# Patient Record
Sex: MALE | Race: WHITE | HISPANIC OR LATINO | URBAN - METROPOLITAN AREA
[De-identification: names, ages, dates, MRNs, and addresses within clinical notes are randomized per-mention and may not be internally consistent; named-entity substitution may affect disease eponyms.]

---

## 2017-01-30 ENCOUNTER — INPATIENT (INPATIENT)
Facility: HOSPITAL | Age: 82
LOS: 13 days | Discharge: NURSING, REHAB OR LONG-TERM CARE | End: 2017-02-13
Attending: FAMILY MEDICINE | Admitting: FAMILY MEDICINE
Payer: MEDICARE

## 2017-01-30 VITALS
HEART RATE: 98 BPM | HEIGHT: 64 IN | SYSTOLIC BLOOD PRESSURE: 194 MMHG | RESPIRATION RATE: 18 BRPM | OXYGEN SATURATION: 97 % | DIASTOLIC BLOOD PRESSURE: 88 MMHG | WEIGHT: 169.98 LBS

## 2017-01-30 LAB
ALBUMIN SERPL ELPH-MCNC: 3.9 G/DL — SIGNIFICANT CHANGE UP (ref 3.3–5)
ALP SERPL-CCNC: 74 U/L — SIGNIFICANT CHANGE UP (ref 40–120)
ALT FLD-CCNC: 21 U/L — SIGNIFICANT CHANGE UP (ref 12–78)
ANION GAP SERPL CALC-SCNC: 8 MMOL/L — SIGNIFICANT CHANGE UP (ref 5–17)
ANISOCYTOSIS BLD QL: SLIGHT — SIGNIFICANT CHANGE UP
APTT BLD: 29.2 SEC — SIGNIFICANT CHANGE UP (ref 27.5–37.4)
AST SERPL-CCNC: 18 U/L — SIGNIFICANT CHANGE UP (ref 15–37)
BASE EXCESS BLDV CALC-SCNC: 6.4 MMOL/L — HIGH (ref -2–2)
BILIRUB SERPL-MCNC: 0.4 MG/DL — SIGNIFICANT CHANGE UP (ref 0.2–1.2)
BUN SERPL-MCNC: 31 MG/DL — HIGH (ref 7–23)
CALCIUM SERPL-MCNC: 8.7 MG/DL — SIGNIFICANT CHANGE UP (ref 8.5–10.1)
CHLORIDE SERPL-SCNC: 97 MMOL/L — SIGNIFICANT CHANGE UP (ref 96–108)
CO2 SERPL-SCNC: 31 MMOL/L — SIGNIFICANT CHANGE UP (ref 22–31)
CREAT SERPL-MCNC: 3.77 MG/DL — HIGH (ref 0.5–1.3)
EOSINOPHIL NFR BLD AUTO: 2 % — SIGNIFICANT CHANGE UP (ref 0–6)
GLUCOSE SERPL-MCNC: 143 MG/DL — HIGH (ref 70–99)
HCO3 BLDV-SCNC: 32 MMOL/L — HIGH (ref 21–29)
HCT VFR BLD CALC: 39 % — SIGNIFICANT CHANGE UP (ref 39–50)
HGB BLD-MCNC: 12.8 G/DL — LOW (ref 13–17)
INR BLD: 0.96 RATIO — SIGNIFICANT CHANGE UP (ref 0.88–1.16)
LACTATE SERPL-SCNC: 1.2 MMOL/L — SIGNIFICANT CHANGE UP (ref 0.7–2)
LG PLATELETS BLD QL AUTO: SLIGHT — SIGNIFICANT CHANGE UP
LYMPHOCYTES # BLD AUTO: 2 % — LOW (ref 13–44)
MCHC RBC-ENTMCNC: 29.7 PG — SIGNIFICANT CHANGE UP (ref 27–34)
MCHC RBC-ENTMCNC: 32.9 GM/DL — SIGNIFICANT CHANGE UP (ref 32–36)
MCV RBC AUTO: 90.3 FL — SIGNIFICANT CHANGE UP (ref 80–100)
MONOCYTES NFR BLD AUTO: 12 % — SIGNIFICANT CHANGE UP (ref 2–14)
NEUTROPHILS NFR BLD AUTO: 82 % — HIGH (ref 43–77)
NEUTS BAND # BLD: 2 % — SIGNIFICANT CHANGE UP (ref 0–8)
PCO2 BLDV: 52 MMHG — HIGH (ref 35–50)
PH BLDV: 7.41 — SIGNIFICANT CHANGE UP (ref 7.35–7.45)
PLAT MORPH BLD: NORMAL — SIGNIFICANT CHANGE UP
PLATELET # BLD AUTO: 219 K/UL — SIGNIFICANT CHANGE UP (ref 150–400)
PO2 BLDV: 27 MMHG — SIGNIFICANT CHANGE UP (ref 25–45)
POIKILOCYTOSIS BLD QL AUTO: SLIGHT — SIGNIFICANT CHANGE UP
POTASSIUM SERPL-MCNC: 4 MMOL/L — SIGNIFICANT CHANGE UP (ref 3.5–5.3)
POTASSIUM SERPL-SCNC: 4 MMOL/L — SIGNIFICANT CHANGE UP (ref 3.5–5.3)
PROT SERPL-MCNC: 8 GM/DL — SIGNIFICANT CHANGE UP (ref 6–8.3)
PROTHROM AB SERPL-ACNC: 10.6 SEC — SIGNIFICANT CHANGE UP (ref 10–13.1)
RAPID RVP RESULT: SIGNIFICANT CHANGE UP
RBC # BLD: 4.32 M/UL — SIGNIFICANT CHANGE UP (ref 4.2–5.8)
RBC # FLD: 13.4 % — SIGNIFICANT CHANGE UP (ref 10.3–14.5)
RBC BLD AUTO: NORMAL — SIGNIFICANT CHANGE UP
SAO2 % BLDV: 43 % — LOW (ref 67–88)
SODIUM SERPL-SCNC: 136 MMOL/L — SIGNIFICANT CHANGE UP (ref 135–145)
TROPONIN I SERPL-MCNC: 0.11 NG/ML — HIGH (ref 0.01–0.04)
WBC # BLD: 11.2 K/UL — HIGH (ref 3.8–10.5)
WBC # FLD AUTO: 11.2 K/UL — HIGH (ref 3.8–10.5)

## 2017-01-30 PROCEDURE — 70450 CT HEAD/BRAIN W/O DYE: CPT | Mod: 26

## 2017-01-30 PROCEDURE — 99285 EMERGENCY DEPT VISIT HI MDM: CPT

## 2017-01-30 PROCEDURE — 71260 CT THORAX DX C+: CPT | Mod: 26

## 2017-01-30 PROCEDURE — 74177 CT ABD & PELVIS W/CONTRAST: CPT | Mod: 26

## 2017-01-30 PROCEDURE — 93010 ELECTROCARDIOGRAM REPORT: CPT

## 2017-01-30 PROCEDURE — 71010: CPT | Mod: 26

## 2017-01-30 RX ORDER — ACETAMINOPHEN 500 MG
650 TABLET ORAL ONCE
Qty: 0 | Refills: 0 | Status: COMPLETED | OUTPATIENT
Start: 2017-01-30 | End: 2017-01-30

## 2017-01-30 RX ORDER — PIPERACILLIN AND TAZOBACTAM 4; .5 G/20ML; G/20ML
3.38 INJECTION, POWDER, LYOPHILIZED, FOR SOLUTION INTRAVENOUS ONCE
Qty: 0 | Refills: 0 | Status: COMPLETED | OUTPATIENT
Start: 2017-01-30 | End: 2017-01-30

## 2017-01-30 RX ORDER — SODIUM CHLORIDE 9 MG/ML
500 INJECTION INTRAMUSCULAR; INTRAVENOUS; SUBCUTANEOUS ONCE
Qty: 0 | Refills: 0 | Status: COMPLETED | OUTPATIENT
Start: 2017-01-30 | End: 2017-01-30

## 2017-01-30 RX ORDER — SODIUM CHLORIDE 9 MG/ML
3 INJECTION INTRAMUSCULAR; INTRAVENOUS; SUBCUTANEOUS ONCE
Qty: 0 | Refills: 0 | Status: COMPLETED | OUTPATIENT
Start: 2017-01-30 | End: 2017-01-30

## 2017-01-30 RX ORDER — VANCOMYCIN HCL 1 G
1000 VIAL (EA) INTRAVENOUS ONCE
Qty: 0 | Refills: 0 | Status: COMPLETED | OUTPATIENT
Start: 2017-01-30 | End: 2017-01-30

## 2017-01-30 RX ADMIN — Medication 650 MILLIGRAM(S): at 13:00

## 2017-01-30 RX ADMIN — SODIUM CHLORIDE 500 MILLILITER(S): 9 INJECTION INTRAMUSCULAR; INTRAVENOUS; SUBCUTANEOUS at 11:55

## 2017-01-30 RX ADMIN — Medication 650 MILLIGRAM(S): at 21:00

## 2017-01-30 RX ADMIN — Medication 250 MILLIGRAM(S): at 16:10

## 2017-01-30 RX ADMIN — SODIUM CHLORIDE 3 MILLILITER(S): 9 INJECTION INTRAMUSCULAR; INTRAVENOUS; SUBCUTANEOUS at 18:00

## 2017-01-30 RX ADMIN — PIPERACILLIN AND TAZOBACTAM 200 GRAM(S): 4; .5 INJECTION, POWDER, LYOPHILIZED, FOR SOLUTION INTRAVENOUS at 15:13

## 2017-01-30 NOTE — ED PROVIDER NOTE - PROGRESS NOTE DETAILS
Pt seen and evaluated by ED attending (Dr. Burgos) agree with plan and assessment as set forth by Resident (Dr. Monge). Pt seen with resident.  Pt is now oriented to place and name.  States was confused earlier after dialysis and thought he was in another clinic.  Explained that we wish to do lp to r/o cns infection.   Risks and benefitis explained in Macedonian to pt.  He is in agreement to have test performed. Dr. Anaya Lp attempted by Dr. Monge under aseptic conditions without success.  Supervision by me. Pt tolerated attempts well. Dr. Anaya: Lp attempted by Dr. Monge under aseptic conditions without success.  Supervision by me. Pt tolerated attempts well. Dr. Anaya: Lp attempted by Dr. Monge under aseptic conditions without success.  Pt tolerated attempts well. Dr. Anaya: Lp attempted by Dr. Monge under aseptic conditions without success.  Pt tolerated attempts well. I was available to Dr. Monge during procedure for consultation.

## 2017-01-30 NOTE — ED PROVIDER NOTE - MEDICAL DECISION MAKING DETAILS
87M hx multiple medical comorbidities presents with AMS, cough, and RLQ this AM after routine dialysis.  Hypertensive, with mild tachycardia and rectal temp >103F.  Concern for sepsis.  will obtain labs including lactate, cultures, urine and RVP.  CXR.  given RLQ ttp will obtain CT to eval for source of infection.  500cc IVF bolus to start given ESRD.  reassess, TBA.

## 2017-01-30 NOTE — ED ADULT NURSE REASSESSMENT NOTE - NS ED NURSE REASSESS COMMENT FT1
Pt more alert at this time. Pt able to verbalize understanding of current plan of care and need for urine sample. Will continue to monitor.

## 2017-01-30 NOTE — ED PROVIDER NOTE - DETAILS:
Hx of dialysis with change in ms and fever. Pt greatly improved.  Presented by resident Dr. Monge.  Agree with plan of care.

## 2017-01-30 NOTE — ED ADULT NURSE NOTE - OBJECTIVE STATEMENT
Pt unable to verbalize what is happening. Pt presents with AMS, pt care taker states he had AMS after dialysis this AM. Spoke to pts daughter, who states this often happens and he has a h/o pneumonia.

## 2017-01-30 NOTE — ED PROCEDURE NOTE - PROCEDURE ADDITIONAL DETAILS
Unable to sample CSF fluid in both lateral recumbent and sitting position likely 2/2 positioning v habitus.

## 2017-01-30 NOTE — ED PROVIDER NOTE - PSH
AICD (automatic cardioverter/defibrillator) present    History of Hernia Repair    History of penile implant

## 2017-01-30 NOTE — ED PROVIDER NOTE - CARE PLAN
Principal Discharge DX:	Sepsis, due to unspecified organism Principal Discharge DX:	Sepsis, due to unspecified organism  Secondary Diagnosis:	Alteration in cognition

## 2017-01-30 NOTE — ED ADULT NURSE REASSESSMENT NOTE - NS ED NURSE REASSESS COMMENT FT1
MD Monge attempted LP. Pt and family updated as to current plan of care. Daughter now at pt bedside. Will continue to monitor.

## 2017-01-30 NOTE — ED ADULT NURSE REASSESSMENT NOTE - NS ED NURSE REASSESS COMMENT FT1
Pt and family aware of current plan of care. Pt and family verbalize understanding of reason for admission and have no questions at this time. Will continue to waqar.

## 2017-01-30 NOTE — ED ADULT NURSE REASSESSMENT NOTE - NS ED NURSE REASSESS COMMENT FT1
Spoke to pts daughter as to pt care and status. Pts daughter stated pt has a h/o pneumonia. MD Monge made aware. MD Monge made aware of pts BP. Unable to obtain urine sample due to being dialysised this morning, unable to straight cath pt due to penial implant, MD Monge aware. Will continue to monitor.

## 2017-01-30 NOTE — ED ADULT NURSE REASSESSMENT NOTE - NS ED NURSE REASSESS COMMENT FT1
Spoke with pt family about admission status and bed placement. Pt temp increased, MD Monge made aware, pt medicated per MD order. Will continue to monitor,

## 2017-01-30 NOTE — ED ADULT NURSE NOTE - PMH
CHF (Congestive Heart Failure)    Diabetes    Dialysis Patient    Heart Attack    High Cholesterol    HTN (Hypertension)

## 2017-01-30 NOTE — ED PROVIDER NOTE - PMH
AICD (automatic cardioverter/defibrillator) present    CHF (Congestive Heart Failure)    Diabetes    Dialysis Patient    Heart Attack    High Cholesterol    HTN (Hypertension)    Pancreatitis    Pulmonary edema

## 2017-01-30 NOTE — ED PROVIDER NOTE - NS ED ATTENDING STATEMENT MOD
I have personally seen and examined this patient. I have fully participated in the care of this patient. I have reviewed all pertinent clinical information, including history physical exam, plan and the Resident's note and agree except as noted I personally performed the services described in the documentation, reviewed the documentation recorded by the scribe in my presence and it accurately and completely records my words and action.

## 2017-01-30 NOTE — ED PROVIDER NOTE - OBJECTIVE STATEMENT
87m hx multiple medical comorbidities including DM on insulin, ESRD on HD (MWF) presents with AMS worsening after HD this morning.  + cough.  As per EMS pt was HTN with -200s and mildly tachycardic.  POC blood glucose 158 in field.  Pt Vietnamese speaking and arrived with new HHA who is also Occitan speaking and know little about the patient's history.      SHANELL - Danielle Perales [phone] 87m hx multiple medical comorbidities including DM on insulin, ESRD on HD (MWF), PPM? presents with AMS worsening after HD this morning.  + cough.  As per EMS pt was HTN with -200s and mildly tachycardic.  POC blood glucose 158 in field.  No known trauma.  Pt Uzbek speaking and arrived with new HHA who is also Bulgarian speaking and know little about the patient's history.      SHANELL - Danielle Perales [phone]

## 2017-01-30 NOTE — ED PROCEDURE NOTE - NS ED PROCEDURE ASSISTED BY
The procedure was performed independently Supervision was available/The procedure was performed independently

## 2017-01-30 NOTE — ED PROVIDER NOTE - ATTENDING CONTRIBUTION TO CARE
87M hx ESRD/HD BIBA for confusion after HD today -- febrile in ED with tender lower abd.  No other complaints.  Limited historian.  Not hypoglycemic.    Plan: labs, Abx, CT, admit Hx of dialysis with change in ms and fever. Pt greatly improved.  Presented by resident Dr. Monge.  Agree with plan of care.

## 2017-01-31 DIAGNOSIS — E11.22 TYPE 2 DIABETES MELLITUS WITH DIABETIC CHRONIC KIDNEY DISEASE: ICD-10-CM

## 2017-01-31 DIAGNOSIS — I10 ESSENTIAL (PRIMARY) HYPERTENSION: ICD-10-CM

## 2017-01-31 DIAGNOSIS — I25.10 ATHEROSCLEROTIC HEART DISEASE OF NATIVE CORONARY ARTERY WITHOUT ANGINA PECTORIS: ICD-10-CM

## 2017-01-31 DIAGNOSIS — Z99.2 DEPENDENCE ON RENAL DIALYSIS: ICD-10-CM

## 2017-01-31 DIAGNOSIS — R55 SYNCOPE AND COLLAPSE: ICD-10-CM

## 2017-01-31 DIAGNOSIS — G93.40 ENCEPHALOPATHY, UNSPECIFIED: ICD-10-CM

## 2017-01-31 DIAGNOSIS — I50.9 HEART FAILURE, UNSPECIFIED: ICD-10-CM

## 2017-01-31 DIAGNOSIS — A41.9 SEPSIS, UNSPECIFIED ORGANISM: ICD-10-CM

## 2017-01-31 DIAGNOSIS — R79.89 OTHER SPECIFIED ABNORMAL FINDINGS OF BLOOD CHEMISTRY: ICD-10-CM

## 2017-01-31 DIAGNOSIS — N18.6 END STAGE RENAL DISEASE: ICD-10-CM

## 2017-01-31 DIAGNOSIS — R41.89 OTHER SYMPTOMS AND SIGNS INVOLVING COGNITIVE FUNCTIONS AND AWARENESS: ICD-10-CM

## 2017-01-31 DIAGNOSIS — E78.5 HYPERLIPIDEMIA, UNSPECIFIED: ICD-10-CM

## 2017-01-31 DIAGNOSIS — E11.9 TYPE 2 DIABETES MELLITUS WITHOUT COMPLICATIONS: ICD-10-CM

## 2017-01-31 DIAGNOSIS — I16.0 HYPERTENSIVE URGENCY: ICD-10-CM

## 2017-01-31 DIAGNOSIS — E03.9 HYPOTHYROIDISM, UNSPECIFIED: ICD-10-CM

## 2017-01-31 DIAGNOSIS — I50.22 CHRONIC SYSTOLIC (CONGESTIVE) HEART FAILURE: ICD-10-CM

## 2017-01-31 LAB
ANION GAP SERPL CALC-SCNC: 12 MMOL/L — SIGNIFICANT CHANGE UP (ref 5–17)
BASOPHILS # BLD AUTO: 0.2 K/UL — SIGNIFICANT CHANGE UP (ref 0–0.2)
BASOPHILS NFR BLD AUTO: 1.9 % — SIGNIFICANT CHANGE UP (ref 0–2)
BUN SERPL-MCNC: 49 MG/DL — HIGH (ref 7–23)
CALCIUM SERPL-MCNC: 8.3 MG/DL — LOW (ref 8.5–10.1)
CHLORIDE SERPL-SCNC: 98 MMOL/L — SIGNIFICANT CHANGE UP (ref 96–108)
CHOLEST SERPL-MCNC: 113 MG/DL — SIGNIFICANT CHANGE UP (ref 10–199)
CK SERPL-CCNC: 403 U/L — HIGH (ref 26–308)
CK SERPL-CCNC: 454 U/L — HIGH (ref 26–308)
CK SERPL-CCNC: 501 U/L — HIGH (ref 26–308)
CO2 SERPL-SCNC: 27 MMOL/L — SIGNIFICANT CHANGE UP (ref 22–31)
CREAT SERPL-MCNC: 5.53 MG/DL — HIGH (ref 0.5–1.3)
EOSINOPHIL # BLD AUTO: 0 K/UL — SIGNIFICANT CHANGE UP (ref 0–0.5)
EOSINOPHIL NFR BLD AUTO: 0.3 % — SIGNIFICANT CHANGE UP (ref 0–6)
GLUCOSE SERPL-MCNC: 155 MG/DL — HIGH (ref 70–99)
HBA1C BLD-MCNC: 6.8 % — HIGH (ref 4–5.6)
HCT VFR BLD CALC: 37.3 % — LOW (ref 39–50)
HDLC SERPL-MCNC: 52 MG/DL — SIGNIFICANT CHANGE UP (ref 40–125)
HGB BLD-MCNC: 12.2 G/DL — LOW (ref 13–17)
LACTATE SERPL-SCNC: 0.9 MMOL/L — SIGNIFICANT CHANGE UP (ref 0.7–2)
LIPID PNL WITH DIRECT LDL SERPL: 40 MG/DL — SIGNIFICANT CHANGE UP
LYMPHOCYTES # BLD AUTO: 0.5 K/UL — LOW (ref 1–3.3)
LYMPHOCYTES # BLD AUTO: 6 % — LOW (ref 13–44)
MAGNESIUM SERPL-MCNC: 2.4 MG/DL — SIGNIFICANT CHANGE UP (ref 1.8–2.4)
MCHC RBC-ENTMCNC: 29.6 PG — SIGNIFICANT CHANGE UP (ref 27–34)
MCHC RBC-ENTMCNC: 32.7 GM/DL — SIGNIFICANT CHANGE UP (ref 32–36)
MCV RBC AUTO: 90.3 FL — SIGNIFICANT CHANGE UP (ref 80–100)
MONOCYTES # BLD AUTO: 1.3 K/UL — HIGH (ref 0–0.9)
MONOCYTES NFR BLD AUTO: 16.3 % — HIGH (ref 2–14)
NEUTROPHILS # BLD AUTO: 6.1 K/UL — SIGNIFICANT CHANGE UP (ref 1.8–7.4)
NEUTROPHILS NFR BLD AUTO: 75.6 % — SIGNIFICANT CHANGE UP (ref 43–77)
PHOSPHATE SERPL-MCNC: 4.6 MG/DL — HIGH (ref 2.5–4.5)
PLATELET # BLD AUTO: 194 K/UL — SIGNIFICANT CHANGE UP (ref 150–400)
POTASSIUM SERPL-MCNC: 4.4 MMOL/L — SIGNIFICANT CHANGE UP (ref 3.5–5.3)
POTASSIUM SERPL-SCNC: 4.4 MMOL/L — SIGNIFICANT CHANGE UP (ref 3.5–5.3)
RBC # BLD: 4.13 M/UL — LOW (ref 4.2–5.8)
RBC # FLD: 14.1 % — SIGNIFICANT CHANGE UP (ref 10.3–14.5)
SODIUM SERPL-SCNC: 137 MMOL/L — SIGNIFICANT CHANGE UP (ref 135–145)
T4 FREE SERPL-MCNC: 1.13 NG/DL — SIGNIFICANT CHANGE UP (ref 0.76–1.46)
TOTAL CHOLESTEROL/HDL RATIO MEASUREMENT: 2.2 RATIO — LOW (ref 3.4–9.6)
TRIGL SERPL-MCNC: 104 MG/DL — SIGNIFICANT CHANGE UP (ref 10–149)
TROPONIN I SERPL-MCNC: 0.1 NG/ML — HIGH (ref 0.01–0.04)
TROPONIN I SERPL-MCNC: 0.11 NG/ML — HIGH (ref 0.01–0.04)
TROPONIN I SERPL-MCNC: 0.11 NG/ML — HIGH (ref 0.01–0.04)
TROPONIN I SERPL-MCNC: 0.14 NG/ML — HIGH (ref 0.01–0.04)
TSH SERPL-MCNC: 2.16 UU/ML — SIGNIFICANT CHANGE UP (ref 0.36–3.74)
VANCOMYCIN TROUGH SERPL-MCNC: 10.8 UG/ML — SIGNIFICANT CHANGE UP (ref 10–20)
WBC # BLD: 8 K/UL — SIGNIFICANT CHANGE UP (ref 3.8–10.5)
WBC # FLD AUTO: 8 K/UL — SIGNIFICANT CHANGE UP (ref 3.8–10.5)

## 2017-01-31 PROCEDURE — 93306 TTE W/DOPPLER COMPLETE: CPT | Mod: 26

## 2017-01-31 RX ORDER — FUROSEMIDE 40 MG
40 TABLET ORAL
Qty: 0 | Refills: 0 | Status: DISCONTINUED | OUTPATIENT
Start: 2017-01-31 | End: 2017-02-02

## 2017-01-31 RX ORDER — ISOSORBIDE MONONITRATE 60 MG/1
60 TABLET, EXTENDED RELEASE ORAL DAILY
Qty: 0 | Refills: 0 | Status: DISCONTINUED | OUTPATIENT
Start: 2017-01-31 | End: 2017-02-13

## 2017-01-31 RX ORDER — DOCUSATE SODIUM 100 MG
100 CAPSULE ORAL THREE TIMES A DAY
Qty: 0 | Refills: 0 | Status: DISCONTINUED | OUTPATIENT
Start: 2017-01-31 | End: 2017-02-13

## 2017-01-31 RX ORDER — SODIUM CHLORIDE 9 MG/ML
1000 INJECTION, SOLUTION INTRAVENOUS
Qty: 0 | Refills: 0 | Status: DISCONTINUED | OUTPATIENT
Start: 2017-01-31 | End: 2017-02-13

## 2017-01-31 RX ORDER — ONDANSETRON 8 MG/1
4 TABLET, FILM COATED ORAL ONCE
Qty: 0 | Refills: 0 | Status: COMPLETED | OUTPATIENT
Start: 2017-01-31 | End: 2017-01-31

## 2017-01-31 RX ORDER — DEXTROSE 50 % IN WATER 50 %
25 SYRINGE (ML) INTRAVENOUS ONCE
Qty: 0 | Refills: 0 | Status: DISCONTINUED | OUTPATIENT
Start: 2017-01-31 | End: 2017-02-13

## 2017-01-31 RX ORDER — HYDRALAZINE HCL 50 MG
25 TABLET ORAL THREE TIMES A DAY
Qty: 0 | Refills: 0 | Status: DISCONTINUED | OUTPATIENT
Start: 2017-01-31 | End: 2017-01-31

## 2017-01-31 RX ORDER — ACETAMINOPHEN 500 MG
650 TABLET ORAL EVERY 6 HOURS
Qty: 0 | Refills: 0 | Status: DISCONTINUED | OUTPATIENT
Start: 2017-01-31 | End: 2017-02-13

## 2017-01-31 RX ORDER — METOPROLOL TARTRATE 50 MG
2.5 TABLET ORAL ONCE
Qty: 0 | Refills: 0 | Status: COMPLETED | OUTPATIENT
Start: 2017-01-31 | End: 2017-01-31

## 2017-01-31 RX ORDER — DEXTROSE 50 % IN WATER 50 %
12.5 SYRINGE (ML) INTRAVENOUS ONCE
Qty: 0 | Refills: 0 | Status: DISCONTINUED | OUTPATIENT
Start: 2017-01-31 | End: 2017-02-13

## 2017-01-31 RX ORDER — CEFEPIME 1 G/1
2000 INJECTION, POWDER, FOR SOLUTION INTRAMUSCULAR; INTRAVENOUS
Qty: 0 | Refills: 0 | Status: DISCONTINUED | OUTPATIENT
Start: 2017-01-31 | End: 2017-01-31

## 2017-01-31 RX ORDER — ONDANSETRON 8 MG/1
4 TABLET, FILM COATED ORAL EVERY 6 HOURS
Qty: 0 | Refills: 0 | Status: DISCONTINUED | OUTPATIENT
Start: 2017-01-31 | End: 2017-02-13

## 2017-01-31 RX ORDER — HYDRALAZINE HCL 50 MG
50 TABLET ORAL THREE TIMES A DAY
Qty: 0 | Refills: 0 | Status: DISCONTINUED | OUTPATIENT
Start: 2017-01-31 | End: 2017-02-02

## 2017-01-31 RX ORDER — ASPIRIN/CALCIUM CARB/MAGNESIUM 324 MG
81 TABLET ORAL DAILY
Qty: 0 | Refills: 0 | Status: DISCONTINUED | OUTPATIENT
Start: 2017-01-31 | End: 2017-02-13

## 2017-01-31 RX ORDER — LACTULOSE 10 G/15ML
20 SOLUTION ORAL ONCE
Qty: 0 | Refills: 0 | Status: COMPLETED | OUTPATIENT
Start: 2017-01-31 | End: 2017-01-31

## 2017-01-31 RX ORDER — INSULIN GLARGINE 100 [IU]/ML
25 INJECTION, SOLUTION SUBCUTANEOUS AT BEDTIME
Qty: 0 | Refills: 0 | Status: DISCONTINUED | OUTPATIENT
Start: 2017-01-31 | End: 2017-02-13

## 2017-01-31 RX ORDER — FUROSEMIDE 40 MG
40 TABLET ORAL DAILY
Qty: 0 | Refills: 0 | Status: COMPLETED | OUTPATIENT
Start: 2017-01-31 | End: 2017-02-01

## 2017-01-31 RX ORDER — SENNA PLUS 8.6 MG/1
2 TABLET ORAL AT BEDTIME
Qty: 0 | Refills: 0 | Status: DISCONTINUED | OUTPATIENT
Start: 2017-01-31 | End: 2017-02-13

## 2017-01-31 RX ORDER — AMPICILLIN TRIHYDRATE 250 MG
2 CAPSULE ORAL ONCE
Qty: 0 | Refills: 0 | Status: DISCONTINUED | OUTPATIENT
Start: 2017-01-31 | End: 2017-01-31

## 2017-01-31 RX ORDER — GLUCAGON INJECTION, SOLUTION 0.5 MG/.1ML
1 INJECTION, SOLUTION SUBCUTANEOUS ONCE
Qty: 0 | Refills: 0 | Status: DISCONTINUED | OUTPATIENT
Start: 2017-01-31 | End: 2017-02-13

## 2017-01-31 RX ORDER — INSULIN GLARGINE 100 [IU]/ML
15 INJECTION, SOLUTION SUBCUTANEOUS AT BEDTIME
Qty: 0 | Refills: 0 | Status: DISCONTINUED | OUTPATIENT
Start: 2017-01-31 | End: 2017-01-31

## 2017-01-31 RX ORDER — ATORVASTATIN CALCIUM 80 MG/1
40 TABLET, FILM COATED ORAL DAILY
Qty: 0 | Refills: 0 | Status: DISCONTINUED | OUTPATIENT
Start: 2017-01-31 | End: 2017-02-06

## 2017-01-31 RX ORDER — HEPARIN SODIUM 5000 [USP'U]/ML
5000 INJECTION INTRAVENOUS; SUBCUTANEOUS EVERY 8 HOURS
Qty: 0 | Refills: 0 | Status: DISCONTINUED | OUTPATIENT
Start: 2017-01-31 | End: 2017-02-13

## 2017-01-31 RX ORDER — ACYCLOVIR SODIUM 500 MG
385 VIAL (EA) INTRAVENOUS ONCE
Qty: 0 | Refills: 0 | Status: DISCONTINUED | OUTPATIENT
Start: 2017-01-31 | End: 2017-01-31

## 2017-01-31 RX ORDER — INSULIN LISPRO 100/ML
VIAL (ML) SUBCUTANEOUS
Qty: 0 | Refills: 0 | Status: DISCONTINUED | OUTPATIENT
Start: 2017-01-31 | End: 2017-02-13

## 2017-01-31 RX ORDER — CARVEDILOL PHOSPHATE 80 MG/1
25 CAPSULE, EXTENDED RELEASE ORAL EVERY 12 HOURS
Qty: 0 | Refills: 0 | Status: DISCONTINUED | OUTPATIENT
Start: 2017-01-31 | End: 2017-02-02

## 2017-01-31 RX ORDER — DEXTROSE 50 % IN WATER 50 %
1 SYRINGE (ML) INTRAVENOUS ONCE
Qty: 0 | Refills: 0 | Status: DISCONTINUED | OUTPATIENT
Start: 2017-01-31 | End: 2017-02-13

## 2017-01-31 RX ORDER — INSULIN GLARGINE 100 [IU]/ML
15 INJECTION, SOLUTION SUBCUTANEOUS AT BEDTIME
Qty: 0 | Refills: 0 | Status: DISCONTINUED | OUTPATIENT
Start: 2017-01-31 | End: 2017-02-06

## 2017-01-31 RX ADMIN — INSULIN GLARGINE 25 UNIT(S): 100 INJECTION, SOLUTION SUBCUTANEOUS at 22:10

## 2017-01-31 RX ADMIN — Medication 100 MILLIGRAM(S): at 05:49

## 2017-01-31 RX ADMIN — Medication 50 MILLIGRAM(S): at 22:09

## 2017-01-31 RX ADMIN — Medication 40 MILLIGRAM(S): at 18:26

## 2017-01-31 RX ADMIN — Medication 25 MILLIGRAM(S): at 04:16

## 2017-01-31 RX ADMIN — Medication 81 MILLIGRAM(S): at 18:34

## 2017-01-31 RX ADMIN — ISOSORBIDE MONONITRATE 60 MILLIGRAM(S): 60 TABLET, EXTENDED RELEASE ORAL at 18:41

## 2017-01-31 RX ADMIN — Medication 25 MILLIGRAM(S): at 18:33

## 2017-01-31 RX ADMIN — Medication 40 MILLIGRAM(S): at 05:47

## 2017-01-31 RX ADMIN — HEPARIN SODIUM 5000 UNIT(S): 5000 INJECTION INTRAVENOUS; SUBCUTANEOUS at 18:31

## 2017-01-31 RX ADMIN — ONDANSETRON 4 MILLIGRAM(S): 8 TABLET, FILM COATED ORAL at 18:38

## 2017-01-31 RX ADMIN — HEPARIN SODIUM 5000 UNIT(S): 5000 INJECTION INTRAVENOUS; SUBCUTANEOUS at 05:47

## 2017-01-31 RX ADMIN — CARVEDILOL PHOSPHATE 25 MILLIGRAM(S): 80 CAPSULE, EXTENDED RELEASE ORAL at 18:26

## 2017-01-31 RX ADMIN — HEPARIN SODIUM 5000 UNIT(S): 5000 INJECTION INTRAVENOUS; SUBCUTANEOUS at 22:09

## 2017-01-31 RX ADMIN — Medication 650 MILLIGRAM(S): at 23:16

## 2017-01-31 RX ADMIN — Medication 2.5 MILLIGRAM(S): at 18:37

## 2017-01-31 NOTE — CONSULT NOTE ADULT - SUBJECTIVE AND OBJECTIVE BOX
HPI:  87 year old male with past medical history of iddm. htn. cad. chf. ppm, pe, esrd on hd (mwf w/ Dr Lerma) comes with AMS and dizziness with headaches after HD yest am, pt reports he usually has dizzy spells and feels "out of it" after HD, his BP in the ER was noted to be high with systolic BP of up to 190-200s with mild degree of tachycardia, no fever, no wbc ct, reported slight headache but attributed to his BP, no neck pain or stiffness, no recent illness, no cough, no dysuria, no rashes, no diarrhea, there was concern for meningitis so he was given vanco/cefepime acyclovir.     his BP is better today  no fevers  reports slight frontal headache but better than yest  feels better   no confusion    PMH: as above  PSH: as above    Meds: per reconcilation sheet, noted below  MEDICATIONS  (STANDING):  atorvastatin Oral Tab/Cap - Peds 40milliGRAM(s) Oral daily  aspirin  chewable 81milliGRAM(s) Oral daily  carvedilol 25milliGRAM(s) Oral every 12 hours  furosemide    Tablet 40milliGRAM(s) Oral two times a day  hydrALAZINE 25milliGRAM(s) Oral three times a day  insulin glargine Injectable (LANTUS) 25Unit(s) SubCutaneous at bedtime  insulin glargine Injectable (LANTUS) 15Unit(s) SubCutaneous at bedtime  heparin  Injectable 5000Unit(s) SubCutaneous every 8 hours  docusate sodium 100milliGRAM(s) Oral three times a day  insulin lispro (HumaLOG) corrective regimen sliding scale  SubCutaneous three times a day before meals  dextrose 5%. 1000milliLiter(s) IV Continuous <Continuous>  dextrose 50% Injectable 12.5Gram(s) IV Push once  dextrose 50% Injectable 25Gram(s) IV Push once  dextrose 50% Injectable 25Gram(s) IV Push once  isosorbide   mononitrate ER Tablet (IMDUR) 60milliGRAM(s) Oral daily    MEDICATIONS  (PRN):  senna 2Tablet(s) Oral at bedtime PRN Constipation  dextrose Gel 1Dose(s) Oral once PRN Blood Glucose LESS THAN 70 milliGRAM(s)/deciliter  glucagon  Injectable 1milliGRAM(s) IntraMuscular once PRN Glucose LESS THAN 70 milligrams/deciliter    Allergies    No Known Allergies    Intolerances      Social: no smoking, no alcohol, no illegal drugs; no recent travel, no exposure to TB  FAMILY HISTORY:  No pertinent family history in first degree relatives    ROS: the patient denies fever, no chills, no HA, no dizziness, no sore throat, no blurry vision, no CP, no palpitations, no SOB, no cough, no abdominal pain, no diarrhea, no N/V, no dysuria, no leg pain, no claudication, no rash, no joint aches, no rectal pain or bleeding, no night sweats  Vital Signs Last 24 Hrs  T(C): 37.4, Max: 39.5 (01-30 @ 12:00)  T(F): 99.3, Max: 103.1 (01-30 @ 12:00)  HR: 96 (74 - 104)  BP: 204/79 (136/60 - 204/79)  BP(mean): --  RR: 15 (15 - 23)  SpO2: 96% (95% - 100%)  Daily Height in cm: 162.56 (30 Jan 2017 11:34)    Daily   Constitutional: well -appearing, NAD  HEENT: NC/AT, EOMI, PERRLA  Neck: supple  Back: no tenderness  Respiratory: clear  Cardiovascular: S1S2 regular, no murmurs  Abdomen: soft, not tender, not distended, positive BS  Genitourinary: deferred  Rectal: deferred  Musculoskeletal: no muscle tenderness, no joint swelling or tenderness  Extremities: no pedal edema  Neurological: AxOx3, moving all extremities, no focal deficits  Skin: no rashes, R arm fistula                          12.2   8.0   )-----------( 194      ( 31 Jan 2017 08:43 )             37.3     31 Jan 2017 08:43    137    |  98     |  49     ----------------------------<  155    4.4     |  27     |  5.53     Ca    8.3        31 Jan 2017 08:43  Phos  4.6       31 Jan 2017 08:43  Mg     2.4       31 Jan 2017 08:43    TPro  8.0    /  Alb  3.9    /  TBili  0.4    /  DBili  x      /  AST  18     /  ALT  21     /  AlkPhos  74     30 Jan 2017 12:08     LIVER FUNCTIONS - ( 30 Jan 2017 12:08 )  Alb: 3.9 g/dL / Pro: 8.0 gm/dL / ALK PHOS: 74 U/L / ALT: 21 U/L / AST: 18 U/L / GGT: x             PROCEDURE:   CT of the Chest, Abdomen and Pelvis was performed with intravenous   contrast.   Intravenous contrast: 90 ml Omnipaque 350. 10 ml discarded.  Oral contrast: positive contrast was administered.  Sagittal and coronal reformats were performed.    FINDINGS:    CHEST:     LOWER NECK: Within normal limits.  AXILLA, MEDIASTINUM AND GERRI: No lymphadenopathy.  VESSELS: Coronary arterial calcifications. Normal caliber aorta and main   pulmonary artery.  HEART: Cardiomegaly.    PLEURA: No pleural effusion.  LUNGS AND LARGE AIRWAYS: Patent central airways. No pulmonary nodules.   Groundglass opacities in the dependent lower lobes bilaterally compatible   with atelectasis.  CHEST WALL:  Left-sided biventricular AICD in place. Bilateral   gynecomastia.    ABDOMEN AND PELVIS:    LIVER: Within normal limits.  SPLEEN: Within normal limits.  PANCREAS: Within normal limits.  BILE DUCTS: Normal caliber.  GALLBLADDER: Cholelithiasis.  ADRENALS: Within normal limits.  KIDNEYS/URETERS: Mild bilateral renal atrophy and poor enhancement of the   kidneys compatible with the patient's history of end-stage renal disease.   Subcentimeter low-density lesion in the left kidney which is too small to   characterize. 3 mm nonobstructing right renal calculus.    RETROPERITONEUM: No lymphadenopathy.    VESSELS:  Within normal limits.    BOWEL: No bowel obstruction. Appendix normal.  PERITONEUM: No ascites or pneumoperitoneum.    REPRODUCTIVE ORGANS: Penile prosthesis noted with fluid-filled reservoir   in the lower anterior pelvis. Prostate and seminal vesicles are   unremarkable.  BLADDER: Within normal limits.    ABDOMINAL WALL: Within normal limits.  BONES: Multilevel degenerative arthritic changes and L3 inferior endplate   deformity, unchanged.    IMPRESSION: No source of fever identified.    Radiology:  FINDINGS:    CHEST:     LOWER NECK: Within normal limits.  AXILLA, MEDIASTINUM AND GERRI: No lymphadenopathy.  VESSELS: Coronary arterial calcifications. Normal caliber aorta and main   pulmonary artery.  HEART: Cardiomegaly.    PLEURA: No pleural effusion.  LUNGS AND LARGE AIRWAYS: Patent central airways. No pulmonary nodules.   Groundglass opacities in the dependent lower lobes bilaterally compatible   with atelectasis.  CHEST WALL:  Left-sided biventricular AICD in place. Bilateral   gynecomastia.    ABDOMEN AND PELVIS:    LIVER: Within normal limits.  SPLEEN: Within normal limits.  PANCREAS: Within normal limits.  BILE DUCTS: Normal caliber.  GALLBLADDER: Cholelithiasis.  ADRENALS: Within normal limits.  KIDNEYS/URETERS: Mild bilateral renal atrophy and poor enhancement of the   kidneys compatible with the patient's history of end-stage renal disease.   Subcentimeter low-density lesion in the left kidney which is too small to   characterize. 3 mm nonobstructing right renal calculus.    RETROPERITONEUM: No lymphadenopathy.    VESSELS:  Within normal limits.    BOWEL: No bowel obstruction. Appendix normal.  PERITONEUM: No ascites or pneumoperitoneum.    REPRODUCTIVE ORGANS: Penile prosthesis noted with fluid-filled reservoir   in the lower anterior pelvis. Prostate and seminal vesicles are   unremarkable.  BLADDER: Within normal limits.    ABDOMINAL WALL: Within normal limits.  BONES: Multilevel degenerative arthritic changes and L3 inferior endplate   deformity, unchanged.    IMPRESSION: No source of fever identified.      INTERPRETATION:  CT head without IV contrast     CPT 98482    CLINICAL INFORMATION: Fever and altered mental status  TECHNIQUE: Contiguous axial 5 mm sections were obtained through the head.   Coronal and sagittal reconstructions were reformatted.    FINDINGS: Prior CT of the head dated 10/23/2016 is available for review.    The brain demonstrates mild periventricular white matter microvascular   ischemia.. No acute cerebral territorial infarction is seen.  No   intracranial hemorrhage is found.  No mass effect is found in the brain.      The ventricles, sulci and basal cisterns appear unremarkable.  The orbits are unremarkable.   The visualized portions of the paranasal sinuses are clear.  The osseous   calvarium is intact.    IMPRESSION: Mild periventricular white matter microvascular ischemia. No   evidence of acute intracranial pathology.

## 2017-01-31 NOTE — H&P ADULT - PROBLEM SELECTOR PLAN 1
-follow up lactate in am   - start vanco and ceftriaxone  - id consult - r/o meningitis vs unknown source  - neurology consult   - id consult  -follow up lactate in am   - start cefepime ampicillin acyclovir  - s/p vanco and zosyn in ed   - droplet isolation precuations - r/o meningitis vs unknown source  - neurology consult   - id consult  -follow up lactate in am   - start cefepime ampicillin acyclovir as per Dr Pulliam  - s/p vanco and zosyn in ed   - droplet isolation precuations

## 2017-01-31 NOTE — PROGRESS NOTE ADULT - SUBJECTIVE AND OBJECTIVE BOX
PCP    Patient is a 87y old  Male who presents with a chief complaint of altered mental status and dizzyness (2017 03:51)        HPI:       87 year old male with PMH of  DM2 on insulin, HTN, CAD, CHF, PPM, PE, ESRD on HD  (mwf w/ Dr Lerma) comes with AMS and dizziness after HD this morning. Patient states that he usually gets dizziness and slightly out of it after dialysis.     As per ED note states with EMS pt was HTN with -200s and mildly tachycardic.  POC blood glucose 158 in field.  No known trauma.         # [id number] pt reports feeling dizzy, occasional cough, denies CP, palpitations, has lose BM today, but was constipated prior that, denies HA, neck stiffness, visual changes, evaluated by neurology and ID, plan of care discussed    Review of system- Rest of the review of system are normal except mentioned in HPI    SUBJECTIVE & OBJECTIVE:  No new complaint    T(C): 37.5, Max: 37.7 ( @ 20:45)  HR: 90 (82 - 96)  BP: 216/70 (154/63 - 216/70)  RR: 21 (13 - 30)  SpO2: 99% (92% - 100%)  Wt(kg): --      LABS:                        12.2   8.0   )-----------( 194      ( 2017 08:43 )             37.3     2017 08:43    137    |  98     |  49     ----------------------------<  155    4.4     |  27     |  5.53     Ca    8.3        2017 08:43  Phos  4.6       2017 08:43  Mg     2.4       2017 08:43    TPro  8.0    /  Alb  3.9    /  TBili  0.4    /  DBili  x      /  AST  18     /  ALT  21     /  AlkPhos  74     2017 12:08    PT/INR - ( 2017 12:08 )   PT: 10.6 sec;   INR: 0.96 ratio         PTT - ( 2017 12:08 )  PTT:29.2 sec      CAPILLARY BLOOD GLUCOSE      CAPILLARY BLOOD GLUCOSE    CAPILLARY BLOOD GLUCOSE      CARDIAC MARKERS ( 2017 14:01 )  0.097 ng/mL / x     / 454 U/L / x     / x      CARDIAC MARKERS ( 2017 08:43 )  0.114 ng/mL / x     / 501 U/L / x     / x      CARDIAC MARKERS ( 2017 23:39 )  0.112 ng/mL / x     / x     / x     / x      CARDIAC MARKERS ( 2017 20:42 )  0.108 ng/mL / x     / x     / x     / x            RECENT CULTURES:      RADIOLOGY & ADDITIONAL TESTS:      PHYSICAL EXAM:    GENERAL: NAD, well-groomed, well-developed  HEAD:  Atraumatic, Normocephalic  EYES: EOMI, PERRLA, conjunctiva and sclera clear  HEENT: Moist mucous membranes  NECK: Supple, No JVD  NERVOUS SYSTEM:  Alert & Oriented X3, Motor Strength 5/5 B/L upper and lower extremities; DTRs 2+ intact and symmetric  CHEST/LUNG: Clear to auscultation bilaterally; No rales, rhonchi, wheezing, or rubs  HEART: Regular rate and rhythm; No murmurs, rubs, or gallops  ABDOMEN: Soft, Nontender, distended; Bowel sounds present  GENITOURINARY- Voiding, no palpable bladder  EXTREMITIES:  2+ Peripheral Pulses, No clubbing, cyanosis, or edema  MUSCULOSKELETAL No muscle tenderness, Muscle tone normal, No joint tenderness, no Joint swelling, Joint range of motion-normal  SKIN-no rash, no lesion  CNS- alert, oriented X3, non focal       Daily     Daily Weight in k.5 (2017 05:45)      atorvastatin Oral Tab/Cap - Peds 40milliGRAM(s) Oral daily  aspirin  chewable 81milliGRAM(s) Oral daily  carvedilol 25milliGRAM(s) Oral every 12 hours  furosemide    Tablet 40milliGRAM(s) Oral two times a day  hydrALAZINE 25milliGRAM(s) Oral three times a day  insulin glargine Injectable (LANTUS) 25Unit(s) SubCutaneous at bedtime  insulin glargine Injectable (LANTUS) 15Unit(s) SubCutaneous at bedtime  heparin  Injectable 5000Unit(s) SubCutaneous every 8 hours  senna 2Tablet(s) Oral at bedtime PRN  docusate sodium 100milliGRAM(s) Oral three times a day  insulin lispro (HumaLOG) corrective regimen sliding scale  SubCutaneous three times a day before meals  dextrose 5%. 1000milliLiter(s) IV Continuous <Continuous>  dextrose Gel 1Dose(s) Oral once PRN  dextrose 50% Injectable 12.5Gram(s) IV Push once  dextrose 50% Injectable 25Gram(s) IV Push once  dextrose 50% Injectable 25Gram(s) IV Push once  glucagon  Injectable 1milliGRAM(s) IntraMuscular once PRN  isosorbide   mononitrate ER Tablet (IMDUR) 60milliGRAM(s) Oral daily

## 2017-01-31 NOTE — PROVIDER CONTACT NOTE (OTHER) - SITUATION
Called Dr Hutchins regarding consult and spoke to service
Called Dr Kay regarding consult and spoke to service.
Called Dr Lerma regarding consult and spoke to her service
called Dr Downey regarding consult.  spoke to service

## 2017-01-31 NOTE — CONSULT NOTE ADULT - SUBJECTIVE AND OBJECTIVE BOX
87 year old man  past medical history of iddm. htn. cad. chf. ppm, pe, esrd on hd. Admitted after passing out, feeling dizzy after hemodialysis. He reports other similar episodes in the past. Denies recent illness, fever or chills. Presently feeling better, c/o mild frontal headache. no nausea, vertigo, unilateral weakness or numbness of the extremities.  No  chest pains, palpitations, SOB, no stomach pain, diarrhea. History of numbness of both feet. Denies recurrent headaches.  ROS: as above  Social hx: no alcohol, no drugs.    PAST MEDICAL & SURGICAL HISTORY:  CHF (Congestive Heart Failure)  HTN (Hypertension)  High Cholesterol  Heart Attack  Dialysis Patient    Diabetes  History of Hernia Repair      MEDICATIONS  (STANDING):  atorvastatin Oral Tab/Cap - Peds 40milliGRAM(s) Oral daily  aspirin  chewable 81milliGRAM(s) Oral daily  carvedilol 25milliGRAM(s) Oral every 12 hours  furosemide    Tablet 40milliGRAM(s) Oral two times a day  hydrALAZINE 25milliGRAM(s) Oral three times a day  insulin glargine Injectable (LANTUS) 25Unit(s) SubCutaneous at bedtime  insulin glargine Injectable (LANTUS) 15Unit(s) SubCutaneous at bedtime  heparin  Injectable 5000Unit(s) SubCutaneous every 8 hours  docusate sodium 100milliGRAM(s) Oral three times a day  insulin lispro (HumaLOG) corrective regimen sliding scale  SubCutaneous three times a day before meals  dextrose 5%. 1000milliLiter(s) IV Continuous <Continuous>  dextrose 50% Injectable 12.5Gram(s) IV Push once  dextrose 50% Injectable 25Gram(s) IV Push once  dextrose 50% Injectable 25Gram(s) IV Push once  isosorbide   mononitrate ER Tablet (IMDUR) 60milliGRAM(s) Oral daily    MEDICATIONS  (PRN):  senna 2Tablet(s) Oral at bedtime PRN Constipation  dextrose Gel 1Dose(s) Oral once PRN Blood Glucose LESS THAN 70 milliGRAM(s)/deciliter  glucagon  Injectable 1milliGRAM(s) IntraMuscular once PRN Glucose LESS THAN 70 milligrams/deciliter      Allergies    No Known Allergies    Intolerances        SOCIAL HISTORY:    FAMILY HISTORY:  No pertinent family history in first degree relatives      REVIEW OF SYSTEMS      General:	    Skin/Breast:  	  Ophthalmologic:  	  ENMT:	    Respiratory and Thorax:  	  Cardiovascular:	    Gastrointestinal:	    Genitourinary:	    Musculoskeletal:	    Neurological:	    Psychiatric:	    Hematology/Lymphatics:	    Endocrine:	    Allergic/Immunologic:	    Vital Signs Last 24 Hrs  T(C): 37.4, Max: 37.7 (01-30 @ 20:45)  T(F): 99.3, Max: 99.9 (01-30 @ 20:45)  HR: 96 (74 - 104)  BP: 204/79 (136/60 - 204/79)  BP(mean): --  RR: 15 (15 - 23)  SpO2: 96% (95% - 100%)    Neurological Exam:    HF: Patient is alert and oriented x 3. There is no aphasia or dysarthria. Follows complex commands.     CN: Vision is intact to confrontation. Pupils are equal and reactive. Extra ocular muscles are intact. There is no facial droop or asymmetry. Tongue is midline. Sensation is intact in the face. Other CN II-XII are intact.     Motor: motor examination all muscles are 5/5 and there is no pronator drift.     Sensory: reduced distally, symmetrical to pinprick and touch. VS intact    DTR: 1/4 all 4 extremities. Babinski is negative bilateral.    Co-ord:  Finger to finger to nose is intact. Heel to shin is intact bilaterally.     Gait/balance: Not tested.     PHYSICAL EXAM:      Constitutional:    Eyes:PERRL, EOMI:    Neck: supple, no lymphadenopathy       LABS:                        12.2   8.0   )-----------( 194      ( 31 Jan 2017 08:43 )             37.3     31 Jan 2017 08:43    137    |  98     |  49     ----------------------------<  155    4.4     |  27     |  5.53     Ca    8.3        31 Jan 2017 08:43  Phos  4.6       31 Jan 2017 08:43  Mg     2.4       31 Jan 2017 08:43    TPro  8.0    /  Alb  3.9    /  TBili  0.4    /  DBili  x      /  AST  18     /  ALT  21     /  AlkPhos  74     30 Jan 2017 12:08    PT/INR - ( 30 Jan 2017 12:08 )   PT: 10.6 sec;   INR: 0.96 ratio         PTT - ( 30 Jan 2017 12:08 )  PTT:29.2 sec      RADIOLOGY & ADDITIONAL STUDIES:  IMPRESSION: Mild periventricular white matter microvascular ischemia. No   evidence of acute intracranial pathology.

## 2017-01-31 NOTE — CONSULT NOTE ADULT - PROBLEM SELECTOR RECOMMENDATION 9
Will continue TIW HD.  Due for tx in am.
continue present care, f/u blood pressure, management as per medicine.

## 2017-01-31 NOTE — PROGRESS NOTE ADULT - ASSESSMENT
87 year old male with PMH of  DM2 on insulin, HTN, CAD, CHF, PPM, PE, ESRD on HD  (mwf w/ Dr Lerma) comes with AMS and dizziness after HD this morning. Patient states that he usually gets dizziness and slightly out of it after dialysis.     As per ED note states with EMS pt was HTN with -200s and mildly tachycardic.  POC blood glucose 158 in field.  No known trauma.

## 2017-01-31 NOTE — H&P ADULT - PROBLEM SELECTOR PLAN 8
- most likely secondary to demand ischemia given pt possible sepsis - most likely secondary to demand ischemia given pt possible sepsis  - cardiology consult

## 2017-01-31 NOTE — PHARMACY COMMUNICATION NOTE - COMMENTS
order for atorvastatin has "caps/peds" attached to it and will not link to proper product. please dc and enter atorvastatin 40mg product

## 2017-01-31 NOTE — H&P ADULT - NSHPLABSRESULTS_GEN_ALL_CORE
ct head- Mild periventricular white matter microvascular ischemia. No   evidence of acute intracranial pathology.     ct chest/ abdo/pelvis- no pathology noticed     cxr- No active pulmonary disease.                          12.8   11.2  )-----------( 219      ( 30 Jan 2017 12:08 )             39.0   30 Jan 2017 12:08    136    |  97     |  31     ----------------------------<  143    4.0     |  31     |  3.77     Ca    8.7        30 Jan 2017 12:08    TPro  8.0    /  Alb  3.9    /  TBili  0.4    /  DBili  x      /  AST  18     /  ALT  21     /  AlkPhos  74     30 Jan 2017 12:08

## 2017-01-31 NOTE — CONSULT NOTE ADULT - SUBJECTIVE AND OBJECTIVE BOX
Chief complaints.  Pt presented post syncope when he returned home post HD tx yesterday.  Pt unable to provide any detailed hx during tx.    HPI: Hx  Obtained via  phone  #[phone]  86 yo man with ESRD on maintenance HD TIW for several years.  Apparently had no difficulty with dialysis yesterday.  Per outpt HD unit RN, pt maintained stable BP throughout TX.  D/ольга home with /70 with no complaints.  Had 2 kg UF.  Pt reports feeling dizzy and falling.  Apparently had LOC.  In ER, noted with increased BP and mild tachycardia.  Pt was given abtx to treat for possible infectious etiology.        PMHX and PSHX.:  1.HTN  2.DM  3.CAD  4.CHF  5.PPM    FAMILY HISTORY:  No pertinent family history in first degree relatives      SOCIAL HISTORY :Lives at home with family, no hx of smoking /ETOH.  ALLERGIES:  No Known Allergies    REVIEW OF SYSTEMS :  Overall feeling OK.  No current HA.  Denies SOB.  Denies chest discomfort.  No adbdominal discomfort.  No pain in ext.    MEDICATIONS  (STANDING):  atorvastatin Oral Tab/Cap - Peds 40milliGRAM(s) Oral daily  aspirin  chewable 81milliGRAM(s) Oral daily  carvedilol 25milliGRAM(s) Oral every 12 hours  furosemide    Tablet 40milliGRAM(s) Oral two times a day  hydrALAZINE 25milliGRAM(s) Oral three times a day  insulin glargine Injectable (LANTUS) 25Unit(s) SubCutaneous at bedtime  insulin glargine Injectable (LANTUS) 15Unit(s) SubCutaneous at bedtime  heparin  Injectable 5000Unit(s) SubCutaneous every 8 hours  docusate sodium 100milliGRAM(s) Oral three times a day  insulin lispro (HumaLOG) corrective regimen sliding scale  SubCutaneous three times a day before meals  dextrose 5%. 1000milliLiter(s) IV Continuous <Continuous>  dextrose 50% Injectable 12.5Gram(s) IV Push once  dextrose 50% Injectable 25Gram(s) IV Push once  dextrose 50% Injectable 25Gram(s) IV Push once  isosorbide   mononitrate ER Tablet (IMDUR) 60milliGRAM(s) Oral daily  cefepime  IVPB 2000milliGRAM(s) IV Intermittent every 48 hours  ampicillin  IVPB 2Gram(s) IV Intermittent once  acyclovir IVPB 385milliGRAM(s) IV Intermittent once    MEDICATIONS  (PRN):  senna 2Tablet(s) Oral at bedtime PRN Constipation  dextrose Gel 1Dose(s) Oral once PRN Blood Glucose LESS THAN 70 milliGRAM(s)/deciliter  glucagon  Injectable 1milliGRAM(s) IntraMuscular once PRN Glucose LESS THAN 70 milligrams/deciliter         Vital Signs Last 24 Hrs  T(C): 37.4, Max: 39.5 (01-30 @ 12:00)  T(F): 99.3, Max: 103.1 (01-30 @ 12:00)  HR: 96 (74 - 104)  BP: 204/79 (136/60 - 204/79)  BP(mean): --  RR: 15 (15 - 23)  SpO2: 96% (95% - 100%)  Daily Height in cm: 162.56 (30 Jan 2017 11:34)    Daily   I&O's Summary      PHYSICAL EXAM:  Via  phone, A + O x 3  GEN: Resting comfortably            No apparent distress  HEENT: WNL  NECK :supple  CV: s1s2 RRR  LUNGS: Clear to aus  ABD: Soft  EXT: No edema    LABS:                        12.2   8.0   )-----------( 194      ( 31 Jan 2017 08:43 )             37.3     31 Jan 2017 08:43    137    |  98     |  49     ----------------------------<  155    4.4     |  27     |  5.53     Ca    8.3        31 Jan 2017 08:43  Phos  4.6       31 Jan 2017 08:43  Mg     2.4       31 Jan 2017 08:43    TPro  8.0    /  Alb  3.9    /  TBili  0.4    /  DBili  x      /  AST  18     /  ALT  21     /  AlkPhos  74     30 Jan 2017 12:08    PT/INR - ( 30 Jan 2017 12:08 )   PT: 10.6 sec;   INR: 0.96 ratio         PTT - ( 30 Jan 2017 12:08 )  PTT:29.2 sec    Phosphorus Level, Serum: 4.6 mg/dL (01-31 @ 08:43)  Magnesium, Serum: 2.4 mg/dL (01-31 @ 08:43)

## 2017-01-31 NOTE — H&P ADULT - NSHPPHYSICALEXAM_GEN_ALL_CORE
PHYSICAL EXAM:    Constitutional: aaox2 (alert to name and place)    HEENT: EOMI, NCAT    Respiratory: CTA b/l no wheezing no rhonchi    Cardiovascular: +s1/s2    Gastrointestinal: soft nontender bs+    Extremities: no edema b/l le

## 2017-01-31 NOTE — CONSULT NOTE ADULT - PROBLEM SELECTOR RECOMMENDATION 2
Adjust meds.  Pt apparently has been having labile BPs.  Monitor for compliance.
improving. f/u renal, hemodialysis.

## 2017-01-31 NOTE — H&P ADULT - HISTORY OF PRESENT ILLNESS
87 year old male with past medical history of iddm. htn. cad. chf. ppm, pe, esrd on hd (mwf w/ Dr Lerma) comes in after 87 year old male with past medical history of iddm. htn. cad. chf. ppm, pe, esrd on hd (mwf w/ Dr Lerma) comes with AMS and dizzyness after HD this morning. Patient states that he usually gets dizzyness and slightly out of it after dialysis. As per ED note states with EMS pt was HTN with -200s and mildly tachycardic.  POC blood glucose 158 in field.  No known trauma.

## 2017-02-01 DIAGNOSIS — E03.9 HYPOTHYROIDISM, UNSPECIFIED: ICD-10-CM

## 2017-02-01 DIAGNOSIS — Z96.89 PRESENCE OF OTHER SPECIFIED FUNCTIONAL IMPLANTS: Chronic | ICD-10-CM

## 2017-02-01 DIAGNOSIS — Z95.810 PRESENCE OF AUTOMATIC (IMPLANTABLE) CARDIAC DEFIBRILLATOR: Chronic | ICD-10-CM

## 2017-02-01 LAB
ALBUMIN SERPL ELPH-MCNC: 3.2 G/DL — LOW (ref 3.3–5)
ANION GAP SERPL CALC-SCNC: 14 MMOL/L — SIGNIFICANT CHANGE UP (ref 5–17)
ANISOCYTOSIS BLD QL: SLIGHT — SIGNIFICANT CHANGE UP
BASOPHILS # BLD AUTO: 0.1 K/UL — SIGNIFICANT CHANGE UP (ref 0–0.2)
BASOPHILS NFR BLD AUTO: 0.8 % — SIGNIFICANT CHANGE UP (ref 0–2)
BUN SERPL-MCNC: 81 MG/DL — HIGH (ref 7–23)
BUN SERPL-MCNC: 81 MG/DL — HIGH (ref 7–23)
CALCIUM SERPL-MCNC: 7.9 MG/DL — LOW (ref 8.5–10.1)
CHLORIDE SERPL-SCNC: 98 MMOL/L — SIGNIFICANT CHANGE UP (ref 96–108)
CO2 SERPL-SCNC: 25 MMOL/L — SIGNIFICANT CHANGE UP (ref 22–31)
CREAT SERPL-MCNC: 8.03 MG/DL — HIGH (ref 0.5–1.3)
ELLIPTOCYTES BLD QL SMEAR: SLIGHT — SIGNIFICANT CHANGE UP
EOSINOPHIL # BLD AUTO: 0 K/UL — SIGNIFICANT CHANGE UP (ref 0–0.5)
EOSINOPHIL NFR BLD AUTO: 0 % — SIGNIFICANT CHANGE UP (ref 0–6)
GIANT PLATELETS BLD QL SMEAR: PRESENT — SIGNIFICANT CHANGE UP
GLUCOSE SERPL-MCNC: 171 MG/DL — HIGH (ref 70–99)
HAV IGM SER-ACNC: SIGNIFICANT CHANGE UP
HBV CORE IGM SER-ACNC: SIGNIFICANT CHANGE UP
HBV SURFACE AG SER-ACNC: SIGNIFICANT CHANGE UP
HCT VFR BLD CALC: 35.5 % — LOW (ref 39–50)
HCV AB S/CO SERPL IA: 0.11 S/CO — SIGNIFICANT CHANGE UP
HCV AB SERPL-IMP: SIGNIFICANT CHANGE UP
HGB BLD-MCNC: 11.7 G/DL — LOW (ref 13–17)
LACTATE SERPL-SCNC: 0.6 MMOL/L — LOW (ref 0.7–2)
LYMPHOCYTES # BLD AUTO: 0.8 K/UL — LOW (ref 1–3.3)
LYMPHOCYTES # BLD AUTO: 7.6 % — LOW (ref 13–44)
MANUAL DIF COMMENT BLD-IMP: SIGNIFICANT CHANGE UP
MCHC RBC-ENTMCNC: 29.5 PG — SIGNIFICANT CHANGE UP (ref 27–34)
MCHC RBC-ENTMCNC: 33 GM/DL — SIGNIFICANT CHANGE UP (ref 32–36)
MCV RBC AUTO: 89.4 FL — SIGNIFICANT CHANGE UP (ref 80–100)
MONOCYTES # BLD AUTO: 1.5 K/UL — HIGH (ref 0–0.9)
MONOCYTES NFR BLD AUTO: 15 % — HIGH (ref 2–14)
NEUTROPHILS # BLD AUTO: 7.9 K/UL — HIGH (ref 1.8–7.4)
NEUTROPHILS NFR BLD AUTO: 76.7 % — SIGNIFICANT CHANGE UP (ref 43–77)
PHOSPHATE SERPL-MCNC: 8.4 MG/DL — HIGH (ref 2.5–4.5)
PLAT MORPH BLD: NORMAL — SIGNIFICANT CHANGE UP
PLATELET # BLD AUTO: 187 K/UL — SIGNIFICANT CHANGE UP (ref 150–400)
PLATELET COUNT - ESTIMATE: NORMAL — SIGNIFICANT CHANGE UP
POIKILOCYTOSIS BLD QL AUTO: SLIGHT — SIGNIFICANT CHANGE UP
POTASSIUM SERPL-MCNC: 5 MMOL/L — SIGNIFICANT CHANGE UP (ref 3.5–5.3)
POTASSIUM SERPL-SCNC: 5 MMOL/L — SIGNIFICANT CHANGE UP (ref 3.5–5.3)
RBC # BLD: 3.97 M/UL — LOW (ref 4.2–5.8)
RBC # FLD: 13.4 % — SIGNIFICANT CHANGE UP (ref 10.3–14.5)
RBC BLD AUTO: (no result)
SODIUM SERPL-SCNC: 137 MMOL/L — SIGNIFICANT CHANGE UP (ref 135–145)
WBC # BLD: 10.3 K/UL — SIGNIFICANT CHANGE UP (ref 3.8–10.5)
WBC # FLD AUTO: 10.3 K/UL — SIGNIFICANT CHANGE UP (ref 3.8–10.5)

## 2017-02-01 PROCEDURE — 71010: CPT | Mod: 26

## 2017-02-01 RX ORDER — LEVOTHYROXINE SODIUM 125 MCG
75 TABLET ORAL DAILY
Qty: 0 | Refills: 0 | Status: DISCONTINUED | OUTPATIENT
Start: 2017-02-01 | End: 2017-02-13

## 2017-02-01 RX ORDER — ERYTHROPOIETIN 10000 [IU]/ML
2000 INJECTION, SOLUTION INTRAVENOUS; SUBCUTANEOUS ONCE
Qty: 0 | Refills: 0 | Status: DISCONTINUED | OUTPATIENT
Start: 2017-02-01 | End: 2017-02-01

## 2017-02-01 RX ORDER — HEPARIN SODIUM 5000 [USP'U]/ML
1500 INJECTION INTRAVENOUS; SUBCUTANEOUS ONCE
Qty: 0 | Refills: 0 | Status: COMPLETED | OUTPATIENT
Start: 2017-02-01 | End: 2017-02-03

## 2017-02-01 RX ADMIN — Medication 100 MILLIGRAM(S): at 23:24

## 2017-02-01 RX ADMIN — HEPARIN SODIUM 5000 UNIT(S): 5000 INJECTION INTRAVENOUS; SUBCUTANEOUS at 06:22

## 2017-02-01 RX ADMIN — Medication 40 MILLIGRAM(S): at 18:33

## 2017-02-01 RX ADMIN — Medication 50 MILLIGRAM(S): at 16:40

## 2017-02-01 RX ADMIN — HEPARIN SODIUM 5000 UNIT(S): 5000 INJECTION INTRAVENOUS; SUBCUTANEOUS at 23:25

## 2017-02-01 RX ADMIN — ISOSORBIDE MONONITRATE 60 MILLIGRAM(S): 60 TABLET, EXTENDED RELEASE ORAL at 18:32

## 2017-02-01 RX ADMIN — Medication 81 MILLIGRAM(S): at 16:36

## 2017-02-01 RX ADMIN — HEPARIN SODIUM 5000 UNIT(S): 5000 INJECTION INTRAVENOUS; SUBCUTANEOUS at 16:36

## 2017-02-01 RX ADMIN — Medication 100 MILLIGRAM(S): at 16:35

## 2017-02-01 RX ADMIN — CARVEDILOL PHOSPHATE 25 MILLIGRAM(S): 80 CAPSULE, EXTENDED RELEASE ORAL at 18:31

## 2017-02-01 NOTE — PROGRESS NOTE ADULT - SUBJECTIVE AND OBJECTIVE BOX
PCP    Patient is a 87y old  Male who presents with a chief complaint of altered mental status and dizzyness (2017 03:51)        HPI:       87 year old male with PMH of  DM2 on insulin, HTN, CAD, CHF, s/p CRT-D, PE, ESRD on HD  (mwf w/ Dr Lerma)  admitted on 17 with AMS and dizziness after HD this morning. Patient states that he usually gets dizziness and slightly out of it after dialysis.     As per ED note states with EMS pt was HTN with -200s and mildly tachycardic.  POC blood glucose 158 in field.  No known trauma.         # [id number] pt reports feeling dizzy, occasional cough, denies CP, palpitations, has lose BM today, but was constipated prior that, denies HA, neck stiffness, visual changes, evaluated by neurology and ID, plan of care discussed     BP better controlled, + RUQ pain, reports nausea on and off, fever 101 overnight, encephalopathy persist, daughter at bedside , plan of care discussed    Review of system- Rest of the review of system are normal except mentioned in HPI    SUBJECTIVE & OBJECTIVE:  No new complaint    T(C): 37.5, Max: 37.7 (- @ 20:45)  HR: 90 (82 - 96)  BP: 216/70 (154/63 - 216/70)  RR: 21 (13 - 30)  SpO2: 99% (92% - 100%)  Wt(kg): --      LABS:                        12.2   8.0   )-----------( 194      ( 2017 08:43 )             37.3     2017 08:43    137    |  98     |  49     ----------------------------<  155    4.4     |  27     |  5.53     Ca    8.3        2017 08:43  Phos  4.6       2017 08:43  Mg     2.4       2017 08:43    TPro  8.0    /  Alb  3.9    /  TBili  0.4    /  DBili  x      /  AST  18     /  ALT  21     /  AlkPhos  74     2017 12:08    PT/INR - ( 2017 12:08 )   PT: 10.6 sec;   INR: 0.96 ratio         PTT - ( 2017 12:08 )  PTT:29.2 sec      CAPILLARY BLOOD GLUCOSE      CAPILLARY BLOOD GLUCOSE    CAPILLARY BLOOD GLUCOSE      CARDIAC MARKERS ( 2017 14:01 )  0.097 ng/mL / x     / 454 U/L / x     / x      CARDIAC MARKERS ( 2017 08:43 )  0.114 ng/mL / x     / 501 U/L / x     / x      CARDIAC MARKERS ( 2017 23:39 )  0.112 ng/mL / x     / x     / x     / x      CARDIAC MARKERS ( 2017 20:42 )  0.108 ng/mL / x     / x     / x     / x            RECENT CULTURES:      RADIOLOGY & ADDITIONAL TESTS:      PHYSICAL EXAM:    GENERAL: NAD, well-groomed, well-developed  HEAD:  Atraumatic, Normocephalic  EYES: EOMI, PERRLA, conjunctiva and sclera clear  HEENT: Moist mucous membranes  NECK: Supple, No JVD  NERVOUS SYSTEM:  Alert & Oriented X3, Motor Strength 5/5 B/L upper and lower extremities; DTRs 2+ intact and symmetric  CHEST/LUNG: Clear to auscultation bilaterally; No rales, rhonchi, wheezing, or rubs  HEART: Regular rate and rhythm; No murmurs, rubs, or gallops  ABDOMEN: Soft, Nontender, distended; Bowel sounds present  GENITOURINARY- Voiding, no palpable bladder  EXTREMITIES:  2+ Peripheral Pulses, No clubbing, cyanosis, or edema  MUSCULOSKELETAL No muscle tenderness, Muscle tone normal, No joint tenderness, no Joint swelling, Joint range of motion-normal  SKIN-no rash, no lesion  CNS- alert, oriented X3, non focal       Daily     Daily Weight in k.5 (2017 05:45)      atorvastatin Oral Tab/Cap - Peds 40milliGRAM(s) Oral daily  aspirin  chewable 81milliGRAM(s) Oral daily  carvedilol 25milliGRAM(s) Oral every 12 hours  furosemide    Tablet 40milliGRAM(s) Oral two times a day  hydrALAZINE 25milliGRAM(s) Oral three times a day  insulin glargine Injectable (LANTUS) 25Unit(s) SubCutaneous at bedtime  insulin glargine Injectable (LANTUS) 15Unit(s) SubCutaneous at bedtime  heparin  Injectable 5000Unit(s) SubCutaneous every 8 hours  senna 2Tablet(s) Oral at bedtime PRN  docusate sodium 100milliGRAM(s) Oral three times a day  insulin lispro (HumaLOG) corrective regimen sliding scale  SubCutaneous three times a day before meals  dextrose 5%. 1000milliLiter(s) IV Continuous <Continuous>  dextrose Gel 1Dose(s) Oral once PRN  dextrose 50% Injectable 12.5Gram(s) IV Push once  dextrose 50% Injectable 25Gram(s) IV Push once  dextrose 50% Injectable 25Gram(s) IV Push once  glucagon  Injectable 1milliGRAM(s) IntraMuscular once PRN  isosorbide   mononitrate ER Tablet (IMDUR) 60milliGRAM(s) Oral daily

## 2017-02-01 NOTE — PROGRESS NOTE ADULT - SUBJECTIVE AND OBJECTIVE BOX
NEPHROLOGY INTERVAL HPI/OVERNIGHT EVENTS:  history with nurse   states had three episodes of weakness after HD in past 3 months ( once nov, dec and now).  No LOC with no prodromal symptoms notes.  seen now on HD with alert to person time and recognized me.      Noted with BP that is less variable on his home dose of medications.  States that he gets dizzy alot and that he takes his medications as directed.      MEDICATIONS  (STANDING):  atorvastatin Oral Tab/Cap - Peds 40milliGRAM(s) Oral daily  aspirin  chewable 81milliGRAM(s) Oral daily  carvedilol 25milliGRAM(s) Oral every 12 hours  furosemide    Tablet 40milliGRAM(s) Oral two times a day  insulin glargine Injectable (LANTUS) 25Unit(s) SubCutaneous at bedtime  insulin glargine Injectable (LANTUS) 15Unit(s) SubCutaneous at bedtime  heparin  Injectable 5000Unit(s) SubCutaneous every 8 hours  docusate sodium 100milliGRAM(s) Oral three times a day  insulin lispro (HumaLOG) corrective regimen sliding scale  SubCutaneous three times a day before meals  dextrose 5%. 1000milliLiter(s) IV Continuous <Continuous>  dextrose 50% Injectable 12.5Gram(s) IV Push once  dextrose 50% Injectable 25Gram(s) IV Push once  dextrose 50% Injectable 25Gram(s) IV Push once  isosorbide   mononitrate ER Tablet (IMDUR) 60milliGRAM(s) Oral daily  hydrALAZINE 50milliGRAM(s) Oral three times a day  heparin  Injectable 1500Unit(s) IV Push once    MEDICATIONS  (PRN):  senna 2Tablet(s) Oral at bedtime PRN Constipation  dextrose Gel 1Dose(s) Oral once PRN Blood Glucose LESS THAN 70 milliGRAM(s)/deciliter  glucagon  Injectable 1milliGRAM(s) IntraMuscular once PRN Glucose LESS THAN 70 milligrams/deciliter  ondansetron Injectable 4milliGRAM(s) IV Push every 6 hours PRN Nausea and/or Vomiting  acetaminophen  Suppository 650milliGRAM(s) Rectal every 6 hours PRN For Temp greater than 38 C (100.4 F)      Allergies    No Known Allergies    Intolerances        I&O's Detail  I & Os for 24h ending 2017 07:00  =============================================  IN:    Oral Fluid: 600 ml    Total IN: 600 ml  ---------------------------------------------  OUT:    Total OUT: 0 ml  ---------------------------------------------  Total NET: 600 ml    I & Os for current day (as of 2017 12:58)  =============================================  IN:    Oral Fluid: 100 ml    Total IN: 100 ml  ---------------------------------------------  OUT:    Total OUT: 0 ml  ---------------------------------------------  Total NET: 100 ml      .    Patient was seen and evaluated on dialysis.   Patient is tolerating the procedure well.   Continue dialysis:   Dialyzer:          QB:        QD:   Goal UF ___ over ___ Hours       Vital Signs Last 24 Hrs  T(C): 36.4, Max: 39.6 ( @ 22:00)  T(F): 97.6, Max: 103.2 ( @ 22:00)  HR: 74 (65 - 96)  BP: 134/59 (90/38 - 216/70)  BP(mean): 71 (50 - 109)  RR: 18 (13 - 34)  SpO2: 98% (93% - 100%)  Daily     Daily Weight in k.5 (2017 05:00)    PHYSICAL EXAM:  General: alert. awake Ox3  HEENT: MMM  CV: s1s2 rrr  LUNGS: B/L CTA  EXT: no edema    LABS:                        11.7   10.3  )-----------( 187      ( 2017 10:20 )             35.5     2017 10:20    137    |  98     |  81     ----------------------------<  171    5.0     |  25     |  8.03     Ca    7.9        2017 10:20  Phos  8.4       2017 10:20  Mg     2.4       2017 08:43    TPro  x      /  Alb  3.2    /  TBili  x      /  DBili  x      /  AST  x      /  ALT  x      /  AlkPhos  x      2017 10:20        Phosphorus Level, Serum: 8.4 mg/dL ( @ 10:20)

## 2017-02-01 NOTE — PROGRESS NOTE ADULT - ASSESSMENT
87 year old male with past medical history of iddm. htn. cad. chf. ppm, pe, esrd on hd (mwf w/ Dr Lerma) comes with AMS and dizziness with headaches after HD yest am, pt reports he usually has dizzy spells and feels "out of it" after HD, his BP in the ER was noted to be high with systolic BP of up to 190-200s with mild degree of tachycardia, no fever, no wbc ct, reported slight headache but attributed to his BP, no neck pain or stiffness, no recent illness, no cough, no dysuria, no rashes, no diarrhea, there was concern for meningitis so he was given vanco/cefepime acyclovir.     1. fevers/nausea/vomiting/diarrhea - ? etiology ? viral gastroenteritis ? vs unclear etiology    - slightly confused post HD, lungs clear, no rashes, no abd pain, fevers o/n to 103, BP improved from prior    - previous CT abd/pelvis/chest/head (-) unremarkable for infectious etiology and cultures no growth    - no nuchal rigidity or neck stiffness, fevers and low suspicion for meningeal infection     - recommend repeat blood cultures, urinalysis, urine cx, xray to evaluate for source    - would observe off antibiotics at this time     - if still w/loose stools, check stool studies, C diff PCR    - if still febrile, no improvement, start empiric vancomycin/zosyn and repeat CT chest/abd/pelvis    - case d/w Dr. Shaver    - supportive care    - monitor temps    - f/u CBC    2. other issues - iddm. htn. cad. chf. ppm, pe, esrd on hd - care per medicine

## 2017-02-01 NOTE — PROGRESS NOTE ADULT - SUBJECTIVE AND OBJECTIVE BOX
87 year old male with past medical history of iddm. htn. cad. chf. ppm, pe, esrd on hd (mwf w/ Dr Lerma) comes with AMS and dizziness with headaches after HD yest am, pt reports he usually has dizzy spells and feels "out of it" after HD, his BP in the ER was noted to be high with systolic BP of up to 190-200s with mild degree of tachycardia, no fever, no wbc ct, reported slight headache but attributed to his BP, no neck pain or stiffness, no recent illness, no cough, no dysuria, no rashes, no diarrhea, there was concern for meningitis so he was given vanco/cefepime acyclovir.     had persistent fevers o/n to 101-102 103 tmax with episodes of nausea/vomiting/loose stool  completing HD now  feels like he is "seeing things", slightly confused  no cough, no abd pain, no rashes, no other complaints    MEDICATIONS  (STANDING):  atorvastatin Oral Tab/Cap - Peds 40milliGRAM(s) Oral daily  aspirin  chewable 81milliGRAM(s) Oral daily  carvedilol 25milliGRAM(s) Oral every 12 hours  furosemide    Tablet 40milliGRAM(s) Oral two times a day  insulin glargine Injectable (LANTUS) 25Unit(s) SubCutaneous at bedtime  insulin glargine Injectable (LANTUS) 15Unit(s) SubCutaneous at bedtime  heparin  Injectable 5000Unit(s) SubCutaneous every 8 hours  docusate sodium 100milliGRAM(s) Oral three times a day  insulin lispro (HumaLOG) corrective regimen sliding scale  SubCutaneous three times a day before meals  dextrose 5%. 1000milliLiter(s) IV Continuous <Continuous>  dextrose 50% Injectable 12.5Gram(s) IV Push once  dextrose 50% Injectable 25Gram(s) IV Push once  dextrose 50% Injectable 25Gram(s) IV Push once  isosorbide   mononitrate ER Tablet (IMDUR) 60milliGRAM(s) Oral daily  hydrALAZINE 50milliGRAM(s) Oral three times a day  heparin  Injectable 1500Unit(s) IV Push once    MEDICATIONS  (PRN):  senna 2Tablet(s) Oral at bedtime PRN Constipation  dextrose Gel 1Dose(s) Oral once PRN Blood Glucose LESS THAN 70 milliGRAM(s)/deciliter  glucagon  Injectable 1milliGRAM(s) IntraMuscular once PRN Glucose LESS THAN 70 milligrams/deciliter  ondansetron Injectable 4milliGRAM(s) IV Push every 6 hours PRN Nausea and/or Vomiting  acetaminophen  Suppository 650milliGRAM(s) Rectal every 6 hours PRN For Temp greater than 38 C (100.4 F)      Vital Signs Last 24 Hrs  T(C): 36, Max: 39.6 (01-31 @ 22:00)  T(F): 96.8, Max: 103.2 (01-31 @ 22:00)  HR: 78 (65 - 96)  BP: 114/76 (90/38 - 216/70)  BP(mean): 71 (50 - 109)  RR: 18 (13 - 34)  SpO2: 98% (93% - 100%)    Physical Exam:    Constitutional: well -appearing, NAD  HEENT: NC/AT, EOMI, PERRLA  Neck: supple  Back: no tenderness  Respiratory: clear  Cardiovascular: S1S2 regular, no murmurs  Abdomen: soft, not tender, not distended, positive BS  Genitourinary: deferred  Rectal: deferred  Musculoskeletal: no muscle tenderness, no joint swelling or tenderness  Extremities: no pedal edema  Neurological:  moving all extremities, no focal deficits  Skin: no rashes, R arm fistula               Labs:                        11.7   10.3  )-----------( 187      ( 01 Feb 2017 10:20 )             35.5     01 Feb 2017 10:20    137    |  98     |  81     ----------------------------<  171    5.0     |  25     |  8.03     Ca    7.9        01 Feb 2017 10:20  Phos  8.4       01 Feb 2017 10:20  Mg     2.4       31 Jan 2017 08:43    TPro  x      /  Alb  3.2    /  TBili  x      /  DBili  x      /  AST  x      /  ALT  x      /  AlkPhos  x      01 Feb 2017 10:20       Vancomycin Level, Trough: 10.8 ug/mL (01-31 @ 08:43)      Cultures:                PROCEDURE:   CT of the Chest, Abdomen and Pelvis was performed with intravenous   contrast.   Intravenous contrast: 90 ml Omnipaque 350. 10 ml discarded.  Oral contrast: positive contrast was administered.  Sagittal and coronal reformats were performed.    FINDINGS:    CHEST:     LOWER NECK: Within normal limits.  AXILLA, MEDIASTINUM AND GERRI: No lymphadenopathy.  VESSELS: Coronary arterial calcifications. Normal caliber aorta and main   pulmonary artery.  HEART: Cardiomegaly.    PLEURA: No pleural effusion.  LUNGS AND LARGE AIRWAYS: Patent central airways. No pulmonary nodules.   Groundglass opacities in the dependent lower lobes bilaterally compatible   with atelectasis.  CHEST WALL:  Left-sided biventricular AICD in place. Bilateral   gynecomastia.    ABDOMEN AND PELVIS:    LIVER: Within normal limits.  SPLEEN: Within normal limits.  PANCREAS: Within normal limits.  BILE DUCTS: Normal caliber.  GALLBLADDER: Cholelithiasis.  ADRENALS: Within normal limits.  KIDNEYS/URETERS: Mild bilateral renal atrophy and poor enhancement of the   kidneys compatible with the patient's history of end-stage renal disease.   Subcentimeter low-density lesion in the left kidney which is too small to   characterize. 3 mm nonobstructing right renal calculus.    RETROPERITONEUM: No lymphadenopathy.    VESSELS:  Within normal limits.    BOWEL: No bowel obstruction. Appendix normal.  PERITONEUM: No ascites or pneumoperitoneum.    REPRODUCTIVE ORGANS: Penile prosthesis noted with fluid-filled reservoir   in the lower anterior pelvis. Prostate and seminal vesicles are   unremarkable.  BLADDER: Within normal limits.    ABDOMINAL WALL: Within normal limits.  BONES: Multilevel degenerative arthritic changes and L3 inferior endplate   deformity, unchanged.    IMPRESSION: No source of fever identified.    Radiology:  FINDINGS:    CHEST:     LOWER NECK: Within normal limits.  AXILLA, MEDIASTINUM AND GERRI: No lymphadenopathy.  VESSELS: Coronary arterial calcifications. Normal caliber aorta and main   pulmonary artery.  HEART: Cardiomegaly.    PLEURA: No pleural effusion.  LUNGS AND LARGE AIRWAYS: Patent central airways. No pulmonary nodules.   Groundglass opacities in the dependent lower lobes bilaterally compatible   with atelectasis.  CHEST WALL:  Left-sided biventricular AICD in place. Bilateral   gynecomastia.    ABDOMEN AND PELVIS:    LIVER: Within normal limits.  SPLEEN: Within normal limits.  PANCREAS: Within normal limits.  BILE DUCTS: Normal caliber.  GALLBLADDER: Cholelithiasis.  ADRENALS: Within normal limits.  KIDNEYS/URETERS: Mild bilateral renal atrophy and poor enhancement of the   kidneys compatible with the patient's history of end-stage renal disease.   Subcentimeter low-density lesion in the left kidney which is too small to   characterize. 3 mm nonobstructing right renal calculus.    RETROPERITONEUM: No lymphadenopathy.    VESSELS:  Within normal limits.    BOWEL: No bowel obstruction. Appendix normal.  PERITONEUM: No ascites or pneumoperitoneum.    REPRODUCTIVE ORGANS: Penile prosthesis noted with fluid-filled reservoir   in the lower anterior pelvis. Prostate and seminal vesicles are   unremarkable.  BLADDER: Within normal limits.    ABDOMINAL WALL: Within normal limits.  BONES: Multilevel degenerative arthritic changes and L3 inferior endplate   deformity, unchanged.    IMPRESSION: No source of fever identified.      INTERPRETATION:  CT head without IV contrast     CPT 05358    CLINICAL INFORMATION: Fever and altered mental status  TECHNIQUE: Contiguous axial 5 mm sections were obtained through the head.   Coronal and sagittal reconstructions were reformatted.    FINDINGS: Prior CT of the head dated 10/23/2016 is available for review.    The brain demonstrates mild periventricular white matter microvascular   ischemia.. No acute cerebral territorial infarction is seen.  No   intracranial hemorrhage is found.  No mass effect is found in the brain.      The ventricles, sulci and basal cisterns appear unremarkable.  The orbits are unremarkable.   The visualized portions of the paranasal sinuses are clear.  The osseous   calvarium is intact.    IMPRESSION: Mild periventricular white matter microvascular ischemia. No   evidence of acute intracranial pathology.

## 2017-02-01 NOTE — CONSULT NOTE ADULT - SUBJECTIVE AND OBJECTIVE BOX
PCP:    REQUESTING PHYSICIAN: Dr Shaver    REASON FOR CONSULT: Positive Troponins    CHIEF COMPLAINT: Positive Troponins    HPI:  87 year old male with past medical history of iddm. htn. cad. chf. ppm, pe, esrd on hd (mwf w/ Dr Lerma) comes with AMS and dizzyness after HD this morning. Patient states that he usually gets dizzyness and slightly out of it after dialysis. As per ED note states with EMS pt was HTN with -200s and mildly tachycardic.  POC blood glucose 158 in field.  No known trauma. (2017 03:51)    - asked to consult on weakness and positive troponins. Patient appears confused and not able to offer any information.   Temperature yesterday.   Elevated blood pressure. didn't have dialysis yesterday.   Yesterday was vomiting. blood pressure improved once they were able to give him his blood pressure medications.         PAST MEDICAL & SURGICAL HISTORY:  Pulmonary edema  Pancreatitis  AICD (automatic cardioverter/defibrillator) present  CHF (Congestive Heart Failure)  HTN (Hypertension)  High Cholesterol  Heart Attack  Dialysis Patient  Dialysis Patient  Dialysis Patient  Dialysis Patient  Diabetes  AICD (automatic cardioverter/defibrillator) present  History of penile implant  History of Hernia Repair    MEDICATIONS  (STANDING):  atorvastatin Oral Tab/Cap - Peds 40milliGRAM(s) Oral daily  aspirin  chewable 81milliGRAM(s) Oral daily  carvedilol 25milliGRAM(s) Oral every 12 hours  furosemide    Tablet 40milliGRAM(s) Oral two times a day  insulin glargine Injectable (LANTUS) 25Unit(s) SubCutaneous at bedtime  insulin glargine Injectable (LANTUS) 15Unit(s) SubCutaneous at bedtime  heparin  Injectable 5000Unit(s) SubCutaneous every 8 hours  docusate sodium 100milliGRAM(s) Oral three times a day  insulin lispro (HumaLOG) corrective regimen sliding scale  SubCutaneous three times a day before meals  dextrose 5%. 1000milliLiter(s) IV Continuous <Continuous>  dextrose 50% Injectable 12.5Gram(s) IV Push once  dextrose 50% Injectable 25Gram(s) IV Push once  dextrose 50% Injectable 25Gram(s) IV Push once  isosorbide   mononitrate ER Tablet (IMDUR) 60milliGRAM(s) Oral daily  hydrALAZINE 50milliGRAM(s) Oral three times a day  epoetin indu Injectable 2000Unit(s) IV Push once  heparin  Injectable 1500Unit(s) IV Push once    MEDICATIONS  (PRN):  senna 2Tablet(s) Oral at bedtime PRN Constipation  dextrose Gel 1Dose(s) Oral once PRN Blood Glucose LESS THAN 70 milliGRAM(s)/deciliter  glucagon  Injectable 1milliGRAM(s) IntraMuscular once PRN Glucose LESS THAN 70 milligrams/deciliter  ondansetron Injectable 4milliGRAM(s) IV Push every 6 hours PRN Nausea and/or Vomiting  acetaminophen  Suppository 650milliGRAM(s) Rectal every 6 hours PRN For Temp greater than 38 C (100.4 F)    Allergies    No Known Allergies    Intolerances      FAMILY HISTORY:  No pertinent family history in first degree relatives        REVIEW OF SYSTEMS:    CONSTITUTIONAL: No weakness, fevers or chills  EYES/ENT: No visual changes;  No vertigo or throat pain   NECK: No pain or stiffness  RESPIRATORY: No cough, wheezing, hemoptysis; No shortness of breath  CARDIOVASCULAR: No chest pain or palpitations  GASTROINTESTINAL: No abdominal or epigastric pain. No nausea, vomiting, or hematemesis; No diarrhea or constipation. No melena or hematochezia.  GENITOURINARY: No dysuria, frequency or hematuria  NEUROLOGICAL: No numbness or weakness  SKIN: No itching, burning, rashes, or lesions   All other review of systems is negative unless indicated above      PHYSICAL EXAM:  Daily     Daily Weight in k.5 (2017 05:00)  Vital Signs Last 24 Hrs  T(C): 36.4, Max: 39.6 ( @ 22:00)  T(F): 97.6, Max: 103.2 ( @ 22:00)  HR: 67 (67 - 96)  BP: 127/52 (90/38 - 216/70)  BP(mean): 71 (50 - 109)  RR: 20 (13 - 34)  SpO2: 98% (92% - 100%)    Constitutional: NAD, awake and alert, well-developed  HEENT: PERR, EOMI, Normal Hearing, MMM  Neck: Soft and supple, No LAD, No JVD  Respiratory: Breath sounds are clear bilaterally, No wheezing, rales or rhonchi  Cardiovascular: S1 and S2, regular rate and rhythm, no Murmurs, gallops or rubs  Gastrointestinal: Bowel Sounds present, soft, nontender, nondistended, no guarding, no rebound  Extremities: No peripheral edema  Vascular: 2+ peripheral pulses  Neurological: A/O x 3, no focal deficits  Musculoskeletal: 5/5 strength b/l upper and lower extremities  Skin: No rashes    LABS: All Labs Reviewed:                        12.2   8.0   )-----------( 194      ( 2017 08:43 )             37.3     2017 08:43    137    |  98     |  49     ----------------------------<  155    4.4     |  27     |  5.53     Ca    8.3        2017 08:43  Phos  4.6       2017 08:43  Mg     2.4       2017 08:43    TPro  8.0    /  Alb  3.9    /  TBili  0.4    /  DBili  x      /  AST  18     /  ALT  21     /  AlkPhos  74     2017 12:08    PT/INR - ( 2017 12:08 )   PT: 10.6 sec;   INR: 0.96 ratio         PTT - ( 2017 12:08 )  PTT:29.2 sec  CARDIAC MARKERS ( 2017 22:19 )  0.144 ng/mL / x     / 403 U/L / x     / x      CARDIAC MARKERS ( 2017 14:01 )  0.097 ng/mL / x     / 454 U/L / x     / x      CARDIAC MARKERS ( 2017 08:43 )  0.114 ng/mL / x     / 501 U/L / x     / x      CARDIAC MARKERS ( 2017 23:39 )  0.112 ng/mL / x     / x     / x     / x      CARDIAC MARKERS ( 2017 20:42 )  0.108 ng/mL / x     / x     / x     / x          Blood Culture: Organism --  Gram Stain Blood -- Gram Stain --  Specimen Source .Blood None  Culture-Blood --    Organism --  Gram Stain Blood -- Gram Stain --  Specimen Source .Blood None  Culture-Blood --        RADIOLOGY/EKG: Paced Rhythm.    CARDIOLOGY TESTING:  Echo:    There is mild thickening of both mitral valve leaflets. The leaflet   opening appears normal.   Moderate (2+) mitral regurgitation is present.   Fibrocalcific changes noted to the aortic valve leaflets with mild   restriction in leaflet excursion. Trace aortic regurgitation is present.   Well seated prosthetic valve in the aortic position. Peak trans-trans   valve gradient is 23.43mmHg, consistent with   Mild aortic stenosis is present.   The tricuspid valve is not well visualized, but is probably normal.   Trace tricuspid valve regurgitation is present.   Pulmonic valve not well seen, probably normal pulmonic valve function.   The left atrium is normal.   Borderline septal left ventricular hypertrophy is present. Left ventricle   function is mildly impaired; estimated left ventricle function is 45-50%   The right atrium is normal.   The right ventricle is normal in size, wall thickness, wall motion, and   contractility.   A device wire is seen in the RV Enmanuel.   Trace pericardial effusion cannot be ruled out

## 2017-02-01 NOTE — PROGRESS NOTE ADULT - ASSESSMENT
88 y/o HM with ESRD on HD presents with weakness and LOC after HD with evidence of HTN urgency.  Febrile illness with RVP and Blood culures negative.  Has had hx of intermitant weakness after HD with generalized dizziness.      - tolerating HD with goal uf of 0.5 kg on 2k, epogen on hold  - BFR of 400 on 2k  - cardiology input noted, await ECHO and will fu the tele report  - off abx   - neuro input noted

## 2017-02-02 LAB
ALBUMIN SERPL ELPH-MCNC: 3.1 G/DL — LOW (ref 3.3–5)
ALP SERPL-CCNC: 67 U/L — SIGNIFICANT CHANGE UP (ref 40–120)
ALT FLD-CCNC: 28 U/L — SIGNIFICANT CHANGE UP (ref 12–78)
AMMONIA BLD-MCNC: 15 UMOL/L — SIGNIFICANT CHANGE UP (ref 11–32)
ANION GAP SERPL CALC-SCNC: 10 MMOL/L — SIGNIFICANT CHANGE UP (ref 5–17)
AST SERPL-CCNC: 35 U/L — SIGNIFICANT CHANGE UP (ref 15–37)
BILIRUB DIRECT SERPL-MCNC: <0.1 MG/DL — SIGNIFICANT CHANGE UP (ref 0–0.2)
BILIRUB INDIRECT FLD-MCNC: >0.2 MG/DL — SIGNIFICANT CHANGE UP (ref 0.2–1)
BILIRUB SERPL-MCNC: 0.3 MG/DL — SIGNIFICANT CHANGE UP (ref 0.2–1.2)
BUN SERPL-MCNC: 52 MG/DL — HIGH (ref 7–23)
CALCIUM SERPL-MCNC: 7.9 MG/DL — LOW (ref 8.5–10.1)
CHLORIDE SERPL-SCNC: 96 MMOL/L — SIGNIFICANT CHANGE UP (ref 96–108)
CO2 SERPL-SCNC: 30 MMOL/L — SIGNIFICANT CHANGE UP (ref 22–31)
CREAT SERPL-MCNC: 6.02 MG/DL — HIGH (ref 0.5–1.3)
GLUCOSE SERPL-MCNC: 122 MG/DL — HIGH (ref 70–99)
LIDOCAIN IGE QN: 584 U/L — HIGH (ref 73–393)
POTASSIUM SERPL-MCNC: 4.4 MMOL/L — SIGNIFICANT CHANGE UP (ref 3.5–5.3)
POTASSIUM SERPL-SCNC: 4.4 MMOL/L — SIGNIFICANT CHANGE UP (ref 3.5–5.3)
PROT SERPL-MCNC: 6.8 GM/DL — SIGNIFICANT CHANGE UP (ref 6–8.3)
SODIUM SERPL-SCNC: 136 MMOL/L — SIGNIFICANT CHANGE UP (ref 135–145)
VIT B12 SERPL-MCNC: 1004 PG/ML — HIGH (ref 243–894)

## 2017-02-02 PROCEDURE — 76700 US EXAM ABDOM COMPLETE: CPT | Mod: 26

## 2017-02-02 RX ORDER — PIPERACILLIN AND TAZOBACTAM 4; .5 G/20ML; G/20ML
3.38 INJECTION, POWDER, LYOPHILIZED, FOR SOLUTION INTRAVENOUS EVERY 12 HOURS
Qty: 0 | Refills: 0 | Status: DISCONTINUED | OUTPATIENT
Start: 2017-02-02 | End: 2017-02-03

## 2017-02-02 RX ORDER — CARVEDILOL PHOSPHATE 80 MG/1
12.5 CAPSULE, EXTENDED RELEASE ORAL EVERY 12 HOURS
Qty: 0 | Refills: 0 | Status: DISCONTINUED | OUTPATIENT
Start: 2017-02-02 | End: 2017-02-05

## 2017-02-02 RX ORDER — HYDRALAZINE HCL 50 MG
25 TABLET ORAL THREE TIMES A DAY
Qty: 0 | Refills: 0 | Status: DISCONTINUED | OUTPATIENT
Start: 2017-02-02 | End: 2017-02-02

## 2017-02-02 RX ORDER — HYDRALAZINE HCL 50 MG
25 TABLET ORAL EVERY 8 HOURS
Qty: 0 | Refills: 0 | Status: DISCONTINUED | OUTPATIENT
Start: 2017-02-02 | End: 2017-02-13

## 2017-02-02 RX ORDER — FUROSEMIDE 40 MG
20 TABLET ORAL
Qty: 0 | Refills: 0 | Status: DISCONTINUED | OUTPATIENT
Start: 2017-02-02 | End: 2017-02-13

## 2017-02-02 RX ADMIN — CARVEDILOL PHOSPHATE 12.5 MILLIGRAM(S): 80 CAPSULE, EXTENDED RELEASE ORAL at 17:26

## 2017-02-02 RX ADMIN — CARVEDILOL PHOSPHATE 25 MILLIGRAM(S): 80 CAPSULE, EXTENDED RELEASE ORAL at 07:06

## 2017-02-02 RX ADMIN — Medication 1: at 12:13

## 2017-02-02 RX ADMIN — Medication 20 MILLIGRAM(S): at 17:26

## 2017-02-02 RX ADMIN — Medication 40 MILLIGRAM(S): at 07:09

## 2017-02-02 RX ADMIN — HEPARIN SODIUM 5000 UNIT(S): 5000 INJECTION INTRAVENOUS; SUBCUTANEOUS at 23:24

## 2017-02-02 RX ADMIN — ISOSORBIDE MONONITRATE 60 MILLIGRAM(S): 60 TABLET, EXTENDED RELEASE ORAL at 12:13

## 2017-02-02 RX ADMIN — Medication 100 MILLIGRAM(S): at 15:50

## 2017-02-02 RX ADMIN — Medication 50 MILLIGRAM(S): at 07:08

## 2017-02-02 RX ADMIN — HEPARIN SODIUM 5000 UNIT(S): 5000 INJECTION INTRAVENOUS; SUBCUTANEOUS at 07:01

## 2017-02-02 RX ADMIN — Medication 81 MILLIGRAM(S): at 12:12

## 2017-02-02 RX ADMIN — Medication 2: at 10:31

## 2017-02-02 RX ADMIN — Medication 75 MICROGRAM(S): at 07:31

## 2017-02-02 RX ADMIN — Medication 25 MILLIGRAM(S): at 23:24

## 2017-02-02 RX ADMIN — INSULIN GLARGINE 25 UNIT(S): 100 INJECTION, SOLUTION SUBCUTANEOUS at 01:47

## 2017-02-02 RX ADMIN — Medication 100 MILLIGRAM(S): at 23:22

## 2017-02-02 RX ADMIN — HEPARIN SODIUM 5000 UNIT(S): 5000 INJECTION INTRAVENOUS; SUBCUTANEOUS at 15:50

## 2017-02-02 RX ADMIN — Medication 100 MILLIGRAM(S): at 07:05

## 2017-02-02 RX ADMIN — PIPERACILLIN AND TAZOBACTAM 25 GRAM(S): 4; .5 INJECTION, POWDER, LYOPHILIZED, FOR SOLUTION INTRAVENOUS at 18:55

## 2017-02-02 NOTE — PROGRESS NOTE ADULT - ASSESSMENT
86 y/o HM with ESRD on HD presents with weakness and LOC after HD with evidence of HTN urgency.  Febrile illness with RVP and Blood culures negative.  Has had hx of intermitant weakness after HD with generalized dizziness.      - tolerating HD with goal uf of 0.5 kg on 2k, epogen on hold  - BFR of 400 on 2k  - cardiology input noted, await ECHO and will fu the tele report  - off abx   - neuro input noted

## 2017-02-02 NOTE — PROGRESS NOTE ADULT - ASSESSMENT
PCP Dr. Diaz Santos  nephrology Dr. Martínez    87 year old male with PMH of  DM2 on insulin, HTN, CAD, CHF, s/p CRT-D, PE, ESRD on HD  (mw w/ Dr Lerma)  admitted on 1/30/17 with AMS and dizziness after HD this morning. Patient states that he usually gets dizziness and slightly out of it after dialysis.     As per ED note states with EMS pt was HTN with -200s and mildly tachycardic.  POC blood glucose 158 in field.  No known trauma.   Hospital course:  1/31  # [id number] pt reports feeling dizzy, occasional cough, denies CP, palpitations, has lose BM today, but was constipated prior that, denies HA, neck stiffness, visual changes, evaluated by neurology and ID, plan of care discussed  2/1 BP better controlled, + RUQ pain, reports nausea on and off, fever 101 overnight, encephalopathy persist, daughter at bedside , plan of care discussed  2/2 RUQ pain persist, feels better, + nausea, tolerating PO intake, denies CP, palpitations, dizziness, noted low SBP in 100,plan of care discussed

## 2017-02-02 NOTE — PROGRESS NOTE ADULT - ASSESSMENT
87 year old male with past medical history of iddm. htn. cad. chf. ppm, pe, esrd on hd (mwf w/ Dr Lerma) comes with AMS and dizziness with headaches after HD yest am, pt reports he usually has dizzy spells and feels "out of it" after HD, his BP in the ER was noted to be high with systolic BP of up to 190-200s with mild degree of tachycardia, no fever, no wbc ct, reported slight headache but attributed to his BP, no neck pain or stiffness, no recent illness, no cough, no dysuria, no rashes, no diarrhea, there was concern for meningitis so he was given vanco/cefepime acyclovir.     1. fevers/nausea/vomiting/diarrhea - ? etiology ? viral gastroenteritis ? vs unclear etiology    - afebrile, improved today    - repeat blood cx pending    - xray (-)    - US abd with minimal cholithiasis    - previous CT abd/pelvis/chest/head (-) unremarkable for infectious etiology and cultures no growth    - would continue to observe off antibiotics at this time     - if develops any worsening abd pain, consider hida scan  - supportive care    - monitor temps    - f/u CBC    2. other issues - iddm. htn. cad. chf. ppm, pe, esrd on hd - care per medicine 87 year old male with past medical history of iddm. htn. cad. chf. ppm, pe, esrd on hd (mwf w/ Dr Lerma) comes with AMS and dizziness with headaches after HD yest am, pt reports he usually has dizzy spells and feels "out of it" after HD, his BP in the ER was noted to be high with systolic BP of up to 190-200s with mild degree of tachycardia, no fever, no wbc ct, reported slight headache but attributed to his BP, no neck pain or stiffness, no recent illness, no cough, no dysuria, no rashes, no diarrhea, there was concern for meningitis so he was given vanco/cefepime acyclovir.     1. fevers/nausea/vomiting/diarrhea - ? etiology ? viral gastroenteritis ? vs unclear etiology    - afebrile, improved today    - repeat blood cx pending    - xray (-)    - US abd with minimal cholithiasis    - previous CT abd/pelvis/chest/head (-) unremarkable for infectious etiology and cultures no growth    - would continue to observe off antibiotics at this time     - if develops any worsening abd pain, consider CT abd/pelvis if unable to get HIDA    - supportive care    - monitor temps    - f/u CBC    2. other issues - iddm. htn. cad. chf. ppm, pe, esrd on hd - care per medicine 87 year old male with past medical history of iddm. htn. cad. chf. ppm, pe, esrd on hd (mwf w/ Dr Lerma) comes with AMS and dizziness with headaches after HD yest am, pt reports he usually has dizzy spells and feels "out of it" after HD, his BP in the ER was noted to be high with systolic BP of up to 190-200s with mild degree of tachycardia, no fever, no wbc ct, reported slight headache but attributed to his BP, no neck pain or stiffness, no recent illness, no cough, no dysuria, no rashes, no diarrhea, there was concern for meningitis so he was given vanco/cefepime acyclovir - low suspicion for meningitis - antibiotics were discontinued, course c/b fevers up to 103 o/n on 1/31 with n/v/abd pain.    1. fevers/nausea/vomiting/diarrhea - ? etiology ? cholecystitis ? viral gastroenteritis ?    - afebrile, improved today, mild RUQ pain    - repeat blood cx pending    - xray (-)    - US abd with minimal cholithiasis    - previous CT abd/pelvis/chest/head (-) unremarkable for infectious etiology and cultures no growth    - HIDA scan pending, while awaiting further imaging for cholecystitis, can empirically cover with renally-dose adjusted zosyn [id number] for GI tract coverage    - supportive care    - monitor temps    - f/u CBC    2. other issues - iddm. htn. cad. chf. ppm, pe, esrd on hd - care per medicine

## 2017-02-02 NOTE — PROGRESS NOTE ADULT - SUBJECTIVE AND OBJECTIVE BOX
87 year old male with past medical history of iddm. htn. cad. chf. ppm, pe, esrd on hd (mwf w/ Dr Lerma) comes with AMS and dizziness with headaches after HD yest am, pt reports he usually has dizzy spells and feels "out of it" after HD, his BP in the ER was noted to be high with systolic BP of up to 190-200s with mild degree of tachycardia, no fever, no wbc ct, reported slight headache but attributed to his BP, no neck pain or stiffness, no recent illness, no cough, no dysuria, no rashes, no diarrhea, there was concern for meningitis so he was given vanco/cefepime acyclovir.     fevers are down  feels better today  has no complaints    MEDICATIONS  (STANDING):  atorvastatin Oral Tab/Cap - Peds 40milliGRAM(s) Oral daily  aspirin  chewable 81milliGRAM(s) Oral daily  carvedilol 25milliGRAM(s) Oral every 12 hours  furosemide    Tablet 40milliGRAM(s) Oral two times a day  insulin glargine Injectable (LANTUS) 25Unit(s) SubCutaneous at bedtime  insulin glargine Injectable (LANTUS) 15Unit(s) SubCutaneous at bedtime  heparin  Injectable 5000Unit(s) SubCutaneous every 8 hours  docusate sodium 100milliGRAM(s) Oral three times a day  insulin lispro (HumaLOG) corrective regimen sliding scale  SubCutaneous three times a day before meals  dextrose 5%. 1000milliLiter(s) IV Continuous <Continuous>  dextrose 50% Injectable 12.5Gram(s) IV Push once  dextrose 50% Injectable 25Gram(s) IV Push once  dextrose 50% Injectable 25Gram(s) IV Push once  isosorbide   mononitrate ER Tablet (IMDUR) 60milliGRAM(s) Oral daily  hydrALAZINE 50milliGRAM(s) Oral three times a day  heparin  Injectable 1500Unit(s) IV Push once  levothyroxine 75MICROGram(s) Oral daily    MEDICATIONS  (PRN):  senna 2Tablet(s) Oral at bedtime PRN Constipation  dextrose Gel 1Dose(s) Oral once PRN Blood Glucose LESS THAN 70 milliGRAM(s)/deciliter  glucagon  Injectable 1milliGRAM(s) IntraMuscular once PRN Glucose LESS THAN 70 milligrams/deciliter  ondansetron Injectable 4milliGRAM(s) IV Push every 6 hours PRN Nausea and/or Vomiting  acetaminophen  Suppository 650milliGRAM(s) Rectal every 6 hours PRN For Temp greater than 38 C (100.4 F)      Vital Signs Last 24 Hrs  T(C): 36.8, Max: 36.8 (02-02 @ 07:40)  T(F): 98.2, Max: 98.2 (02-02 @ 07:40)  HR: 68 (68 - 85)  BP: 112/46 (107/50 - 144/65)  BP(mean): --  RR: 18 (18 - 18)  SpO2: 99% (94% - 100%)    Physical Exam:  Constitutional: well -appearing, NAD  HEENT: NC/AT, EOMI, PERRLA  Neck: supple  Back: no tenderness  Respiratory: clear  Cardiovascular: S1S2 regular, no murmurs  Abdomen: soft, not tender, not distended, positive BS  Genitourinary: deferred  Rectal: deferred  Musculoskeletal: no muscle tenderness, no joint swelling or tenderness  Extremities: no pedal edema  Neurological:  moving all extremities, no focal deficits  Skin: no rashes, R arm fistula               Labs:                        11.7   10.3  )-----------( 187      ( 01 Feb 2017 10:20 )             35.5     02 Feb 2017 07:31    136    |  96     |  52     ----------------------------<  122    4.4     |  30     |  6.02     Ca    7.9        02 Feb 2017 07:31  Phos  8.4       01 Feb 2017 10:20    TPro  6.8    /  Alb  3.1    /  TBili  0.3    /  DBili  <0.1   /  AST  35     /  ALT  28     /  AlkPhos  67     02 Feb 2017 07:31           Cultures: 1/30 blood cx no growth           US abd  PROCEDURE DATE:  02/02/2017        INTERPRETATION:  Exam Date:        Ultrasound of the abdomen         CLINICAL INFORMATION:       Pain.    TECHNIQUE:   Transcutaneous ultrasonography of the abdomen was performed.    FINDINGS:    No previous examinations are available for review.    The liver demonstrates homogeneous echotexture without focal lesion.    Hepatic size and contours are maintained.  The main hepatic and portal   veins appear patent.   No intrahepatic or ductal dilatation is found.     The common duct is not dilated, measuring 0.31 cm.  The gallbladder is   significant for minimal cholelithiasis with minimal wall thickening   measuring 4 mm but no pericholecystic fluid or Balderrama's sign. The   pancreas is obscured by bowel gas.  The spleen size is normal.    The right kidney measures 8.3 cm in length. The left kidney measures 8.7   cm in length.  Bilateral cortical thinning is noted with increased   echogenicity consistent with end-stage renal disease. 6 mm cyst is seen   in the LEFT midpole. It demonstrates no mass, calculus or hydronephrosis.    The abdominal aorta and IVC are obscured by bowel gas..      IMPRESSION: Minimal cholelithiasis with minimal wall thickening measuring   4 mm. There is no Balderrama's sign. These findings are mild however may   represent early or mild cholecystitis and clinically indicated, HIDA scan   can be utilized for further evaluation. End-stage renal disease.    PROCEDURE:   CT of the Chest, Abdomen and Pelvis was performed with intravenous   contrast.   Intravenous contrast: 90 ml Omnipaque 350. 10 ml discarded.  Oral contrast: positive contrast was administered.  Sagittal and coronal reformats were performed.    FINDINGS:    CHEST:     LOWER NECK: Within normal limits.  AXILLA, MEDIASTINUM AND GERRI: No lymphadenopathy.  VESSELS: Coronary arterial calcifications. Normal caliber aorta and main   pulmonary artery.  HEART: Cardiomegaly.    PLEURA: No pleural effusion.  LUNGS AND LARGE AIRWAYS: Patent central airways. No pulmonary nodules.   Groundglass opacities in the dependent lower lobes bilaterally compatible   with atelectasis.  CHEST WALL:  Left-sided biventricular AICD in place. Bilateral   gynecomastia.    ABDOMEN AND PELVIS:    LIVER: Within normal limits.  SPLEEN: Within normal limits.  PANCREAS: Within normal limits.  BILE DUCTS: Normal caliber.  GALLBLADDER: Cholelithiasis.  ADRENALS: Within normal limits.  KIDNEYS/URETERS: Mild bilateral renal atrophy and poor enhancement of the   kidneys compatible with the patient's history of end-stage renal disease.   Subcentimeter low-density lesion in the left kidney which is too small to   characterize. 3 mm nonobstructing right renal calculus.    RETROPERITONEUM: No lymphadenopathy.    VESSELS:  Within normal limits.    BOWEL: No bowel obstruction. Appendix normal.  PERITONEUM: No ascites or pneumoperitoneum.    REPRODUCTIVE ORGANS: Penile prosthesis noted with fluid-filled reservoir   in the lower anterior pelvis. Prostate and seminal vesicles are   unremarkable.  BLADDER: Within normal limits.    ABDOMINAL WALL: Within normal limits.  BONES: Multilevel degenerative arthritic changes and L3 inferior endplate   deformity, unchanged.    IMPRESSION: No source of fever identified.    Radiology:  FINDINGS:    CHEST:     LOWER NECK: Within normal limits.  AXILLA, MEDIASTINUM AND GERRI: No lymphadenopathy.  VESSELS: Coronary arterial calcifications. Normal caliber aorta and main   pulmonary artery.  HEART: Cardiomegaly.    PLEURA: No pleural effusion.  LUNGS AND LARGE AIRWAYS: Patent central airways. No pulmonary nodules.   Groundglass opacities in the dependent lower lobes bilaterally compatible   with atelectasis.  CHEST WALL:  Left-sided biventricular AICD in place. Bilateral   gynecomastia.    ABDOMEN AND PELVIS:    LIVER: Within normal limits.  SPLEEN: Within normal limits.  PANCREAS: Within normal limits.  BILE DUCTS: Normal caliber.  GALLBLADDER: Cholelithiasis.  ADRENALS: Within normal limits.  KIDNEYS/URETERS: Mild bilateral renal atrophy and poor enhancement of the   kidneys compatible with the patient's history of end-stage renal disease.   Subcentimeter low-density lesion in the left kidney which is too small to   characterize. 3 mm nonobstructing right renal calculus.    RETROPERITONEUM: No lymphadenopathy.    VESSELS:  Within normal limits.    BOWEL: No bowel obstruction. Appendix normal.  PERITONEUM: No ascites or pneumoperitoneum.    REPRODUCTIVE ORGANS: Penile prosthesis noted with fluid-filled reservoir   in the lower anterior pelvis. Prostate and seminal vesicles are   unremarkable.  BLADDER: Within normal limits.    ABDOMINAL WALL: Within normal limits.  BONES: Multilevel degenerative arthritic changes and L3 inferior endplate   deformity, unchanged.    IMPRESSION: No source of fever identified.      INTERPRETATION:  CT head without IV contrast     CPT 48153    CLINICAL INFORMATION: Fever and altered mental status  TECHNIQUE: Contiguous axial 5 mm sections were obtained through the head.   Coronal and sagittal reconstructions were reformatted.    FINDINGS: Prior CT of the head dated 10/23/2016 is available for review.    The brain demonstrates mild periventricular white matter microvascular   ischemia.. No acute cerebral territorial infarction is seen.  No   intracranial hemorrhage is found.  No mass effect is found in the brain.      The ventricles, sulci and basal cisterns appear unremarkable.  The orbits are unremarkable.   The visualized portions of the paranasal sinuses are clear.  The osseous   calvarium is intact.    IMPRESSION: Mild periventricular white matter microvascular ischemia. No   evidence of acute intracranial pathology. 87 year old male with past medical history of iddm. htn. cad. chf. ppm, pe, esrd on hd (mwf w/ Dr Lerma) comes with AMS and dizziness with headaches after HD yest am, pt reports he usually has dizzy spells and feels "out of it" after HD, his BP in the ER was noted to be high with systolic BP of up to 190-200s with mild degree of tachycardia, no fever, no wbc ct, reported slight headache but attributed to his BP, no neck pain or stiffness, no recent illness, no cough, no dysuria, no rashes, no diarrhea, there was concern for meningitis so he was given vanco/cefepime acyclovir - low suspicion for meningitis - antibiotics were discontinued, course c/b fevers up to 103 o/n on 1/31 with n/v/abd pain.    fevers are down  endorses some RUQ pain yesterday and today  feels better       MEDICATIONS  (STANDING):  atorvastatin Oral Tab/Cap - Peds 40milliGRAM(s) Oral daily  aspirin  chewable 81milliGRAM(s) Oral daily  carvedilol 25milliGRAM(s) Oral every 12 hours  furosemide    Tablet 40milliGRAM(s) Oral two times a day  insulin glargine Injectable (LANTUS) 25Unit(s) SubCutaneous at bedtime  insulin glargine Injectable (LANTUS) 15Unit(s) SubCutaneous at bedtime  heparin  Injectable 5000Unit(s) SubCutaneous every 8 hours  docusate sodium 100milliGRAM(s) Oral three times a day  insulin lispro (HumaLOG) corrective regimen sliding scale  SubCutaneous three times a day before meals  dextrose 5%. 1000milliLiter(s) IV Continuous <Continuous>  dextrose 50% Injectable 12.5Gram(s) IV Push once  dextrose 50% Injectable 25Gram(s) IV Push once  dextrose 50% Injectable 25Gram(s) IV Push once  isosorbide   mononitrate ER Tablet (IMDUR) 60milliGRAM(s) Oral daily  hydrALAZINE 50milliGRAM(s) Oral three times a day  heparin  Injectable 1500Unit(s) IV Push once  levothyroxine 75MICROGram(s) Oral daily    MEDICATIONS  (PRN):  senna 2Tablet(s) Oral at bedtime PRN Constipation  dextrose Gel 1Dose(s) Oral once PRN Blood Glucose LESS THAN 70 milliGRAM(s)/deciliter  glucagon  Injectable 1milliGRAM(s) IntraMuscular once PRN Glucose LESS THAN 70 milligrams/deciliter  ondansetron Injectable 4milliGRAM(s) IV Push every 6 hours PRN Nausea and/or Vomiting  acetaminophen  Suppository 650milliGRAM(s) Rectal every 6 hours PRN For Temp greater than 38 C (100.4 F)      Vital Signs Last 24 Hrs  T(C): 36.8, Max: 36.8 (02-02 @ 07:40)  T(F): 98.2, Max: 98.2 (02-02 @ 07:40)  HR: 68 (68 - 85)  BP: 112/46 (107/50 - 144/65)  BP(mean): --  RR: 18 (18 - 18)  SpO2: 99% (94% - 100%)    Physical Exam:  Constitutional: well -appearing, NAD  HEENT: NC/AT, EOMI, PERRLA  Neck: supple  Back: no tenderness  Respiratory: clear  Cardiovascular: S1S2 regular, no murmurs  Abdomen: soft, + RUQ tenderness not distended, positive BS  Genitourinary: deferred  Rectal: deferred  Musculoskeletal: no muscle tenderness, no joint swelling or tenderness  Extremities: no pedal edema  Neurological:  moving all extremities, no focal deficits  Skin: no rashes, R arm fistula               Labs:                        11.7   10.3  )-----------( 187      ( 01 Feb 2017 10:20 )             35.5     02 Feb 2017 07:31    136    |  96     |  52     ----------------------------<  122    4.4     |  30     |  6.02     Ca    7.9        02 Feb 2017 07:31  Phos  8.4       01 Feb 2017 10:20    TPro  6.8    /  Alb  3.1    /  TBili  0.3    /  DBili  <0.1   /  AST  35     /  ALT  28     /  AlkPhos  67     02 Feb 2017 07:31           Cultures: 1/30 blood cx no growth           US abd  PROCEDURE DATE:  02/02/2017        INTERPRETATION:  Exam Date:        Ultrasound of the abdomen         CLINICAL INFORMATION:       Pain.    TECHNIQUE:   Transcutaneous ultrasonography of the abdomen was performed.    FINDINGS:    No previous examinations are available for review.    The liver demonstrates homogeneous echotexture without focal lesion.    Hepatic size and contours are maintained.  The main hepatic and portal   veins appear patent.   No intrahepatic or ductal dilatation is found.     The common duct is not dilated, measuring 0.31 cm.  The gallbladder is   significant for minimal cholelithiasis with minimal wall thickening   measuring 4 mm but no pericholecystic fluid or Balderrama's sign. The   pancreas is obscured by bowel gas.  The spleen size is normal.    The right kidney measures 8.3 cm in length. The left kidney measures 8.7   cm in length.  Bilateral cortical thinning is noted with increased   echogenicity consistent with end-stage renal disease. 6 mm cyst is seen   in the LEFT midpole. It demonstrates no mass, calculus or hydronephrosis.    The abdominal aorta and IVC are obscured by bowel gas..      IMPRESSION: Minimal cholelithiasis with minimal wall thickening measuring   4 mm. There is no Balderrama's sign. These findings are mild however may   represent early or mild cholecystitis and clinically indicated, HIDA scan   can be utilized for further evaluation. End-stage renal disease.    PROCEDURE:   CT of the Chest, Abdomen and Pelvis was performed with intravenous   contrast.   Intravenous contrast: 90 ml Omnipaque 350. 10 ml discarded.  Oral contrast: positive contrast was administered.  Sagittal and coronal reformats were performed.    FINDINGS:    CHEST:     LOWER NECK: Within normal limits.  AXILLA, MEDIASTINUM AND GERRI: No lymphadenopathy.  VESSELS: Coronary arterial calcifications. Normal caliber aorta and main   pulmonary artery.  HEART: Cardiomegaly.    PLEURA: No pleural effusion.  LUNGS AND LARGE AIRWAYS: Patent central airways. No pulmonary nodules.   Groundglass opacities in the dependent lower lobes bilaterally compatible   with atelectasis.  CHEST WALL:  Left-sided biventricular AICD in place. Bilateral   gynecomastia.    ABDOMEN AND PELVIS:    LIVER: Within normal limits.  SPLEEN: Within normal limits.  PANCREAS: Within normal limits.  BILE DUCTS: Normal caliber.  GALLBLADDER: Cholelithiasis.  ADRENALS: Within normal limits.  KIDNEYS/URETERS: Mild bilateral renal atrophy and poor enhancement of the   kidneys compatible with the patient's history of end-stage renal disease.   Subcentimeter low-density lesion in the left kidney which is too small to   characterize. 3 mm nonobstructing right renal calculus.    RETROPERITONEUM: No lymphadenopathy.    VESSELS:  Within normal limits.    BOWEL: No bowel obstruction. Appendix normal.  PERITONEUM: No ascites or pneumoperitoneum.    REPRODUCTIVE ORGANS: Penile prosthesis noted with fluid-filled reservoir   in the lower anterior pelvis. Prostate and seminal vesicles are   unremarkable.  BLADDER: Within normal limits.    ABDOMINAL WALL: Within normal limits.  BONES: Multilevel degenerative arthritic changes and L3 inferior endplate   deformity, unchanged.    IMPRESSION: No source of fever identified.    Radiology:  FINDINGS:    CHEST:     LOWER NECK: Within normal limits.  AXILLA, MEDIASTINUM AND GERRI: No lymphadenopathy.  VESSELS: Coronary arterial calcifications. Normal caliber aorta and main   pulmonary artery.  HEART: Cardiomegaly.    PLEURA: No pleural effusion.  LUNGS AND LARGE AIRWAYS: Patent central airways. No pulmonary nodules.   Groundglass opacities in the dependent lower lobes bilaterally compatible   with atelectasis.  CHEST WALL:  Left-sided biventricular AICD in place. Bilateral   gynecomastia.    ABDOMEN AND PELVIS:    LIVER: Within normal limits.  SPLEEN: Within normal limits.  PANCREAS: Within normal limits.  BILE DUCTS: Normal caliber.  GALLBLADDER: Cholelithiasis.  ADRENALS: Within normal limits.  KIDNEYS/URETERS: Mild bilateral renal atrophy and poor enhancement of the   kidneys compatible with the patient's history of end-stage renal disease.   Subcentimeter low-density lesion in the left kidney which is too small to   characterize. 3 mm nonobstructing right renal calculus.    RETROPERITONEUM: No lymphadenopathy.    VESSELS:  Within normal limits.    BOWEL: No bowel obstruction. Appendix normal.  PERITONEUM: No ascites or pneumoperitoneum.    REPRODUCTIVE ORGANS: Penile prosthesis noted with fluid-filled reservoir   in the lower anterior pelvis. Prostate and seminal vesicles are   unremarkable.  BLADDER: Within normal limits.    ABDOMINAL WALL: Within normal limits.  BONES: Multilevel degenerative arthritic changes and L3 inferior endplate   deformity, unchanged.    IMPRESSION: No source of fever identified.      INTERPRETATION:  CT head without IV contrast     CPT 75691    CLINICAL INFORMATION: Fever and altered mental status  TECHNIQUE: Contiguous axial 5 mm sections were obtained through the head.   Coronal and sagittal reconstructions were reformatted.    FINDINGS: Prior CT of the head dated 10/23/2016 is available for review.    The brain demonstrates mild periventricular white matter microvascular   ischemia.. No acute cerebral territorial infarction is seen.  No   intracranial hemorrhage is found.  No mass effect is found in the brain.      The ventricles, sulci and basal cisterns appear unremarkable.  The orbits are unremarkable.   The visualized portions of the paranasal sinuses are clear.  The osseous   calvarium is intact.    IMPRESSION: Mild periventricular white matter microvascular ischemia. No   evidence of acute intracranial pathology.

## 2017-02-02 NOTE — CONSULT NOTE ADULT - ASSESSMENT
86 yo man with ESRD admitted post syncope post HD.  Pt reports this is the third similar episode post HD.  Reports no symptoms during tx.
87 year old male admitted with weakness after dialysis. He has positive troponins which may be related to his altered kidney status rather than demand ischemia.   He had a temperature last night and appears confused today.   Will need to get some older records so we can compare echocardiogram.   Would continue his cardiac medications at this time.
87 year old male with past medical history of iddm. htn. cad. chf. ppm, pe, esrd on hd (mwf w/ Dr Lerma) comes with AMS and dizziness with headaches after HD yest am, pt reports he usually has dizzy spells and feels "out of it" after HD, his BP in the ER was noted to be high with systolic BP of up to 190-200s with mild degree of tachycardia, no fever, no wbc ct, reported slight headache but attributed to his BP, no neck pain or stiffness, no recent illness, no cough, no dysuria, no rashes, no diarrhea, there was concern for meningitis so he was given vanco/cefepime acyclovir.     1. AMS/dizziness/headache     - likely d/t hypertensive urgency     - no nuchal rigidity or neck stiffness, fevers and low suspicion for meningeal infection     - no evidence of sepsis on CT head/chest/abd/pelvis    - recommend discontinue antibiotics and observe    - f/u cultures    - supportive care    - monitor temps    - f/u CBC    2. other issues - iddm. htn. cad. chf. ppm, pe, esrd on hd - care per medicine
A/P:  R/O cholecystitis  Cholelithiasis  Multiple medical comorbidities of DMII, HTN, CAD, chronic systolic CHF, PE, ESRD on HD, hypothyroidism, h/o MI  NPO, IV hydration  HD per schedule  GI/DVT prophylaxis  Pain control  Incentive spirometry  Serial abd exams  F/U labs, radiologic studies  Medical management per primary service  Pt will be monitored for signs of evolution/resolution of pathology and surgical intervention as required and warranted  Pt aware of and agrees with all of the abov
87 year old man admitted after passing out. C/o headache. Non focal exam, Ct of head, no acute changes. No meningismus. Elevated blood pressure.  Doubt ischemia, meningitis. ? Hypertensive urgency. ? underlying infection.

## 2017-02-02 NOTE — CONSULT NOTE ADULT - SUBJECTIVE AND OBJECTIVE BOX
CC: abd pain, 2/2/17    HPI: 87 year old male with PMH of  DM2 on insulin, HTN, CAD, CHF, s/p CRT-D, PE, ESRD on HD ([facility] w/ Dr Lerma)  admitted on 1/30/17 with AMS and dizziness after HD. Asked to evaluate pt for c/o abd pain and nausea x 1 day. Pt seen and examined at bedside with chaperone,  present. Pt is AAOx3, pt in no acute distress. Pt denied c/o fever, chills, chest pain, SOB, N/V/D, extremity pain or dysfunction, hemoptysis, hematemesis, hematuria, hematochexia, headache, diplopia, vertigo, dizzyness.     PMH: DMII, HTN, CAD, chronic systolic CHF, PE, ESRD on HD, hypothyroidism, h/o MI  PSH: AICD, penile prosthesis, aortic valve repair, hernia repair  SH: no etoh, tobacco, illicit drug use  FH: non contributory at present    Vitals:  Vital Signs Last 24 Hrs  T(C): 36.7, Max: 36.9 (02-02 @ 08:35)  T(F): 98, Max: 98.4 (02-02 @ 08:35)  HR: 67 (64 - 69)  BP: 113/45 (108/44 - 134/77)  BP(mean): --  RR: 18 (18 - 18)  SpO2: 96% (96% - 100%)    Labs:      CARDIAC MARKERS ( 31 Jan 2017 22:19 )  0.144 ng/mL / x     / 403 U/L / x     / x                                11.7   10.3  )-----------( 187      ( 01 Feb 2017 10:20 )             35.5     CBC Full  -  ( 01 Feb 2017 10:20 )  WBC Count : 10.3 K/uL  Hemoglobin : 11.7 g/dL  Hematocrit : 35.5 %  Platelet Count - Automated : 187 K/uL  Mean Cell Volume : 89.4 fl  Mean Cell Hemoglobin : 29.5 pg  Mean Cell Hemoglobin Concentration : 33.0 gm/dL  Auto Neutrophil # : 7.9 K/uL  Auto Lymphocyte # : 0.8 K/uL  Auto Monocyte # : 1.5 K/uL  Auto Eosinophil # : 0.0 K/uL  Auto Basophil # : 0.1 K/uL  Auto Neutrophil % : 76.7 %  Auto Lymphocyte % : 7.6 %  Auto Monocyte % : 15.0 %  Auto Eosinophil % : 0.0 %  Auto Basophil % : 0.8 %    02 Feb 2017 07:31    136    |  96     |  52     ----------------------------<  122    4.4     |  30     |  6.02     Ca    7.9        02 Feb 2017 07:31  Phos  8.4       01 Feb 2017 10:20    TPro  6.8    /  Alb  3.1    /  TBili  0.3    /  DBili  <0.1   /  AST  35     /  ALT  28     /  AlkPhos  67     02 Feb 2017 07:31    LIVER FUNCTIONS - ( 02 Feb 2017 07:31 )  Alb: 3.1 g/dL / Pro: 6.8 gm/dL / ALK PHOS: 67 U/L / ALT: 28 U/L / AST: 35 U/L / GGT: x             Radiology:  EXAM:  US COMPLETE ABDOMEN SONOGRAM                            PROCEDURE DATE:  02/02/2017        INTERPRETATION:  Exam Date:        Ultrasound of the abdomen         CLINICAL INFORMATION:       Pain.    TECHNIQUE:   Transcutaneous ultrasonography of the abdomen was performed.    FINDINGS:    No previous examinations are available for review.    The liver demonstrates homogeneous echotexture without focal lesion.    Hepatic size and contours are maintained.  The main hepatic and portal   veins appear patent.   No intrahepatic or ductal dilatation is found.     The common duct is not dilated, measuring 0.31 cm.  The gallbladder is   significant for minimal cholelithiasis with minimal wall thickening   measuring 4 mm but no pericholecystic fluid or Balderrama's sign. The   pancreas is obscured by bowel gas.  The spleen size is normal.    The right kidney measures 8.3 cm in length. The left kidney measures 8.7   cm in length.  Bilateral cortical thinning is noted with increased   echogenicity consistent with end-stage renal disease. 6 mm cyst is seen   in the LEFT midpole. It demonstrates no mass, calculus or hydronephrosis.    The abdominal aorta and IVC are obscured by bowel gas..      IMPRESSION: Minimal cholelithiasis with minimal wallthickening measuring   4 mm. There is no Balderrama's sign. These findings are mild however may   represent early or mild cholecystitis and clinically indicated, HIDA scan   can be utilized for further evaluation. End-stage renal disease.              TIFFANIE MARTINEZ M.D., ATTENDING RADIOLOGIST  This document has been electronically signed. Feb 2 2017  8:32AM        EXAM:  CT BRAIN                            PROCEDURE DATE:  01/30/2017        INTERPRETATION:  CT head without IV contrast     CPT 78983    CLINICAL INFORMATION: Fever and altered mental status  TECHNIQUE: Contiguous axial 5 mm sections were obtained through the head.   Coronal and sagittal reconstructions were reformatted.    FINDINGS: Prior CT of the head dated 10/23/2016 is available for review.    The brain demonstrates mild periventricular white matter microvascular   ischemia.. No acute cerebral territorial infarction is seen.  No   intracranial hemorrhage is found.  No mass effect is found in the brain.      The ventricles, sulci and basal cisterns appear unremarkable.  The orbits are unremarkable.   The visualized portions of the paranasal sinuses are clear.  The osseous   calvarium is intact.    IMPRESSION: Mild periventricular white matter microvascular ischemia. No   evidence of acute intracranial pathology.                 COOPER GARCIA M.D., ATTENDING RADIOLOGIST  This document has been electronically signed. Jan 30 2017  6:02PM      EXAM:  CT CHEST IC                            PROCEDURE DATE:  01/30/2017        INTERPRETATION:  Clinical information: Fever with cough. Right lower   quadrant pain. Evaluate for pneumonia, appendicitis or other source of   infection.    COMPARISON: CT abdomen dated June 21, 2015. CT chest dated May 12, 2014.    PROCEDURE:   CT of the Chest, Abdomen and Pelvis was performed with intravenous   contrast.   Intravenous contrast: 90 ml Omnipaque 350. 10 ml discarded.  Oral contrast: positive contrast was administered.  Sagittal and coronal reformats were performed.    FINDINGS:    CHEST:     LOWER NECK: Within normal limits.  AXILLA, MEDIASTINUM AND GERRI: No lymphadenopathy.  VESSELS: Coronary arterial calcifications. Normal caliber aorta and main   pulmonary artery.  HEART: Cardiomegaly.    PLEURA: No pleural effusion.  LUNGS AND LARGE AIRWAYS: Patent central airways. No pulmonary nodules.   Groundglass opacities in the dependent lower lobes bilaterally compatible   with atelectasis.  CHEST WALL:  Left-sided biventricular AICD in place. Bilateral   gynecomastia.    ABDOMEN AND PELVIS:    LIVER: Within normal limits.  SPLEEN: Within normal limits.  PANCREAS: Within normal limits.  BILE DUCTS: Normal caliber.  GALLBLADDER: Cholelithiasis.  ADRENALS: Within normal limits.  KIDNEYS/URETERS: Mild bilateral renal atrophy and poor enhancement of the   kidneys compatible with the patient's history of end-stage renal disease.   Subcentimeter low-density lesion in the left kidney which is too small to   characterize. 3 mm nonobstructing right renal calculus.    RETROPERITONEUM: No lymphadenopathy.    VESSELS:  Within normal limits.    BOWEL: No bowel obstruction. Appendix normal.  PERITONEUM: No ascites or pneumoperitoneum.    REPRODUCTIVE ORGANS: Penile prosthesis noted with fluid-filled reservoir   in the lower anterior pelvis. Prostate and seminal vesicles are   unremarkable.  BLADDER: Within normal limits.    ABDOMINAL WALL: Within normal limits.  BONES: Multilevel degenerative arthritic changes and L3 inferior endplate   deformity, unchanged.    IMPRESSION: No source of fever identified.                  VIANNEY CONNORS   This document has been electronically signed. Jan 30 2017  2:56PM      Physical exam:    Pt is AAOx3  Pt in no acute distress  HEENT: Normocephalic, HELDER, EOM wnl  Neck: No JVD, no tracheal deviation  Cardiovascular: S1S2 Present  Chest: no rib pathology or tenderness to exam. No sternal pathology or tenderness to exam. No crepitus, no ecchymosis, no hematoma.   Respiratory: Respiratory Effort normal; no wheezes, rales or rhonchi to exam, CTAB  ABD: bowel sounds (+), soft, (+) right upper and lower quadrant tenderness to exam, non distended, no rebound, no guarding, no rigidity, no skin changes to exam.  Musculoskeletal: Pt has right AV fistula. Pt has good capillary refill to digits, no gross calf edema or tenderness to exam.  Skin: no jaundice or icteric sclera to exam b/l, no skin changes to exam

## 2017-02-02 NOTE — PROGRESS NOTE ADULT - SUBJECTIVE AND OBJECTIVE BOX
HPI:  2/2 RUQ pain persist, feels better, + nausea, tolerating PO intake, denies CP, palpitations, dizziness, noted low SBP in 100,plan of care discussed    Review of system- Rest of the review of system are normal except mentioned in HPI    SUBJECTIVE & OBJECTIVE:  No new complaint    Vital Signs Last 24 Hrs  T(C): 36.9, Max: 36.9 ( @ 08:35)  T(F): 98.4, Max: 98.4 ( @ 08:35)  HR: 64 (64 - 85)  BP: 108/44 (108/44 - 137/53)  BP(mean): --  RR: 18 (18 - 18)  SpO2: 99% (94% - 100%)  2017 07:31    136    |  96     |  52     ----------------------------<  122    4.4     |  30     |  6.02     Ca    7.9        2017 07:31  Phos  8.4       2017 10:20    TPro  6.8    /  Alb  3.1    /  TBili  0.3    /  DBili  <0.1   /  AST  35     /  ALT  28     /  AlkPhos  67     2017 07:31                            11.7   10.3  )-----------( 187      ( 2017 10:20 )             35.5       CARDIAC MARKERS ( 2017 22:19 )  0.144 ng/mL / x     / 403 U/L / x     / x            LIVER FUNCTIONS - ( 2017 07:31 )  Alb: 3.1 g/dL / Pro: 6.8 gm/dL / ALK PHOS: 67 U/L / ALT: 28 U/L / AST: 35 U/L / GGT: x             XAM:  US COMPLETE ABDOMEN SONOGRAM                            PROCEDURE DATE:  2017        INTERPRETATION:  Exam Date:        Ultrasound of the abdomen         CLINICAL INFORMATION:       Pain.    TECHNIQUE:   Transcutaneous ultrasonography of the abdomen was performed.    FINDINGS:    No previous examinations are available for review.    The liver demonstrates homogeneous echotexture without focal lesion.    Hepatic size and contours are maintained.  The main hepatic and portal   veins appear patent.   No intrahepatic or ductal dilatation is found.     The common duct is not dilated, measuring 0.31 cm.  The gallbladder is   significant for minimal cholelithiasis with minimal wall thickening   measuring 4 mm but no pericholecystic fluid or Balderrama's sign. The   pancreas is obscured by bowel gas.  The spleen size is normal.    The right kidney measures 8.3 cm in length. The left kidney measures 8.7   cm in length.  Bilateral cortical thinning is noted with increased   echogenicity consistent with end-stage renal disease. 6 mm cyst is seen   in the LEFT midpole. It demonstrates no mass, calculus or hydronephrosis.    The abdominal aorta and IVC are obscured by bowel gas..      IMPRESSION: Minimal cholelithiasis with minimal wallthickening measuring   4 mm. There is no Balderrama's sign. These findings are mild however may   represent early or mild cholecystitis and clinically indicated, HIDA scan   can be utilized for further evaluation. End-stage renal disease.    PHYSICAL EXAM:    GENERAL: NAD, well-groomed, well-developed  HEAD:  Atraumatic, Normocephalic  EYES: EOMI, PERRLA, conjunctiva and sclera clear  HEENT: Moist mucous membranes  NECK: Supple, No JVD  NERVOUS SYSTEM:  Alert & Oriented X3, Motor Strength 5/5 B/L upper and lower extremities; DTRs 2+ intact and symmetric  CHEST/LUNG: Clear to auscultation bilaterally; No rales, rhonchi, wheezing, or rubs  HEART: Regular rate and rhythm; No murmurs, rubs, or gallops  MEDICATIONS  (STANDING):  atorvastatin Oral Tab/Cap - Peds 40milliGRAM(s) Oral daily  aspirin  chewable 81milliGRAM(s) Oral daily  insulin glargine Injectable (LANTUS) 25Unit(s) SubCutaneous at bedtime  insulin glargine Injectable (LANTUS) 15Unit(s) SubCutaneous at bedtime  heparin  Injectable 5000Unit(s) SubCutaneous every 8 hours  docusate sodium 100milliGRAM(s) Oral three times a day  insulin lispro (HumaLOG) corrective regimen sliding scale  SubCutaneous three times a day before meals  dextrose 5%. 1000milliLiter(s) IV Continuous <Continuous>  dextrose 50% Injectable 12.5Gram(s) IV Push once  dextrose 50% Injectable 25Gram(s) IV Push once  dextrose 50% Injectable 25Gram(s) IV Push once  isosorbide   mononitrate ER Tablet (IMDUR) 60milliGRAM(s) Oral daily  heparin  Injectable 1500Unit(s) IV Push once  levothyroxine 75MICROGram(s) Oral daily  hydrALAZINE 25milliGRAM(s) Oral every 8 hours  carvedilol 12.5milliGRAM(s) Oral every 12 hours  furosemide    Tablet 20milliGRAM(s) Oral two times a day  piperacillin/tazobactam IVPB. 3.375Gram(s) IV Intermittent every 12 hours  ABDOMEN: Soft, Nontender, distended; Bowel sounds present  GENITOURINARY- Voiding, no palpable bladder  EXTREMITIES:  2+ Peripheral Pulses, No clubbing, cyanosis, or edema  MUSCULOSKELETAL No muscle tenderness, Muscle tone normal, No joint tenderness, no Joint swelling, Joint range of motion-normal  SKIN-no rash, no lesion  CNS- alert, oriented X3, non focal       Daily     Daily Weight in k.5 (2017 05:45)

## 2017-02-02 NOTE — PROGRESS NOTE ADULT - SUBJECTIVE AND OBJECTIVE BOX
NEPHROLOGY INTERVAL HPI/OVERNIGHT EVENTS:  No acute events.  Resting comfortably.    HPI:  87 year old male with past medical history of iddm. htn. cad. chf. ppm, pe, esrd on hd ([facility] w/ Dr Lerma) comes with AMS and dizzyness after HD this morning. Patient states that he usually gets dizzyness and slightly out of it after dialysis. As per ED note states with EMS pt was HTN with -200s and mildly tachycardic.  POC blood glucose 158 in field.  No known trauma. (2017 03:51)          MEDICATIONS  (STANDING):  atorvastatin Oral Tab/Cap - Peds 40milliGRAM(s) Oral daily  aspirin  chewable 81milliGRAM(s) Oral daily  carvedilol 25milliGRAM(s) Oral every 12 hours  furosemide    Tablet 40milliGRAM(s) Oral two times a day  insulin glargine Injectable (LANTUS) 25Unit(s) SubCutaneous at bedtime  insulin glargine Injectable (LANTUS) 15Unit(s) SubCutaneous at bedtime  heparin  Injectable 5000Unit(s) SubCutaneous every 8 hours  docusate sodium 100milliGRAM(s) Oral three times a day  insulin lispro (HumaLOG) corrective regimen sliding scale  SubCutaneous three times a day before meals  dextrose 5%. 1000milliLiter(s) IV Continuous <Continuous>  dextrose 50% Injectable 12.5Gram(s) IV Push once  dextrose 50% Injectable 25Gram(s) IV Push once  dextrose 50% Injectable 25Gram(s) IV Push once  isosorbide   mononitrate ER Tablet (IMDUR) 60milliGRAM(s) Oral daily  hydrALAZINE 50milliGRAM(s) Oral three times a day  heparin  Injectable 1500Unit(s) IV Push once  levothyroxine 75MICROGram(s) Oral daily    MEDICATIONS  (PRN):  senna 2Tablet(s) Oral at bedtime PRN Constipation  dextrose Gel 1Dose(s) Oral once PRN Blood Glucose LESS THAN 70 milliGRAM(s)/deciliter  glucagon  Injectable 1milliGRAM(s) IntraMuscular once PRN Glucose LESS THAN 70 milligrams/deciliter  ondansetron Injectable 4milliGRAM(s) IV Push every 6 hours PRN Nausea and/or Vomiting  acetaminophen  Suppository 650milliGRAM(s) Rectal every 6 hours PRN For Temp greater than 38 C (100.4 F)          Vital Signs Last 24 Hrs  T(C): 36.8, Max: 36.8 ( @ 07:40)  T(F): 98.2, Max: 98.2 ( @ 07:40)  HR: 68 (68 - 85)  BP: 112/46 (107/50 - 144/65)  BP(mean): --  RR: 18 (18 - 18)  SpO2: 99% (94% - 100%)  Daily     Daily Weight in k.5 (2017 16:31)    PHYSICAL EXAM: Resting comfortably  GENERAL: No apparent distress  CHEST/LUNG: clear to aus  HEART: S1S2 RRR  ABDOMEN: soft  EXTREMITIES: no edema  SKIN:     LABS:                        11.7   10.3  )-----------( 187      ( 2017 10:20 )             35.5     2017 07:31    136    |  96     |  52     ----------------------------<  122    4.4     |  30     |  6.02     Ca    7.9        2017 07:31  Phos  8.4       2017 10:20    TPro  6.8    /  Alb  3.1    /  TBili  0.3    /  DBili  <0.1   /  AST  35     /  ALT  28     /  AlkPhos  67     2017 07:31                RADIOLOGY & ADDITIONAL TESTS:

## 2017-02-02 NOTE — PROGRESS NOTE ADULT - PROBLEM SELECTOR PLAN 1
CT head - neg  unlikely meningitis observe off abx  monitor  neurology input appreciated  unable to do MRI due to ICD

## 2017-02-03 LAB
ANION GAP SERPL CALC-SCNC: 12 MMOL/L — SIGNIFICANT CHANGE UP (ref 5–17)
ANISOCYTOSIS BLD QL: SLIGHT — SIGNIFICANT CHANGE UP
BASOPHILS # BLD AUTO: 0.1 K/UL — SIGNIFICANT CHANGE UP (ref 0–0.2)
BASOPHILS NFR BLD AUTO: 2 % — SIGNIFICANT CHANGE UP (ref 0–2)
BUN SERPL-MCNC: 78 MG/DL — HIGH (ref 7–23)
CALCIUM SERPL-MCNC: 7.4 MG/DL — LOW (ref 8.5–10.1)
CHLORIDE SERPL-SCNC: 92 MMOL/L — LOW (ref 96–108)
CO2 SERPL-SCNC: 27 MMOL/L — SIGNIFICANT CHANGE UP (ref 22–31)
CREAT SERPL-MCNC: 7.53 MG/DL — HIGH (ref 0.5–1.3)
EOSINOPHIL # BLD AUTO: 0 K/UL — SIGNIFICANT CHANGE UP (ref 0–0.5)
EOSINOPHIL NFR BLD AUTO: 3 % — SIGNIFICANT CHANGE UP (ref 0–6)
GLUCOSE SERPL-MCNC: 65 MG/DL — LOW (ref 70–99)
HCT VFR BLD CALC: 34.3 % — LOW (ref 39–50)
HGB BLD-MCNC: 11.2 G/DL — LOW (ref 13–17)
LYMPHOCYTES # BLD AUTO: 1.1 K/UL — SIGNIFICANT CHANGE UP (ref 1–3.3)
LYMPHOCYTES # BLD AUTO: 18 % — SIGNIFICANT CHANGE UP (ref 13–44)
MCHC RBC-ENTMCNC: 29.5 PG — SIGNIFICANT CHANGE UP (ref 27–34)
MCHC RBC-ENTMCNC: 32.6 GM/DL — SIGNIFICANT CHANGE UP (ref 32–36)
MCV RBC AUTO: 90.6 FL — SIGNIFICANT CHANGE UP (ref 80–100)
MONOCYTES # BLD AUTO: 0.9 K/UL — SIGNIFICANT CHANGE UP (ref 0–0.9)
MONOCYTES NFR BLD AUTO: 17 % — HIGH (ref 2–14)
NEUTROPHILS # BLD AUTO: 2.7 K/UL — SIGNIFICANT CHANGE UP (ref 1.8–7.4)
NEUTROPHILS NFR BLD AUTO: 51 % — SIGNIFICANT CHANGE UP (ref 43–77)
NEUTS BAND # BLD: 6 % — SIGNIFICANT CHANGE UP (ref 0–8)
PLAT MORPH BLD: NORMAL — SIGNIFICANT CHANGE UP
PLATELET # BLD AUTO: 167 K/UL — SIGNIFICANT CHANGE UP (ref 150–400)
POTASSIUM SERPL-MCNC: 4.6 MMOL/L — SIGNIFICANT CHANGE UP (ref 3.5–5.3)
POTASSIUM SERPL-SCNC: 4.6 MMOL/L — SIGNIFICANT CHANGE UP (ref 3.5–5.3)
RBC # BLD: 3.79 M/UL — LOW (ref 4.2–5.8)
RBC # FLD: 13.8 % — SIGNIFICANT CHANGE UP (ref 10.3–14.5)
RBC BLD AUTO: SIGNIFICANT CHANGE UP
SODIUM SERPL-SCNC: 131 MMOL/L — LOW (ref 135–145)
VARIANT LYMPHS # BLD: 3 % — SIGNIFICANT CHANGE UP (ref 0–6)
WBC # BLD: 4.7 K/UL — SIGNIFICANT CHANGE UP (ref 3.8–10.5)
WBC # FLD AUTO: 4.7 K/UL — SIGNIFICANT CHANGE UP (ref 3.8–10.5)

## 2017-02-03 PROCEDURE — 74177 CT ABD & PELVIS W/CONTRAST: CPT | Mod: 26

## 2017-02-03 PROCEDURE — 78226 HEPATOBILIARY SYSTEM IMAGING: CPT | Mod: 26

## 2017-02-03 RX ADMIN — Medication 100 MILLIGRAM(S): at 22:06

## 2017-02-03 RX ADMIN — Medication 25 MILLIGRAM(S): at 22:07

## 2017-02-03 RX ADMIN — CARVEDILOL PHOSPHATE 12.5 MILLIGRAM(S): 80 CAPSULE, EXTENDED RELEASE ORAL at 06:42

## 2017-02-03 RX ADMIN — Medication 81 MILLIGRAM(S): at 12:00

## 2017-02-03 RX ADMIN — PIPERACILLIN AND TAZOBACTAM 25 GRAM(S): 4; .5 INJECTION, POWDER, LYOPHILIZED, FOR SOLUTION INTRAVENOUS at 06:42

## 2017-02-03 RX ADMIN — Medication 75 MICROGRAM(S): at 06:45

## 2017-02-03 RX ADMIN — Medication 100 MILLIGRAM(S): at 13:31

## 2017-02-03 RX ADMIN — HEPARIN SODIUM 5000 UNIT(S): 5000 INJECTION INTRAVENOUS; SUBCUTANEOUS at 22:06

## 2017-02-03 RX ADMIN — Medication 25 MILLIGRAM(S): at 06:48

## 2017-02-03 RX ADMIN — HEPARIN SODIUM 1500 UNIT(S): 5000 INJECTION INTRAVENOUS; SUBCUTANEOUS at 14:09

## 2017-02-03 RX ADMIN — Medication 20 MILLIGRAM(S): at 18:34

## 2017-02-03 RX ADMIN — Medication 200 MILLIGRAM(S): at 13:15

## 2017-02-03 RX ADMIN — INSULIN GLARGINE 25 UNIT(S): 100 INJECTION, SOLUTION SUBCUTANEOUS at 22:08

## 2017-02-03 RX ADMIN — HEPARIN SODIUM 5000 UNIT(S): 5000 INJECTION INTRAVENOUS; SUBCUTANEOUS at 13:31

## 2017-02-03 RX ADMIN — ISOSORBIDE MONONITRATE 60 MILLIGRAM(S): 60 TABLET, EXTENDED RELEASE ORAL at 12:01

## 2017-02-03 RX ADMIN — Medication 20 MILLIGRAM(S): at 06:44

## 2017-02-03 RX ADMIN — HEPARIN SODIUM 5000 UNIT(S): 5000 INJECTION INTRAVENOUS; SUBCUTANEOUS at 06:44

## 2017-02-03 RX ADMIN — CARVEDILOL PHOSPHATE 12.5 MILLIGRAM(S): 80 CAPSULE, EXTENDED RELEASE ORAL at 18:34

## 2017-02-03 RX ADMIN — Medication 100 MILLIGRAM(S): at 06:43

## 2017-02-03 NOTE — PROGRESS NOTE ADULT - SUBJECTIVE AND OBJECTIVE BOX
NEPHROLOGY INTERVAL HPI/OVERNIGHT EVENTS:  seen with daughter at bedside, mental status at baseline.  primary concern is his coughing      MEDICATIONS  (STANDING):  atorvastatin Oral Tab/Cap - Peds 40milliGRAM(s) Oral at bedtime  aspirin  chewable 81milliGRAM(s) Oral daily  insulin glargine Injectable (LANTUS) 25Unit(s) SubCutaneous at bedtime  insulin glargine Injectable (LANTUS) 15Unit(s) SubCutaneous at bedtime  heparin  Injectable 5000Unit(s) SubCutaneous every 8 hours  docusate sodium 100milliGRAM(s) Oral three times a day  insulin lispro (HumaLOG) corrective regimen sliding scale  SubCutaneous three times a day before meals  dextrose 5%. 1000milliLiter(s) IV Continuous <Continuous>  dextrose 50% Injectable 12.5Gram(s) IV Push once  dextrose 50% Injectable 25Gram(s) IV Push once  dextrose 50% Injectable 25Gram(s) IV Push once  isosorbide   mononitrate ER Tablet (IMDUR) 60milliGRAM(s) Oral daily  heparin  Injectable 1500Unit(s) IV Push once  levothyroxine 75MICROGram(s) Oral daily  hydrALAZINE 25milliGRAM(s) Oral every 8 hours  carvedilol 12.5milliGRAM(s) Oral every 12 hours  furosemide    Tablet 20milliGRAM(s) Oral two times a day  piperacillin/tazobactam IVPB. 3.375Gram(s) IV Intermittent every 12 hours    MEDICATIONS  (PRN):  senna 2Tablet(s) Oral at bedtime PRN Constipation  dextrose Gel 1Dose(s) Oral once PRN Blood Glucose LESS THAN 70 milliGRAM(s)/deciliter  glucagon  Injectable 1milliGRAM(s) IntraMuscular once PRN Glucose LESS THAN 70 milligrams/deciliter  ondansetron Injectable 4milliGRAM(s) IV Push every 6 hours PRN Nausea and/or Vomiting  acetaminophen  Suppository 650milliGRAM(s) Rectal every 6 hours PRN For Temp greater than 38 C (100.4 F)      Allergies    No Known Allergies    Intolerances        I&O's Detail  I & Os for 24h ending 03 Feb 2017 07:00  =============================================  IN:    Oral Fluid: 240 ml    Total IN: 240 ml  ---------------------------------------------  OUT:    Total OUT: 0 ml  ---------------------------------------------  Total NET: 240 ml    I & Os for current day (as of 03 Feb 2017 12:09)  =============================================  IN:    Total IN: 0 ml  ---------------------------------------------  OUT:    Voided: 200 ml    Total OUT: 200 ml  ---------------------------------------------  Total NET: -200 ml      Vital Signs Last 24 Hrs  T(C): 36.7, Max: 36.9 (02-02 @ 15:52)  T(F): 98.1, Max: 98.4 (02-02 @ 15:52)  HR: 63 (63 - 67)  BP: 140/50 (104/- - 140/50)  BP(mean): --  RR: 16 (16 - 20)  SpO2: 99% (96% - 99%)  Daily     Daily     PHYSICAL EXAM:  General: alert. awake Ox3  HEENT: MMM  CV: s1s2 rrr  LUNGS: B/L CTA  EXT: no edema    LABS:                        11.2   4.7   )-----------( 167      ( 03 Feb 2017 05:47 )             34.3     03 Feb 2017 05:47    131    |  92     |  78     ----------------------------<  65     4.6     |  27     |  7.53     Ca    7.4        03 Feb 2017 05:47    TPro  6.8    /  Alb  3.1    /  TBili  0.3    /  DBili  <0.1   /  AST  35     /  ALT  28     /  AlkPhos  67     02 Feb 2017 07:31    IMAGING  HIDA: negative  CT abd with IV contrast: no acute pathology

## 2017-02-03 NOTE — PROGRESS NOTE ADULT - SUBJECTIVE AND OBJECTIVE BOX
87 year old male with past medical history of iddm. htn. cad. chf. ppm, pe, esrd on hd (mwf w/ Dr Lerma) comes with AMS and dizziness with headaches after HD yest am, pt reports he usually has dizzy spells and feels "out of it" after HD, his BP in the ER was noted to be high with systolic BP of up to 190-200s with mild degree of tachycardia, no fever, no wbc ct, reported slight headache but attributed to his BP, no neck pain or stiffness, no recent illness, no cough, no dysuria, no rashes, no diarrhea, there was concern for meningitis so he was given vanco/cefepime acyclovir - low suspicion for meningitis - antibiotics were discontinued, course c/b fevers up to 103 o/n on 1/31 with n/v/abd pain.    fevers are down  no complaints  feels better      MEDICATIONS  (STANDING):  atorvastatin Oral Tab/Cap - Peds 40milliGRAM(s) Oral at bedtime  aspirin  chewable 81milliGRAM(s) Oral daily  insulin glargine Injectable (LANTUS) 25Unit(s) SubCutaneous at bedtime  insulin glargine Injectable (LANTUS) 15Unit(s) SubCutaneous at bedtime  heparin  Injectable 5000Unit(s) SubCutaneous every 8 hours  docusate sodium 100milliGRAM(s) Oral three times a day  insulin lispro (HumaLOG) corrective regimen sliding scale  SubCutaneous three times a day before meals  dextrose 5%. 1000milliLiter(s) IV Continuous <Continuous>  dextrose 50% Injectable 12.5Gram(s) IV Push once  dextrose 50% Injectable 25Gram(s) IV Push once  dextrose 50% Injectable 25Gram(s) IV Push once  isosorbide   mononitrate ER Tablet (IMDUR) 60milliGRAM(s) Oral daily  heparin  Injectable 1500Unit(s) IV Push once  levothyroxine 75MICROGram(s) Oral daily  hydrALAZINE 25milliGRAM(s) Oral every 8 hours  carvedilol 12.5milliGRAM(s) Oral every 12 hours  furosemide    Tablet 20milliGRAM(s) Oral two times a day  guaiFENesin    Syrup 200milliGRAM(s) Oral every 8 hours    MEDICATIONS  (PRN):  senna 2Tablet(s) Oral at bedtime PRN Constipation  dextrose Gel 1Dose(s) Oral once PRN Blood Glucose LESS THAN 70 milliGRAM(s)/deciliter  glucagon  Injectable 1milliGRAM(s) IntraMuscular once PRN Glucose LESS THAN 70 milligrams/deciliter  ondansetron Injectable 4milliGRAM(s) IV Push every 6 hours PRN Nausea and/or Vomiting  acetaminophen  Suppository 650milliGRAM(s) Rectal every 6 hours PRN For Temp greater than 38 C (100.4 F)      Vital Signs Last 24 Hrs  T(C): 36.7, Max: 36.9 (02-02 @ 15:52)  T(F): 98.1, Max: 98.4 (02-02 @ 15:52)  HR: 63 (63 - 67)  BP: 140/50 (104/- - 140/50)  BP(mean): --  RR: 16 (16 - 20)  SpO2: 99% (96% - 99%)      Physical Exam:  Constitutional: well -appearing, NAD  HEENT: NC/AT, EOMI, PERRLA  Neck: supple  Back: no tenderness  Respiratory: clear  Cardiovascular: S1S2 regular, no murmurs  Abdomen: soft, nontender, not distended, positive BS  Genitourinary: deferred  Rectal: deferred  Musculoskeletal: no muscle tenderness, no joint swelling or tenderness  Extremities: no pedal edema  Neurological:  moving all extremities, no focal deficits  Skin: no rashes, R arm fistula               Labs:                        11.2   4.7   )-----------( 167      ( 03 Feb 2017 05:47 )             34.3     03 Feb 2017 05:47    131    |  92     |  78     ----------------------------<  65     4.6     |  27     |  7.53     Ca    7.4        03 Feb 2017 05:47    TPro  6.8    /  Alb  3.1    /  TBili  0.3    /  DBili  <0.1   /  AST  35     /  ALT  28     /  AlkPhos  67     02 Feb 2017 07:31         Cultures: 1/30 blood cx no growth  Culture - Blood (02.01.17 @ 14:44)    Specimen Source: .Blood None    Culture Results:   No growth to date.    Radiology:           US abd  PROCEDURE DATE:  02/02/2017        INTERPRETATION:  Exam Date:        Ultrasound of the abdomen         CLINICAL INFORMATION:       Pain.    TECHNIQUE:   Transcutaneous ultrasonography of the abdomen was performed.    FINDINGS:    No previous examinations are available for review.    The liver demonstrates homogeneous echotexture without focal lesion.    Hepatic size and contours are maintained.  The main hepatic and portal   veins appear patent.   No intrahepatic or ductal dilatation is found.     The common duct is not dilated, measuring 0.31 cm.  The gallbladder is   significant for minimal cholelithiasis with minimal wall thickening   measuring 4 mm but no pericholecystic fluid or Balderrama's sign. The   pancreas is obscured by bowel gas.  The spleen size is normal.    The right kidney measures 8.3 cm in length. The left kidney measures 8.7   cm in length.  Bilateral cortical thinning is noted with increased   echogenicity consistent with end-stage renal disease. 6 mm cyst is seen   in the LEFT midpole. It demonstrates no mass, calculus or hydronephrosis.    The abdominal aorta and IVC are obscured by bowel gas..      IMPRESSION: Minimal cholelithiasis with minimal wall thickening measuring   4 mm. There is no Balderrama's sign. These findings are mild however may   represent early or mild cholecystitis and clinically indicated, HIDA scan   can be utilized for further evaluation. End-stage renal disease.    PROCEDURE:   CT of the Chest, Abdomen and Pelvis was performed with intravenous   contrast.   Intravenous contrast: 90 ml Omnipaque 350. 10 ml discarded.  Oral contrast: positive contrast was administered.  Sagittal and coronal reformats were performed.    FINDINGS:    CHEST:     LOWER NECK: Within normal limits.  AXILLA, MEDIASTINUM AND GERRI: No lymphadenopathy.  VESSELS: Coronary arterial calcifications. Normal caliber aorta and main   pulmonary artery.  HEART: Cardiomegaly.    PLEURA: No pleural effusion.  LUNGS AND LARGE AIRWAYS: Patent central airways. No pulmonary nodules.   Groundglass opacities in the dependent lower lobes bilaterally compatible   with atelectasis.  CHEST WALL:  Left-sided biventricular AICD in place. Bilateral   gynecomastia.    ABDOMEN AND PELVIS:    LIVER: Within normal limits.  SPLEEN: Within normal limits.  PANCREAS: Within normal limits.  BILE DUCTS: Normal caliber.  GALLBLADDER: Cholelithiasis.  ADRENALS: Within normal limits.  KIDNEYS/URETERS: Mild bilateral renal atrophy and poor enhancement of the   kidneys compatible with the patient's history of end-stage renal disease.   Subcentimeter low-density lesion in the left kidney which is too small to   characterize. 3 mm nonobstructing right renal calculus.    RETROPERITONEUM: No lymphadenopathy.    VESSELS:  Within normal limits.    BOWEL: No bowel obstruction. Appendix normal.  PERITONEUM: No ascites or pneumoperitoneum.    REPRODUCTIVE ORGANS: Penile prosthesis noted with fluid-filled reservoir   in the lower anterior pelvis. Prostate and seminal vesicles are   unremarkable.  BLADDER: Within normal limits.    ABDOMINAL WALL: Within normal limits.  BONES: Multilevel degenerative arthritic changes and L3 inferior endplate   deformity, unchanged.    IMPRESSION: No source of fever identified.    Radiology:  FINDINGS:    CHEST:     LOWER NECK: Within normal limits.  AXILLA, MEDIASTINUM AND GERRI: No lymphadenopathy.  VESSELS: Coronary arterial calcifications. Normal caliber aorta and main   pulmonary artery.  HEART: Cardiomegaly.    PLEURA: No pleural effusion.  LUNGS AND LARGE AIRWAYS: Patent central airways. No pulmonary nodules.   Groundglass opacities in the dependent lower lobes bilaterally compatible   with atelectasis.  CHEST WALL:  Left-sided biventricular AICD in place. Bilateral   gynecomastia.    ABDOMEN AND PELVIS:    LIVER: Within normal limits.  SPLEEN: Within normal limits.  PANCREAS: Within normal limits.  BILE DUCTS: Normal caliber.  GALLBLADDER: Cholelithiasis.  ADRENALS: Within normal limits.  KIDNEYS/URETERS: Mild bilateral renal atrophy and poor enhancement of the   kidneys compatible with the patient's history of end-stage renal disease.   Subcentimeter low-density lesion in the left kidney which is too small to   characterize. 3 mm nonobstructing right renal calculus.    RETROPERITONEUM: No lymphadenopathy.    VESSELS:  Within normal limits.    BOWEL: No bowel obstruction. Appendix normal.  PERITONEUM: No ascites or pneumoperitoneum.    REPRODUCTIVE ORGANS: Penile prosthesis noted with fluid-filled reservoir   in the lower anterior pelvis. Prostate and seminal vesicles are   unremarkable.  BLADDER: Within normal limits.    ABDOMINAL WALL: Within normal limits.  BONES: Multilevel degenerative arthritic changes and L3 inferior endplate   deformity, unchanged.    IMPRESSION: No source of fever identified.    INTERPRETATION:  CT head without IV contrast     CPT 22773    CLINICAL INFORMATION: Fever and altered mental status  TECHNIQUE: Contiguous axial 5 mm sections were obtained through the head.   Coronal and sagittal reconstructions were reformatted.    FINDINGS: Prior CT of the head dated 10/23/2016 is available for review.    The brain demonstrates mild periventricular white matter microvascular   ischemia.. No acute cerebral territorial infarction is seen.  No   intracranial hemorrhage is found.  No mass effect is found in the brain.      The ventricles, sulci and basal cisterns appear unremarkable.  The orbits are unremarkable.   The visualized portions of the paranasal sinuses are clear.  The osseous   calvarium is intact.    IMPRESSION: Mild periventricular white matter microvascular ischemia. No   evidence of acute intracranial pathology.   2/1 Ct abd/pelvis with contrast - no intraabd pathology  HIDA scan (-) acute alan

## 2017-02-03 NOTE — PROGRESS NOTE ADULT - ASSESSMENT
88 y/o HM presents with Altered MS with HTN urgency    # ESRD  # Syncope/ Weakness  # Encephalopathy, now resolved  # HTN urgency  # RUQ pain with neg HIDA and CT of abd   # chronic systolic CHF with ICD  # Hypothyroidism    PLAN  - Thus far, all cultures are negative  - BP mpre stable, it appears pt is non compliant with his BP meds at home  - HD today, s/p IV contrast study

## 2017-02-03 NOTE — PROGRESS NOTE ADULT - SUBJECTIVE AND OBJECTIVE BOX
Patient Demographics     Address  [address] [place] [address] [address] [id number] Phone  [phone] [facility]  [phone] [facility]      Emergency Contact(s)     Name Relation Home Work Mobile    savannah rodarte [phone]     [place] Notice HPI:  2/2 RUQ pain persist, feels better, + nausea, tolerating PO intake, denies CP, palpitations, dizziness, noted low SBP in 100,plan of care discussed  2/3 pain improved tolerated HD and PO intake well, afebrile, plan of care discussed    Review of system- Rest of the review of system are normal except mentioned in HPI    SUBJECTIVE & OBJECTIVE:  No new complaint    Vital Signs Last 24 Hrs  T(C): 36.7, Max: 36.7 ( @ 07:05)  T(F): 98, Max: 98.1 ( @ 07:05)  HR: 74 (60 - 74)  BP: 139/49 (106/50 - 140/50)  BP(mean): --  RR: 18 (16 - 18)  SpO2: 97% (97% - 99%)  2017 05:47    131    |  92     |  78     ----------------------------<  65     4.6     |  27     |  7.53     Ca    7.4        2017 05:47    TPro  6.8    /  Alb  3.1    /  TBili  0.3    /  DBili  <0.1   /  AST  35     /  ALT  28     /  AlkPhos  67     2017 07:31                         11.2   4.7   )-----------( 167      ( 2017 05:47 )             34.3         LIVER FUNCTIONS - ( 2017 07:31 )  Alb: 3.1 g/dL / Pro: 6.8 gm/dL / ALK PHOS: 67 U/L / ALT: 28 U/L / AST: 35 U/L / GGT: x             136    |  96     |  52     ----------------------------<  122    4.4     |  30     |  6.02     Ca    7.9        2017 07:31  Phos  8.4       2017 10:20    TPro  6.8    /  Alb  3.1    /  TBili  0.3    /  DBili  <0.1   /  AST  35     /  ALT  28     /  AlkPhos  67     2017 07:31                            11.7   10.3  )-----------( 187      ( 2017 10:20 )             35.5       CARDIAC MARKERS ( 2017 22:19 )  0.144 ng/mL / x     / 403 U/L / x     / x            LIVER FUNCTIONS - ( 2017 07:31 )  Alb: 3.1 g/dL / Pro: 6.8 gm/dL / ALK PHOS: 67 U/L / ALT: 28 U/L / AST: 35 U/L / GGT: x         EXAM:  NM HEPATOBILIARY IMG                            PROCEDURE DATE:  2017        INTERPRETATION:    RADIOPHARMACEUTICAL:  99mTc-Mebrofenin  DOSE: 3.6 mCi IV    CLINICAL INFORMATION: 87 year old male with sepsis, gallbladder wall   thickening on sonogram referred to evaluate for acute cholecystitis    TECHNIQUE: Dynamic images of the anterior abdomen were obtained for 20   minutes immediately following radiotracer injection. Static images of the   abdomen in the anterior, right anterioroblique and right lateral   projections were also obtained.    COMPARISON:No previous hepatobiliary scan for comparison    FINDINGS: There is prompt hepatic uptake of the injected radiotracer. The   gallbladder is first visualized at 20 minutes post tracer injection and   bowel activity by 15 minutes There is normal tracer clearance from the   liver by the end of the study.     IMPRESSION: Normal hepatobiliary scan.     No scan evidence of acute cholecystitis.     IMPRESSION: No acute abdominal pathology. CT bad      XAM:  US COMPLETE ABDOMEN SONOGRAM                            PROCEDURE DATE:  2017        INTERPRETATION:  Exam Date:        Ultrasound of the abdomen         CLINICAL INFORMATION:       Pain.    TECHNIQUE:   Transcutaneous ultrasonography of the abdomen was performed.    FINDINGS:    No previous examinations are available for review.    The liver demonstrates homogeneous echotexture without focal lesion.    Hepatic size and contours are maintained.  The main hepatic and portal   veins appear patent.   No intrahepatic or ductal dilatation is found.     The common duct is not dilated, measuring 0.31 cm.  The gallbladder is   significant for minimal cholelithiasis with minimal wall thickening   measuring 4 mm but no pericholecystic fluid or Balderrama's sign. The   pancreas is obscured by bowel gas.  The spleen size is normal.    The right kidney measures 8.3 cm in length. The left kidney measures 8.7   cm in length.  Bilateral cortical thinning is noted with increased   echogenicity consistent with end-stage renal disease. 6 mm cyst is seen   in the LEFT midpole. It demonstrates no mass, calculus or hydronephrosis.    The abdominal aorta and IVC are obscured by bowel gas..      IMPRESSION: Minimal cholelithiasis with minimal wallthickening measuring   4 mm. There is no Balderrama's sign. These findings are mild however may   represent early or mild cholecystitis and clinically indicated, HIDA scan   can be utilized for further evaluation. End-stage renal disease.    PHYSICAL EXAM:    GENERAL: NAD, well-groomed, well-developed  HEAD:  Atraumatic, Normocephalic  EYES: EOMI, PERRLA, conjunctiva and sclera clear  HEENT: Moist mucous membranes  NECK: Supple, No JVD  NERVOUS SYSTEM:  Alert & Oriented X3, Motor Strength 5/5 B/L upper and lower extremities; DTRs 2+ intact and symmetric  CHEST/LUNG: Clear to auscultation bilaterally; No rales, rhonchi, wheezing, or rubs  HEART: Regular rate and rhythm; No murmurs, rubs, or gallops  MEDICATIONS  (STANDING):  atorvastatin Oral Tab/Cap - Peds 40milliGRAM(s) Oral daily  aspirin  chewable 81milliGRAM(s) Oral daily  insulin glargine Injectable (LANTUS) 25Unit(s) SubCutaneous at bedtime  insulin glargine Injectable (LANTUS) 15Unit(s) SubCutaneous at bedtime  heparin  Injectable 5000Unit(s) SubCutaneous every 8 hours  docusate sodium 100milliGRAM(s) Oral three times a day  insulin lispro (HumaLOG) corrective regimen sliding scale  SubCutaneous three times a day before meals  dextrose 5%. 1000milliLiter(s) IV Continuous <Continuous>  dextrose 50% Injectable 12.5Gram(s) IV Push once  dextrose 50% Injectable 25Gram(s) IV Push once  dextrose 50% Injectable 25Gram(s) IV Push once  isosorbide   mononitrate ER Tablet (IMDUR) 60milliGRAM(s) Oral daily  heparin  Injectable 1500Unit(s) IV Push once  levothyroxine 75MICROGram(s) Oral daily  hydrALAZINE 25milliGRAM(s) Oral every 8 hours  carvedilol 12.5milliGRAM(s) Oral every 12 hours  furosemide    Tablet 20milliGRAM(s) Oral two times a day  piperacillin/tazobactam IVPB. 3.375Gram(s) IV Intermittent every 12 hours  ABDOMEN: Soft, Nontender, distended; Bowel sounds present  GENITOURINARY- Voiding, no palpable bladder  EXTREMITIES:  2+ Peripheral Pulses, No clubbing, cyanosis, or edema  MUSCULOSKELETAL No muscle tenderness, Muscle tone normal, No joint tenderness, no Joint swelling, Joint range of motion-normal  SKIN-no rash, no lesion  CNS- alert, oriented X3, non focal       Daily     Daily Weight in k.5 (2017 05:45) Next dose due: Tonight at bedtime      Take 100 mg by mouth 2 (two) times daily. DULCOLAX 10 MG Supp  Generic drug:  bisacodyl  Next dose due: Tomorrow in the morning      Place 10 mg rectally daily.           glipiZIDE 5 MG Tabs  Commonly known [id number] Labetalol HCl (NORMODYNE) tab 200 mg 12/12/18 1831 Given      [id number] Labetalol HCl (NORMODYNE) tab 200 mg 12/13/18 0516 Given      [id number] Labetalol HCl (TRANDATE) injection 10 mg 12/12/18 2206 Given      [id number] baclofen (LIORESAL) tab 2 SpO2  93 % Filed at 12/13/2018 1200      Patient's Most Recent Weight       Most Recent Value   Patient Weight  77.2 kg (170 lb 4.8 oz)      Lab Results Last 24 Hours    No matching results found     Microbiology Results (All)     Procedure Component Value status. Patient was discharged on Cipro to nursing home facility. Patient sent to the hospital from the nursing home due to altered mental status and hypoxemia. Nursing home reported O2 saturation 86% on 1 L.   Patient unable to cooperate with exam or an baclofen 20 MG Oral Tab Take 20 mg by mouth 3 (three) times daily. Disp:  Rfl:    Ciprofloxacin HCl 250 MG Oral Tab Take 250 mg by mouth daily.  For 5 days administer with cipro 500mg Disp:  Rfl:    AmLODIPine Besylate 10 MG Oral Tab Take 2 tablets (10 mg t Musculoskeletal: Moves all extremities. Extremities: No edema or cyanosis. [RZ.1]  Genitals-I will order a mammogram to[RZ. 2]  Psychiatric: Appropriate mood and affect.       Diagnostic Data:      Labs:  Recent Labs   Lab  12/07/18   1636   WBC  16.5*   HGB Electronically signed by Sahara Storey DO on 12/7/2018  7:30 PM   Attribution Garcia    RZ. 1 - Sahara Storey DO on 12/7/2018  7:09 PM  RELENA. 2 - Sahara Storey DO on 12/7/2018  7:25 PM                        Consults - MD Electronically signed by Mary Dumont DO on 12/10/2018  4:00 PM   Attribution Garcia    CS. 1 - Mary Dumont DO on 12/10/2018  3:55 PM                     D/C Summary     No notes of this type exist for this encounter.       Physical Therapy Not

## 2017-02-03 NOTE — PROGRESS NOTE ADULT - ASSESSMENT
87 year old male with past medical history of iddm. htn. cad. chf. ppm, pe, esrd on hd (mwf w/ Dr Lerma) comes with AMS and dizziness with headaches after HD yest am, pt reports he usually has dizzy spells and feels "out of it" after HD, his BP in the ER was noted to be high with systolic BP of up to 190-200s with mild degree of tachycardia, no fever, no wbc ct, reported slight headache but attributed to his BP, no neck pain or stiffness, no recent illness, no cough, no dysuria, no rashes, no diarrhea, there was concern for meningitis so he was given vanco/cefepime acyclovir - low suspicion for meningitis - antibiotics were discontinued, course c/b fevers up to 103 o/n on 1/31 with n/v/abd pain.    1. fevers/nausea/vomiting/diarrhea - ? etiology ? cholecystitis ? viral gastroenteritis ?    - no fevers, stable    - repeat blood cx no growth    - xray (-)    - US abd with minimal cholithiasis    - CT abd/pelvis yest w/ contrast and HIDA today with no intra abd pathology and negative for acute alan    - previous CT abd/pelvis/chest/head (-) unremarkable for infectious etiology and cultures no growth    - d/c antibiotics and observe    - supportive care    - monitor temps    - f/u CBC    2. other issues - iddm. htn. cad. chf. ppm, pe, esrd on hd - care per medicine

## 2017-02-03 NOTE — PROGRESS NOTE ADULT - ASSESSMENT
PCP Dr. Diaz Santos  nephrology Dr. Martínez    87 year old male with PMH of  DM2 on insulin, HTN, CAD, CHF, s/p CRT-D, PE, ESRD on HD  (mw w/ Dr Lerma)  admitted on 1/30/17 with AMS and dizziness after HD this morning. Patient states that he usually gets dizziness and slightly out of it after dialysis.     As per ED note states with EMS pt was HTN with -200s and mildly tachycardic.  POC blood glucose 158 in field.  No known trauma.   Hospital course:  1/31  # [id number] pt reports feeling dizzy, occasional cough, denies CP, palpitations, has lose BM today, but was constipated prior that, denies HA, neck stiffness, visual changes, evaluated by neurology and ID, plan of care discussed  2/1 BP better controlled, + RUQ pain, reports nausea on and off, fever 101 overnight, encephalopathy persist, daughter at bedside , plan of care discussed  2/2 RUQ pain persist, feels better, + nausea, tolerating PO intake, denies CP, palpitations, dizziness, noted low SBP in 100,plan of care discussed

## 2017-02-04 LAB
ANION GAP SERPL CALC-SCNC: 10 MMOL/L — SIGNIFICANT CHANGE UP (ref 5–17)
BASOPHILS # BLD AUTO: 0.1 K/UL — SIGNIFICANT CHANGE UP (ref 0–0.2)
BASOPHILS NFR BLD AUTO: 2 % — SIGNIFICANT CHANGE UP (ref 0–2)
BUN SERPL-MCNC: 43 MG/DL — HIGH (ref 7–23)
CALCIUM SERPL-MCNC: 7.8 MG/DL — LOW (ref 8.5–10.1)
CHLORIDE SERPL-SCNC: 101 MMOL/L — SIGNIFICANT CHANGE UP (ref 96–108)
CO2 SERPL-SCNC: 28 MMOL/L — SIGNIFICANT CHANGE UP (ref 22–31)
CREAT SERPL-MCNC: 5.08 MG/DL — HIGH (ref 0.5–1.3)
CULTURE RESULTS: SIGNIFICANT CHANGE UP
CULTURE RESULTS: SIGNIFICANT CHANGE UP
EOSINOPHIL # BLD AUTO: 0.1 K/UL — SIGNIFICANT CHANGE UP (ref 0–0.5)
EOSINOPHIL NFR BLD AUTO: 1.6 % — SIGNIFICANT CHANGE UP (ref 0–6)
GLUCOSE SERPL-MCNC: 86 MG/DL — SIGNIFICANT CHANGE UP (ref 70–99)
HCT VFR BLD CALC: 34.2 % — LOW (ref 39–50)
HGB BLD-MCNC: 11.1 G/DL — LOW (ref 13–17)
LYMPHOCYTES # BLD AUTO: 0.9 K/UL — LOW (ref 1–3.3)
LYMPHOCYTES # BLD AUTO: 21.3 % — SIGNIFICANT CHANGE UP (ref 13–44)
MCHC RBC-ENTMCNC: 29.3 PG — SIGNIFICANT CHANGE UP (ref 27–34)
MCHC RBC-ENTMCNC: 32.3 GM/DL — SIGNIFICANT CHANGE UP (ref 32–36)
MCV RBC AUTO: 90.7 FL — SIGNIFICANT CHANGE UP (ref 80–100)
MONOCYTES # BLD AUTO: 0.8 K/UL — SIGNIFICANT CHANGE UP (ref 0–0.9)
MONOCYTES NFR BLD AUTO: 19.8 % — HIGH (ref 2–14)
NEUTROPHILS # BLD AUTO: 2.3 K/UL — SIGNIFICANT CHANGE UP (ref 1.8–7.4)
NEUTROPHILS NFR BLD AUTO: 55.3 % — SIGNIFICANT CHANGE UP (ref 43–77)
PLATELET # BLD AUTO: 165 K/UL — SIGNIFICANT CHANGE UP (ref 150–400)
POTASSIUM SERPL-MCNC: 4 MMOL/L — SIGNIFICANT CHANGE UP (ref 3.5–5.3)
POTASSIUM SERPL-SCNC: 4 MMOL/L — SIGNIFICANT CHANGE UP (ref 3.5–5.3)
RBC # BLD: 3.78 M/UL — LOW (ref 4.2–5.8)
RBC # FLD: 13.8 % — SIGNIFICANT CHANGE UP (ref 10.3–14.5)
SODIUM SERPL-SCNC: 139 MMOL/L — SIGNIFICANT CHANGE UP (ref 135–145)
SPECIMEN SOURCE: SIGNIFICANT CHANGE UP
SPECIMEN SOURCE: SIGNIFICANT CHANGE UP
WBC # BLD: 4.1 K/UL — SIGNIFICANT CHANGE UP (ref 3.8–10.5)
WBC # FLD AUTO: 4.1 K/UL — SIGNIFICANT CHANGE UP (ref 3.8–10.5)

## 2017-02-04 RX ADMIN — Medication 200 MILLIGRAM(S): at 22:18

## 2017-02-04 RX ADMIN — Medication 200 MILLIGRAM(S): at 13:36

## 2017-02-04 RX ADMIN — HEPARIN SODIUM 5000 UNIT(S): 5000 INJECTION INTRAVENOUS; SUBCUTANEOUS at 06:18

## 2017-02-04 RX ADMIN — HEPARIN SODIUM 5000 UNIT(S): 5000 INJECTION INTRAVENOUS; SUBCUTANEOUS at 22:18

## 2017-02-04 RX ADMIN — Medication 20 MILLIGRAM(S): at 17:37

## 2017-02-04 RX ADMIN — Medication 25 MILLIGRAM(S): at 06:22

## 2017-02-04 RX ADMIN — CARVEDILOL PHOSPHATE 12.5 MILLIGRAM(S): 80 CAPSULE, EXTENDED RELEASE ORAL at 17:37

## 2017-02-04 RX ADMIN — ISOSORBIDE MONONITRATE 60 MILLIGRAM(S): 60 TABLET, EXTENDED RELEASE ORAL at 13:14

## 2017-02-04 RX ADMIN — Medication 100 MILLIGRAM(S): at 06:19

## 2017-02-04 RX ADMIN — CARVEDILOL PHOSPHATE 12.5 MILLIGRAM(S): 80 CAPSULE, EXTENDED RELEASE ORAL at 06:22

## 2017-02-04 RX ADMIN — Medication 20 MILLIGRAM(S): at 06:19

## 2017-02-04 RX ADMIN — Medication 25 MILLIGRAM(S): at 22:19

## 2017-02-04 RX ADMIN — Medication 100 MILLIGRAM(S): at 22:17

## 2017-02-04 RX ADMIN — HEPARIN SODIUM 5000 UNIT(S): 5000 INJECTION INTRAVENOUS; SUBCUTANEOUS at 13:36

## 2017-02-04 RX ADMIN — Medication 75 MICROGRAM(S): at 06:22

## 2017-02-04 RX ADMIN — Medication 100 MILLIGRAM(S): at 13:36

## 2017-02-04 RX ADMIN — Medication 81 MILLIGRAM(S): at 13:14

## 2017-02-04 RX ADMIN — Medication 25 MILLIGRAM(S): at 16:47

## 2017-02-04 NOTE — PROGRESS NOTE ADULT - ASSESSMENT
PCP Dr. Diaz Santos  nephrology Dr. Martínez    87 year old male with PMH of  DM2 on insulin, HTN, CAD, CHF, s/p CRT-D, PE, ESRD on HD  (mw w/ Dr Lerma)  admitted on 1/30/17 with AMS and dizziness after HD this morning. Patient states that he usually gets dizziness and slightly out of it after dialysis.        There is mild thickening of both mitral valve leaflets. The leaflet   opening appears normal.   Moderate (2+) mitral regurgitation is present.   Fibrocalcific changes noted to the aortic valve leaflets with mild   restriction in leaflet excursion. Trace aortic regurgitation is present.   Well seated prosthetic valve in the aortic position. Peak trans-trans   valve gradient is 23.43mmHg, consistent with   Mild aortic stenosis is present.   The tricuspid valve is not well visualized, but is probably normal.   Trace tricuspid valve regurgitation is present.   Pulmonic valve not well seen, probably normal pulmonic valve function.   The left atrium is normal.   Borderline septal left ventricular hypertrophy is present. Left ventricle   function is mildly impaired; estimated left ventricle function is 45-50%   The right atrium is normal.   The right ventricle is normal in size, wall thickness, wall motion, and   contractility.   A device wire is seen in the RV and RA.   Trace pericardial effusion cannot be ruled out    stress test when allowed.

## 2017-02-04 NOTE — PROGRESS NOTE ADULT - SUBJECTIVE AND OBJECTIVE BOX
HPI:  2/4 afebrile, cough improved, very weak when walks, patient and daughter requesting HI, denies other sx, family member at bedside    Review of system- Rest of the review of system are normal except mentioned in HPI    SUBJECTIVE & OBJECTIVE:  No new complaint    Vital Signs Last 24 Hrs  T(C): 36.6, Max: 36.7 (02-03 @ 17:38)  T(F): 97.9, Max: 98 (02-03 @ 17:38)  HR: 69 (60 - 74)  BP: 162/52 (107/51 - 162/52)  BP(mean): --  RR: 18 (16 - 18)  SpO2: 99% (97% - 99%)      PHYSICAL EXAM:    Constitutional: NAD, awake and alert, well-developed  HEENT: PERR, EOMI, Normal Hearing, MMM  Neck: Soft and supple, No LAD, No JVD  Respiratory: Breath sounds are clear bilaterally, No wheezing, rales or rhonchi  Cardiovascular: S1 and S2, regular rate and rhythm, no Murmurs, gallops or rubs  Gastrointestinal: Bowel Sounds present, soft, nontender, nondistended, no guarding, no rebound  Extremities: No peripheral edema  Vascular: 2+ peripheral pulses  Neurological: A/O x 3, no focal deficits  Musculoskeletal: 5/5 strength b/l upper and lower extremities  Skin: No rashes    04 Feb 2017 05:31    139    |  101    |  43     ----------------------------<  86     4.0     |  28     |  5.08     Ca    7.8        04 Feb 2017 05:31                              11.1   4.1   )-----------( 165      ( 04 Feb 2017 05:31 )             34.2     CARDIAC MARKERS ( 31 Jan 2017 22:19 )  0.144 ng/mL / x     / 403 U/L / x     / x            LIVER FUNCTIONS - ( 02 Feb 2017 07:31 )  Alb: 3.1 g/dL / Pro: 6.8 gm/dL / ALK PHOS: 67 U/L / ALT: 28 U/L / AST: 35 U/L / GGT: x         EXAM:  NM HEPATOBILIARY IMG                            PROCEDURE DATE:  02/03/2017        INTERPRETATION:    RADIOPHARMACEUTICAL:  99mTc-Mebrofenin  DOSE: 3.6 mCi IV    CLINICAL INFORMATION: 87 year old male with sepsis, gallbladder wall   thickening on sonogram referred to evaluate for acute cholecystitis    TECHNIQUE: Dynamic images of the anterior abdomen were obtained for 20   minutes immediately following radiotracer injection. Static images of the   abdomen in the anterior, right anterioroblique and right lateral   projections were also obtained.    COMPARISON:No previous hepatobiliary scan for comparison    FINDINGS: There is prompt hepatic uptake of the injected radiotracer. The   gallbladder is first visualized at 20 minutes post tracer injection and   bowel activity by 15 minutes There is normal tracer clearance from the   liver by the end of the study.     IMPRESSION: Normal hepatobiliary scan.     No scan evidence of acute cholecystitis.     IMPRESSION: No acute abdominal pathology. CT bad      XAM:  US COMPLETE ABDOMEN SONOGRAM                            PROCEDURE DATE:  02/02/2017        INTERPRETATION:  Exam Date:        Ultrasound of the abdomen         CLINICAL INFORMATION:       Pain.    TECHNIQUE:   Transcutaneous ultrasonography of the abdomen was performed.    FINDINGS:    No previous examinations are available for review.    The liver demonstrates homogeneous echotexture without focal lesion.    Hepatic size and contours are maintained.  The main hepatic and portal   veins appear patent.   No intrahepatic or ductal dilatation is found.     The common duct is not dilated, measuring 0.31 cm.  The gallbladder is   significant for minimal cholelithiasis with minimal wall thickening   measuring 4 mm but no pericholecystic fluid or Balderrama's sign. The   pancreas is obscured by bowel gas.  The spleen size is normal.    The right kidney measures 8.3 cm in length. The left kidney measures 8.7   cm in length.  Bilateral cortical thinning is noted with increased   echogenicity consistent with end-stage renal disease. 6 mm cyst is seen   in the LEFT midpole. It demonstrates no mass, calculus or hydronephrosis.    The abdominal aorta and IVC are obscured by bowel gas..      IMPRESSION: Minimal cholelithiasis with minimal wallthickening measuring   4 mm. There is no Balderrama's sign. These findings are mild however may   represent early or mild cholecystitis and clinically indicated, HIDA scan   can be utilized for further evaluation. End-stage renal disease.

## 2017-02-04 NOTE — PROGRESS NOTE ADULT - SUBJECTIVE AND OBJECTIVE BOX
NEPHROLOGY INTERVAL HPI/OVERNIGHT EVENTS:  No acute events.  CT of ABD and HIDA scan negative.  Now off abtx.    HPI:  87 year old male with past medical history of iddm. htn. cad. chf. ppm, pe, esrd on hd (mwf w/ Dr Lerma) comes with AMS and dizzyness after HD this morning. Patient states that he usually gets dizzyness and slightly out of it after dialysis. As per ED note states with EMS pt was HTN with -200s and mildly tachycardic.  POC blood glucose 158 in field.  No known trauma. (2017 03:51)      MEDICATIONS  (STANDING):  atorvastatin Oral Tab/Cap - Peds 40milliGRAM(s) Oral at bedtime  aspirin  chewable 81milliGRAM(s) Oral daily  insulin glargine Injectable (LANTUS) 25Unit(s) SubCutaneous at bedtime  insulin glargine Injectable (LANTUS) 15Unit(s) SubCutaneous at bedtime  heparin  Injectable 5000Unit(s) SubCutaneous every 8 hours  docusate sodium 100milliGRAM(s) Oral three times a day  insulin lispro (HumaLOG) corrective regimen sliding scale  SubCutaneous three times a day before meals  dextrose 5%. 1000milliLiter(s) IV Continuous <Continuous>  dextrose 50% Injectable 12.5Gram(s) IV Push once  dextrose 50% Injectable 25Gram(s) IV Push once  dextrose 50% Injectable 25Gram(s) IV Push once  isosorbide   mononitrate ER Tablet (IMDUR) 60milliGRAM(s) Oral daily  levothyroxine 75MICROGram(s) Oral daily  hydrALAZINE 25milliGRAM(s) Oral every 8 hours  carvedilol 12.5milliGRAM(s) Oral every 12 hours  furosemide    Tablet 20milliGRAM(s) Oral two times a day  guaiFENesin    Syrup 200milliGRAM(s) Oral every 8 hours    MEDICATIONS  (PRN):  senna 2Tablet(s) Oral at bedtime PRN Constipation  dextrose Gel 1Dose(s) Oral once PRN Blood Glucose LESS THAN 70 milliGRAM(s)/deciliter  glucagon  Injectable 1milliGRAM(s) IntraMuscular once PRN Glucose LESS THAN 70 milligrams/deciliter  ondansetron Injectable 4milliGRAM(s) IV Push every 6 hours PRN Nausea and/or Vomiting  acetaminophen  Suppository 650milliGRAM(s) Rectal every 6 hours PRN For Temp greater than 38 C (100.4 F)          Vital Signs Last 24 Hrs  T(C): 36.8, Max: 36.8 ( @ 15:29)  T(F): 98.3, Max: 98.3 ( @ 15:29)  HR: 65 (65 - 74)  BP: 154/53 (139/49 - 162/52)  BP(mean): --  RR: 18 (18 - 18)  SpO2: 97% (97% - 99%)  Daily     Daily Weight in k.3 (2017 22:02)    PHYSICAL EXAM:  Alert and appropriate  GENERAL: comfortable  CHEST/LUNG: clear to aus  HEART: S1S2 RRR  ABDOMEN: soft  EXTREMITIES: no edema  SKIN:     LABS:                        11.1   4.1   )-----------( 165      ( 2017 05:31 )             34.2     2017 05:31    139    |  101    |  43     ----------------------------<  86     4.0     |  28     |  5.08     Ca    7.8        2017 05:31                  RADIOLOGY & ADDITIONAL TESTS:

## 2017-02-04 NOTE — PROGRESS NOTE ADULT - ASSESSMENT
88 y/o HM presents with Altered MS with HTN urgency    # ESRD  # Syncope/ Weakness  # Encephalopathy, now resolved  # HTN urgency  # RUQ pain with neg HIDA and CT of abd   # chronic systolic CHF with ICD  # Hypothyroidism    PLAN  - Thus far, all cultures are negative  - BP mpre stable, it appears pt is non compliant with his BP meds at home  - HD today, s/p IV contrast study    2/4  --ESRD  stable on TIW HD  --Syncope,  work up negative.  --HTN, BP control acceptable,  Would allow some increase in BP to avoid hypotensive episodes associated with HD.  --ABD, No pathology on imaging studies.

## 2017-02-04 NOTE — PROGRESS NOTE ADULT - SUBJECTIVE AND OBJECTIVE BOX
HPI:  2/2 RUQ pain persist, feels better, + nausea, tolerating PO intake, denies CP, palpitations, dizziness, noted low SBP in 100,plan of care discussed  2/3 pain improved tolerated HD and PO intake well, afebrile, plan of care discussed    2/4 states his last episode of chest pain was thursday.    Review of system- Rest of the review of system are normal except mentioned in HPI    SUBJECTIVE & OBJECTIVE:  No new complaint    Vital Signs Last 24 Hrs  T(C): 36.7, Max: 36.7 ( @ 07:05)  T(F): 98, Max: 98.1 ( @ 07:05)  HR: 74 (60 - 74)  BP: 139/49 (106/50 - 140/50)  BP(mean): --  RR: 18 (16 - 18)  SpO2: 97% (97% - 99%)  2017 05:47    131    |  92     |  78     ----------------------------<  65     4.6     |  27     |  7.53     Ca    7.4        2017 05:47    TPro  6.8    /  Alb  3.1    /  TBili  0.3    /  DBili  <0.1   /  AST  35     /  ALT  28     /  AlkPhos  67     2017 07:31                         11.2   4.7   )-----------( 167      ( 2017 05:47 )             34.3         LIVER FUNCTIONS - ( 2017 07:31 )  Alb: 3.1 g/dL / Pro: 6.8 gm/dL / ALK PHOS: 67 U/L / ALT: 28 U/L / AST: 35 U/L / GGT: x             136    |  96     |  52     ----------------------------<  122    4.4     |  30     |  6.02     Ca    7.9        2017 07:31  Phos  8.4       2017 10:20    TPro  6.8    /  Alb  3.1    /  TBili  0.3    /  DBili  <0.1   /  AST  35     /  ALT  28     /  AlkPhos  67     2017 07:31                            11.7   10.3  )-----------( 187      ( 2017 10:20 )             35.5       CARDIAC MARKERS ( 2017 22:19 )  0.144 ng/mL / x     / 403 U/L / x     / x            LIVER FUNCTIONS - ( 2017 07:31 )  Alb: 3.1 g/dL / Pro: 6.8 gm/dL / ALK PHOS: 67 U/L / ALT: 28 U/L / AST: 35 U/L / GGT: x         EXAM:  NM HEPATOBILIARY IMG                            PROCEDURE DATE:  2017        INTERPRETATION:    RADIOPHARMACEUTICAL:  99mTc-Mebrofenin  DOSE: 3.6 mCi IV    CLINICAL INFORMATION: 87 year old male with sepsis, gallbladder wall   thickening on sonogram referred to evaluate for acute cholecystitis    TECHNIQUE: Dynamic images of the anterior abdomen were obtained for 20   minutes immediately following radiotracer injection. Static images of the   abdomen in the anterior, right anterioroblique and right lateral   projections were also obtained.    COMPARISON:No previous hepatobiliary scan for comparison    FINDINGS: There is prompt hepatic uptake of the injected radiotracer. The   gallbladder is first visualized at 20 minutes post tracer injection and   bowel activity by 15 minutes There is normal tracer clearance from the   liver by the end of the study.     IMPRESSION: Normal hepatobiliary scan.     No scan evidence of acute cholecystitis.     IMPRESSION: No acute abdominal pathology. CT bad      XAM:  US COMPLETE ABDOMEN SONOGRAM                            PROCEDURE DATE:  2017        INTERPRETATION:  Exam Date:        Ultrasound of the abdomen         CLINICAL INFORMATION:       Pain.    TECHNIQUE:   Transcutaneous ultrasonography of the abdomen was performed.    FINDINGS:    No previous examinations are available for review.    The liver demonstrates homogeneous echotexture without focal lesion.    Hepatic size and contours are maintained.  The main hepatic and portal   veins appear patent.   No intrahepatic or ductal dilatation is found.     The common duct is not dilated, measuring 0.31 cm.  The gallbladder is   significant for minimal cholelithiasis with minimal wall thickening   measuring 4 mm but no pericholecystic fluid or Balderrama's sign. The   pancreas is obscured by bowel gas.  The spleen size is normal.    The right kidney measures 8.3 cm in length. The left kidney measures 8.7   cm in length.  Bilateral cortical thinning is noted with increased   echogenicity consistent with end-stage renal disease. 6 mm cyst is seen   in the LEFT midpole. It demonstrates no mass, calculus or hydronephrosis.    The abdominal aorta and IVC are obscured by bowel gas..      IMPRESSION: Minimal cholelithiasis with minimal wallthickening measuring   4 mm. There is no Balderrama's sign. These findings are mild however may   represent early or mild cholecystitis and clinically indicated, HIDA scan   can be utilized for further evaluation. End-stage renal disease.    PHYSICAL EXAM:    GENERAL: NAD, well-groomed, well-developed  HEAD:  Atraumatic, Normocephalic  EYES: EOMI, PERRLA, conjunctiva and sclera clear  HEENT: Moist mucous membranes  NECK: Supple, No JVD  NERVOUS SYSTEM:  Alert & Oriented X3, Motor Strength 5/5 B/L upper and lower extremities; DTRs 2+ intact and symmetric  CHEST/LUNG: Clear to auscultation bilaterally; No rales, rhonchi, wheezing, or rubs  HEART: Regular rate and rhythm; No murmurs, rubs, or gallops  MEDICATIONS  (STANDING):  atorvastatin Oral Tab/Cap - Peds 40milliGRAM(s) Oral daily  aspirin  chewable 81milliGRAM(s) Oral daily  insulin glargine Injectable (LANTUS) 25Unit(s) SubCutaneous at bedtime  insulin glargine Injectable (LANTUS) 15Unit(s) SubCutaneous at bedtime  heparin  Injectable 5000Unit(s) SubCutaneous every 8 hours  docusate sodium 100milliGRAM(s) Oral three times a day  insulin lispro (HumaLOG) corrective regimen sliding scale  SubCutaneous three times a day before meals  dextrose 5%. 1000milliLiter(s) IV Continuous <Continuous>  dextrose 50% Injectable 12.5Gram(s) IV Push once  dextrose 50% Injectable 25Gram(s) IV Push once  dextrose 50% Injectable 25Gram(s) IV Push once  isosorbide   mononitrate ER Tablet (IMDUR) 60milliGRAM(s) Oral daily  heparin  Injectable 1500Unit(s) IV Push once  levothyroxine 75MICROGram(s) Oral daily  hydrALAZINE 25milliGRAM(s) Oral every 8 hours  carvedilol 12.5milliGRAM(s) Oral every 12 hours  furosemide    Tablet 20milliGRAM(s) Oral two times a day  piperacillin/tazobactam IVPB. 3.375Gram(s) IV Intermittent every 12 hours  ABDOMEN: Soft, Nontender, distended; Bowel sounds present  GENITOURINARY- Voiding, no palpable bladder  EXTREMITIES:  2+ Peripheral Pulses, No clubbing, cyanosis, or edema  MUSCULOSKELETAL No muscle tenderness, Muscle tone normal, No joint tenderness, no Joint swelling, Joint range of motion-normal  SKIN-no rash, no lesion  CNS- alert, oriented X3, non focal       Daily     Daily Weight in k.5 (2017 05:45)

## 2017-02-04 NOTE — PROGRESS NOTE ADULT - ASSESSMENT
PCP Dr. Diaz Santos  nephrology Dr. Martínez    87 year old male with PMH of  DM2 on insulin, HTN, CAD, CHF, s/p CRT-D, PE, ESRD on HD  (mw w/ Dr Lerma)  admitted on 1/30/17 with AMS and dizziness after HD this morning. Patient states that he usually gets dizziness and slightly out of it after dialysis.     As per ED note states with EMS pt was HTN with -200s and mildly tachycardic.  POC blood glucose 158 in field.  No known trauma.   Hospital course:  1/31  # [id number] pt reports feeling dizzy, occasional cough, denies CP, palpitations, has lose BM today, but was constipated prior that, denies HA, neck stiffness, visual changes, evaluated by neurology and ID, plan of care discussed  2/1 BP better controlled, + RUQ pain, reports nausea on and off, fever 101 overnight, encephalopathy persist, daughter at bedside , plan of care discussed  2/2 RUQ pain persist, feels better, + nausea, tolerating PO intake, denies CP, palpitations, dizziness, noted low SBP in 100,plan of care discussed  2/3 pain improved tolerated HD and PO intake well, afebrile, plan of care discussed  2/4 afebrile, off abx

## 2017-02-05 LAB
ANION GAP SERPL CALC-SCNC: 12 MMOL/L — SIGNIFICANT CHANGE UP (ref 5–17)
BASOPHILS # BLD AUTO: 0 K/UL — SIGNIFICANT CHANGE UP (ref 0–0.2)
BASOPHILS NFR BLD AUTO: 1 % — SIGNIFICANT CHANGE UP (ref 0–2)
BUN SERPL-MCNC: 68 MG/DL — HIGH (ref 7–23)
CALCIUM SERPL-MCNC: 7.5 MG/DL — LOW (ref 8.5–10.1)
CHLORIDE SERPL-SCNC: 100 MMOL/L — SIGNIFICANT CHANGE UP (ref 96–108)
CO2 SERPL-SCNC: 26 MMOL/L — SIGNIFICANT CHANGE UP (ref 22–31)
CREAT SERPL-MCNC: 6.67 MG/DL — HIGH (ref 0.5–1.3)
EOSINOPHIL # BLD AUTO: 0.1 K/UL — SIGNIFICANT CHANGE UP (ref 0–0.5)
EOSINOPHIL NFR BLD AUTO: 1 % — SIGNIFICANT CHANGE UP (ref 0–6)
GLUCOSE SERPL-MCNC: 66 MG/DL — LOW (ref 70–99)
HCT VFR BLD CALC: 34.3 % — LOW (ref 39–50)
HGB BLD-MCNC: 10.8 G/DL — LOW (ref 13–17)
LYMPHOCYTES # BLD AUTO: 1.5 K/UL — SIGNIFICANT CHANGE UP (ref 1–3.3)
LYMPHOCYTES # BLD AUTO: 27 % — SIGNIFICANT CHANGE UP (ref 13–44)
MANUAL DIF COMMENT BLD-IMP: SIGNIFICANT CHANGE UP
MCHC RBC-ENTMCNC: 28.6 PG — SIGNIFICANT CHANGE UP (ref 27–34)
MCHC RBC-ENTMCNC: 31.5 GM/DL — LOW (ref 32–36)
MCV RBC AUTO: 91.1 FL — SIGNIFICANT CHANGE UP (ref 80–100)
MONOCYTES # BLD AUTO: 0.7 K/UL — SIGNIFICANT CHANGE UP (ref 0–0.9)
MONOCYTES NFR BLD AUTO: 18 % — HIGH (ref 2–14)
NEUTROPHILS # BLD AUTO: 2.9 K/UL — SIGNIFICANT CHANGE UP (ref 1.8–7.4)
NEUTROPHILS NFR BLD AUTO: 53 % — SIGNIFICANT CHANGE UP (ref 43–77)
NRBC # BLD: 1 /100 — HIGH (ref 0–0)
PLAT MORPH BLD: NORMAL — SIGNIFICANT CHANGE UP
PLATELET # BLD AUTO: 165 K/UL — SIGNIFICANT CHANGE UP (ref 150–400)
POTASSIUM SERPL-MCNC: 4.4 MMOL/L — SIGNIFICANT CHANGE UP (ref 3.5–5.3)
POTASSIUM SERPL-SCNC: 4.4 MMOL/L — SIGNIFICANT CHANGE UP (ref 3.5–5.3)
RBC # BLD: 3.77 M/UL — LOW (ref 4.2–5.8)
RBC # FLD: 13.5 % — SIGNIFICANT CHANGE UP (ref 10.3–14.5)
RBC BLD AUTO: NORMAL — SIGNIFICANT CHANGE UP
SODIUM SERPL-SCNC: 138 MMOL/L — SIGNIFICANT CHANGE UP (ref 135–145)
WBC # BLD: 5.3 K/UL — SIGNIFICANT CHANGE UP (ref 3.8–10.5)
WBC # FLD AUTO: 5.3 K/UL — SIGNIFICANT CHANGE UP (ref 3.8–10.5)

## 2017-02-05 RX ORDER — CARVEDILOL PHOSPHATE 80 MG/1
25 CAPSULE, EXTENDED RELEASE ORAL EVERY 12 HOURS
Qty: 0 | Refills: 0 | Status: DISCONTINUED | OUTPATIENT
Start: 2017-02-05 | End: 2017-02-13

## 2017-02-05 RX ADMIN — Medication 1: at 13:51

## 2017-02-05 RX ADMIN — Medication 100 MILLIGRAM(S): at 07:04

## 2017-02-05 RX ADMIN — Medication 20 MILLIGRAM(S): at 17:03

## 2017-02-05 RX ADMIN — ISOSORBIDE MONONITRATE 60 MILLIGRAM(S): 60 TABLET, EXTENDED RELEASE ORAL at 11:41

## 2017-02-05 RX ADMIN — HEPARIN SODIUM 5000 UNIT(S): 5000 INJECTION INTRAVENOUS; SUBCUTANEOUS at 07:06

## 2017-02-05 RX ADMIN — Medication 81 MILLIGRAM(S): at 11:41

## 2017-02-05 RX ADMIN — CARVEDILOL PHOSPHATE 12.5 MILLIGRAM(S): 80 CAPSULE, EXTENDED RELEASE ORAL at 07:06

## 2017-02-05 RX ADMIN — Medication 75 MICROGRAM(S): at 07:06

## 2017-02-05 RX ADMIN — Medication 200 MILLIGRAM(S): at 07:04

## 2017-02-05 RX ADMIN — Medication 25 MILLIGRAM(S): at 21:55

## 2017-02-05 RX ADMIN — Medication 100 MILLIGRAM(S): at 17:03

## 2017-02-05 RX ADMIN — Medication 200 MILLIGRAM(S): at 21:59

## 2017-02-05 RX ADMIN — HEPARIN SODIUM 5000 UNIT(S): 5000 INJECTION INTRAVENOUS; SUBCUTANEOUS at 17:02

## 2017-02-05 RX ADMIN — INSULIN GLARGINE 25 UNIT(S): 100 INJECTION, SOLUTION SUBCUTANEOUS at 22:04

## 2017-02-05 RX ADMIN — CARVEDILOL PHOSPHATE 12.5 MILLIGRAM(S): 80 CAPSULE, EXTENDED RELEASE ORAL at 17:04

## 2017-02-05 RX ADMIN — HEPARIN SODIUM 5000 UNIT(S): 5000 INJECTION INTRAVENOUS; SUBCUTANEOUS at 21:58

## 2017-02-05 RX ADMIN — Medication 25 MILLIGRAM(S): at 17:03

## 2017-02-05 RX ADMIN — Medication 1: at 17:56

## 2017-02-05 RX ADMIN — Medication 100 MILLIGRAM(S): at 21:55

## 2017-02-05 RX ADMIN — Medication 200 MILLIGRAM(S): at 17:07

## 2017-02-05 RX ADMIN — Medication 20 MILLIGRAM(S): at 07:08

## 2017-02-05 NOTE — PROGRESS NOTE ADULT - ASSESSMENT
88 y/o HM presents with Altered MS with HTN urgency    # ESRD  # Syncope/ Weakness  # Encephalopathy, now resolved  # HTN urgency  # RUQ pain with neg HIDA and CT of abd   # chronic systolic CHF with ICD  # Hypothyroidism    PLAN  - Thus far, all cultures are negative  - BP mpre stable, it appears pt is non compliant with his BP meds at home  - HD today, s/p IV contrast study    2/4  --ESRD  stable on TIW HD  --Syncope,  work up negative.  --HTN, BP control acceptable,  Would allow some increase in BP to avoid hypotensive episodes associated with HD.  --ABD, No pathology on imaging studies.    2/5   --ESRD : continue TIW hd, next tx in am  --SYNCOPE : no clear etiology.  all work up negative.  --HTN  monitor outpt compliance  --ABD Pain. : no specific pathology.  continue to monitor.  --D/c planning.

## 2017-02-05 NOTE — PROGRESS NOTE ADULT - SUBJECTIVE AND OBJECTIVE BOX
HPI:  2/4 afebrile, cough improved, very weak when walks, patient and daughter requesting HI, denies other sx, family member at bedside  2/5 feels better, denies CP, palpitations, dizziness, abd pain, awaiting HI placement    Review of system- Rest of the review of system are normal except mentioned in HPI    SUBJECTIVE & OBJECTIVE:  No new complaint    Vital Signs Last 24 Hrs  T(C): 36.5, Max: 36.9 (02-04 @ 21:36)  T(F): 97.7, Max: 98.5 (02-04 @ 21:36)  HR: 68 (61 - 68)  BP: 180/61 (145/53 - 189/65)  BP(mean): --  RR: 18 (18 - 18)  SpO2: 98% (96% - 98%)  PHYSICAL EXAM:    Constitutional: NAD, awake and alert, well-developed  HEENT: PERR, EOMI, Normal Hearing, MMM  Neck: Soft and supple, No LAD, No JVD  Respiratory: Breath sounds are clear bilaterally, No wheezing, rales or rhonchi  Cardiovascular: S1 and S2, regular rate and rhythm, no Murmurs, gallops or rubs  Gastrointestinal: Bowel Sounds present, soft, nontender, nondistended, no guarding, no rebound  Extremities: No peripheral edema  Vascular: 2+ peripheral pulses  Neurological: A/O x 3, no focal deficits  Musculoskeletal: 5/5 strength b/l upper and lower extremities  Skin: No rashes    05 Feb 2017 06:36    138    |  100    |  68     ----------------------------<  66     4.4     |  26     |  6.67     Ca    7.5        05 Feb 2017 06:36                              10.8   5.3   )-----------( 165      ( 05 Feb 2017 06:36 )             34.3   CARDIAC MARKERS ( 31 Jan 2017 22:19 )  0.144 ng/mL / x     / 403 U/L / x     / x        LIVER FUNCTIONS - ( 02 Feb 2017 07:31 )  Alb: 3.1 g/dL / Pro: 6.8 gm/dL / ALK PHOS: 67 U/L / ALT: 28 U/L / AST: 35 U/L / GGT: x         EXAM:  NM HEPATOBILIARY IMG                            PROCEDURE DATE:  02/03/2017        INTERPRETATION:    RADIOPHARMACEUTICAL:  99mTc-Mebrofenin  DOSE: 3.6 mCi IV    CLINICAL INFORMATION: 87 year old male with sepsis, gallbladder wall   thickening on sonogram referred to evaluate for acute cholecystitis    TECHNIQUE: Dynamic images of the anterior abdomen were obtained for 20   minutes immediately following radiotracer injection. Static images of the   abdomen in the anterior, right anterioroblique and right lateral   projections were also obtained.    COMPARISON:No previous hepatobiliary scan for comparison    FINDINGS: There is prompt hepatic uptake of the injected radiotracer. The   gallbladder is first visualized at 20 minutes post tracer injection and   bowel activity by 15 minutes There is normal tracer clearance from the   liver by the end of the study.     IMPRESSION: Normal hepatobiliary scan.     No scan evidence of acute cholecystitis.     IMPRESSION: No acute abdominal pathology. CT bad      XAM:  US COMPLETE ABDOMEN SONOGRAM                            PROCEDURE DATE:  02/02/2017        INTERPRETATION:  Exam Date:        Ultrasound of the abdomen         CLINICAL INFORMATION:       Pain.    TECHNIQUE:   Transcutaneous ultrasonography of the abdomen was performed.    FINDINGS:    No previous examinations are available for review.    The liver demonstrates homogeneous echotexture without focal lesion.    Hepatic size and contours are maintained.  The main hepatic and portal   veins appear patent.   No intrahepatic or ductal dilatation is found.     The common duct is not dilated, measuring 0.31 cm.  The gallbladder is   significant for minimal cholelithiasis with minimal wall thickening   measuring 4 mm but no pericholecystic fluid or Balderrama's sign. The   pancreas is obscured by bowel gas.  The spleen size is normal.    The right kidney measures 8.3 cm in length. The left kidney measures 8.7   cm in length.  Bilateral cortical thinning is noted with increased   echogenicity consistent with end-stage renal disease. 6 mm cyst is seen   in the LEFT midpole. It demonstrates no mass, calculus or hydronephrosis.    The abdominal aorta and IVC are obscured by bowel gas..      IMPRESSION: Minimal cholelithiasis with minimal wallthickening measuring   4 mm. There is no Balderrama's sign. These findings are mild however may   represent early or mild cholecystitis and clinically indicated, HIDA scan   can be utilized for further evaluation. End-stage renal disease.

## 2017-02-05 NOTE — PROGRESS NOTE ADULT - ASSESSMENT
PCP Dr. Diaz Santos  nephrology Dr. Martínez    87 year old male with PMH of  DM2 on insulin, HTN, CAD, CHF, s/p CRT-D, PE, ESRD on HD  (mwf w/ Dr Lerma)  admitted on 1/30/17 with AMS and dizziness after HD this morning. Patient states that he usually gets dizziness and slightly out of it after dialysis.        There is mild thickening of both mitral valve leaflets. The leaflet   opening appears normal.   Moderate (2+) mitral regurgitation is present.   Fibrocalcific changes noted to the aortic valve leaflets with mild   restriction in leaflet excursion. Trace aortic regurgitation is present.   Well seated prosthetic valve in the aortic position. Peak trans-trans   valve gradient is 23.43mmHg, consistent with   Mild aortic stenosis is present.   The tricuspid valve is not well visualized, but is probably normal.   Trace tricuspid valve regurgitation is present.   Pulmonic valve not well seen, probably normal pulmonic valve function.   The left atrium is normal.   Borderline septal left ventricular hypertrophy is present. Left ventricle   function is mildly impaired; estimated left ventricle function is 45-50%   The right atrium is normal.   The right ventricle is normal in size, wall thickness, wall motion, and   contractility.   A device wire is seen in the RV and RA.   Trace pericardial effusion cannot be ruled out    stress test when allowed. (order placed)

## 2017-02-05 NOTE — PROGRESS NOTE ADULT - SUBJECTIVE AND OBJECTIVE BOX
NEPHROLOGY INTERVAL HPI/OVERNIGHT EVENTS:  No acute events.  Comfortable. No complaints.      HPI:  87 year old male with past medical history of iddm. htn. cad. chf. ppm, pe, esrd on hd (mwf w/ Dr Lerma) comes with AMS and dizzyness after HD this morning. Patient states that he usually gets dizzyness and slightly out of it after dialysis. As per ED note states with EMS pt was HTN with -200s and mildly tachycardic.  POC blood glucose 158 in field.  No known trauma. (2017 03:51)          MEDICATIONS  (STANDING):  atorvastatin Oral Tab/Cap - Peds 40milliGRAM(s) Oral at bedtime  aspirin  chewable 81milliGRAM(s) Oral daily  insulin glargine Injectable (LANTUS) 25Unit(s) SubCutaneous at bedtime  insulin glargine Injectable (LANTUS) 15Unit(s) SubCutaneous at bedtime  heparin  Injectable 5000Unit(s) SubCutaneous every 8 hours  docusate sodium 100milliGRAM(s) Oral three times a day  insulin lispro (HumaLOG) corrective regimen sliding scale  SubCutaneous three times a day before meals  dextrose 5%. 1000milliLiter(s) IV Continuous <Continuous>  dextrose 50% Injectable 12.5Gram(s) IV Push once  dextrose 50% Injectable 25Gram(s) IV Push once  dextrose 50% Injectable 25Gram(s) IV Push once  isosorbide   mononitrate ER Tablet (IMDUR) 60milliGRAM(s) Oral daily  levothyroxine 75MICROGram(s) Oral daily  hydrALAZINE 25milliGRAM(s) Oral every 8 hours  carvedilol 12.5milliGRAM(s) Oral every 12 hours  furosemide    Tablet 20milliGRAM(s) Oral two times a day  guaiFENesin    Syrup 200milliGRAM(s) Oral every 8 hours    MEDICATIONS  (PRN):  senna 2Tablet(s) Oral at bedtime PRN Constipation  dextrose Gel 1Dose(s) Oral once PRN Blood Glucose LESS THAN 70 milliGRAM(s)/deciliter  glucagon  Injectable 1milliGRAM(s) IntraMuscular once PRN Glucose LESS THAN 70 milligrams/deciliter  ondansetron Injectable 4milliGRAM(s) IV Push every 6 hours PRN Nausea and/or Vomiting  acetaminophen  Suppository 650milliGRAM(s) Rectal every 6 hours PRN For Temp greater than 38 C (100.4 F)          Vital Signs Last 24 Hrs  T(C): 36.2, Max: 36.9 ( @ 21:36)  T(F): 97.2, Max: 98.5 ( @ 21:36)  HR: 61 (61 - 65)  BP: 189/65 (145/53 - 189/65)  BP(mean): --  RR: 18 (18 - 18)  SpO2: 96% (96% - 97%)  Daily     Daily Weight in k (2017 06:17)    PHYSICAL EXAM:  Alert and appropriate  GENERAL: No apparent distress  CHEST/LUNG: clear to aus  HEART: S1S2 RRR  ABDOMEN: soft  EXTREMITIES: no edema  SKIN:     LABS:                        10.8   5.3   )-----------( 165      ( 2017 06:36 )             34.3     2017 06:36    138    |  100    |  68     ----------------------------<  66     4.4     |  26     |  6.67     Ca    7.5        2017 06:36                  RADIOLOGY & ADDITIONAL TESTS:

## 2017-02-05 NOTE — PROGRESS NOTE ADULT - ASSESSMENT
PCP Dr. Diaz Santos  nephrology Dr. Martínez  cardiology Dr. Castillo    87 year old male with PMH of  DM2 on insulin, HTN, CAD, CHF, s/p CRT-D, PE, ESRD on HD  (mw w/ Dr Lerma)  admitted on 1/30/17 with AMS and dizziness after HD this morning. Patient states that he usually gets dizziness and slightly out of it after dialysis.     As per ED note states with EMS pt was HTN with -200s and mildly tachycardic.  POC blood glucose 158 in field.  No known trauma.   Hospital course:  1/31  # [id number] pt reports feeling dizzy, occasional cough, denies CP, palpitations, has lose BM today, but was constipated prior that, denies HA, neck stiffness, visual changes, evaluated by neurology and ID, plan of care discussed  2/1 BP better controlled, + RUQ pain, reports nausea on and off, fever 101 overnight, encephalopathy persist, daughter at bedside , plan of care discussed  2/2 RUQ pain persist, feels better, + nausea, tolerating PO intake, denies CP, palpitations, dizziness, noted low SBP in 100,plan of care discussed  2/3 pain improved tolerated HD and PO intake well, afebrile, plan of care discussed  2/4 afebrile, off abx  2/5 stable, awaiting rheab placement

## 2017-02-05 NOTE — PROGRESS NOTE ADULT - SUBJECTIVE AND OBJECTIVE BOX
HPI:  2/2 RUQ pain persist, feels better, + nausea, tolerating PO intake, denies CP, palpitations, dizziness, noted low SBP in 100,plan of care discussed  2/3 pain improved tolerated HD and PO intake well, afebrile, plan of care discussed    2/ states his last episode of chest pain was thursday.   - no chest pain overnight.     Review of system- Rest of the review of system are normal except mentioned in HPI    SUBJECTIVE & OBJECTIVE:  No new complaint    Vital Signs Last 24 Hrs  T(C): 36.7, Max: 36.7 ( @ 07:05)  T(F): 98, Max: 98.1 ( @ 07:05)  HR: 74 (60 - 74)  BP: 139/49 (106/50 - 140/50)  BP(mean): --  RR: 18 (16 - 18)  SpO2: 97% (97% - 99%)  2017 05:47    131    |  92     |  78     ----------------------------<  65     4.6     |  27     |  7.53     Ca    7.4        2017 05:47    TPro  6.8    /  Alb  3.1    /  TBili  0.3    /  DBili  <0.1   /  AST  35     /  ALT  28     /  AlkPhos  67     2017 07:31                         11.2   4.7   )-----------( 167      ( 2017 05:47 )             34.3         LIVER FUNCTIONS - ( 2017 07:31 )  Alb: 3.1 g/dL / Pro: 6.8 gm/dL / ALK PHOS: 67 U/L / ALT: 28 U/L / AST: 35 U/L / GGT: x             136    |  96     |  52     ----------------------------<  122    4.4     |  30     |  6.02     Ca    7.9        2017 07:31  Phos  8.4       2017 10:20    TPro  6.8    /  Alb  3.1    /  TBili  0.3    /  DBili  <0.1   /  AST  35     /  ALT  28     /  AlkPhos  67     2017 07:31                            11.7   10.3  )-----------( 187      ( 2017 10:20 )             35.5       CARDIAC MARKERS ( 2017 22:19 )  0.144 ng/mL / x     / 403 U/L / x     / x            LIVER FUNCTIONS - ( 2017 07:31 )  Alb: 3.1 g/dL / Pro: 6.8 gm/dL / ALK PHOS: 67 U/L / ALT: 28 U/L / AST: 35 U/L / GGT: x         EXAM:  NM HEPATOBILIARY IMG                            PROCEDURE DATE:  2017        INTERPRETATION:    RADIOPHARMACEUTICAL:  99mTc-Mebrofenin  DOSE: 3.6 mCi IV    CLINICAL INFORMATION: 87 year old male with sepsis, gallbladder wall   thickening on sonogram referred to evaluate for acute cholecystitis    TECHNIQUE: Dynamic images of the anterior abdomen were obtained for 20   minutes immediately following radiotracer injection. Static images of the   abdomen in the anterior, right anterioroblique and right lateral   projections were also obtained.    COMPARISON:No previous hepatobiliary scan for comparison    FINDINGS: There is prompt hepatic uptake of the injected radiotracer. The   gallbladder is first visualized at 20 minutes post tracer injection and   bowel activity by 15 minutes There is normal tracer clearance from the   liver by the end of the study.     IMPRESSION: Normal hepatobiliary scan.     No scan evidence of acute cholecystitis.     IMPRESSION: No acute abdominal pathology. CT bad      XAM:  US COMPLETE ABDOMEN SONOGRAM                            PROCEDURE DATE:  2017        INTERPRETATION:  Exam Date:        Ultrasound of the abdomen         CLINICAL INFORMATION:       Pain.    TECHNIQUE:   Transcutaneous ultrasonography of the abdomen was performed.    FINDINGS:    No previous examinations are available for review.    The liver demonstrates homogeneous echotexture without focal lesion.    Hepatic size and contours are maintained.  The main hepatic and portal   veins appear patent.   No intrahepatic or ductal dilatation is found.     The common duct is not dilated, measuring 0.31 cm.  The gallbladder is   significant for minimal cholelithiasis with minimal wall thickening   measuring 4 mm but no pericholecystic fluid or Balderrama's sign. The   pancreas is obscured by bowel gas.  The spleen size is normal.    The right kidney measures 8.3 cm in length. The left kidney measures 8.7   cm in length.  Bilateral cortical thinning is noted with increased   echogenicity consistent with end-stage renal disease. 6 mm cyst is seen   in the LEFT midpole. It demonstrates no mass, calculus or hydronephrosis.    The abdominal aorta and IVC are obscured by bowel gas..      IMPRESSION: Minimal cholelithiasis with minimal wallthickening measuring   4 mm. There is no Balderrama's sign. These findings are mild however may   represent early or mild cholecystitis and clinically indicated, HIDA scan   can be utilized for further evaluation. End-stage renal disease.    PHYSICAL EXAM:    GENERAL: NAD, well-groomed, well-developed  HEAD:  Atraumatic, Normocephalic  EYES: EOMI, PERRLA, conjunctiva and sclera clear  HEENT: Moist mucous membranes  NECK: Supple, No JVD  NERVOUS SYSTEM:  Alert & Oriented X3, Motor Strength 5/5 B/L upper and lower extremities; DTRs 2+ intact and symmetric  CHEST/LUNG: Clear to auscultation bilaterally; No rales, rhonchi, wheezing, or rubs  HEART: Regular rate and rhythm; No murmurs, rubs, or gallops  MEDICATIONS  (STANDING):  atorvastatin Oral Tab/Cap - Peds 40milliGRAM(s) Oral daily  aspirin  chewable 81milliGRAM(s) Oral daily  insulin glargine Injectable (LANTUS) 25Unit(s) SubCutaneous at bedtime  insulin glargine Injectable (LANTUS) 15Unit(s) SubCutaneous at bedtime  heparin  Injectable 5000Unit(s) SubCutaneous every 8 hours  docusate sodium 100milliGRAM(s) Oral three times a day  insulin lispro (HumaLOG) corrective regimen sliding scale  SubCutaneous three times a day before meals  dextrose 5%. 1000milliLiter(s) IV Continuous <Continuous>  dextrose 50% Injectable 12.5Gram(s) IV Push once  dextrose 50% Injectable 25Gram(s) IV Push once  dextrose 50% Injectable 25Gram(s) IV Push once  isosorbide   mononitrate ER Tablet (IMDUR) 60milliGRAM(s) Oral daily  heparin  Injectable 1500Unit(s) IV Push once  levothyroxine 75MICROGram(s) Oral daily  hydrALAZINE 25milliGRAM(s) Oral every 8 hours  carvedilol 12.5milliGRAM(s) Oral every 12 hours  furosemide    Tablet 20milliGRAM(s) Oral two times a day  piperacillin/tazobactam IVPB. 3.375Gram(s) IV Intermittent every 12 hours  ABDOMEN: Soft, Nontender, distended; Bowel sounds present  GENITOURINARY- Voiding, no palpable bladder  EXTREMITIES:  2+ Peripheral Pulses, No clubbing, cyanosis, or edema  MUSCULOSKELETAL No muscle tenderness, Muscle tone normal, No joint tenderness, no Joint swelling, Joint range of motion-normal  SKIN-no rash, no lesion  CNS- alert, oriented X3, non focal       Daily     Daily Weight in k.5 (2017 05:45)

## 2017-02-06 LAB
ALBUMIN SERPL ELPH-MCNC: 2.8 G/DL — LOW (ref 3.3–5)
ANION GAP SERPL CALC-SCNC: 15 MMOL/L — SIGNIFICANT CHANGE UP (ref 5–17)
BASOPHILS # BLD AUTO: 0.1 K/UL — SIGNIFICANT CHANGE UP (ref 0–0.2)
BASOPHILS NFR BLD AUTO: 1.1 % — SIGNIFICANT CHANGE UP (ref 0–2)
BUN SERPL-MCNC: 89 MG/DL — HIGH (ref 7–23)
CALCIUM SERPL-MCNC: 7.5 MG/DL — LOW (ref 8.5–10.1)
CHLORIDE SERPL-SCNC: 99 MMOL/L — SIGNIFICANT CHANGE UP (ref 96–108)
CO2 SERPL-SCNC: 23 MMOL/L — SIGNIFICANT CHANGE UP (ref 22–31)
CREAT SERPL-MCNC: 7.61 MG/DL — HIGH (ref 0.5–1.3)
CULTURE RESULTS: SIGNIFICANT CHANGE UP
CULTURE RESULTS: SIGNIFICANT CHANGE UP
EOSINOPHIL # BLD AUTO: 0.3 K/UL — SIGNIFICANT CHANGE UP (ref 0–0.5)
EOSINOPHIL NFR BLD AUTO: 4.8 % — SIGNIFICANT CHANGE UP (ref 0–6)
GLUCOSE SERPL-MCNC: 129 MG/DL — HIGH (ref 70–99)
HCT VFR BLD CALC: 33.3 % — LOW (ref 39–50)
HGB BLD-MCNC: 10.7 G/DL — LOW (ref 13–17)
LYMPHOCYTES # BLD AUTO: 1.1 K/UL — SIGNIFICANT CHANGE UP (ref 1–3.3)
LYMPHOCYTES # BLD AUTO: 17.6 % — SIGNIFICANT CHANGE UP (ref 13–44)
MCHC RBC-ENTMCNC: 28.7 PG — SIGNIFICANT CHANGE UP (ref 27–34)
MCHC RBC-ENTMCNC: 32.2 GM/DL — SIGNIFICANT CHANGE UP (ref 32–36)
MCV RBC AUTO: 89.1 FL — SIGNIFICANT CHANGE UP (ref 80–100)
MONOCYTES # BLD AUTO: 0.7 K/UL — SIGNIFICANT CHANGE UP (ref 0–0.9)
MONOCYTES NFR BLD AUTO: 10.1 % — SIGNIFICANT CHANGE UP (ref 2–14)
NEUTROPHILS # BLD AUTO: 4.3 K/UL — SIGNIFICANT CHANGE UP (ref 1.8–7.4)
NEUTROPHILS NFR BLD AUTO: 66.4 % — SIGNIFICANT CHANGE UP (ref 43–77)
PHOSPHATE SERPL-MCNC: 6.4 MG/DL — HIGH (ref 2.5–4.5)
PLATELET # BLD AUTO: 180 K/UL — SIGNIFICANT CHANGE UP (ref 150–400)
POTASSIUM SERPL-MCNC: 4.5 MMOL/L — SIGNIFICANT CHANGE UP (ref 3.5–5.3)
POTASSIUM SERPL-SCNC: 4.5 MMOL/L — SIGNIFICANT CHANGE UP (ref 3.5–5.3)
RBC # BLD: 3.73 M/UL — LOW (ref 4.2–5.8)
RBC # FLD: 13.7 % — SIGNIFICANT CHANGE UP (ref 10.3–14.5)
SODIUM SERPL-SCNC: 137 MMOL/L — SIGNIFICANT CHANGE UP (ref 135–145)
SPECIMEN SOURCE: SIGNIFICANT CHANGE UP
SPECIMEN SOURCE: SIGNIFICANT CHANGE UP
WBC # BLD: 6.5 K/UL — SIGNIFICANT CHANGE UP (ref 3.8–10.5)
WBC # FLD AUTO: 6.5 K/UL — SIGNIFICANT CHANGE UP (ref 3.8–10.5)

## 2017-02-06 RX ORDER — ATORVASTATIN CALCIUM 80 MG/1
40 TABLET, FILM COATED ORAL AT BEDTIME
Qty: 0 | Refills: 0 | Status: DISCONTINUED | OUTPATIENT
Start: 2017-02-06 | End: 2017-02-13

## 2017-02-06 RX ORDER — HEPARIN SODIUM 5000 [USP'U]/ML
1500 INJECTION INTRAVENOUS; SUBCUTANEOUS
Qty: 0 | Refills: 0 | Status: DISCONTINUED | OUTPATIENT
Start: 2017-02-06 | End: 2017-02-13

## 2017-02-06 RX ORDER — HEPARIN SODIUM 5000 [USP'U]/ML
1500 INJECTION INTRAVENOUS; SUBCUTANEOUS
Qty: 0 | Refills: 0 | Status: DISCONTINUED | OUTPATIENT
Start: 2017-02-06 | End: 2017-02-06

## 2017-02-06 RX ADMIN — HEPARIN SODIUM 5000 UNIT(S): 5000 INJECTION INTRAVENOUS; SUBCUTANEOUS at 22:08

## 2017-02-06 RX ADMIN — Medication 100 MILLIGRAM(S): at 05:50

## 2017-02-06 RX ADMIN — Medication 100 MILLIGRAM(S): at 22:08

## 2017-02-06 RX ADMIN — Medication 25 MILLIGRAM(S): at 05:50

## 2017-02-06 RX ADMIN — Medication 200 MILLIGRAM(S): at 13:36

## 2017-02-06 RX ADMIN — Medication 20 MILLIGRAM(S): at 05:50

## 2017-02-06 RX ADMIN — Medication 25 MILLIGRAM(S): at 13:36

## 2017-02-06 RX ADMIN — Medication 200 MILLIGRAM(S): at 05:51

## 2017-02-06 RX ADMIN — CARVEDILOL PHOSPHATE 25 MILLIGRAM(S): 80 CAPSULE, EXTENDED RELEASE ORAL at 17:18

## 2017-02-06 RX ADMIN — Medication 1: at 17:24

## 2017-02-06 RX ADMIN — CARVEDILOL PHOSPHATE 25 MILLIGRAM(S): 80 CAPSULE, EXTENDED RELEASE ORAL at 05:59

## 2017-02-06 RX ADMIN — INSULIN GLARGINE 25 UNIT(S): 100 INJECTION, SOLUTION SUBCUTANEOUS at 22:09

## 2017-02-06 RX ADMIN — Medication 81 MILLIGRAM(S): at 13:35

## 2017-02-06 RX ADMIN — Medication 20 MILLIGRAM(S): at 17:18

## 2017-02-06 RX ADMIN — Medication 25 MILLIGRAM(S): at 22:09

## 2017-02-06 RX ADMIN — Medication 100 MILLIGRAM(S): at 13:38

## 2017-02-06 RX ADMIN — ISOSORBIDE MONONITRATE 60 MILLIGRAM(S): 60 TABLET, EXTENDED RELEASE ORAL at 13:35

## 2017-02-06 RX ADMIN — HEPARIN SODIUM 5000 UNIT(S): 5000 INJECTION INTRAVENOUS; SUBCUTANEOUS at 13:38

## 2017-02-06 RX ADMIN — Medication 75 MICROGRAM(S): at 05:50

## 2017-02-06 RX ADMIN — ATORVASTATIN CALCIUM 40 MILLIGRAM(S): 80 TABLET, FILM COATED ORAL at 22:08

## 2017-02-06 RX ADMIN — HEPARIN SODIUM 5000 UNIT(S): 5000 INJECTION INTRAVENOUS; SUBCUTANEOUS at 05:49

## 2017-02-06 RX ADMIN — HEPARIN SODIUM 1500 UNIT(S): 5000 INJECTION INTRAVENOUS; SUBCUTANEOUS at 09:55

## 2017-02-06 NOTE — PROGRESS NOTE ADULT - SUBJECTIVE AND OBJECTIVE BOX
HPI:  2/4 afebrile, cough improved, very weak when walks, patient and daughter requesting HI, denies other sx, family member at bedside  2/5 feels better, denies CP, palpitations, dizziness, abd pain, awaiting HI placement  2/6 seen on dialysis, afebrile, denies abd pain, CP, palpitations, plan for nuclear stress test in am    Review of system- Rest of the review of system are normal except mentioned in HPI    SUBJECTIVE & OBJECTIVE:  No new complaint    Vital Signs Last 24 Hrs  T(C): 36.6, Max: 36.8 (02-06 @ 07:23)  T(F): 97.8, Max: 98.3 (02-06 @ 12:52)  HR: 67 (60 - 68)  BP: 164/52 (145/45 - 188/74)  BP(mean): --  RR: 16 (16 - 18)  SpO2: 97% (66% - 99%)    PHYSICAL EXAM:    Constitutional: NAD, awake and alert, well-developed  HEENT: PERR, EOMI, Normal Hearing, MMM  Neck: Soft and supple, No LAD, No JVD  Respiratory: Breath sounds are clear bilaterally, No wheezing, rales or rhonchi  Cardiovascular: S1 and S2, regular rate and rhythm, no Murmurs, gallops or rubs  Gastrointestinal: Bowel Sounds present, soft, nontender, nondistended, no guarding, no rebound  Extremities: No peripheral edema  Vascular: 2+ peripheral pulses  Neurological: A/O x 3, no focal deficits  Musculoskeletal: 5/5 strength b/l upper and lower extremities  Skin: No rashes    06 Feb 2017 08:10    137    |  99     |  89     ----------------------------<  129    4.5     |  23     |  7.61     Ca    7.5        06 Feb 2017 08:10  Phos  6.4       06 Feb 2017 08:10    TPro  x      /  Alb  2.8    /  TBili  x      /  DBili  x      /  AST  x      /  ALT  x      /  AlkPhos  x      06 Feb 2017 08:10                         10.7   6.5   )-----------( 180      ( 06 Feb 2017 08:10 )             33.3     LIVER FUNCTIONS - ( 06 Feb 2017 08:10 )  Alb: 2.8 g/dL / Pro: x     / ALK PHOS: x     / ALT: x     / AST: x     / GGT: x             CARDIAC MARKERS ( 31 Jan 2017 22:19 )  0.144 ng/mL / x     / 403 U/L / x     / x        LIVER FUNCTIONS - ( 02 Feb 2017 07:31 )  Alb: 3.1 g/dL / Pro: 6.8 gm/dL / ALK PHOS: 67 U/L / ALT: 28 U/L / AST: 35 U/L / GGT: x         EXAM:  NM HEPATOBILIARY IMG                          2D ECHOCARDIOGRAM AD 01/31/2017    There is mild thickening of both mitral valve leaflets. The leaflet   opening appears normal.   Moderate (2+) mitral regurgitation is present.   Fibrocalcific changes noted to the aortic valve leaflets with mild   restriction in leaflet excursion. Trace aortic regurgitation is present.   Well seated prosthetic valve in the aortic position. Peak trans-trans   valve gradient is 23.43mmHg, consistent with   Mild aortic stenosis is present.   The tricuspid valve is not well visualized, but is probably normal.   Trace tricuspid valve regurgitation is present.   Pulmonic valve not well seen, probably normal pulmonic valve function.   The left atrium is normal.Borderline septal left ventricular hypertrophy is present. Left ventricle   function is mildly impaired; estimated left ventricle function is 45-50%   The right atrium is normal.   The right ventricle is normal in size, wall thickness, wall motion, and   contractility.   A device wire is seen in the RV Enmanuel.   Trace pericardial effusion cannot be ruled out    PROCEDURE DATE:  02/03/2017        INTERPRETATION:    RADIOPHARMACEUTICAL:  99mTc-Mebrofenin  DOSE: 3.6 mCi IV    CLINICAL INFORMATION: 87 year old male with sepsis, gallbladder wall   thickening on sonogram referred to evaluate for acute cholecystitis    TECHNIQUE: Dynamic images of the anterior abdomen were obtained for 20   minutes immediately following radiotracer injection. Static images of the   abdomen in the anterior, right anterioroblique and right lateral   projections were also obtained.    COMPARISON:No previous hepatobiliary scan for comparison    FINDINGS: There is prompt hepatic uptake of the injected radiotracer. The   gallbladder is first visualized at 20 minutes post tracer injection and   bowel activity by 15 minutes There is normal tracer clearance from the   liver by the end of the study.     IMPRESSION: Normal hepatobiliary scan.     No scan evidence of acute cholecystitis.     IMPRESSION: No acute abdominal pathology. CT bad      XAM:  US COMPLETE ABDOMEN SONOGRAM                            PROCEDURE DATE:  02/02/2017        INTERPRETATION:  Exam Date:        Ultrasound of the abdomen         CLINICAL INFORMATION:       Pain.    TECHNIQUE:   Transcutaneous ultrasonography of the abdomen was performed.    FINDINGS:    No previous examinations are available for review.    The liver demonstrates homogeneous echotexture without focal lesion.    Hepatic size and contours are maintained.  The main hepatic and portal   veins appear patent.   No intrahepatic or ductal dilatation is found.     The common duct is not dilated, measuring 0.31 cm.  The gallbladder is   significant for minimal cholelithiasis with minimal wall thickening   measuring 4 mm but no pericholecystic fluid or Balderrama's sign. The   pancreas is obscured by bowel gas.  The spleen size is normal.    The right kidney measures 8.3 cm in length. The left kidney measures 8.7   cm in length.  Bilateral cortical thinning is noted with increased   echogenicity consistent with end-stage renal disease. 6 mm cyst is seen   in the LEFT midpole. It demonstrates no mass, calculus or hydronephrosis.    The abdominal aorta and IVC are obscured by bowel gas..      IMPRESSION: Minimal cholelithiasis with minimal wallthickening measuring   4 mm. There is no Balderrama's sign. These findings are mild however may   represent early or mild cholecystitis and clinically indicated, HIDA scan   can be utilized for further evaluation. End-stage renal disease.

## 2017-02-06 NOTE — PROGRESS NOTE ADULT - PROBLEM SELECTOR PLAN 1
CT head - neg  unlikely meningitis observe off abx  monitor  neurology input appreciated  unable to do MRI due to ICD  nuclear stress test in am

## 2017-02-06 NOTE — PROGRESS NOTE ADULT - ASSESSMENT
PCP Dr. Diaz Santos  nephrology Dr. Martínez  cardiology Dr. Castillo    87 year old male with PMH of  DM2 on insulin, HTN, CAD, CHF, s/p CRT-D, PE, ESRD on HD  (mw w/ Dr Lerma)  admitted on 1/30/17 with AMS and dizziness after HD this morning. Patient states that he usually gets dizziness and slightly out of it after dialysis.     As per ED note states with EMS pt was HTN with -200s and mildly tachycardic.  POC blood glucose 158 in field.  No known trauma.   Hospital course:  1/31  # [id number] pt reports feeling dizzy, occasional cough, denies CP, palpitations, has lose BM today, but was constipated prior that, denies HA, neck stiffness, visual changes, evaluated by neurology and ID, plan of care discussed  2/1 BP better controlled, + RUQ pain, reports nausea on and off, fever 101 overnight, encephalopathy persist, daughter at bedside , plan of care discussed  2/2 RUQ pain persist, feels better, + nausea, tolerating PO intake, denies CP, palpitations, dizziness, noted low SBP in 100,plan of care discussed  2/3 pain improved tolerated HD and PO intake well, afebrile, plan of care discussed  2/4 afebrile, off abx  2/5 stable, awaiting rheab placement

## 2017-02-06 NOTE — PROGRESS NOTE ADULT - ASSESSMENT
PCP Dr. Diaz Santos  nephrology Dr. Martínez    87 year old male with PMH of  DM2 on insulin, HTN, CAD, CHF, s/p CRT-D, PE, ESRD on HD  (mwf w/ Dr Lerma)  admitted on 1/30/17 with AMS and dizziness after HD. Patient states that he usually gets dizziness and slightly out of it after dialysis.        There is mild thickening of both mitral valve leaflets. The leaflet   opening appears normal.   Moderate (2+) mitral regurgitation is present.   Fibrocalcific changes noted to the aortic valve leaflets with mild   restriction in leaflet excursion. Trace aortic regurgitation is present.   Well seated prosthetic valve in the aortic position. Peak trans-trans   valve gradient is 23.43mmHg, consistent with   Mild aortic stenosis is present.   The tricuspid valve is not well visualized, but is probably normal.   Trace tricuspid valve regurgitation is present.   Pulmonic valve not well seen, probably normal pulmonic valve function.   The left atrium is normal.   Borderline septal left ventricular hypertrophy is present. Left ventricle   function is mildly impaired; estimated left ventricle function is 45-50%   The right atrium is normal.   The right ventricle is normal in size, wall thickness, wall motion, and   contractility.   A device wire is seen in the RV and RA.   Trace pericardial effusion cannot be ruled out    stress test - pending results.

## 2017-02-06 NOTE — PROGRESS NOTE ADULT - SUBJECTIVE AND OBJECTIVE BOX
NEPHROLOGY INTERVAL HPI/OVERNIGHT EVENTS:  no new complaints.  for rehab placement.      MEDICATIONS  (STANDING):  atorvastatin Oral Tab/Cap - Peds 40milliGRAM(s) Oral at bedtime  aspirin  chewable 81milliGRAM(s) Oral daily  insulin glargine Injectable (LANTUS) 25Unit(s) SubCutaneous at bedtime  insulin glargine Injectable (LANTUS) 15Unit(s) SubCutaneous at bedtime  heparin  Injectable 5000Unit(s) SubCutaneous every 8 hours  docusate sodium 100milliGRAM(s) Oral three times a day  insulin lispro (HumaLOG) corrective regimen sliding scale  SubCutaneous three times a day before meals  dextrose 5%. 1000milliLiter(s) IV Continuous <Continuous>  dextrose 50% Injectable 12.5Gram(s) IV Push once  dextrose 50% Injectable 25Gram(s) IV Push once  dextrose 50% Injectable 25Gram(s) IV Push once  isosorbide   mononitrate ER Tablet (IMDUR) 60milliGRAM(s) Oral daily  levothyroxine 75MICROGram(s) Oral daily  hydrALAZINE 25milliGRAM(s) Oral every 8 hours  furosemide    Tablet 20milliGRAM(s) Oral two times a day  guaiFENesin    Syrup 200milliGRAM(s) Oral every 8 hours  carvedilol 25milliGRAM(s) Oral every 12 hours  heparin  Injectable 1500Unit(s) IV Push <User Schedule>  heparin  Injectable 1500Unit(s) IV Push <User Schedule>    MEDICATIONS  (PRN):  senna 2Tablet(s) Oral at bedtime PRN Constipation  dextrose Gel 1Dose(s) Oral once PRN Blood Glucose LESS THAN 70 milliGRAM(s)/deciliter  glucagon  Injectable 1milliGRAM(s) IntraMuscular once PRN Glucose LESS THAN 70 milligrams/deciliter  ondansetron Injectable 4milliGRAM(s) IV Push every 6 hours PRN Nausea and/or Vomiting  acetaminophen  Suppository 650milliGRAM(s) Rectal every 6 hours PRN For Temp greater than 38 C (100.4 F)      Allergies    No Known Allergies    Intolerances        I&O's Detail    I & Os for current day (as of 2017 09:28)  =============================================  IN:    Oral Fluid: 600 ml    Total IN: 600 ml  ---------------------------------------------  OUT:    Total OUT: 0 ml  ---------------------------------------------  Total NET: 600 ml      .    Patient was seen and evaluated on dialysis.   Patient is tolerating the procedure well.   Continue dialysis:   Dialyzer: 350-400 QB: on 2k   Goal UF 1.5kg    Vital Signs Last 24 Hrs  T(C): 36.7, Max: 36.8 ( @ 07:23)  T(F): 98, Max: 98.2 ( @ 07:23)  HR: 60 (60 - 68)  BP: 156/69 (147/65 - 185/68)  BP(mean): --  RR: 16 (16 - 18)  SpO2: 66% (66% - 99%)  Daily     Daily Weight in k.3 (2017 05:48)    PHYSICAL EXAM:  General: alert. awake Ox3  HEENT: MMM  CV: s1s2 rrr  LUNGS: B/L CTA  EXT: no edema    LABS:                        10.7   6.5   )-----------( 180      ( 2017 08:10 )             33.3     2017 08:10    137    |  99     |  89     ----------------------------<  129    4.5     |  23     |  7.61     Ca    7.5        2017 08:10  Phos  6.4       2017 08:10    TPro  x      /  Alb  2.8    /  TBili  x      /  DBili  x      /  AST  x      /  ALT  x      /  AlkPhos  x      2017 08:10        Phosphorus Level, Serum: 6.4 mg/dL ( @ 08:10)

## 2017-02-06 NOTE — PROGRESS NOTE ADULT - SUBJECTIVE AND OBJECTIVE BOX
HPI:  2/2 RUQ pain persist, feels better, + nausea, tolerating PO intake, denies CP, palpitations, dizziness, noted low SBP in 100,plan of care discussed  2/3 pain improved tolerated HD and PO intake well, afebrile, plan of care discussed    2/4 states his last episode of chest pain was thursday.  2/ - no chest pain overnight.     2/6 - no chest pain overnight.     Review of system- Rest of the review of system are normal except mentioned in HPI    SUBJECTIVE & OBJECTIVE:  No new complaint    Vital Signs Last 24 Hrs  T(C): 36.6, Max: 36.8 ( @ 07:23)  T(F): 97.8, Max: 98.3 ( @ 12:52)  HR: 67 (60 - 68)  BP: 164/52 (145/45 - 188/74)  BP(mean): --  RR: 16 (16 - 18)  SpO2: 97% (66% - 99%)    CBC Full  -  ( 2017 08:10 )  WBC Count : 6.5 K/uL  Hemoglobin : 10.7 g/dL  Hematocrit : 33.3 %  Platelet Count - Automated : 180 K/uL  Mean Cell Volume : 89.1 fl  Mean Cell Hemoglobin : 28.7 pg  Mean Cell Hemoglobin Concentration : 32.2 gm/dL  Auto Neutrophil # : 4.3 K/uL  Auto Lymphocyte # : 1.1 K/uL  Auto Monocyte # : 0.7 K/uL  Auto Eosinophil # : 0.3 K/uL  Auto Basophil # : 0.1 K/uL  Auto Neutrophil % : 66.4 %  Auto Lymphocyte % : 17.6 %  Auto Monocyte % : 10.1 %  Auto Eosinophil % : 4.8 %  Auto Basophil % : 1.1 %    2017 08:10    137    |  99     |  89     ----------------------------<  129    4.5     |  23     |  7.61     Ca    7.5        2017 08:10  Phos  6.4       2017 08:10    TPro  x      /  Alb  2.8    /  TBili  x      /  DBili  x      /  AST  x      /  ALT  x      /  AlkPhos  x      2017 08:10      EXAM:  NM HEPATOBILIARY IMG                            PROCEDURE DATE:  2017        INTERPRETATION:    RADIOPHARMACEUTICAL:  99mTc-Mebrofenin  DOSE: 3.6 mCi IV    CLINICAL INFORMATION: 87 year old male with sepsis, gallbladder wall   thickening on sonogram referred to evaluate for acute cholecystitis    TECHNIQUE: Dynamic images of the anterior abdomen were obtained for 20   minutes immediately following radiotracer injection. Static images of the   abdomen in the anterior, right anterioroblique and right lateral   projections were also obtained.    COMPARISON:No previous hepatobiliary scan for comparison    FINDINGS: There is prompt hepatic uptake of the injected radiotracer. The   gallbladder is first visualized at 20 minutes post tracer injection and   bowel activity by 15 minutes There is normal tracer clearance from the   liver by the end of the study.     IMPRESSION: Normal hepatobiliary scan.     No scan evidence of acute cholecystitis.     IMPRESSION: No acute abdominal pathology. CT bad      XAM:  US COMPLETE ABDOMEN SONOGRAM                            PROCEDURE DATE:  2017        INTERPRETATION:  Exam Date:        Ultrasound of the abdomen         CLINICAL INFORMATION:       Pain.    TECHNIQUE:   Transcutaneous ultrasonography of the abdomen was performed.    FINDINGS:    No previous examinations are available for review.    The liver demonstrates homogeneous echotexture without focal lesion.    Hepatic size and contours are maintained.  The main hepatic and portal   veins appear patent.   No intrahepatic or ductal dilatation is found.     The common duct is not dilated, measuring 0.31 cm.  The gallbladder is   significant for minimal cholelithiasis with minimal wall thickening   measuring 4 mm but no pericholecystic fluid or Balderrama's sign. The   pancreas is obscured by bowel gas.  The spleen size is normal.    The right kidney measures 8.3 cm in length. The left kidney measures 8.7   cm in length.  Bilateral cortical thinning is noted with increased   echogenicity consistent with end-stage renal disease. 6 mm cyst is seen   in the LEFT midpole. It demonstrates no mass, calculus or hydronephrosis.    The abdominal aorta and IVC are obscured by bowel gas..      IMPRESSION: Minimal cholelithiasis with minimal wallthickening measuring   4 mm. There is no Balderrama's sign. These findings are mild however may   represent early or mild cholecystitis and clinically indicated, HIDA scan   can be utilized for further evaluation. End-stage renal disease.    PHYSICAL EXAM:    GENERAL: NAD, well-groomed, well-developed  HEAD:  Atraumatic, Normocephalic  EYES: EOMI, PERRLA, conjunctiva and sclera clear  HEENT: Moist mucous membranes  NECK: Supple, No JVD  NERVOUS SYSTEM:  Alert & Oriented X3, Motor Strength 5/5 B/L upper and lower extremities; DTRs 2+ intact and symmetric  CHEST/LUNG: Clear to auscultation bilaterally; No rales, rhonchi, wheezing, or rubs  HEART: Regular rate and rhythm; No murmurs, rubs, or gallops  MEDICATIONS  (STANDING):  atorvastatin Oral Tab/Cap - Peds 40milliGRAM(s) Oral daily  aspirin  chewable 81milliGRAM(s) Oral daily  insulin glargine Injectable (LANTUS) 25Unit(s) SubCutaneous at bedtime  insulin glargine Injectable (LANTUS) 15Unit(s) SubCutaneous at bedtime  heparin  Injectable 5000Unit(s) SubCutaneous every 8 hours  docusate sodium 100milliGRAM(s) Oral three times a day  insulin lispro (HumaLOG) corrective regimen sliding scale  SubCutaneous three times a day before meals  dextrose 5%. 1000milliLiter(s) IV Continuous <Continuous>  dextrose 50% Injectable 12.5Gram(s) IV Push once  dextrose 50% Injectable 25Gram(s) IV Push once  dextrose 50% Injectable 25Gram(s) IV Push once  isosorbide   mononitrate ER Tablet (IMDUR) 60milliGRAM(s) Oral daily  heparin  Injectable 1500Unit(s) IV Push once  levothyroxine 75MICROGram(s) Oral daily  hydrALAZINE 25milliGRAM(s) Oral every 8 hours  carvedilol 12.5milliGRAM(s) Oral every 12 hours  furosemide    Tablet 20milliGRAM(s) Oral two times a day  piperacillin/tazobactam IVPB. 3.375Gram(s) IV Intermittent every 12 hours  ABDOMEN: Soft, Nontender, distended; Bowel sounds present  GENITOURINARY- Voiding, no palpable bladder  EXTREMITIES:  2+ Peripheral Pulses, No clubbing, cyanosis, or edema  MUSCULOSKELETAL No muscle tenderness, Muscle tone normal, No joint tenderness, no Joint swelling, Joint range of motion-normal  SKIN-no rash, no lesion  CNS- alert, oriented X3, non focal       Daily     Daily Weight in k.5 (2017 05:45)

## 2017-02-06 NOTE — PROGRESS NOTE ADULT - ASSESSMENT
86 y/o HM presents with Altered MS with HTN urgency    # ESRD  # Syncope/ Weakness  # Encephalopathy, now resolved  # HTN urgency  # RUQ pain with neg HIDA and CT of abd   # chronic systolic CHF with ICD  # Hypothyroidism    PLAN  - Thus far, all cultures are negative  - BP mpre stable, it appears pt is non compliant with his BP meds at home  - HD today, s/p IV contrast study    2/4  --ESRD  stable on TIW HD  --Syncope,  work up negative.  --HTN, BP control acceptable,  Would allow some increase in BP to avoid hypotensive episodes associated with HD.  --ABD, No pathology on imaging studies.    2/5   --ESRD : continue TIW hd, next tx in am  --SYNCOPE : no clear etiology.  all work up negative.  --HTN  monitor outpt compliance  --ABD Pain. : no specific pathology.  continue to monitor.  --D/c planning.    2/6 MK  - ESRD: tolerating hd with goal uf of 1.5 kg, epo on hold with restart when below 10.5  - HTN urgency due to medication compliance.  will continue with current regimen  - SYNCOPE/Near SYNCOPE: work- up negative  - no renal barrier for dc

## 2017-02-07 DIAGNOSIS — R79.89 OTHER SPECIFIED ABNORMAL FINDINGS OF BLOOD CHEMISTRY: ICD-10-CM

## 2017-02-07 PROCEDURE — 93018 CV STRESS TEST I&R ONLY: CPT

## 2017-02-07 PROCEDURE — 78452 HT MUSCLE IMAGE SPECT MULT: CPT | Mod: 26

## 2017-02-07 PROCEDURE — 93016 CV STRESS TEST SUPVJ ONLY: CPT

## 2017-02-07 RX ADMIN — Medication 20 MILLIGRAM(S): at 17:40

## 2017-02-07 RX ADMIN — INSULIN GLARGINE 25 UNIT(S): 100 INJECTION, SOLUTION SUBCUTANEOUS at 21:31

## 2017-02-07 RX ADMIN — Medication 100 MILLIGRAM(S): at 05:22

## 2017-02-07 RX ADMIN — Medication 25 MILLIGRAM(S): at 05:23

## 2017-02-07 RX ADMIN — Medication 81 MILLIGRAM(S): at 12:27

## 2017-02-07 RX ADMIN — HEPARIN SODIUM 5000 UNIT(S): 5000 INJECTION INTRAVENOUS; SUBCUTANEOUS at 14:19

## 2017-02-07 RX ADMIN — CARVEDILOL PHOSPHATE 25 MILLIGRAM(S): 80 CAPSULE, EXTENDED RELEASE ORAL at 05:23

## 2017-02-07 RX ADMIN — Medication 25 MILLIGRAM(S): at 21:31

## 2017-02-07 RX ADMIN — Medication 25 MILLIGRAM(S): at 14:19

## 2017-02-07 RX ADMIN — Medication 100 MILLIGRAM(S): at 14:19

## 2017-02-07 RX ADMIN — ATORVASTATIN CALCIUM 40 MILLIGRAM(S): 80 TABLET, FILM COATED ORAL at 21:31

## 2017-02-07 RX ADMIN — ISOSORBIDE MONONITRATE 60 MILLIGRAM(S): 60 TABLET, EXTENDED RELEASE ORAL at 12:24

## 2017-02-07 RX ADMIN — CARVEDILOL PHOSPHATE 25 MILLIGRAM(S): 80 CAPSULE, EXTENDED RELEASE ORAL at 17:40

## 2017-02-07 RX ADMIN — Medication 100 MILLIGRAM(S): at 21:32

## 2017-02-07 RX ADMIN — Medication 200 MILLIGRAM(S): at 12:24

## 2017-02-07 RX ADMIN — Medication 20 MILLIGRAM(S): at 05:22

## 2017-02-07 RX ADMIN — Medication 75 MICROGRAM(S): at 05:22

## 2017-02-07 RX ADMIN — HEPARIN SODIUM 5000 UNIT(S): 5000 INJECTION INTRAVENOUS; SUBCUTANEOUS at 05:22

## 2017-02-07 RX ADMIN — HEPARIN SODIUM 5000 UNIT(S): 5000 INJECTION INTRAVENOUS; SUBCUTANEOUS at 21:31

## 2017-02-07 NOTE — PROGRESS NOTE ADULT - ASSESSMENT
PCP Dr. Diaz Santos  nephrology Dr. Martínez    87 year old male with PMH of  DM2 on insulin, HTN, CAD, CHF, s/p CRT-D, PE, ESRD on HD  (mwf w/ Dr Lerma)  admitted on 1/30/17 with AMS and dizziness after HD. Patient states that he usually gets dizziness and slightly out of it after dialysis.        There is mild thickening of both mitral valve leaflets. The leaflet   opening appears normal.   Moderate (2+) mitral regurgitation is present.   Fibrocalcific changes noted to the aortic valve leaflets with mild   restriction in leaflet excursion. Trace aortic regurgitation is present.   Well seated prosthetic valve in the aortic position. Peak trans-trans   valve gradient is 23.43mmHg, consistent with   Mild aortic stenosis is present.   The tricuspid valve is not well visualized, but is probably normal.   Trace tricuspid valve regurgitation is present.   Pulmonic valve not well seen, probably normal pulmonic valve function.   The left atrium is normal.   Borderline septal left ventricular hypertrophy is present. Left ventricle   function is mildly impaired; estimated left ventricle function is 45-50%   The right atrium is normal.   The right ventricle is normal in size, wall thickness, wall motion, and   contractility.   A device wire is seen in the RV and RA.   Trace pericardial effusion cannot be ruled out    stress test - done 2/7 - if normal - may go home from cardiac standpoint.

## 2017-02-07 NOTE — PROGRESS NOTE ADULT - SUBJECTIVE AND OBJECTIVE BOX
NEPHROLOGY INTERVAL HPI/OVERNIGHT EVENTS:  No acute events.  Resting comfortably.      HPI:  87 year old male with past medical history of iddm. htn. cad. chf. ppm, pe, esrd on hd (mwf w/ Dr Lerma) comes with AMS and dizzyness after HD this morning. Patient states that he usually gets dizzyness and slightly out of it after dialysis. As per ED note states with EMS pt was HTN with -200s and mildly tachycardic.  POC blood glucose 158 in field.  No known trauma. (31 Jan 2017 03:51)      MEDICATIONS  (STANDING):  aspirin  chewable 81milliGRAM(s) Oral daily  insulin glargine Injectable (LANTUS) 25Unit(s) SubCutaneous at bedtime  heparin  Injectable 5000Unit(s) SubCutaneous every 8 hours  docusate sodium 100milliGRAM(s) Oral three times a day  insulin lispro (HumaLOG) corrective regimen sliding scale  SubCutaneous three times a day before meals  dextrose 5%. 1000milliLiter(s) IV Continuous <Continuous>  dextrose 50% Injectable 12.5Gram(s) IV Push once  dextrose 50% Injectable 25Gram(s) IV Push once  dextrose 50% Injectable 25Gram(s) IV Push once  isosorbide   mononitrate ER Tablet (IMDUR) 60milliGRAM(s) Oral daily  levothyroxine 75MICROGram(s) Oral daily  hydrALAZINE 25milliGRAM(s) Oral every 8 hours  furosemide    Tablet 20milliGRAM(s) Oral two times a day  guaiFENesin    Syrup 200milliGRAM(s) Oral every 8 hours  carvedilol 25milliGRAM(s) Oral every 12 hours  heparin  Injectable 1500Unit(s) IV Push <User Schedule>  atorvastatin 40milliGRAM(s) Oral at bedtime    MEDICATIONS  (PRN):  senna 2Tablet(s) Oral at bedtime PRN Constipation  dextrose Gel 1Dose(s) Oral once PRN Blood Glucose LESS THAN 70 milliGRAM(s)/deciliter  glucagon  Injectable 1milliGRAM(s) IntraMuscular once PRN Glucose LESS THAN 70 milligrams/deciliter  ondansetron Injectable 4milliGRAM(s) IV Push every 6 hours PRN Nausea and/or Vomiting  acetaminophen  Suppository 650milliGRAM(s) Rectal every 6 hours PRN For Temp greater than 38 C (100.4 F)          Vital Signs Last 24 Hrs  T(C): 36.7, Max: 36.7 (02-07 @ 15:11)  T(F): 98, Max: 98 (02-07 @ 15:11)  HR: 64 (62 - 67)  BP: 153/62 (124/47 - 164/52)  BP(mean): --  RR: 18 (16 - 20)  SpO2: 98% (92% - 98%)  Daily     Daily     PHYSICAL EXAM:  Alert and appropriate  GENERAL: No apparent distress  CHEST/LUNG: clear to aus  HEART: S1S2 RRR  ABDOMEN: soft  EXTREMITIES: no edema  SKIN:     LABS:                        10.7   6.5   )-----------( 180      ( 06 Feb 2017 08:10 )             33.3     06 Feb 2017 08:10    137    |  99     |  89     ----------------------------<  129    4.5     |  23     |  7.61     Ca    7.5        06 Feb 2017 08:10  Phos  6.4       06 Feb 2017 08:10    TPro  x      /  Alb  2.8    /  TBili  x      /  DBili  x      /  AST  x      /  ALT  x      /  AlkPhos  x      06 Feb 2017 08:10                RADIOLOGY & ADDITIONAL TESTS:

## 2017-02-07 NOTE — PROGRESS NOTE ADULT - PROBLEM SELECTOR PLAN 1
troponin peaked at .114  nuclear stress test - positive: Chemically inducible myocardial ischemia involving the septum, anterior and lateral walls of the left ventricle, and a partially   reversible defect involving the inferior wall, suggesting a triple-vessel   disease. Global hypokinesia with an ejection fraction of 43% and left   ventricular dilatation.  cardio FU  continue ASA, statin and BB

## 2017-02-07 NOTE — PROGRESS NOTE ADULT - ASSESSMENT
87 year old male with PMH of  DM2 on insulin, HTN, CAD, CHF, s/p CRT-D, PE, ESRD on HD  (mwf w/ Dr Lerma)  admitted on 1/30/17 with AMS and dizziness after HD this morning. Patient states that he usually gets dizziness and slightly out of it after dialysis.     As per ED note states with EMS pt was HTN with -200s and mildly tachycardic.  POC blood glucose 158 in field.  No known trauma.

## 2017-02-07 NOTE — PROGRESS NOTE ADULT - SUBJECTIVE AND OBJECTIVE BOX
HPI:  2/2 RUQ pain persist, feels better, + nausea, tolerating PO intake, denies CP, palpitations, dizziness, noted low SBP in 100,plan of care discussed  2/3 pain improved tolerated HD and PO intake well, afebrile, plan of care discussed    2/4 states his last episode of chest pain was thursday.  2/5 - no chest pain overnight.     2/6 - no chest pain overnight.     2/7 - no pain overnight not on tele stress test today.     Review of system- Rest of the review of system are normal except mentioned in HPI    SUBJECTIVE & OBJECTIVE:  No new complaint    ICU Vital Signs Last 24 Hrs  T(C): 36.6, Max: 36.8 (02-06 @ 12:52)  T(F): 97.9, Max: 98.3 (02- @ 12:52)  HR: 66 (60 - 67)  BP: 149/52 (124/47 - 188/74)  BP(mean): --  ABP: --  ABP(mean): --  RR: 19 (16 - 20)  SpO2: 92% (92% - 97%)    MEDICATIONS  (STANDING):  aspirin  chewable 81milliGRAM(s) Oral daily  insulin glargine Injectable (LANTUS) 25Unit(s) SubCutaneous at bedtime  heparin  Injectable 5000Unit(s) SubCutaneous every 8 hours  docusate sodium 100milliGRAM(s) Oral three times a day  insulin lispro (HumaLOG) corrective regimen sliding scale  SubCutaneous three times a day before meals  dextrose 5%. 1000milliLiter(s) IV Continuous <Continuous>  dextrose 50% Injectable 12.5Gram(s) IV Push once  dextrose 50% Injectable 25Gram(s) IV Push once  dextrose 50% Injectable 25Gram(s) IV Push once  isosorbide   mononitrate ER Tablet (IMDUR) 60milliGRAM(s) Oral daily  levothyroxine 75MICROGram(s) Oral daily  hydrALAZINE 25milliGRAM(s) Oral every 8 hours  furosemide    Tablet 20milliGRAM(s) Oral two times a day  guaiFENesin    Syrup 200milliGRAM(s) Oral every 8 hours  carvedilol 25milliGRAM(s) Oral every 12 hours  heparin  Injectable 1500Unit(s) IV Push <User Schedule>  atorvastatin 40milliGRAM(s) Oral at bedtime  freetext medication  - 43.45milliGRAM(s) IV Push once          EXAM:  NM HEPATOBILIARY IMG                            PROCEDURE DATE:  2017        INTERPRETATION:    RADIOPHARMACEUTICAL:  99mTc-Mebrofenin  DOSE: 3.6 mCi IV    CLINICAL INFORMATION: 87 year old male with sepsis, gallbladder wall   thickening on sonogram referred to evaluate for acute cholecystitis    TECHNIQUE: Dynamic images of the anterior abdomen were obtained for 20   minutes immediately following radiotracer injection. Static images of the   abdomen in the anterior, right anterioroblique and right lateral   projections were also obtained.    COMPARISON:No previous hepatobiliary scan for comparison    FINDINGS: There is prompt hepatic uptake of the injected radiotracer. The   gallbladder is first visualized at 20 minutes post tracer injection and   bowel activity by 15 minutes There is normal tracer clearance from the   liver by the end of the study.     IMPRESSION: Normal hepatobiliary scan.     No scan evidence of acute cholecystitis.     IMPRESSION: No acute abdominal pathology. CT bad      XAM:  US COMPLETE ABDOMEN SONOGRAM                            PROCEDURE DATE:  2017        INTERPRETATION:  Exam Date:        Ultrasound of the abdomen         CLINICAL INFORMATION:       Pain.    TECHNIQUE:   Transcutaneous ultrasonography of the abdomen was performed.    FINDINGS:    No previous examinations are available for review.    The liver demonstrates homogeneous echotexture without focal lesion.    Hepatic size and contours are maintained.  The main hepatic and portal   veins appear patent.   No intrahepatic or ductal dilatation is found.     The common duct is not dilated, measuring 0.31 cm.  The gallbladder is   significant for minimal cholelithiasis with minimal wall thickening   measuring 4 mm but no pericholecystic fluid or Balderrama's sign. The   pancreas is obscured by bowel gas.  The spleen size is normal.    The right kidney measures 8.3 cm in length. The left kidney measures 8.7   cm in length.  Bilateral cortical thinning is noted with increased   echogenicity consistent with end-stage renal disease. 6 mm cyst is seen   in the LEFT midpole. It demonstrates no mass, calculus or hydronephrosis.    The abdominal aorta and IVC are obscured by bowel gas..      IMPRESSION: Minimal cholelithiasis with minimal wallthickening measuring   4 mm. There is no Balderrama's sign. These findings are mild however may   represent early or mild cholecystitis and clinically indicated, HIDA scan   can be utilized for further evaluation. End-stage renal disease.    PHYSICAL EXAM:    GENERAL: NAD, well-groomed, well-developed  HEAD:  Atraumatic, Normocephalic  EYES: EOMI, PERRLA, conjunctiva and sclera clear  HEENT: Moist mucous membranes  NECK: Supple, No JVD  NERVOUS SYSTEM:  Alert & Oriented X3, Motor Strength 5/5 B/L upper and lower extremities; DTRs 2+ intact and symmetric  CHEST/LUNG: Clear to auscultation bilaterally; No rales, rhonchi, wheezing, or rubs  HEART: Regular rate and rhythm; No murmurs, rubs, or gallops  ABDOMEN: Soft, Nontender, distended; Bowel sounds present  GENITOURINARY- Voiding, no palpable bladder  EXTREMITIES:  2+ Peripheral Pulses, No clubbing, cyanosis, or edema  MUSCULOSKELETAL No muscle tenderness, Muscle tone normal, No joint tenderness, no Joint swelling, Joint range of motion-normal  SKIN-no rash, no lesion  CNS- alert, oriented X3, non focal       Daily     Daily Weight in k.5 (2017 05:45)

## 2017-02-08 LAB
ANION GAP SERPL CALC-SCNC: 14 MMOL/L — SIGNIFICANT CHANGE UP (ref 5–17)
BUN SERPL-MCNC: 73 MG/DL — HIGH (ref 7–23)
CALCIUM SERPL-MCNC: 7.7 MG/DL — LOW (ref 8.5–10.1)
CHLORIDE SERPL-SCNC: 99 MMOL/L — SIGNIFICANT CHANGE UP (ref 96–108)
CO2 SERPL-SCNC: 25 MMOL/L — SIGNIFICANT CHANGE UP (ref 22–31)
CREAT SERPL-MCNC: 6.24 MG/DL — HIGH (ref 0.5–1.3)
GLUCOSE SERPL-MCNC: 66 MG/DL — LOW (ref 70–99)
HCT VFR BLD CALC: 32.7 % — LOW (ref 39–50)
HGB BLD-MCNC: 10.6 G/DL — LOW (ref 13–17)
MCHC RBC-ENTMCNC: 29.1 PG — SIGNIFICANT CHANGE UP (ref 27–34)
MCHC RBC-ENTMCNC: 32.5 GM/DL — SIGNIFICANT CHANGE UP (ref 32–36)
MCV RBC AUTO: 89.4 FL — SIGNIFICANT CHANGE UP (ref 80–100)
PLATELET # BLD AUTO: 210 K/UL — SIGNIFICANT CHANGE UP (ref 150–400)
POTASSIUM SERPL-MCNC: 4.4 MMOL/L — SIGNIFICANT CHANGE UP (ref 3.5–5.3)
POTASSIUM SERPL-SCNC: 4.4 MMOL/L — SIGNIFICANT CHANGE UP (ref 3.5–5.3)
RBC # BLD: 3.66 M/UL — LOW (ref 4.2–5.8)
RBC # FLD: 13.4 % — SIGNIFICANT CHANGE UP (ref 10.3–14.5)
SODIUM SERPL-SCNC: 138 MMOL/L — SIGNIFICANT CHANGE UP (ref 135–145)
WBC # BLD: 8.7 K/UL — SIGNIFICANT CHANGE UP (ref 3.8–10.5)
WBC # FLD AUTO: 8.7 K/UL — SIGNIFICANT CHANGE UP (ref 3.8–10.5)

## 2017-02-08 RX ORDER — ERYTHROPOIETIN 10000 [IU]/ML
2000 INJECTION, SOLUTION INTRAVENOUS; SUBCUTANEOUS ONCE
Qty: 0 | Refills: 0 | Status: COMPLETED | OUTPATIENT
Start: 2017-02-08 | End: 2017-02-08

## 2017-02-08 RX ADMIN — Medication 25 MILLIGRAM(S): at 05:34

## 2017-02-08 RX ADMIN — CARVEDILOL PHOSPHATE 25 MILLIGRAM(S): 80 CAPSULE, EXTENDED RELEASE ORAL at 05:10

## 2017-02-08 RX ADMIN — HEPARIN SODIUM 1500 UNIT(S): 5000 INJECTION INTRAVENOUS; SUBCUTANEOUS at 08:10

## 2017-02-08 RX ADMIN — Medication 20 MILLIGRAM(S): at 05:10

## 2017-02-08 RX ADMIN — ISOSORBIDE MONONITRATE 60 MILLIGRAM(S): 60 TABLET, EXTENDED RELEASE ORAL at 13:19

## 2017-02-08 RX ADMIN — Medication 200 MILLIGRAM(S): at 21:59

## 2017-02-08 RX ADMIN — INSULIN GLARGINE 25 UNIT(S): 100 INJECTION, SOLUTION SUBCUTANEOUS at 22:03

## 2017-02-08 RX ADMIN — Medication 25 MILLIGRAM(S): at 13:19

## 2017-02-08 RX ADMIN — Medication 25 MILLIGRAM(S): at 22:00

## 2017-02-08 RX ADMIN — Medication 20 MILLIGRAM(S): at 17:27

## 2017-02-08 RX ADMIN — CARVEDILOL PHOSPHATE 25 MILLIGRAM(S): 80 CAPSULE, EXTENDED RELEASE ORAL at 17:27

## 2017-02-08 RX ADMIN — Medication 1: at 17:31

## 2017-02-08 RX ADMIN — HEPARIN SODIUM 5000 UNIT(S): 5000 INJECTION INTRAVENOUS; SUBCUTANEOUS at 22:01

## 2017-02-08 RX ADMIN — ERYTHROPOIETIN 2000 UNIT(S): 10000 INJECTION, SOLUTION INTRAVENOUS; SUBCUTANEOUS at 10:53

## 2017-02-08 RX ADMIN — Medication 200 MILLIGRAM(S): at 13:20

## 2017-02-08 RX ADMIN — HEPARIN SODIUM 5000 UNIT(S): 5000 INJECTION INTRAVENOUS; SUBCUTANEOUS at 13:19

## 2017-02-08 RX ADMIN — Medication 100 MILLIGRAM(S): at 21:59

## 2017-02-08 RX ADMIN — ATORVASTATIN CALCIUM 40 MILLIGRAM(S): 80 TABLET, FILM COATED ORAL at 21:59

## 2017-02-08 RX ADMIN — Medication 81 MILLIGRAM(S): at 13:18

## 2017-02-08 RX ADMIN — Medication 100 MILLIGRAM(S): at 13:19

## 2017-02-08 RX ADMIN — Medication 75 MICROGRAM(S): at 05:09

## 2017-02-08 RX ADMIN — HEPARIN SODIUM 5000 UNIT(S): 5000 INJECTION INTRAVENOUS; SUBCUTANEOUS at 05:09

## 2017-02-08 NOTE — PROGRESS NOTE ADULT - ASSESSMENT
88 y/o HM presents with Altered MS with HTN urgency    # ESRD  # Syncope/ Weakness  # Encephalopathy, now resolved  # HTN urgency  # RUQ pain with neg HIDA and CT of abd   # chronic systolic CHF with ICD  # Hypothyroidism    PLAN  - Thus far, all cultures are negative  - BP mpre stable, it appears pt is non compliant with his BP meds at home  - HD today, s/p IV contrast study    2/4  --ESRD  stable on TIW HD  --Syncope,  work up negative.  --HTN, BP control acceptable,  Would allow some increase in BP to avoid hypotensive episodes associated with HD.  --ABD, No pathology on imaging studies.    2/5   --ESRD : continue TIW hd, next tx in am  --SYNCOPE : no clear etiology.  all work up negative.  --HTN  monitor outpt compliance  --ABD Pain. : no specific pathology.  continue to monitor.  --D/c planning.    2/6 MK  - ESRD: tolerating hd with goal uf of 1.5 kg, epo on hold with restart when below 10.5  - HTN urgency due to medication compliance.  will continue with current regimen  - SYNCOPE/Near SYNCOPE: work- up negative  - no renal barrier for dc    2/7   --ESRD : continue TIW HD  --NEURO status stable  --HTN   BP control fair.  --D/c planning.    2/8  --ESRD : tolerating tx well.  --HTN : Bp control fair.  --NEURO STATUS : stable  --D/C to rehab

## 2017-02-08 NOTE — PROGRESS NOTE ADULT - SUBJECTIVE AND OBJECTIVE BOX
NEPHROLOGY INTERVAL HPI/OVERNIGHT EVENTS:  No acute events.   Appearing comfortable    HPI:  87 year old male with past medical history of iddm. htn. cad. chf. ppm, pe, esrd on hd (mwf w/ Dr Lerma) comes with AMS and dizzyness after HD this morning. Patient states that he usually gets dizzyness and slightly out of it after dialysis. As per ED note states with EMS pt was HTN with -200s and mildly tachycardic.  POC blood glucose 158 in field.  No known trauma. (31 Jan 2017 03:51)      Patient is a 87y old  Male who presents with a chief complaint of altered mental status after dialysis (01 Feb 2017 01:59)      MEDICATIONS  (STANDING):  aspirin  chewable 81milliGRAM(s) Oral daily  insulin glargine Injectable (LANTUS) 25Unit(s) SubCutaneous at bedtime  heparin  Injectable 5000Unit(s) SubCutaneous every 8 hours  docusate sodium 100milliGRAM(s) Oral three times a day  insulin lispro (HumaLOG) corrective regimen sliding scale  SubCutaneous three times a day before meals  dextrose 5%. 1000milliLiter(s) IV Continuous <Continuous>  dextrose 50% Injectable 12.5Gram(s) IV Push once  dextrose 50% Injectable 25Gram(s) IV Push once  dextrose 50% Injectable 25Gram(s) IV Push once  isosorbide   mononitrate ER Tablet (IMDUR) 60milliGRAM(s) Oral daily  levothyroxine 75MICROGram(s) Oral daily  hydrALAZINE 25milliGRAM(s) Oral every 8 hours  furosemide    Tablet 20milliGRAM(s) Oral two times a day  guaiFENesin    Syrup 200milliGRAM(s) Oral every 8 hours  carvedilol 25milliGRAM(s) Oral every 12 hours  heparin  Injectable 1500Unit(s) IV Push <User Schedule>  atorvastatin 40milliGRAM(s) Oral at bedtime    MEDICATIONS  (PRN):  senna 2Tablet(s) Oral at bedtime PRN Constipation  dextrose Gel 1Dose(s) Oral once PRN Blood Glucose LESS THAN 70 milliGRAM(s)/deciliter  glucagon  Injectable 1milliGRAM(s) IntraMuscular once PRN Glucose LESS THAN 70 milligrams/deciliter  ondansetron Injectable 4milliGRAM(s) IV Push every 6 hours PRN Nausea and/or Vomiting  acetaminophen  Suppository 650milliGRAM(s) Rectal every 6 hours PRN For Temp greater than 38 C (100.4 F)        I&O's Detail    I & Os for current day (as of 08 Feb 2017 08:48)  =============================================  IN:    Oral Fluid: 350 ml    Total IN: 350 ml  ---------------------------------------------  OUT:    Total OUT: 0 ml  ---------------------------------------------  Total NET: 350 ml      Patient was seen and evaluated on dialysis.   Patient is tolerating the procedure well.   Continue dialysis:   Dialyzer:  Ex 190        QB:   450     QD: 700  Goal UF _2kg__ over _4__ Hours       Vital Signs Last 24 Hrs  T(C): 36.6, Max: 36.7 (02-07 @ 15:11)  T(F): 97.8, Max: 98 (02-07 @ 15:11)  HR: 60 (60 - 70)  BP: 143/61 (139/66 - 168/60)  BP(mean): --  RR: 18 (18 - 18)  SpO2: 97% (97% - 98%)  Daily     Daily     PHYSICAL EXAM: Alert and appropriate  GENERAL  :  No apparent distress  HEENT: WNL  CV: S1S2 RRR  LUNGS: Clear to aus  EXT: no edema    LABS:                        10.6   8.7   )-----------( 210      ( 08 Feb 2017 05:31 )             32.7     08 Feb 2017 05:31    138    |  99     |  73     ----------------------------<  66     4.4     |  25     |  6.24     Ca    7.7        08 Feb 2017 05:31

## 2017-02-08 NOTE — PROGRESS NOTE ADULT - SUBJECTIVE AND OBJECTIVE BOX
PCP Dr. Diaz Santos  nephrology Dr. Martínez  cardiology Dr. Castillo      HPI:  87 year old male with past medical history of iddm. htn. cad. chf. ppm, pe, esrd on hd ([facility] w/ Dr Lerma) comes with AMS and dizzyness after HD this morning. Patient states that he usually gets dizzyness and slightly out of it after dialysis. As per ED note states with EMS pt was HTN with -200s and mildly tachycardic.  POC blood glucose 158 in field.  No known trauma. (31 Jan 2017 03:51)      2/7:  Patient came back from the stress test. has no complaints.   02/08/17: Patient seen and examined in HD unit. He denies any new complaints. Discussed with DR Castillo, possible cath on Friday.    Review of system- Rest of the review of system are normal except mentioned in HPI    SUBJECTIVE & OBJECTIVE:  No new complaint  Vital Signs: Vital Signs Last 24 Hrs  T(C): 36.7, Max: 36.7 (02-08 @ 15:27)  T(F): 98, Max: 98 (02-08 @ 15:27)  HR: 66 (60 - 70)  BP: 142/55 (139/66 - 168/68)  BP(mean): --  RR: 16 (10 - 101)  SpO2: 97% (97% - 98%)      PHYSICAL EXAM:    Constitutional: NAD, awake and alert, well-developed  HEENT: PERR, EOMI, Normal Hearing, MMM  Neck: Soft and supple, No LAD, No JVD  Respiratory: Breath sounds are clear bilaterally, No wheezing, rales or rhonchi  Cardiovascular: S1 and S2, regular rate and rhythm, no Murmurs, gallops or rubs  Gastrointestinal: Bowel Sounds present, soft, nontender, nondistended, no guarding, no rebound  Extremities: No peripheral edema  Vascular: 2+ peripheral pulses  Neurological: A/O x 3, no focal deficits  Musculoskeletal: 5/5 strength b/l upper and lower extremities  Skin: No rashes                          10.7   6.5   )-----------( 180      ( 06 Feb 2017 08:10 )             33.3     06 Feb 2017 08:10    137    |  99     |  89     ----------------------------<  129    4.5     |  23     |  7.61     Ca    7.5        06 Feb 2017 08:10  Phos  6.4       06 Feb 2017 08:10    TPro  x      /  Alb  2.8    /  TBili  x      /  DBili  x      /  AST  x      /  ALT  x      /  AlkPhos  x      06 Feb 2017 08:10    LIVER FUNCTIONS - ( 06 Feb 2017 08:10 )  Alb: 2.8 g/dL / Pro: x     / ALK PHOS: x     / ALT: x     / AST: x     / GGT: x             MICROBIOLOGY/CULTURES:  Culture Results:   No growth at 5 days. (02-01 @ 14:44)  Culture Results:   No growth at 5 days. (02-01 @ 14:44)      RADIOLOGY RESULTS:          EXAM:  NM HEPATOBILIARY IMG                          2D ECHOCARDIOGRAM AD 01/31/2017    There is mild thickening of both mitral valve leaflets. The leaflet   opening appears normal.   Moderate (2+) mitral regurgitation is present.   Fibrocalcific changes noted to the aortic valve leaflets with mild   restriction in leaflet excursion. Trace aortic regurgitation is present.   Well seated prosthetic valve in the aortic position. Peak trans-trans   valve gradient is 23.43mmHg, consistent with   Mild aortic stenosis is present.   The tricuspid valve is not well visualized, but is probably normal.   Trace tricuspid valve regurgitation is present.   Pulmonic valve not well seen, probably normal pulmonic valve function.   The left atrium is normal.Borderline septal left ventricular hypertrophy is present. Left ventricle   function is mildly impaired; estimated left ventricle function is 45-50%   The right atrium is normal.   The right ventricle is normal in size, wall thickness, wall motion, and   contractility.   A device wire is seen in the RV Enmanuel.   Trace pericardial effusion cannot be ruled out    PROCEDURE DATE:  02/03/2017        INTERPRETATION:    RADIOPHARMACEUTICAL:  99mTc-Mebrofenin  DOSE: 3.6 mCi IV    CLINICAL INFORMATION: 87 year old male with sepsis, gallbladder wall   thickening on sonogram referred to evaluate for acute cholecystitis    TECHNIQUE: Dynamic images of the anterior abdomen were obtained for 20   minutes immediately following radiotracer injection. Static images of the   abdomen in the anterior, right anterioroblique and right lateral   projections were also obtained.    COMPARISON:No previous hepatobiliary scan for comparison    FINDINGS: There is prompt hepatic uptake of the injected radiotracer. The   gallbladder is first visualized at 20 minutes post tracer injection and   bowel activity by 15 minutes There is normal tracer clearance from the   liver by the end of the study.     IMPRESSION: Normal hepatobiliary scan.     No scan evidence of acute cholecystitis.     IMPRESSION: No acute abdominal pathology. CT bad      XAM:  US COMPLETE ABDOMEN SONOGRAM                            PROCEDURE DATE:  02/02/2017        INTERPRETATION:  Exam Date:        Ultrasound of the abdomen         CLINICAL INFORMATION:       Pain.    TECHNIQUE:   Transcutaneous ultrasonography of the abdomen was performed.    FINDINGS:    No previous examinations are available for review.    The liver demonstrates homogeneous echotexture without focal lesion.    Hepatic size and contours are maintained.  The main hepatic and portal   veins appear patent.   No intrahepatic or ductal dilatation is found.     The common duct is not dilated, measuring 0.31 cm.  The gallbladder is   significant for minimal cholelithiasis with minimal wall thickening   measuring 4 mm but no pericholecystic fluid or Balderrama's sign. The   pancreas is obscured by bowel gas.  The spleen size is normal.    The right kidney measures 8.3 cm in length. The left kidney measures 8.7   cm in length.  Bilateral cortical thinning is noted with increased   echogenicity consistent with end-stage renal disease. 6 mm cyst is seen   in the LEFT midpole. It demonstrates no mass, calculus or hydronephrosis.    The abdominal aorta and IVC are obscured by bowel gas..      IMPRESSION: Minimal cholelithiasis with minimal wallthickening measuring   4 mm. There is no Balderrama's sign. These findings are mild however may   represent early or mild cholecystitis and clinically indicated, HIDA scan   can be utilized for further evaluation. End-stage renal disease.      stress test: Chemically inducible myocardial ischemia involving the septum,   anterior and lateral walls of the left ventricle, and a partially   reversible defect involving the inferior wall, suggesting a triple-vessel   disease.    2. Global hypokinesia with anejection fraction of 43% and left   ventricular dilatation.

## 2017-02-08 NOTE — PROGRESS NOTE ADULT - ASSESSMENT
PCP Dr. Diaz Santos  nephrology Dr. Martínez    87 year old male with PMH of  DM2 on insulin, HTN, CAD, CHF, s/p CRT-D, PE, ESRD on HD  ([facility] w/ Dr Lerma)  admitted on 1/30/17 with AMS and dizziness after HD. Patient states that he usually gets dizziness and slightly out of it after dialysis.       1. Chemically inducible myocardial ischemia involving the septum,   anterior and lateral walls of the left ventricle, and a partially   reversible defect involving the inferior wall, suggesting a triple-vessel   disease.    2. Global hypokinesia with an ejection fraction of 43% and left   ventricular dilatation.           There is mild thickening of both mitral valve leaflets. The leaflet   opening appears normal.   Moderate (2+) mitral regurgitation is present.   Fibrocalcific changes noted to the aortic valve leaflets with mild   restriction in leaflet excursion. Trace aortic regurgitation is present.   Well seated prosthetic valve in the aortic position. Peak trans-trans   valve gradient is 23.43mmHg, consistent with   Mild aortic stenosis is present.   The tricuspid valve is not well visualized, but is probably normal.   Trace tricuspid valve regurgitation is present.   Pulmonic valve not well seen, probably normal pulmonic valve function.   The left atrium is normal.   Borderline septal left ventricular hypertrophy is present. Left ventricle   function is mildly impaired; estimated left ventricle function is 45-50%   The right atrium is normal.   The right ventricle is normal in size, wall thickness, wall motion, and   contractility.   A device wire is seen in the RV and RA.   Trace pericardial effusion cannot be ruled out    +stress test  Risks and benefits of cardiac cath explained to patient. Benefits may include need for revascularization.  Risks may include but not limited to bleeding, infection, vascular compromise, MI, stroke, death, kidney injury requiring dialysis. Pt is in agreement with procedure.

## 2017-02-08 NOTE — PROGRESS NOTE ADULT - SUBJECTIVE AND OBJECTIVE BOX
HPI:  2/2 RUQ pain persist, feels better, + nausea, tolerating PO intake, denies CP, palpitations, dizziness, noted low SBP in 100,plan of care discussed  2/3 pain improved tolerated HD and PO intake well, afebrile, plan of care discussed    2/4 states his last episode of chest pain was thursday.  2/5 - no chest pain overnight.     2/6 - no chest pain overnight.     2/7 - no pain overnight not on tele stress test today.     2/8 -pt with stress test yesterday and positive.     Review of system- Rest of the review of system are normal except mentioned in HPI    SUBJECTIVE & OBJECTIVE:  No new complaint  ICU Vital Signs Last 24 Hrs  T(C): 36.6, Max: 36.7 (02-07 @ 15:11)  T(F): 97.8, Max: 98 (02-07 @ 15:11)  HR: 64 (60 - 70)  BP: 155/63 (139/66 - 168/68)  BP(mean): --  ABP: --  ABP(mean): --  RR: 18 (10 - 101)  SpO2: 97% (97% - 98%)    MEDICATIONS  (STANDING):  aspirin  chewable 81milliGRAM(s) Oral daily  insulin glargine Injectable (LANTUS) 25Unit(s) SubCutaneous at bedtime  heparin  Injectable 5000Unit(s) SubCutaneous every 8 hours  docusate sodium 100milliGRAM(s) Oral three times a day  insulin lispro (HumaLOG) corrective regimen sliding scale  SubCutaneous three times a day before meals  dextrose 5%. 1000milliLiter(s) IV Continuous <Continuous>  dextrose 50% Injectable 12.5Gram(s) IV Push once  dextrose 50% Injectable 25Gram(s) IV Push once  dextrose 50% Injectable 25Gram(s) IV Push once  isosorbide   mononitrate ER Tablet (IMDUR) 60milliGRAM(s) Oral daily  levothyroxine 75MICROGram(s) Oral daily  hydrALAZINE 25milliGRAM(s) Oral every 8 hours  furosemide    Tablet 20milliGRAM(s) Oral two times a day  guaiFENesin    Syrup 200milliGRAM(s) Oral every 8 hours  carvedilol 25milliGRAM(s) Oral every 12 hours  heparin  Injectable 1500Unit(s) IV Push <User Schedule>  atorvastatin 40milliGRAM(s) Oral at bedtime              EXAM:  NM HEPATOBILIARY IMG                            PROCEDURE DATE:  2017        INTERPRETATION:    RADIOPHARMACEUTICAL:  99mTc-Mebrofenin  DOSE: 3.6 mCi IV    CLINICAL INFORMATION: 87 year old male with sepsis, gallbladder wall   thickening on sonogram referred to evaluate for acute cholecystitis    TECHNIQUE: Dynamic images of the anterior abdomen were obtained for 20   minutes immediately following radiotracer injection. Static images of the   CBC Full  -  ( 2017 05:31 )  WBC Count : 8.7 K/uL  Hemoglobin : 10.6 g/dL  Hematocrit : 32.7 %  Platelet Count - Automated : 210 K/uL  Mean Cell Volume : 89.4 fl  Mean Cell Hemoglobin : 29.1 pg  Mean Cell Hemoglobin Concentration : 32.5 gm/dL  Auto Neutrophil # : x  Auto Lymphocyte # : x  Auto Monocyte # : x  Auto Eosinophil # : x  Auto Basophil # : x  Auto Neutrophil % : x  Auto Lymphocyte % : x  Auto Monocyte % : x  Auto Eosinophil % : x  Auto Basophil % : x    2017 05:31    138    |  99     |  73     ----------------------------<  66     4.4     |  25     |  6.24     Ca    7.7        2017 05:31        abdomen in the anterior, right anterioroblique and right lateral   projections were also obtained.    COMPARISON:No previous hepatobiliary scan for comparison    FINDINGS: There is prompt hepatic uptake of the injected radiotracer. The   gallbladder is first visualized at 20 minutes post tracer injection and   bowel activity by 15 minutes There is normal tracer clearance from the   liver by the end of the study.     IMPRESSION: Normal hepatobiliary scan.     No scan evidence of acute cholecystitis.     IMPRESSION: No acute abdominal pathology. CT bad      XAM:  US COMPLETE ABDOMEN SONOGRAM                            PROCEDURE DATE:  2017        INTERPRETATION:  Exam Date:        Ultrasound of the abdomen         CLINICAL INFORMATION:       Pain.    TECHNIQUE:   Transcutaneous ultrasonography of the abdomen was performed.    FINDINGS:    No previous examinations are available for review.    The liver demonstrates homogeneous echotexture without focal lesion.    Hepatic size and contours are maintained.  The main hepatic and portal   veins appear patent.   No intrahepatic or ductal dilatation is found.     The common duct is not dilated, measuring 0.31 cm.  The gallbladder is   significant for minimal cholelithiasis with minimal wall thickening   measuring 4 mm but no pericholecystic fluid or Balderrama's sign. The   pancreas is obscured by bowel gas.  The spleen size is normal.    The right kidney measures 8.3 cm in length. The left kidney measures 8.7   cm in length.  Bilateral cortical thinning is noted with increased   echogenicity consistent with end-stage renal disease. 6 mm cyst is seen   in the LEFT midpole. It demonstrates no mass, calculus or hydronephrosis.    The abdominal aorta and IVC are obscured by bowel gas..      IMPRESSION: Minimal cholelithiasis with minimal wallthickening measuring   4 mm. There is no Balderrama's sign. These findings are mild however may   represent early or mild cholecystitis and clinically indicated, HIDA scan   can be utilized for further evaluation. End-stage renal disease.    PHYSICAL EXAM:    GENERAL: NAD, well-groomed, well-developed  HEAD:  Atraumatic, Normocephalic  EYES: EOMI, PERRLA, conjunctiva and sclera clear  HEENT: Moist mucous membranes  NECK: Supple, No JVD  NERVOUS SYSTEM:  Alert & Oriented X3, Motor Strength 5/5 B/L upper and lower extremities; DTRs 2+ intact and symmetric  CHEST/LUNG: Clear to auscultation bilaterally; No rales, rhonchi, wheezing, or rubs  HEART: Regular rate and rhythm; No murmurs, rubs, or gallops  ABDOMEN: Soft, Nontender, distended; Bowel sounds present  GENITOURINARY- Voiding, no palpable bladder  EXTREMITIES:  2+ Peripheral Pulses, No clubbing, cyanosis, or edema  MUSCULOSKELETAL No muscle tenderness, Muscle tone normal, No joint tenderness, no Joint swelling, Joint range of motion-normal  SKIN-no rash, no lesion  CNS- alert, oriented X3, non focal       Daily     Daily Weight in k.5 (2017 05:45)

## 2017-02-08 NOTE — PROGRESS NOTE ADULT - PROBLEM SELECTOR PLAN 1
troponin peaked at .114  nuclear stress test - positive: Chemically inducible myocardial ischemia involving the septum, anterior and lateral walls of the left ventricle, and a partially   reversible defect involving the inferior wall, suggesting a triple-vessel   disease. Global hypokinesia with an ejection fraction of 43% and left   ventricular dilatation.  cardio FU appreciated  Cardiac cath on Friday  continue ASA, statin and BB

## 2017-02-09 RX ADMIN — HEPARIN SODIUM 5000 UNIT(S): 5000 INJECTION INTRAVENOUS; SUBCUTANEOUS at 21:10

## 2017-02-09 RX ADMIN — Medication 200 MILLIGRAM(S): at 21:10

## 2017-02-09 RX ADMIN — Medication 25 MILLIGRAM(S): at 21:11

## 2017-02-09 RX ADMIN — HEPARIN SODIUM 5000 UNIT(S): 5000 INJECTION INTRAVENOUS; SUBCUTANEOUS at 06:11

## 2017-02-09 RX ADMIN — ATORVASTATIN CALCIUM 40 MILLIGRAM(S): 80 TABLET, FILM COATED ORAL at 21:10

## 2017-02-09 RX ADMIN — Medication 100 MILLIGRAM(S): at 06:11

## 2017-02-09 RX ADMIN — Medication 200 MILLIGRAM(S): at 06:11

## 2017-02-09 RX ADMIN — ISOSORBIDE MONONITRATE 60 MILLIGRAM(S): 60 TABLET, EXTENDED RELEASE ORAL at 11:32

## 2017-02-09 RX ADMIN — Medication 75 MICROGRAM(S): at 06:12

## 2017-02-09 RX ADMIN — Medication 200 MILLIGRAM(S): at 15:13

## 2017-02-09 RX ADMIN — INSULIN GLARGINE 25 UNIT(S): 100 INJECTION, SOLUTION SUBCUTANEOUS at 21:11

## 2017-02-09 RX ADMIN — HEPARIN SODIUM 5000 UNIT(S): 5000 INJECTION INTRAVENOUS; SUBCUTANEOUS at 15:13

## 2017-02-09 RX ADMIN — Medication 25 MILLIGRAM(S): at 06:11

## 2017-02-09 RX ADMIN — CARVEDILOL PHOSPHATE 25 MILLIGRAM(S): 80 CAPSULE, EXTENDED RELEASE ORAL at 17:41

## 2017-02-09 RX ADMIN — CARVEDILOL PHOSPHATE 25 MILLIGRAM(S): 80 CAPSULE, EXTENDED RELEASE ORAL at 06:11

## 2017-02-09 RX ADMIN — Medication 1: at 12:17

## 2017-02-09 RX ADMIN — Medication 20 MILLIGRAM(S): at 06:11

## 2017-02-09 RX ADMIN — Medication 25 MILLIGRAM(S): at 15:14

## 2017-02-09 RX ADMIN — Medication 20 MILLIGRAM(S): at 17:41

## 2017-02-09 RX ADMIN — Medication 81 MILLIGRAM(S): at 11:34

## 2017-02-09 RX ADMIN — Medication 100 MILLIGRAM(S): at 15:13

## 2017-02-09 RX ADMIN — Medication 100 MILLIGRAM(S): at 21:10

## 2017-02-09 NOTE — PROGRESS NOTE ADULT - SUBJECTIVE AND OBJECTIVE BOX
PCP Dr. Diaz Santos  nephrology Dr. Martínez  cardiology Dr. Castillo      HPI:  87 year old male with past medical history of iddm. htn. cad. chf. ppm, pe, esrd on hd ([facility] w/ Dr Lerma) comes with AMS and dizzyness after HD this morning. Patient states that he usually gets dizzyness and slightly out of it after dialysis. As per ED note states with EMS pt was HTN with -200s and mildly tachycardic.  POC blood glucose 158 in field.  No known trauma. (31 Jan 2017 03:51)    JL  2/9:     Review of system- Rest of the review of system are normal except mentioned in HPI    SUBJECTIVE & OBJECTIVE:  No new complaint  Vital Signs Last 24 Hrs  T(C): 36.7, Max: 36.7 (02-07 @ 15:11)  T(F): 98, Max: 98 (02-07 @ 15:11)  HR: 64 (62 - 67)  BP: 153/62 (124/47 - 153/62)  BP(mean): --  RR: 18 (16 - 20)  SpO2: 98% (92% - 98%)  PHYSICAL EXAM:    Constitutional: NAD, awake and alert, well-developed  HEENT: PERR, EOMI, Normal Hearing, MMM  Neck: Soft and supple, No LAD, No JVD  Respiratory: Breath sounds are clear bilaterally, No wheezing, rales or rhonchi  Cardiovascular: S1 and S2, regular rate and rhythm, no Murmurs, gallops or rubs  Gastrointestinal: Bowel Sounds present, soft, nontender, nondistended, no guarding, no rebound  Extremities: No peripheral edema  Vascular: 2+ peripheral pulses  Neurological: A/O x 3, no focal deficits  Musculoskeletal: 5/5 strength b/l upper and lower extremities  Skin: No rashes    Lab Results:  CBC  CBC Full  -  ( 06 Feb 2017 08:10 )  WBC Count : 6.5 K/uL  Hemoglobin : 10.7 g/dL  Hematocrit : 33.3 %  Platelet Count - Automated : 180 K/uL  Mean Cell Volume : 89.1 fl  Mean Cell Hemoglobin : 28.7 pg  Mean Cell Hemoglobin Concentration : 32.2 gm/dL  Auto Neutrophil # : 4.3 K/uL  Auto Lymphocyte # : 1.1 K/uL  Auto Monocyte # : 0.7 K/uL  Auto Eosinophil # : 0.3 K/uL  Auto Basophil # : 0.1 K/uL  Auto Neutrophil % : 66.4 %  Auto Lymphocyte % : 17.6 %  Auto Monocyte % : 10.1 %  Auto Eosinophil % : 4.8 %  Auto Basophil % : 1.1 %    .		Differential:	[] Automated		[] Manual  Chemistry                        10.7   6.5   )-----------( 180      ( 06 Feb 2017 08:10 )             33.3     06 Feb 2017 08:10    137    |  99     |  89     ----------------------------<  129    4.5     |  23     |  7.61     Ca    7.5        06 Feb 2017 08:10  Phos  6.4       06 Feb 2017 08:10    TPro  x      /  Alb  2.8    /  TBili  x      /  DBili  x      /  AST  x      /  ALT  x      /  AlkPhos  x      06 Feb 2017 08:10    LIVER FUNCTIONS - ( 06 Feb 2017 08:10 )  Alb: 2.8 g/dL / Pro: x     / ALK PHOS: x     / ALT: x     / AST: x     / GGT: x             MICROBIOLOGY/CULTURES:  Culture Results:   No growth at 5 days. (02-01 @ 14:44)  Culture Results:   No growth at 5 days. (02-01 @ 14:44)      RADIOLOGY RESULTS:          EXAM:  NM HEPATOBILIARY IMG                          2D ECHOCARDIOGRAM AD 01/31/2017    There is mild thickening of both mitral valve leaflets. The leaflet   opening appears normal.   Moderate (2+) mitral regurgitation is present.   Fibrocalcific changes noted to the aortic valve leaflets with mild   restriction in leaflet excursion. Trace aortic regurgitation is present.   Well seated prosthetic valve in the aortic position. Peak trans-trans   valve gradient is 23.43mmHg, consistent with   Mild aortic stenosis is present.   The tricuspid valve is not well visualized, but is probably normal.   Trace tricuspid valve regurgitation is present.   Pulmonic valve not well seen, probably normal pulmonic valve function.   The left atrium is normal.Borderline septal left ventricular hypertrophy is present. Left ventricle   function is mildly impaired; estimated left ventricle function is 45-50%   The right atrium is normal.   The right ventricle is normal in size, wall thickness, wall motion, and   contractility.   A device wire is seen in the RV Enmanuel.   Trace pericardial effusion cannot be ruled out    PROCEDURE DATE:  02/03/2017        INTERPRETATION:    RADIOPHARMACEUTICAL:  99mTc-Mebrofenin  DOSE: 3.6 mCi IV    CLINICAL INFORMATION: 87 year old male with sepsis, gallbladder wall   thickening on sonogram referred to evaluate for acute cholecystitis    TECHNIQUE: Dynamic images of the anterior abdomen were obtained for 20   minutes immediately following radiotracer injection. Static images of the   abdomen in the anterior, right anterioroblique and right lateral   projections were also obtained.    COMPARISON:No previous hepatobiliary scan for comparison    FINDINGS: There is prompt hepatic uptake of the injected radiotracer. The   gallbladder is first visualized at 20 minutes post tracer injection and   bowel activity by 15 minutes There is normal tracer clearance from the   liver by the end of the study.     IMPRESSION: Normal hepatobiliary scan.     No scan evidence of acute cholecystitis.     IMPRESSION: No acute abdominal pathology. CT bad      XAM:  US COMPLETE ABDOMEN SONOGRAM                            PROCEDURE DATE:  02/02/2017        INTERPRETATION:  Exam Date:        Ultrasound of the abdomen         CLINICAL INFORMATION:       Pain.    TECHNIQUE:   Transcutaneous ultrasonography of the abdomen was performed.    FINDINGS:    No previous examinations are available for review.    The liver demonstrates homogeneous echotexture without focal lesion.    Hepatic size and contours are maintained.  The main hepatic and portal   veins appear patent.   No intrahepatic or ductal dilatation is found.     The common duct is not dilated, measuring 0.31 cm.  The gallbladder is   significant for minimal cholelithiasis with minimal wall thickening   measuring 4 mm but no pericholecystic fluid or Balderrama's sign. The   pancreas is obscured by bowel gas.  The spleen size is normal.    The right kidney measures 8.3 cm in length. The left kidney measures 8.7   cm in length.  Bilateral cortical thinning is noted with increased   echogenicity consistent with end-stage renal disease. 6 mm cyst is seen   in the LEFT midpole. It demonstrates no mass, calculus or hydronephrosis.    The abdominal aorta and IVC are obscured by bowel gas..      IMPRESSION: Minimal cholelithiasis with minimal wallthickening measuring   4 mm. There is no Balderrama's sign. These findings are mild however may   represent early or mild cholecystitis and clinically indicated, HIDA scan   can be utilized for further evaluation. End-stage renal disease.      stress test: Chemically inducible myocardial ischemia involving the septum,   anterior and lateral walls of the left ventricle, and a partially   reversible defect involving the inferior wall, suggesting a triple-vessel   disease.    2. Global hypokinesia with anejection fraction of 43% and left   ventricular dilatation. PCP Dr. Diaz Santos  nephrology Dr. Martínez  cardiology Dr. Castillo      HPI:  87 year old male with past medical history of iddm. htn. cad. chf. ppm, pe, esrd on hd ([facility] w/ Dr Lerma) comes with AMS and dizzyness after HD this morning. Patient states that he usually gets dizzyness and slightly out of it after dialysis. As per ED note states with EMS pt was HTN with -200s and mildly tachycardic.  POC blood glucose 158 in field.  No known trauma. (31 Jan 2017 03:51)    JL  2/9: Patient has no new complaints.  Denies any CP or Dyspnea. Seen by cardio yesterday and plan is to do cath kenzie.     Review of system- Rest of the review of system are normal except mentioned in HPI    SUBJECTIVE & OBJECTIVE:   Vital Signs Last 24 Hrs  T(C): 36.4, Max: 36.7 (02-08 @ 15:27)  T(F): 97.5, Max: 98.1 (02-09 @ 05:21)  HR: 60 (60 - 69)  BP: 149/51 (137/41 - 149/51)  BP(mean): --  RR: 17 (16 - 17)  SpO2: 98% (97% - 100%)    Constitutional: NAD, awake and alert, well-developed  HEENT: PERR, EOMI, Normal Hearing, MMM  Neck: Soft and supple, No LAD, No JVD  Respiratory: Breath sounds are clear bilaterally, No wheezing, rales or rhonchi  Cardiovascular: S1 and S2, regular rate and rhythm, no Murmurs, gallops or rubs  Gastrointestinal: Bowel Sounds present, soft, nontender, nondistended, no guarding, no rebound  Extremities: No peripheral edema  Vascular: 2+ peripheral pulses  Neurological: A/O x 3, no focal deficits  Musculoskeletal: 5/5 strength b/l upper and lower extremities  Skin: No rashes    Lab Results:  CBC  CBC Full  -  ( 08 Feb 2017 05:31 )  WBC Count : 8.7 K/uL  Hemoglobin : 10.6 g/dL  Hematocrit : 32.7 %  Platelet Count - Automated : 210 K/uL  Mean Cell Volume : 89.4 fl  Mean Cell Hemoglobin : 29.1 pg  Mean Cell Hemoglobin Concentration : 32.5 gm/dL  Auto Neutrophil # : x  Auto Lymphocyte # : x  Auto Monocyte # : x  Auto Eosinophil # : x  Auto Basophil # : x  Auto Neutrophil % : x  Auto Lymphocyte % : x  Auto Monocyte % : x  Auto Eosinophil % : x  Auto Basophil % : x    .		Differential:	[] Automated		[] Manual  Chemistry                        10.6   8.7   )-----------( 210      ( 08 Feb 2017 05:31 )             32.7     08 Feb 2017 05:31    138    |  99     |  73     ----------------------------<  66     4.4     |  25     |  6.24     Ca    7.7        08 Feb 2017 05:31      MICROBIOLOGY/CULTURES:  Culture Results:   No growth at 5 days. (02-01 @ 14:44)  Culture Results:   No growth at 5 days. (02-01 @ 14:44)      RADIOLOGY RESULTS:          EXAM:  NM HEPATOBILIARY IMG                          2D ECHOCARDIOGRAM AD 01/31/2017    There is mild thickening of both mitral valve leaflets. The leaflet   opening appears normal.   Moderate (2+) mitral regurgitation is present.   Fibrocalcific changes noted to the aortic valve leaflets with mild   restriction in leaflet excursion. Trace aortic regurgitation is present.   Well seated prosthetic valve in the aortic position. Peak trans-trans   valve gradient is 23.43mmHg, consistent with   Mild aortic stenosis is present.   The tricuspid valve is not well visualized, but is probably normal.   Trace tricuspid valve regurgitation is present.   Pulmonic valve not well seen, probably normal pulmonic valve function.   The left atrium is normal.Borderline septal left ventricular hypertrophy is present. Left ventricle   function is mildly impaired; estimated left ventricle function is 45-50%   The right atrium is normal.   The right ventricle is normal in size, wall thickness, wall motion, and   contractility.   A device wire is seen in the RV Enmanuel.   Trace pericardial effusion cannot be ruled out    PROCEDURE DATE:  02/03/2017        INTERPRETATION:    RADIOPHARMACEUTICAL:  99mTc-Mebrofenin  DOSE: 3.6 mCi IV    CLINICAL INFORMATION: 87 year old male with sepsis, gallbladder wall   thickening on sonogram referred to evaluate for acute cholecystitis    TECHNIQUE: Dynamic images of the anterior abdomen were obtained for 20   minutes immediately following radiotracer injection. Static images of the   abdomen in the anterior, right anterioroblique and right lateral   projections were also obtained.    COMPARISON:No previous hepatobiliary scan for comparison    FINDINGS: There is prompt hepatic uptake of the injected radiotracer. The   gallbladder is first visualized at 20 minutes post tracer injection and   bowel activity by 15 minutes There is normal tracer clearance from the   liver by the end of the study.     IMPRESSION: Normal hepatobiliary scan.     No scan evidence of acute cholecystitis.     IMPRESSION: No acute abdominal pathology. CT bad      XAM:  US COMPLETE ABDOMEN SONOGRAM                            PROCEDURE DATE:  02/02/2017        INTERPRETATION:  Exam Date:        Ultrasound of the abdomen         CLINICAL INFORMATION:       Pain.    TECHNIQUE:   Transcutaneous ultrasonography of the abdomen was performed.    FINDINGS:    No previous examinations are available for review.    The liver demonstrates homogeneous echotexture without focal lesion.    Hepatic size and contours are maintained.  The main hepatic and portal   veins appear patent.   No intrahepatic or ductal dilatation is found.     The common duct is not dilated, measuring 0.31 cm.  The gallbladder is   significant for minimal cholelithiasis with minimal wall thickening   measuring 4 mm but no pericholecystic fluid or Balderrama's sign. The   pancreas is obscured by bowel gas.  The spleen size is normal.    The right kidney measures 8.3 cm in length. The left kidney measures 8.7   cm in length.  Bilateral cortical thinning is noted with increased   echogenicity consistent with end-stage renal disease. 6 mm cyst is seen   in the LEFT midpole. It demonstrates no mass, calculus or hydronephrosis.    The abdominal aorta and IVC are obscured by bowel gas..      IMPRESSION: Minimal cholelithiasis with minimal wallthickening measuring   4 mm. There is no Balderrama's sign. These findings are mild however may   represent early or mild cholecystitis and clinically indicated, HIDA scan   can be utilized for further evaluation. End-stage renal disease.      stress test: Chemically inducible myocardial ischemia involving the septum,   anterior and lateral walls of the left ventricle, and a partially   reversible defect involving the inferior wall, suggesting a triple-vessel   disease.    2. Global hypokinesia with anejection fraction of 43% and left   ventricular dilatation.

## 2017-02-09 NOTE — PROGRESS NOTE ADULT - PROBLEM SELECTOR PLAN 1
troponin peaked at .114  nuclear stress test - positive: Chemically inducible myocardial ischemia involving the septum, anterior and lateral walls of the left ventricle, and a partially   reversible defect involving the inferior wall, suggesting a triple-vessel   disease. Global hypokinesia with an ejection fraction of 43% and left   ventricular dilatation.  cardio FU  continue ASA, statin and BB  cardiac cath on friday troponin peaked at .114  nuclear stress test - positive: Chemically inducible myocardial ischemia involving the septum, anterior and lateral walls of the left ventricle, and a partially   reversible defect involving the inferior wall, suggesting a triple-vessel   disease. Global hypokinesia with an ejection fraction of 43% and left   ventricular dilatation.  cardio FU appreciated   continue ASA, statin and BB  cardiac cath on friday

## 2017-02-10 LAB
ANION GAP SERPL CALC-SCNC: 15 MMOL/L — SIGNIFICANT CHANGE UP (ref 5–17)
BUN SERPL-MCNC: 79 MG/DL — HIGH (ref 7–23)
CALCIUM SERPL-MCNC: 7.7 MG/DL — LOW (ref 8.5–10.1)
CHLORIDE SERPL-SCNC: 95 MMOL/L — LOW (ref 96–108)
CO2 SERPL-SCNC: 25 MMOL/L — SIGNIFICANT CHANGE UP (ref 22–31)
CREAT SERPL-MCNC: 6.43 MG/DL — HIGH (ref 0.5–1.3)
GLUCOSE SERPL-MCNC: 100 MG/DL — HIGH (ref 70–99)
HCT VFR BLD CALC: 34 % — LOW (ref 39–50)
HGB BLD-MCNC: 11.2 G/DL — LOW (ref 13–17)
MCHC RBC-ENTMCNC: 29.2 PG — SIGNIFICANT CHANGE UP (ref 27–34)
MCHC RBC-ENTMCNC: 32.9 GM/DL — SIGNIFICANT CHANGE UP (ref 32–36)
MCV RBC AUTO: 88.6 FL — SIGNIFICANT CHANGE UP (ref 80–100)
PLATELET # BLD AUTO: 272 K/UL — SIGNIFICANT CHANGE UP (ref 150–400)
POTASSIUM SERPL-MCNC: 4.4 MMOL/L — SIGNIFICANT CHANGE UP (ref 3.5–5.3)
POTASSIUM SERPL-SCNC: 4.4 MMOL/L — SIGNIFICANT CHANGE UP (ref 3.5–5.3)
RBC # BLD: 3.83 M/UL — LOW (ref 4.2–5.8)
RBC # FLD: 13.5 % — SIGNIFICANT CHANGE UP (ref 10.3–14.5)
SODIUM SERPL-SCNC: 135 MMOL/L — SIGNIFICANT CHANGE UP (ref 135–145)
WBC # BLD: 7.6 K/UL — SIGNIFICANT CHANGE UP (ref 3.8–10.5)
WBC # FLD AUTO: 7.6 K/UL — SIGNIFICANT CHANGE UP (ref 3.8–10.5)

## 2017-02-10 RX ADMIN — Medication 100 MILLIGRAM(S): at 06:43

## 2017-02-10 RX ADMIN — Medication 20 MILLIGRAM(S): at 06:48

## 2017-02-10 RX ADMIN — Medication 100 MILLIGRAM(S): at 21:34

## 2017-02-10 RX ADMIN — Medication 25 MILLIGRAM(S): at 06:45

## 2017-02-10 RX ADMIN — CARVEDILOL PHOSPHATE 25 MILLIGRAM(S): 80 CAPSULE, EXTENDED RELEASE ORAL at 20:28

## 2017-02-10 RX ADMIN — Medication 20 MILLIGRAM(S): at 18:40

## 2017-02-10 RX ADMIN — Medication 75 MICROGRAM(S): at 06:43

## 2017-02-10 RX ADMIN — Medication 25 MILLIGRAM(S): at 21:30

## 2017-02-10 RX ADMIN — HEPARIN SODIUM 5000 UNIT(S): 5000 INJECTION INTRAVENOUS; SUBCUTANEOUS at 21:34

## 2017-02-10 RX ADMIN — Medication 200 MILLIGRAM(S): at 06:44

## 2017-02-10 RX ADMIN — HEPARIN SODIUM 5000 UNIT(S): 5000 INJECTION INTRAVENOUS; SUBCUTANEOUS at 06:43

## 2017-02-10 RX ADMIN — CARVEDILOL PHOSPHATE 25 MILLIGRAM(S): 80 CAPSULE, EXTENDED RELEASE ORAL at 06:43

## 2017-02-10 RX ADMIN — INSULIN GLARGINE 25 UNIT(S): 100 INJECTION, SOLUTION SUBCUTANEOUS at 21:33

## 2017-02-10 RX ADMIN — ATORVASTATIN CALCIUM 40 MILLIGRAM(S): 80 TABLET, FILM COATED ORAL at 21:30

## 2017-02-10 RX ADMIN — Medication 200 MILLIGRAM(S): at 21:38

## 2017-02-10 NOTE — PROGRESS NOTE ADULT - ASSESSMENT
PCP Dr. Diaz Santos  nephrology Dr. Martínez    87 year old male with PMH of  DM2 on insulin, HTN, CAD, CHF, s/p CRT-D, PE, ESRD on HD  ([facility] w/ Dr Lerma)  admitted on 1/30/17 with AMS and dizziness after HD. Patient states that he usually gets dizziness and slightly out of it after dialysis.       1. Chemically inducible myocardial ischemia involving the septum,   anterior and lateral walls of the left ventricle, and a partially   reversible defect involving the inferior wall, suggesting a triple-vessel   disease.    2. Global hypokinesia with an ejection fraction of 43% and left   ventricular dilatation.           There is mild thickening of both mitral valve leaflets. The leaflet   opening appears normal.   Moderate (2+) mitral regurgitation is present.   Fibrocalcific changes noted to the aortic valve leaflets with mild   restriction in leaflet excursion. Trace aortic regurgitation is present.   Well seated prosthetic valve in the aortic position. Peak trans-trans   valve gradient is 23.43mmHg, consistent with   Mild aortic stenosis is present.   The tricuspid valve is not well visualized, but is probably normal.   Trace tricuspid valve regurgitation is present.   Pulmonic valve not well seen, probably normal pulmonic valve function.   The left atrium is normal.   Borderline septal left ventricular hypertrophy is present. Left ventricle   function is mildly impaired; estimated left ventricle function is 45-50%   The right atrium is normal.   The right ventricle is normal in size, wall thickness, wall motion, and   contractility.   A device wire is seen in the RV and RA.   Trace pericardial effusion cannot be ruled out    +stress test  cath - no pci required - medical management    pt to follow up with his private cardiologist as outpt - please reconsult prn.

## 2017-02-10 NOTE — PROGRESS NOTE ADULT - SUBJECTIVE AND OBJECTIVE BOX
NEPHROLOGY INTERVAL HPI/OVERNIGHT EVENTS:  S/p Cardiac cath--No PCI required,  Medical mgt.  No complications.  Seen on dialysis.    HPI:  87 year old male with past medical history of iddm. htn. cad. chf. ppm, pe, esrd on hd (mwf w/ Dr Lerma) comes with AMS and dizzyness after HD this morning. Patient states that he usually gets dizzyness and slightly out of it after dialysis. As per ED note states with EMS pt was HTN with -200s and mildly tachycardic.  POC blood glucose 158 in field.  No known trauma. (31 Jan 2017 03:51)      Patient is a 87y old  Male who presents with a chief complaint of altered mental status after dialysis (01 Feb 2017 01:59)      MEDICATIONS  (STANDING):  aspirin  chewable 81milliGRAM(s) Oral daily  insulin glargine Injectable (LANTUS) 25Unit(s) SubCutaneous at bedtime  heparin  Injectable 5000Unit(s) SubCutaneous every 8 hours  docusate sodium 100milliGRAM(s) Oral three times a day  insulin lispro (HumaLOG) corrective regimen sliding scale  SubCutaneous three times a day before meals  dextrose 5%. 1000milliLiter(s) IV Continuous <Continuous>  dextrose 50% Injectable 12.5Gram(s) IV Push once  dextrose 50% Injectable 25Gram(s) IV Push once  dextrose 50% Injectable 25Gram(s) IV Push once  isosorbide   mononitrate ER Tablet (IMDUR) 60milliGRAM(s) Oral daily  levothyroxine 75MICROGram(s) Oral daily  hydrALAZINE 25milliGRAM(s) Oral every 8 hours  furosemide    Tablet 20milliGRAM(s) Oral two times a day  guaiFENesin    Syrup 200milliGRAM(s) Oral every 8 hours  carvedilol 25milliGRAM(s) Oral every 12 hours  heparin  Injectable 1500Unit(s) IV Push <User Schedule>  atorvastatin 40milliGRAM(s) Oral at bedtime    MEDICATIONS  (PRN):  senna 2Tablet(s) Oral at bedtime PRN Constipation  dextrose Gel 1Dose(s) Oral once PRN Blood Glucose LESS THAN 70 milliGRAM(s)/deciliter  glucagon  Injectable 1milliGRAM(s) IntraMuscular once PRN Glucose LESS THAN 70 milligrams/deciliter  ondansetron Injectable 4milliGRAM(s) IV Push every 6 hours PRN Nausea and/or Vomiting  acetaminophen  Suppository 650milliGRAM(s) Rectal every 6 hours PRN For Temp greater than 38 C (100.4 F)        I&O's Detail      Patient was seen and evaluated on dialysis.   Patient is tolerating the procedure well.   Continue dialysis: TIW  Dialyzer:   ex 190       QB:  400      QD: 700   Goal UF _2 kg__ over __3.5_ Hours       Vital Signs Last 24 Hrs  T(C): 36.4, Max: 36.6 (02-09 @ 20:25)  T(F): 97.5, Max: 97.8 (02-09 @ 20:25)  HR: 60 (60 - 92)  BP: 125/56 (122/41 - 180/56)  BP(mean): --  RR: 18 (16 - 18)  SpO2: 99% (97% - 100%)  Daily Height in cm: 162.56 (10 Feb 2017 08:52)    Daily     PHYSICAL EXAM:  Alert and responsive  GENERAL : No apparent distress  HEENT: WNL  CHEST : clear to aus  COR : S1S2 RRR  ABDOMEN : soft  EXT: no edema    LABS:    10 Feb 2017 07:50    135    |  95     |  79     ----------------------------<  100    4.4     |  25     |  6.43     Ca    7.7        10 Feb 2017 07:50

## 2017-02-10 NOTE — PROGRESS NOTE ADULT - SUBJECTIVE AND OBJECTIVE BOX
HPI:  2/2 RUQ pain persist, feels better, + nausea, tolerating PO intake, denies CP, palpitations, dizziness, noted low SBP in 100,plan of care discussed  2/3 pain improved tolerated HD and PO intake well, afebrile, plan of care discussed    2/ states his last episode of chest pain was thursday.   - no chest pain overnight.      - no chest pain overnight.      - no pain overnight not on tele stress test today.      -pt with stress test yesterday and positive.     2/10 - s/p cath - no pci required - no further chest pain     Review of system- Rest of the review of system are normal except mentioned in HPI    SUBJECTIVE & OBJECTIVE:  No new complaint  ICU Vital Signs Last 24 Hrs  T(C): 36.6, Max: 36.7 (02-07 @ 15:11)  T(F): 97.8, Max: 98 (02-07 @ 15:11)  HR: 64 (60 - 70)  BP: 155/63 (139/66 - 168/68)  BP(mean): --  ABP: --  ABP(mean): --  RR: 18 (10 - 101)  SpO2: 97% (97% - 98%)    MEDICATIONS  (STANDING):  aspirin  chewable 81milliGRAM(s) Oral daily  insulin glargine Injectable (LANTUS) 25Unit(s) SubCutaneous at bedtime  heparin  Injectable 5000Unit(s) SubCutaneous every 8 hours  docusate sodium 100milliGRAM(s) Oral three times a day  insulin lispro (HumaLOG) corrective regimen sliding scale  SubCutaneous three times a day before meals  dextrose 5%. 1000milliLiter(s) IV Continuous <Continuous>  dextrose 50% Injectable 12.5Gram(s) IV Push once  dextrose 50% Injectable 25Gram(s) IV Push once  dextrose 50% Injectable 25Gram(s) IV Push once  isosorbide   mononitrate ER Tablet (IMDUR) 60milliGRAM(s) Oral daily  levothyroxine 75MICROGram(s) Oral daily  hydrALAZINE 25milliGRAM(s) Oral every 8 hours  furosemide    Tablet 20milliGRAM(s) Oral two times a day  guaiFENesin    Syrup 200milliGRAM(s) Oral every 8 hours  carvedilol 25milliGRAM(s) Oral every 12 hours  heparin  Injectable 1500Unit(s) IV Push <User Schedule>  atorvastatin 40milliGRAM(s) Oral at bedtime              EXAM:  NM HEPATOBILIARY IMG                            PROCEDURE DATE:  2017        INTERPRETATION:    RADIOPHARMACEUTICAL:  99mTc-Mebrofenin  DOSE: 3.6 mCi IV    CLINICAL INFORMATION: 87 year old male with sepsis, gallbladder wall   thickening on sonogram referred to evaluate for acute cholecystitis    TECHNIQUE: Dynamic images of the anterior abdomen were obtained for 20   minutes immediately following radiotracer injection. Static images of the   CBC Full  -  ( 2017 05:31 )  WBC Count : 8.7 K/uL  Hemoglobin : 10.6 g/dL  Hematocrit : 32.7 %  Platelet Count - Automated : 210 K/uL  Mean Cell Volume : 89.4 fl  Mean Cell Hemoglobin : 29.1 pg  Mean Cell Hemoglobin Concentration : 32.5 gm/dL  Auto Neutrophil # : x  Auto Lymphocyte # : x  Auto Monocyte # : x  Auto Eosinophil # : x  Auto Basophil # : x  Auto Neutrophil % : x  Auto Lymphocyte % : x  Auto Monocyte % : x  Auto Eosinophil % : x  Auto Basophil % : x    2017 05:31    138    |  99     |  73     ----------------------------<  66     4.4     |  25     |  6.24     Ca    7.7        2017 05:31        abdomen in the anterior, right anterioroblique and right lateral   projections were also obtained.    COMPARISON:No previous hepatobiliary scan for comparison    FINDINGS: There is prompt hepatic uptake of the injected radiotracer. The   gallbladder is first visualized at 20 minutes post tracer injection and   bowel activity by 15 minutes There is normal tracer clearance from the   liver by the end of the study.     IMPRESSION: Normal hepatobiliary scan.     No scan evidence of acute cholecystitis.     IMPRESSION: No acute abdominal pathology. CT bad      XAM:  US COMPLETE ABDOMEN SONOGRAM                            PROCEDURE DATE:  2017        INTERPRETATION:  Exam Date:        Ultrasound of the abdomen         CLINICAL INFORMATION:       Pain.    TECHNIQUE:   Transcutaneous ultrasonography of the abdomen was performed.    FINDINGS:    No previous examinations are available for review.    The liver demonstrates homogeneous echotexture without focal lesion.    Hepatic size and contours are maintained.  The main hepatic and portal   veins appear patent.   No intrahepatic or ductal dilatation is found.     The common duct is not dilated, measuring 0.31 cm.  The gallbladder is   significant for minimal cholelithiasis with minimal wall thickening   measuring 4 mm but no pericholecystic fluid or Balderrama's sign. The   pancreas is obscured by bowel gas.  The spleen size is normal.    The right kidney measures 8.3 cm in length. The left kidney measures 8.7   cm in length.  Bilateral cortical thinning is noted with increased   echogenicity consistent with end-stage renal disease. 6 mm cyst is seen   in the LEFT midpole. It demonstrates no mass, calculus or hydronephrosis.    The abdominal aorta and IVC are obscured by bowel gas..      IMPRESSION: Minimal cholelithiasis with minimal wallthickening measuring   4 mm. There is no Balderrama's sign. These findings are mild however may   represent early or mild cholecystitis and clinically indicated, HIDA scan   can be utilized for further evaluation. End-stage renal disease.    PHYSICAL EXAM:    GENERAL: NAD, well-groomed, well-developed  HEAD:  Atraumatic, Normocephalic  EYES: EOMI, PERRLA, conjunctiva and sclera clear  HEENT: Moist mucous membranes  NECK: Supple, No JVD  NERVOUS SYSTEM:  Alert & Oriented X3, Motor Strength 5/5 B/L upper and lower extremities; DTRs 2+ intact and symmetric  CHEST/LUNG: Clear to auscultation bilaterally; No rales, rhonchi, wheezing, or rubs  HEART: Regular rate and rhythm; No murmurs, rubs, or gallops  ABDOMEN: Soft, Nontender, distended; Bowel sounds present  GENITOURINARY- Voiding, no palpable bladder  EXTREMITIES:  2+ Peripheral Pulses, No clubbing, cyanosis, or edema  MUSCULOSKELETAL No muscle tenderness, Muscle tone normal, No joint tenderness, no Joint swelling, Joint range of motion-normal  SKIN-no rash, no lesion  CNS- alert, oriented X3, non focal       Daily     Daily Weight in k.5 (2017 05:45)

## 2017-02-10 NOTE — PROGRESS NOTE ADULT - SUBJECTIVE AND OBJECTIVE BOX
PCP Dr. Diaz Santos  nephrology Dr. Martínez  cardiology Dr. Castillo    HPI:  87 year old male with past medical history of iddm. htn. cad. chf. ppm, pe, esrd on hd (mwf w/ Dr Lerma) comes with AMS and dizzyness after HD this morning. Patient states that he usually gets dizzyness and slightly out of it after dialysis. As per ED note states with EMS pt was HTN with -200s and mildly tachycardic.  POC blood glucose 158 in field.  No known trauma. (31 Jan 2017 03:51)    JL  2/9: Patient has no new complaints.  Denies any CP or Dyspnea. Seen by cardio yesterday and plan is to do cath kenzie.   2/10: S/P Cardiac Cath today. No chest pain or dyspnea. spoke to . patient got accepted and will be going to [facility] on monday.    Review of system- Rest of the review of system are normal except mentioned in HPI    SUBJECTIVE & OBJECTIVE:   Constitutional: NAD, awake and alert, well-developed  HEENT: PERR, EOMI, Normal Hearing, MMM  Neck: Soft and supple, No LAD, No JVD  Respiratory: Breath sounds are clear bilaterally, No wheezing, rales or rhonchi  Cardiovascular: S1 and S2, regular rate and rhythm, no Murmurs, gallops or rubs  Gastrointestinal: Bowel Sounds present, soft, nontender, nondistended, no guarding, no rebound  Extremities: No peripheral edema  Vascular: 2+ peripheral pulses  Neurological: A/O x 3, no focal deficits  Musculoskeletal: 5/5 strength b/l upper and lower extremities  Skin: No rashes      Lab Results:  CBC  CBC Full  -  ( 10 Feb 2017 14:00 )  WBC Count : 7.6 K/uL  Hemoglobin : 11.2 g/dL  Hematocrit : 34.0 %  Platelet Count - Automated : 272 K/uL  Mean Cell Volume : 88.6 fl  Mean Cell Hemoglobin : 29.2 pg  Mean Cell Hemoglobin Concentration : 32.9 gm/dL  Auto Neutrophil # : x  Auto Lymphocyte # : x  Auto Monocyte # : x  Auto Eosinophil # : x  Auto Basophil # : x  Auto Neutrophil % : x  Auto Lymphocyte % : x  Auto Monocyte % : x  Auto Eosinophil % : x  Auto Basophil % : x    .		Differential:	[] Automated		[] Manual  Chemistry                        11.2   7.6   )-----------( 272      ( 10 Feb 2017 14:00 )             34.0     10 Feb 2017 07:50    135    |  95     |  79     ----------------------------<  100    4.4     |  25     |  6.43     Ca    7.7        10 Feb 2017 07:50      MICROBIOLOGY/CULTURES:  Culture Results:   No growth at 5 days. (02-01 @ 14:44)  Culture Results:   No growth at 5 days. (02-01 @ 14:44)      RADIOLOGY RESULTS:          EXAM:  NM HEPATOBILIARY IMG                          2D ECHOCARDIOGRAM AD 01/31/2017    There is mild thickening of both mitral valve leaflets. The leaflet   opening appears normal.   Moderate (2+) mitral regurgitation is present.   Fibrocalcific changes noted to the aortic valve leaflets with mild   restriction in leaflet excursion. Trace aortic regurgitation is present.   Well seated prosthetic valve in the aortic position. Peak trans-trans   valve gradient is 23.43mmHg, consistent with   Mild aortic stenosis is present.   The tricuspid valve is not well visualized, but is probably normal.   Trace tricuspid valve regurgitation is present.   Pulmonic valve not well seen, probably normal pulmonic valve function.   The left atrium is normal.Borderline septal left ventricular hypertrophy is present. Left ventricle   function is mildly impaired; estimated left ventricle function is 45-50%   The right atrium is normal.   The right ventricle is normal in size, wall thickness, wall motion, and   contractility.   A device wire is seen in the RV Enmanuel.   Trace pericardial effusion cannot be ruled out    PROCEDURE DATE:  02/03/2017        INTERPRETATION:    RADIOPHARMACEUTICAL:  99mTc-Mebrofenin  DOSE: 3.6 mCi IV    CLINICAL INFORMATION: 87 year old male with sepsis, gallbladder wall   thickening on sonogram referred to evaluate for acute cholecystitis    TECHNIQUE: Dynamic images of the anterior abdomen were obtained for 20   minutes immediately following radiotracer injection. Static images of the   abdomen in the anterior, right anterioroblique and right lateral   projections were also obtained.    COMPARISON:No previous hepatobiliary scan for comparison    FINDINGS: There is prompt hepatic uptake of the injected radiotracer. The   gallbladder is first visualized at 20 minutes post tracer injection and   bowel activity by 15 minutes There is normal tracer clearance from the   liver by the end of the study.     IMPRESSION: Normal hepatobiliary scan.     No scan evidence of acute cholecystitis.     IMPRESSION: No acute abdominal pathology. CT bad      EXAM:  US COMPLETE ABDOMEN SONOGRAM                            PROCEDURE DATE:  02/02/2017        INTERPRETATION:  Exam Date:        Ultrasound of the abdomen         CLINICAL INFORMATION:       Pain.    TECHNIQUE:   Transcutaneous ultrasonography of the abdomen was performed.    FINDINGS:    No previous examinations are available for review.    The liver demonstrates homogeneous echotexture without focal lesion.    Hepatic size and contours are maintained.  The main hepatic and portal   veins appear patent.   No intrahepatic or ductal dilatation is found.     The common duct is not dilated, measuring 0.31 cm.  The gallbladder is   significant for minimal cholelithiasis with minimal wall thickening   measuring 4 mm but no pericholecystic fluid or Balderrama's sign. The   pancreas is obscured by bowel gas.  The spleen size is normal.    The right kidney measures 8.3 cm in length. The left kidney measures 8.7   cm in length.  Bilateral cortical thinning is noted with increased   echogenicity consistent with end-stage renal disease. 6 mm cyst is seen   in the LEFT midpole. It demonstrates no mass, calculus or hydronephrosis.    The abdominal aorta and IVC are obscured by bowel gas..      IMPRESSION: Minimal cholelithiasis with minimal wallthickening measuring   4 mm. There is no Balderrama's sign. These findings are mild however may   represent early or mild cholecystitis and clinically indicated, HIDA scan   can be utilized for further evaluation. End-stage renal disease.      stress test: Chemically inducible myocardial ischemia involving the septum,   anterior and lateral walls of the left ventricle, and a partially   reversible defect involving the inferior wall, suggesting a triple-vessel   disease.    2. Global hypokinesia with anejection fraction of 43% and left   ventricular dilatation.

## 2017-02-10 NOTE — PROGRESS NOTE ADULT - ASSESSMENT
87 year old male with PMHx of DM, ESRD on HD presented in ER with SOB post HD 87 year old male with PMHx of DM, ESRD on HD presented in ER with SOB post HD. Abnormal ST   S/P LHC: Non obstructive CAD    A+Ox3  Right femoral cath site with Mynx closure. No bleeding, no hematoma. Pedal pulses palpable

## 2017-02-10 NOTE — PROGRESS NOTE ADULT - ASSESSMENT
86 y/o HM presents with Altered MS with HTN urgency    # ESRD  # Syncope/ Weakness  # Encephalopathy, now resolved  # HTN urgency  # RUQ pain with neg HIDA and CT of abd   # chronic systolic CHF with ICD  # Hypothyroidism    PLAN  - Thus far, all cultures are negative  - BP mpre stable, it appears pt is non compliant with his BP meds at home  - HD today, s/p IV contrast study    2/6 MK  - ESRD: tolerating hd with goal uf of 1.5 kg, epo on hold with restart when below 10.5  - HTN urgency due to medication compliance.  will continue with current regimen  - SYNCOPE/Near SYNCOPE: work- up negative  - no renal barrier for dc    2/7   --ESRD : continue TIW HD  --NEURO status stable  --HTN   BP control fair.  --D/c planning.    2/8  --ESRD : tolerating tx well.  --HTN : Bp control fair.  --NEURO STATUS : stable  --D/C to rehab     2/10  --ESRD :  tolerating tx well.  Aim for 2 KG UF  --Neuro status : stable  --CAD  post cardiac cath --No PCI  Medical mgt.  --D/c planning to rehab.

## 2017-02-10 NOTE — PROGRESS NOTE ADULT - PROBLEM SELECTOR PLAN 1
troponin peaked at .114  nuclear stress test - positive: Chemically inducible myocardial ischemia involving the septum, anterior and lateral walls of the left ventricle, and a partially   reversible defect involving the inferior wall, suggesting a triple-vessel   disease. Global hypokinesia with an ejection fraction of 43% and left   ventricular dilatation.  S/P LHC: Non obstructive CAD  cardio FU appreciated   continue ASA, statin and BB

## 2017-02-11 LAB
ANION GAP SERPL CALC-SCNC: 12 MMOL/L — SIGNIFICANT CHANGE UP (ref 5–17)
BASOPHILS # BLD AUTO: 0.1 K/UL — SIGNIFICANT CHANGE UP (ref 0–0.2)
BASOPHILS NFR BLD AUTO: 0.9 % — SIGNIFICANT CHANGE UP (ref 0–2)
BUN SERPL-MCNC: 53 MG/DL — HIGH (ref 7–23)
CALCIUM SERPL-MCNC: 8.2 MG/DL — LOW (ref 8.5–10.1)
CHLORIDE SERPL-SCNC: 95 MMOL/L — LOW (ref 96–108)
CO2 SERPL-SCNC: 27 MMOL/L — SIGNIFICANT CHANGE UP (ref 22–31)
CREAT SERPL-MCNC: 5.02 MG/DL — HIGH (ref 0.5–1.3)
EOSINOPHIL # BLD AUTO: 0.5 K/UL — SIGNIFICANT CHANGE UP (ref 0–0.5)
EOSINOPHIL NFR BLD AUTO: 5.2 % — SIGNIFICANT CHANGE UP (ref 0–6)
GLUCOSE SERPL-MCNC: 82 MG/DL — SIGNIFICANT CHANGE UP (ref 70–99)
HCT VFR BLD CALC: 37 % — LOW (ref 39–50)
HGB BLD-MCNC: 11.9 G/DL — LOW (ref 13–17)
LYMPHOCYTES # BLD AUTO: 1.4 K/UL — SIGNIFICANT CHANGE UP (ref 1–3.3)
LYMPHOCYTES # BLD AUTO: 14.6 % — SIGNIFICANT CHANGE UP (ref 13–44)
MCHC RBC-ENTMCNC: 28.8 PG — SIGNIFICANT CHANGE UP (ref 27–34)
MCHC RBC-ENTMCNC: 32.1 GM/DL — SIGNIFICANT CHANGE UP (ref 32–36)
MCV RBC AUTO: 89.7 FL — SIGNIFICANT CHANGE UP (ref 80–100)
MONOCYTES # BLD AUTO: 1.4 K/UL — HIGH (ref 0–0.9)
MONOCYTES NFR BLD AUTO: 14.7 % — HIGH (ref 2–14)
NEUTROPHILS # BLD AUTO: 6.2 K/UL — SIGNIFICANT CHANGE UP (ref 1.8–7.4)
NEUTROPHILS NFR BLD AUTO: 64.6 % — SIGNIFICANT CHANGE UP (ref 43–77)
PLATELET # BLD AUTO: 254 K/UL — SIGNIFICANT CHANGE UP (ref 150–400)
POTASSIUM SERPL-MCNC: 4.3 MMOL/L — SIGNIFICANT CHANGE UP (ref 3.5–5.3)
POTASSIUM SERPL-SCNC: 4.3 MMOL/L — SIGNIFICANT CHANGE UP (ref 3.5–5.3)
RBC # BLD: 4.12 M/UL — LOW (ref 4.2–5.8)
RBC # FLD: 13.5 % — SIGNIFICANT CHANGE UP (ref 10.3–14.5)
SODIUM SERPL-SCNC: 134 MMOL/L — LOW (ref 135–145)
WBC # BLD: 9.6 K/UL — SIGNIFICANT CHANGE UP (ref 3.8–10.5)
WBC # FLD AUTO: 9.6 K/UL — SIGNIFICANT CHANGE UP (ref 3.8–10.5)

## 2017-02-11 RX ADMIN — Medication 25 MILLIGRAM(S): at 06:15

## 2017-02-11 RX ADMIN — HEPARIN SODIUM 5000 UNIT(S): 5000 INJECTION INTRAVENOUS; SUBCUTANEOUS at 21:51

## 2017-02-11 RX ADMIN — CARVEDILOL PHOSPHATE 25 MILLIGRAM(S): 80 CAPSULE, EXTENDED RELEASE ORAL at 06:14

## 2017-02-11 RX ADMIN — Medication 200 MILLIGRAM(S): at 21:51

## 2017-02-11 RX ADMIN — HEPARIN SODIUM 5000 UNIT(S): 5000 INJECTION INTRAVENOUS; SUBCUTANEOUS at 06:14

## 2017-02-11 RX ADMIN — Medication 200 MILLIGRAM(S): at 13:45

## 2017-02-11 RX ADMIN — Medication 100 MILLIGRAM(S): at 13:45

## 2017-02-11 RX ADMIN — ATORVASTATIN CALCIUM 40 MILLIGRAM(S): 80 TABLET, FILM COATED ORAL at 21:50

## 2017-02-11 RX ADMIN — Medication 20 MILLIGRAM(S): at 17:02

## 2017-02-11 RX ADMIN — Medication 75 MICROGRAM(S): at 06:14

## 2017-02-11 RX ADMIN — Medication 100 MILLIGRAM(S): at 06:15

## 2017-02-11 RX ADMIN — Medication 25 MILLIGRAM(S): at 13:45

## 2017-02-11 RX ADMIN — Medication 100 MILLIGRAM(S): at 21:50

## 2017-02-11 RX ADMIN — Medication 20 MILLIGRAM(S): at 06:14

## 2017-02-11 RX ADMIN — Medication 81 MILLIGRAM(S): at 12:09

## 2017-02-11 RX ADMIN — Medication 2: at 21:49

## 2017-02-11 RX ADMIN — INSULIN GLARGINE 25 UNIT(S): 100 INJECTION, SOLUTION SUBCUTANEOUS at 21:52

## 2017-02-11 RX ADMIN — CARVEDILOL PHOSPHATE 25 MILLIGRAM(S): 80 CAPSULE, EXTENDED RELEASE ORAL at 17:02

## 2017-02-11 RX ADMIN — Medication 200 MILLIGRAM(S): at 06:15

## 2017-02-11 RX ADMIN — ISOSORBIDE MONONITRATE 60 MILLIGRAM(S): 60 TABLET, EXTENDED RELEASE ORAL at 12:10

## 2017-02-11 RX ADMIN — HEPARIN SODIUM 5000 UNIT(S): 5000 INJECTION INTRAVENOUS; SUBCUTANEOUS at 13:45

## 2017-02-11 NOTE — PROGRESS NOTE ADULT - PROBLEM SELECTOR PLAN 1
troponin peaked at .114  nuclear stress test - positive: Chemically inducible myocardial ischemia involving the septum, anterior and lateral walls of the left ventricle, and a partially   reversible defect involving the inferior wall, suggesting a triple-vessel   disease. Global hypokinesia with an ejection fraction of 43% and left   ventricular dilatation.  S/P LHC: Non obstructive CAD  cardio FU appreciated   continue ASA, statin and BB  ECHO - Left ventricle function is mildly impaired; estimated left ventricle function is 45-50%

## 2017-02-11 NOTE — PROGRESS NOTE ADULT - SUBJECTIVE AND OBJECTIVE BOX
PCP Dr. Diaz Santos  nephrology Dr. Martínez  cardiology Dr. Castillo    HPI:  87 year old male with past medical history of iddm. htn. cad. chf. ppm, pe, esrd on hd (mwf w/ Dr Lerma) comes with AMS and dizzyness after HD this morning. Patient states that he usually gets dizzyness and slightly out of it after dialysis. As per ED note states with EMS pt was HTN with -200s and mildly tachycardic.  POC blood glucose 158 in field.  No known trauma. (31 Jan 2017 03:51)    JL  2/9: Patient has no new complaints.  Denies any CP or Dyspnea. Seen by cardio yesterday and plan is to do cath kenzie.   2/10: S/P Cardiac Cath today. No chest pain or dyspnea. spoke to . patient got accepted and will be going to [facility] on monday.  2/11:  No CP, dyspnea. No complaints.     Review of system- Rest of the review of system are normal except mentioned in HPI    SUBJECTIVE & OBJECTIVE:   Vital Signs Last 24 Hrs  T(C): 36.5, Max: 36.8 (02-10 @ 17:26)  T(F): 97.7, Max: 98.2 (02-10 @ 17:26)  HR: 60 (60 - 66)  BP: 131/40 (110/51 - 133/59)  BP(mean): --  RR: 18 (16 - 60)  SpO2: 99% (99% - 99%)    Constitutional: NAD, awake and alert, well-developed  HEENT: PERR, EOMI, Normal Hearing, MMM  Neck: Soft and supple, No LAD, No JVD  Respiratory: Breath sounds are clear bilaterally, No wheezing, rales or rhonchi  Cardiovascular: S1 and S2, regular rate and rhythm, no Murmurs, gallops or rubs  Gastrointestinal: Bowel Sounds present, soft, nontender, nondistended, no guarding, no rebound  Extremities: No peripheral edema  Vascular: 2+ peripheral pulses  Neurological: A/O x 3, no focal deficits  Musculoskeletal: 5/5 strength b/l upper and lower extremities  Skin: No rashes    Lab Results:  CBC  CBC Full  -  ( 11 Feb 2017 07:22 )  WBC Count : 9.6 K/uL  Hemoglobin : 11.9 g/dL  Hematocrit : 37.0 %  Platelet Count - Automated : 254 K/uL  Mean Cell Volume : 89.7 fl  Mean Cell Hemoglobin : 28.8 pg  Mean Cell Hemoglobin Concentration : 32.1 gm/dL  Auto Neutrophil # : 6.2 K/uL  Auto Lymphocyte # : 1.4 K/uL  Auto Monocyte # : 1.4 K/uL  Auto Eosinophil # : 0.5 K/uL  Auto Basophil # : 0.1 K/uL  Auto Neutrophil % : 64.6 %  Auto Lymphocyte % : 14.6 %  Auto Monocyte % : 14.7 %  Auto Eosinophil % : 5.2 %  Auto Basophil % : 0.9 %    .		Differential:	[] Automated		[] Manual  Chemistry                        11.9   9.6   )-----------( 254      ( 11 Feb 2017 07:22 )             37.0     11 Feb 2017 07:22    134    |  95     |  53     ----------------------------<  82     4.3     |  27     |  5.02     Ca    8.2        11 Feb 2017 07:22        MICROBIOLOGY/CULTURES:  Culture Results:   No growth at 5 days. (02-01 @ 14:44)  Culture Results:   No growth at 5 days. (02-01 @ 14:44)      RADIOLOGY RESULTS:          EXAM:  NM HEPATOBILIARY IMG                          2D ECHOCARDIOGRAM AD 01/31/2017    There is mild thickening of both mitral valve leaflets. The leaflet   opening appears normal.   Moderate (2+) mitral regurgitation is present.   Fibrocalcific changes noted to the aortic valve leaflets with mild   restriction in leaflet excursion. Trace aortic regurgitation is present.   Well seated prosthetic valve in the aortic position. Peak trans-trans   valve gradient is 23.43mmHg, consistent with   Mild aortic stenosis is present.   The tricuspid valve is not well visualized, but is probably normal.   Trace tricuspid valve regurgitation is present.   Pulmonic valve not well seen, probably normal pulmonic valve function.   The left atrium is normal.Borderline septal left ventricular hypertrophy is present. Left ventricle   function is mildly impaired; estimated left ventricle function is 45-50%   The right atrium is normal.   The right ventricle is normal in size, wall thickness, wall motion, and   contractility.   A device wire is seen in the RV Enmanuel.   Trace pericardial effusion cannot be ruled out    PROCEDURE DATE:  02/03/2017        INTERPRETATION:    RADIOPHARMACEUTICAL:  99mTc-Mebrofenin  DOSE: 3.6 mCi IV    CLINICAL INFORMATION: 87 year old male with sepsis, gallbladder wall   thickening on sonogram referred to evaluate for acute cholecystitis    TECHNIQUE: Dynamic images of the anterior abdomen were obtained for 20   minutes immediately following radiotracer injection. Static images of the   abdomen in the anterior, right anterioroblique and right lateral   projections were also obtained.    COMPARISON:No previous hepatobiliary scan for comparison    FINDINGS: There is prompt hepatic uptake of the injected radiotracer. The   gallbladder is first visualized at 20 minutes post tracer injection and   bowel activity by 15 minutes There is normal tracer clearance from the   liver by the end of the study.     IMPRESSION: Normal hepatobiliary scan.     No scan evidence of acute cholecystitis.     IMPRESSION: No acute abdominal pathology. CT bad      EXAM:  US COMPLETE ABDOMEN SONOGRAM                            PROCEDURE DATE:  02/02/2017        INTERPRETATION:  Exam Date:        Ultrasound of the abdomen         CLINICAL INFORMATION:       Pain.    TECHNIQUE:   Transcutaneous ultrasonography of the abdomen was performed.    FINDINGS:    No previous examinations are available for review.    The liver demonstrates homogeneous echotexture without focal lesion.    Hepatic size and contours are maintained.  The main hepatic and portal   veins appear patent.   No intrahepatic or ductal dilatation is found.     The common duct is not dilated, measuring 0.31 cm.  The gallbladder is   significant for minimal cholelithiasis with minimal wall thickening   measuring 4 mm but no pericholecystic fluid or Balderrama's sign. The   pancreas is obscured by bowel gas.  The spleen size is normal.    The right kidney measures 8.3 cm in length. The left kidney measures 8.7   cm in length.  Bilateral cortical thinning is noted with increased   echogenicity consistent with end-stage renal disease. 6 mm cyst is seen   in the LEFT midpole. It demonstrates no mass, calculus or hydronephrosis.    The abdominal aorta and IVC are obscured by bowel gas..      IMPRESSION: Minimal cholelithiasis with minimal wallthickening measuring   4 mm. There is no Balderrama's sign. These findings are mild however may   represent early or mild cholecystitis and clinically indicated, HIDA scan   can be utilized for further evaluation. End-stage renal disease.      stress test: Chemically inducible myocardial ischemia involving the septum,   anterior and lateral walls of the left ventricle, and a partially   reversible defect involving the inferior wall, suggesting a triple-vessel   disease.    2. Global hypokinesia with anejection fraction of 43% and left   ventricular dilatation.

## 2017-02-12 LAB
ANION GAP SERPL CALC-SCNC: 15 MMOL/L — SIGNIFICANT CHANGE UP (ref 5–17)
BUN SERPL-MCNC: 82 MG/DL — HIGH (ref 7–23)
CALCIUM SERPL-MCNC: 7.8 MG/DL — LOW (ref 8.5–10.1)
CHLORIDE SERPL-SCNC: 93 MMOL/L — LOW (ref 96–108)
CO2 SERPL-SCNC: 25 MMOL/L — SIGNIFICANT CHANGE UP (ref 22–31)
CREAT SERPL-MCNC: 6.44 MG/DL — HIGH (ref 0.5–1.3)
GLUCOSE SERPL-MCNC: 80 MG/DL — SIGNIFICANT CHANGE UP (ref 70–99)
POTASSIUM SERPL-MCNC: 4.3 MMOL/L — SIGNIFICANT CHANGE UP (ref 3.5–5.3)
POTASSIUM SERPL-SCNC: 4.3 MMOL/L — SIGNIFICANT CHANGE UP (ref 3.5–5.3)
SODIUM SERPL-SCNC: 133 MMOL/L — LOW (ref 135–145)

## 2017-02-12 RX ADMIN — Medication 25 MILLIGRAM(S): at 05:55

## 2017-02-12 RX ADMIN — HEPARIN SODIUM 5000 UNIT(S): 5000 INJECTION INTRAVENOUS; SUBCUTANEOUS at 14:15

## 2017-02-12 RX ADMIN — Medication 100 MILLIGRAM(S): at 05:56

## 2017-02-12 RX ADMIN — Medication 100 MILLIGRAM(S): at 21:04

## 2017-02-12 RX ADMIN — HEPARIN SODIUM 5000 UNIT(S): 5000 INJECTION INTRAVENOUS; SUBCUTANEOUS at 05:56

## 2017-02-12 RX ADMIN — Medication 200 MILLIGRAM(S): at 05:56

## 2017-02-12 RX ADMIN — Medication 2: at 11:53

## 2017-02-12 RX ADMIN — INSULIN GLARGINE 25 UNIT(S): 100 INJECTION, SOLUTION SUBCUTANEOUS at 21:04

## 2017-02-12 RX ADMIN — CARVEDILOL PHOSPHATE 25 MILLIGRAM(S): 80 CAPSULE, EXTENDED RELEASE ORAL at 17:16

## 2017-02-12 RX ADMIN — Medication 81 MILLIGRAM(S): at 11:53

## 2017-02-12 RX ADMIN — Medication 200 MILLIGRAM(S): at 21:04

## 2017-02-12 RX ADMIN — Medication 20 MILLIGRAM(S): at 05:56

## 2017-02-12 RX ADMIN — Medication 25 MILLIGRAM(S): at 21:04

## 2017-02-12 RX ADMIN — Medication 20 MILLIGRAM(S): at 17:16

## 2017-02-12 RX ADMIN — CARVEDILOL PHOSPHATE 25 MILLIGRAM(S): 80 CAPSULE, EXTENDED RELEASE ORAL at 05:55

## 2017-02-12 RX ADMIN — HEPARIN SODIUM 5000 UNIT(S): 5000 INJECTION INTRAVENOUS; SUBCUTANEOUS at 21:04

## 2017-02-12 RX ADMIN — Medication 75 MICROGRAM(S): at 05:56

## 2017-02-12 RX ADMIN — ATORVASTATIN CALCIUM 40 MILLIGRAM(S): 80 TABLET, FILM COATED ORAL at 21:04

## 2017-02-12 RX ADMIN — Medication 200 MILLIGRAM(S): at 14:16

## 2017-02-12 RX ADMIN — ISOSORBIDE MONONITRATE 60 MILLIGRAM(S): 60 TABLET, EXTENDED RELEASE ORAL at 11:51

## 2017-02-12 RX ADMIN — Medication 100 MILLIGRAM(S): at 14:15

## 2017-02-12 NOTE — PROGRESS NOTE ADULT - SUBJECTIVE AND OBJECTIVE BOX
PCP Dr. Diaz Santos  nephrology Dr. Martínez  cardiology Dr. Castillo    HPI:  87 year old male with past medical history of iddm. htn. cad. chf. ppm, pe, esrd on hd (mwf w/ Dr Lerma) comes with AMS and dizzyness after HD this morning. Patient states that he usually gets dizzyness and slightly out of it after dialysis. As per ED note states with EMS pt was HTN with -200s and mildly tachycardic.  POC blood glucose 158 in field.  No known trauma. (31 Jan 2017 03:51)    JL  2/9: Patient has no new complaints.  Denies any CP or Dyspnea. Seen by cardio yesterday and plan is to do cath kenzie.   2/10: S/P Cardiac Cath today. No chest pain or dyspnea. spoke to . patient got accepted and will be going to [facility] on monday.  2/11:  No CP, dyspnea. No complaints.   2/12: Sleeping. No complaints.     Review of system- Rest of the review of system are normal except mentioned in HPI    SUBJECTIVE & OBJECTIVE:   Vital Signs Last 24 Hrs  T(C): 36.7, Max: 36.7 (02-12 @ 05:52)  T(F): 98, Max: 98 (02-12 @ 05:52)  HR: 61 (60 - 68)  BP: 156/51 (99/68 - 156/51)  BP(mean): --  RR: 17 (17 - 18)  SpO2: 100% (98% - 100%)    Constitutional: NAD, awake and alert, well-developed  HEENT: PERR, EOMI, Normal Hearing, MMM  Neck: Soft and supple, No LAD, No JVD  Respiratory: Breath sounds are clear bilaterally, No wheezing, rales or rhonchi  Cardiovascular: S1 and S2, regular rate and rhythm, no Murmurs, gallops or rubs  Gastrointestinal: Bowel Sounds present, soft, nontender, nondistended, no guarding, no rebound  Extremities: No peripheral edema  Vascular: 2+ peripheral pulses  Neurological: A/O x 3, no focal deficits  Musculoskeletal: 5/5 strength b/l upper and lower extremities  Skin: No rashes    Lab Results:  CBC  CBC Full  -  ( 11 Feb 2017 07:22 )  WBC Count : 9.6 K/uL  Hemoglobin : 11.9 g/dL  Hematocrit : 37.0 %  Platelet Count - Automated : 254 K/uL  Mean Cell Volume : 89.7 fl  Mean Cell Hemoglobin : 28.8 pg  Mean Cell Hemoglobin Concentration : 32.1 gm/dL  Auto Neutrophil # : 6.2 K/uL  Auto Lymphocyte # : 1.4 K/uL  Auto Monocyte # : 1.4 K/uL  Auto Eosinophil # : 0.5 K/uL  Auto Basophil # : 0.1 K/uL  Auto Neutrophil % : 64.6 %  Auto Lymphocyte % : 14.6 %  Auto Monocyte % : 14.7 %  Auto Eosinophil % : 5.2 %  Auto Basophil % : 0.9 %    .		Differential:	[] Automated		[] Manual  Chemistry                        11.9   9.6   )-----------( 254      ( 11 Feb 2017 07:22 )             37.0     12 Feb 2017 05:30    133    |  93     |  82     ----------------------------<  80     4.3     |  25     |  6.44     Ca    7.8        12 Feb 2017 05:30      MICROBIOLOGY/CULTURES:  Culture Results:   No growth at 5 days. (02-01 @ 14:44)  Culture Results:   No growth at 5 days. (02-01 @ 14:44)      RADIOLOGY RESULTS:          EXAM:  NM HEPATOBILIARY IMG                          2D ECHOCARDIOGRAM AD 01/31/2017    There is mild thickening of both mitral valve leaflets. The leaflet   opening appears normal.   Moderate (2+) mitral regurgitation is present.   Fibrocalcific changes noted to the aortic valve leaflets with mild   restriction in leaflet excursion. Trace aortic regurgitation is present.   Well seated prosthetic valve in the aortic position. Peak trans-trans   valve gradient is 23.43mmHg, consistent with   Mild aortic stenosis is present.   The tricuspid valve is not well visualized, but is probably normal.   Trace tricuspid valve regurgitation is present.   Pulmonic valve not well seen, probably normal pulmonic valve function.   The left atrium is normal.Borderline septal left ventricular hypertrophy is present. Left ventricle   function is mildly impaired; estimated left ventricle function is 45-50%   The right atrium is normal.   The right ventricle is normal in size, wall thickness, wall motion, and   contractility.   A device wire is seen in the RV Enmanuel.   Trace pericardial effusion cannot be ruled out    PROCEDURE DATE:  02/03/2017        INTERPRETATION:    RADIOPHARMACEUTICAL:  99mTc-Mebrofenin  DOSE: 3.6 mCi IV    CLINICAL INFORMATION: 87 year old male with sepsis, gallbladder wall   thickening on sonogram referred to evaluate for acute cholecystitis    TECHNIQUE: Dynamic images of the anterior abdomen were obtained for 20   minutes immediately following radiotracer injection. Static images of the   abdomen in the anterior, right anterioroblique and right lateral   projections were also obtained.    COMPARISON:No previous hepatobiliary scan for comparison    FINDINGS: There is prompt hepatic uptake of the injected radiotracer. The   gallbladder is first visualized at 20 minutes post tracer injection and   bowel activity by 15 minutes There is normal tracer clearance from the   liver by the end of the study.     IMPRESSION: Normal hepatobiliary scan.     No scan evidence of acute cholecystitis.     IMPRESSION: No acute abdominal pathology. CT bad      EXAM:  US COMPLETE ABDOMEN SONOGRAM                            PROCEDURE DATE:  02/02/2017        INTERPRETATION:  Exam Date:        Ultrasound of the abdomen         CLINICAL INFORMATION:       Pain.    TECHNIQUE:   Transcutaneous ultrasonography of the abdomen was performed.    FINDINGS:    No previous examinations are available for review.    The liver demonstrates homogeneous echotexture without focal lesion.    Hepatic size and contours are maintained.  The main hepatic and portal   veins appear patent.   No intrahepatic or ductal dilatation is found.     The common duct is not dilated, measuring 0.31 cm.  The gallbladder is   significant for minimal cholelithiasis with minimal wall thickening   measuring 4 mm but no pericholecystic fluid or Balderrama's sign. The   pancreas is obscured by bowel gas.  The spleen size is normal.    The right kidney measures 8.3 cm in length. The left kidney measures 8.7   cm in length.  Bilateral cortical thinning is noted with increased   echogenicity consistent with end-stage renal disease. 6 mm cyst is seen   in the LEFT midpole. It demonstrates no mass, calculus or hydronephrosis.    The abdominal aorta and IVC are obscured by bowel gas..      IMPRESSION: Minimal cholelithiasis with minimal wallthickening measuring   4 mm. There is no Balderrama's sign. These findings are mild however may   represent early or mild cholecystitis and clinically indicated, HIDA scan   can be utilized for further evaluation. End-stage renal disease.      stress test: Chemically inducible myocardial ischemia involving the septum,   anterior and lateral walls of the left ventricle, and a partially   reversible defect involving the inferior wall, suggesting a triple-vessel   disease.    2. Global hypokinesia with anejection fraction of 43% and left   ventricular dilatation.

## 2017-02-12 NOTE — PROGRESS NOTE ADULT - SUBJECTIVE AND OBJECTIVE BOX
NEPHROLOGY INTERVAL HPI/OVERNIGHT EVENTS:    HPI:  87 year old male with past medical history of iddm. htn. cad. chf. ppm, pe, esrd on hd (mwf w/ Dr Lerma) comes with AMS and dizzyness after HD this morning. Patient states that he usually gets dizzyness and slightly out of it after dialysis. As per ED note states with EMS pt was HTN with -200s and mildly tachycardic.  POC blood glucose 158 in field.  No known trauma. (2017 03:51)    2/  pt seen in room today  no c/o   NAD      PAST MEDICAL & SURGICAL HISTORY:  Pulmonary edema  Pancreatitis  AICD (automatic cardioverter/defibrillator) present  CHF (Congestive Heart Failure)  HTN (Hypertension)  High Cholesterol  Heart Attack  Dialysis Patient  Diabetes  AICD (automatic cardioverter/defibrillator) present  History of penile implant  History of Hernia Repair      FAMILY HISTORY:  No pertinent family history in first degree relatives      MEDICATIONS  (STANDING):  aspirin  chewable 81milliGRAM(s) Oral daily  insulin glargine Injectable (LANTUS) 25Unit(s) SubCutaneous at bedtime  heparin  Injectable 5000Unit(s) SubCutaneous every 8 hours  docusate sodium 100milliGRAM(s) Oral three times a day  insulin lispro (HumaLOG) corrective regimen sliding scale  SubCutaneous three times a day before meals  dextrose 5%. 1000milliLiter(s) IV Continuous <Continuous>  dextrose 50% Injectable 12.5Gram(s) IV Push once  dextrose 50% Injectable 25Gram(s) IV Push once  dextrose 50% Injectable 25Gram(s) IV Push once  isosorbide   mononitrate ER Tablet (IMDUR) 60milliGRAM(s) Oral daily  levothyroxine 75MICROGram(s) Oral daily  hydrALAZINE 25milliGRAM(s) Oral every 8 hours  furosemide    Tablet 20milliGRAM(s) Oral two times a day  guaiFENesin    Syrup 200milliGRAM(s) Oral every 8 hours  carvedilol 25milliGRAM(s) Oral every 12 hours  heparin  Injectable 1500Unit(s) IV Push <User Schedule>  atorvastatin 40milliGRAM(s) Oral at bedtime    MEDICATIONS  (PRN):  senna 2Tablet(s) Oral at bedtime PRN Constipation  dextrose Gel 1Dose(s) Oral once PRN Blood Glucose LESS THAN 70 milliGRAM(s)/deciliter  glucagon  Injectable 1milliGRAM(s) IntraMuscular once PRN Glucose LESS THAN 70 milligrams/deciliter  ondansetron Injectable 4milliGRAM(s) IV Push every 6 hours PRN Nausea and/or Vomiting  acetaminophen  Suppository 650milliGRAM(s) Rectal every 6 hours PRN For Temp greater than 38 C (100.4 F)      Allergies    No Known Allergies    Intolerances        I&O's Summary  I & Os for 24h ending 2017 07:00  =============================================  IN: 720 ml / OUT: 0 ml / NET: 720 ml    I & Os for current day (as of 2017 17:34)  =============================================  IN: 680 ml / OUT: 0 ml / NET: 680 ml        REVIEW OF SYSTEMS:    CONSTITUTIONAL:  As per HPI.    HEENT:  Eyes:  No diplopia or blurred vision. ENT:  No earache, sore throat or runny nose.    CARDIOVASCULAR:  No pressure, squeezing, strangling, tightness, heaviness or aching about the chest, neck, axilla or epigastrium.    RESPIRATORY:  No cough, shortness of breath, PND or orthopnea.    GASTROINTESTINAL:  No nausea, vomiting or diarrhea.    GENITOURINARY:  No dysuria, frequency or urgency.    MUSCULOSKELETAL:  As per HPI.    SKIN:  No change in skin, hair or nails.    NEUROLOGIC:  No paresthesias, fasciculations, seizures or weakness.    PSYCHIATRIC:  No disorder of thought or mood.    ENDOCRINE:  No heat or cold intolerance, polyuria or polydipsia.    HEMATOLOGICAL:  No easy bruising or bleeding.    Patient was seen and evaluated on dialysis.   Patient is tolerating the procedure well.   Continue dialysis:   Dialyzer:          QB:        QD:   Goal UF ___ over ___ Hours       Vital Signs Last 24 Hrs  T(C): 36.4, Max: 36.7 ( @ 05:52)  T(F): 97.6, Max: 98 ( @ 05:52)  HR: 60 (60 - 68)  BP: 116/46 (99/68 - 156/51)  BP(mean): --  RR: 18 (17 - 18)  SpO2: 99% (99% - 100%)  Daily     Daily Weight in k.6 (2017 02:43)    PHYSICAL EXAM:    General:  Alert, well-developed ,No acute distress.    Neuro:  Alert and oriented to person, place, and time. Able to communicate  well. Cranial nerves 2-12 grossly intact. 5/5 strength in all  extremities bilaterally. Sensation intact in all extremities.  Appropriate affect.     HEENT:  No JVD, no masses, Eyes anicteric, No carotid bruits.No lymphadenopathy,    Cardiovascular:  Regular rate and rhythm, with normal S1 and S2. No murmurs, rubs,  or gallops. No JVD.    Lungs:  Lungs clear. no rales, no wheezing, .    Abdomen:  Normoactive bowel sounds. Soft, flat, non-tender, and non-distended.  No hepatosplenomegaly, positive bowel sounds    Skin:  Warm, dry, well-perfused. No rashes or other lesions.     Extremities:  2+ pulses in upper and lower extremities. No lower extremity pain or  edema; legs are symmetric in appearance.    LABS:                        11.9   9.6   )-----------( 254      ( 2017 07:22 )             37.0     2017 05:30    133    |  93     |  82     ----------------------------<  80     4.3     |  25     |  6.44     Ca    7.8        2017 05:30

## 2017-02-12 NOTE — PROGRESS NOTE ADULT - ASSESSMENT
87 year old male with PMH of  DM2 on insulin, HTN, CAD, CHF, s/p CRT-D, PE, ESRD on HD  (mwyuan w/ Dr Lerma)  admitted on 1/30/17 with AMS and dizziness after HD this morning. Patient states that he usually gets dizziness and slightly out of it after dialysis.   Troponions peaked at .114...nuclear stress test - positive: Chemically inducible myocardial ischemia involving the septum, anterior and lateral walls of the left ventricle, and a partially   reversible defect involving the inferior wall, suggesting a triple-vessel   disease. Global hypokinesia with an ejection fraction of 43% and left   ventricular dilatation.  S/P LHC: Non obstructive CAD  continue ASA, statin and BB  ECHO - Left ventricle function is mildly impaired; estimated left ventricle function is 45-50%.     Pt is tolerating his dialysis session

## 2017-02-13 VITALS
OXYGEN SATURATION: 98 % | SYSTOLIC BLOOD PRESSURE: 131 MMHG | DIASTOLIC BLOOD PRESSURE: 58 MMHG | TEMPERATURE: 98 F | RESPIRATION RATE: 16 BRPM | HEART RATE: 76 BPM

## 2017-02-13 LAB
ANION GAP SERPL CALC-SCNC: 17 MMOL/L — SIGNIFICANT CHANGE UP (ref 5–17)
BUN SERPL-MCNC: 108 MG/DL — HIGH (ref 7–23)
CALCIUM SERPL-MCNC: 7.6 MG/DL — LOW (ref 8.5–10.1)
CHLORIDE SERPL-SCNC: 94 MMOL/L — LOW (ref 96–108)
CO2 SERPL-SCNC: 20 MMOL/L — LOW (ref 22–31)
CREAT SERPL-MCNC: 7.98 MG/DL — HIGH (ref 0.5–1.3)
GLUCOSE SERPL-MCNC: 134 MG/DL — HIGH (ref 70–99)
HCT VFR BLD CALC: 29.3 % — LOW (ref 39–50)
HGB BLD-MCNC: 10.7 G/DL — LOW (ref 13–17)
MCHC RBC-ENTMCNC: 32 PG — SIGNIFICANT CHANGE UP (ref 27–34)
MCHC RBC-ENTMCNC: 36.6 GM/DL — HIGH (ref 32–36)
MCV RBC AUTO: 87.3 FL — SIGNIFICANT CHANGE UP (ref 80–100)
PLATELET # BLD AUTO: 248 K/UL — SIGNIFICANT CHANGE UP (ref 150–400)
POTASSIUM SERPL-MCNC: 4.6 MMOL/L — SIGNIFICANT CHANGE UP (ref 3.5–5.3)
POTASSIUM SERPL-SCNC: 4.6 MMOL/L — SIGNIFICANT CHANGE UP (ref 3.5–5.3)
RBC # BLD: 3.35 M/UL — LOW (ref 4.2–5.8)
RBC # FLD: 13.6 % — SIGNIFICANT CHANGE UP (ref 10.3–14.5)
SODIUM SERPL-SCNC: 131 MMOL/L — LOW (ref 135–145)
WBC # BLD: 10.5 K/UL — SIGNIFICANT CHANGE UP (ref 3.8–10.5)
WBC # FLD AUTO: 10.5 K/UL — SIGNIFICANT CHANGE UP (ref 3.8–10.5)

## 2017-02-13 RX ORDER — ERYTHROPOIETIN 10000 [IU]/ML
1000 INJECTION, SOLUTION INTRAVENOUS; SUBCUTANEOUS EVERY OTHER DAY
Qty: 0 | Refills: 0 | Status: COMPLETED | OUTPATIENT
Start: 2017-02-13 | End: 2017-02-13

## 2017-02-13 RX ORDER — DOCUSATE SODIUM 100 MG
1 CAPSULE ORAL
Qty: 0 | Refills: 0 | COMMUNITY
Start: 2017-02-13

## 2017-02-13 RX ORDER — INSULIN LISPRO 100/ML
0 VIAL (ML) SUBCUTANEOUS
Qty: 0 | Refills: 0 | DISCHARGE
Start: 2017-02-13

## 2017-02-13 RX ORDER — DOCUSATE SODIUM 100 MG
1 CAPSULE ORAL
Qty: 0 | Refills: 0 | DISCHARGE
Start: 2017-02-13

## 2017-02-13 RX ADMIN — Medication 81 MILLIGRAM(S): at 12:00

## 2017-02-13 RX ADMIN — Medication 20 MILLIGRAM(S): at 17:35

## 2017-02-13 RX ADMIN — Medication 1: at 17:33

## 2017-02-13 RX ADMIN — Medication 200 MILLIGRAM(S): at 13:13

## 2017-02-13 RX ADMIN — CARVEDILOL PHOSPHATE 25 MILLIGRAM(S): 80 CAPSULE, EXTENDED RELEASE ORAL at 06:11

## 2017-02-13 RX ADMIN — HEPARIN SODIUM 5000 UNIT(S): 5000 INJECTION INTRAVENOUS; SUBCUTANEOUS at 13:13

## 2017-02-13 RX ADMIN — Medication 20 MILLIGRAM(S): at 06:13

## 2017-02-13 RX ADMIN — Medication 200 MILLIGRAM(S): at 06:11

## 2017-02-13 RX ADMIN — Medication 75 MICROGRAM(S): at 06:12

## 2017-02-13 RX ADMIN — Medication 25 MILLIGRAM(S): at 06:11

## 2017-02-13 RX ADMIN — Medication 25 MILLIGRAM(S): at 17:37

## 2017-02-13 RX ADMIN — Medication 100 MILLIGRAM(S): at 06:12

## 2017-02-13 RX ADMIN — CARVEDILOL PHOSPHATE 25 MILLIGRAM(S): 80 CAPSULE, EXTENDED RELEASE ORAL at 17:33

## 2017-02-13 RX ADMIN — ERYTHROPOIETIN 1000 UNIT(S): 10000 INJECTION, SOLUTION INTRAVENOUS; SUBCUTANEOUS at 10:24

## 2017-02-13 RX ADMIN — HEPARIN SODIUM 5000 UNIT(S): 5000 INJECTION INTRAVENOUS; SUBCUTANEOUS at 06:12

## 2017-02-13 RX ADMIN — ISOSORBIDE MONONITRATE 60 MILLIGRAM(S): 60 TABLET, EXTENDED RELEASE ORAL at 12:01

## 2017-02-13 NOTE — PROGRESS NOTE ADULT - ASSESSMENT
87 year old male with PMH of  DM2 on insulin, HTN, CAD, CHF, s/p CRT-D, PE, ESRD on HD  (f w/ Dr Lerma)  admitted on 1/30/17 with AMS and dizziness after HD this morning. Patient states that he usually gets dizziness and slightly out of it after dialysis.     As per ED note states with EMS pt was HTN with -200s and mildly tachycardic.  POC blood glucose 158 in field.  No known trauma.     DISCHARGE INSTRUCTIONS/FOLLOW UP/PENDING LABS:  -d/c patient to [facility]  -continue with dialysis, [facility] renal Dr Lerma  -[facility] with cardioology in 1 week, Dr Hutchins  - LEEROY with Dr Perales in 1 week    message left for Dr Perales  D/C Summary Faxed  Total time: 45 minutes

## 2017-02-13 NOTE — PROGRESS NOTE ADULT - PROBLEM SELECTOR PROBLEM 1
Elevated troponin
Syncope, unspecified syncope type
ESRD (end stage renal disease)
Elevated troponin

## 2017-02-13 NOTE — PROGRESS NOTE ADULT - PROBLEM SELECTOR PROBLEM 3
Encephalopathy
Hypertensive urgency
Syncope, unspecified syncope type

## 2017-02-13 NOTE — PROGRESS NOTE ADULT - ASSESSMENT
86 y/o HM presents with Altered MS with HTN urgency    # ESRD  # Syncope/ Weakness  # Encephalopathy, now resolved  # HTN urgency  # RUQ pain with neg HIDA and CT of abd   # chronic systolic CHF with ICD  # Hypothyroidism  # s/p cath: non obstructive CAD    PLAN  - tolerating hd to 1 kg on 2k  - epo with HD  - no renal barrier for dc

## 2017-02-13 NOTE — PROGRESS NOTE ADULT - PROBLEM SELECTOR PROBLEM 5
Chronic systolic congestive heart failure
ESRD (end stage renal disease)
R/O RUQ pain

## 2017-02-13 NOTE — PROGRESS NOTE ADULT - PROBLEM SELECTOR PLAN 4
abd sono - possible acute cholecystitis   HIDA scan  - negative  CT abd with IV constrast - negative  likely gastroenteritis vs bloating  surgery consult d/w Dr. Holcomb   s/p IV zosyn
abd sono - possible acute cholecystitis   HIDA scan  - pending  CT abd with IV constrast, pt will have HD kenzie, d/w Dr. Martínez  surgery consult d/w Dr. Aicha landa
abd sono - to r/o cholecystitis  can not do HIDA scan due to ESRD  check hepatic function, ammonia and lipase in am,
resolved  -continue to monitor
HD as per renal team

## 2017-02-13 NOTE — PROGRESS NOTE ADULT - PROBLEM SELECTOR PLAN 5
HD as per renal team
abd sono - possible acute cholecystitis  HIDA scan  - negative  CT abd with IV constrast - negative  likely gastroenteritis vs bloating  surgery consult appreciated with Dr. Holcomb   s/p IV zosyn
abd sono - possible acute cholecystitis  HIDA scan  - negative  CT abd with IV contrast - negative  likely gastroenteritis vs bloating  surgery consult appreciated with Dr. Holcomb   s/p IV zosyn
c/w lasix

## 2017-02-13 NOTE — DISCHARGE NOTE ADULT - MEDICATION SUMMARY - MEDICATIONS TO STOP TAKING
I will STOP taking the medications listed below when I get home from the hospital:    novolog 25/75 units   --   2 times a day

## 2017-02-13 NOTE — PROGRESS NOTE ADULT - PROBLEM SELECTOR PROBLEM 7
Type 2 diabetes mellitus with diabetic chronic kidney disease, unspecified CKD stage, unspecified long term insulin use status
Chronic systolic congestive heart failure
Type 2 diabetes mellitus with diabetic chronic kidney disease, unspecified CKD stage, unspecified long term insulin use status

## 2017-02-13 NOTE — PROGRESS NOTE ADULT - PROBLEM SELECTOR PLAN 6
c/w lasix  ICD-D - interogated
HD as per renal team
HD as per renal team  renal consult appreciated
HD as per renal team.  Dialyzed today
c/w lasix  ICD-D - interogated
c/w lantus  ISS  monitor FBG

## 2017-02-13 NOTE — DISCHARGE NOTE ADULT - ADDITIONAL INSTRUCTIONS
DISCHARGE INSTRUCTIONS/FOLLOW UP/PENDING LABS:  -d/c patient to [facility]  -continue with dialysis, [facility] renal Dr Lerma  -[facility] with cardioology in 1 week, Dr Hutchins  - [facility] with Dr Perales in 1 week

## 2017-02-13 NOTE — DISCHARGE NOTE ADULT - OTHER SIGNIFICANT FINDINGS
10.7   10.5  )-----------( 248      ( 13 Feb 2017 07:35 )             29.3     13 Feb 2017 07:35    131    |  94     |  108    ----------------------------<  134    4.6     |  20     |  7.98     Ca    7.6        13 Feb 2017 07:35  MICROBIOLOGY/CULTURES:  Culture Results:   No growth at 5 days. (02-01 @ 14:44)  Culture Results:   No growth at 5 days. (02-01 @ 14:44)  RADIOLOGY RESULTS:        EXAM:  NM HEPATOBILIARY IMG                        2D ECHOCARDIOGRAM AD 01/31/2017    There is mild thickening of both mitral valve leaflets. The leaflet   opening appears normal.   Moderate (2+) mitral regurgitation is present.   Fibrocalcific changes noted to the aortic valve leaflets with mild   restriction in leaflet excursion. Trace aortic regurgitation is present.   Well seated prosthetic valve in the aortic position. Peak trans-trans   valve gradient is 23.43mmHg, consistent with   Mild aortic stenosis is present.   The tricuspid valve is not well visualized, but is probably normal.   Trace tricuspid valve regurgitation is present.   Pulmonic valve not well seen, probably normal pulmonic valve function.   The left atrium is normal.Borderline septal left ventricular hypertrophy is present. Left ventricle   function is mildly impaired; estimated left ventricle function is 45-50%   The right atrium is normal.   The right ventricle is normal in size, wall thickness, wall motion, and   contractility.   A device wire is seen in the RV Enmanuel.   Trace pericardial effusion cannot be ruled out    PROCEDURE DATE:  02/03/2017    INTERPRETATION:    RADIOPHARMACEUTICAL:  99mTc-Mebrofenin  DOSE: 3.6 mCi IV  CLINICAL INFORMATION: 87 year old male with sepsis, gallbladder wall   thickening on sonogram referred to evaluate for acute cholecystitis  TECHNIQUE: Dynamic images of the anterior abdomen were obtained for 20   minutes immediately following radiotracer injection. Static images of the   abdomen in the anterior, right anterioroblique and right lateral   projections were also obtained.  COMPARISON:No previous hepatobiliary scan for comparison  FINDINGS: There is prompt hepatic uptake of the injected radiotracer. The   gallbladder is first visualized at 20 minutes post tracer injection and   bowel activity by 15 minutes There is normal tracer clearance from the   liver by the end of the study.   IMPRESSION: Normal hepatobiliary scan.   No scan evidence of acute cholecystitis.   IMPRESSION: No acute abdominal pathology. CT bad  EXAM:  US COMPLETE ABDOMEN SONOGRAM                        INTERPRETATION:  Exam Date:      Ultrasound of the abdomen       CLINICAL INFORMATION:       Pain.  TECHNIQUE:   Transcutaneous ultrasonography of the abdomen was performed.  FINDINGS:    No previous examinations are available for review.  The liver demonstrates homogeneous echotexture without focal lesion.    epatic size and contours are maintained.  The main hepatic and portal   veins appear patent.   No intrahepatic or ductal dilatation is found.     The common duct is not dilated, measuring 0.31 cm.  The gallbladder is   significant for minimal cholelithiasis with minimal wall thickening   measuring 4 mm but no pericholecystic fluid or Balderrama's sign. The   pancreas is obscured by bowel gas.  The spleen size is normal.    The right kidney measures 8.3 cm in length. The left kidney measures 8.7   cm in length.  Bilateral cortical thinning is noted with increased   echogenicity consistent with end-stage renal disease. 6 mm cyst is seen   in the LEFT midpole. It demonstrates no mass, calculus or hydronephrosis.  The abdominal aorta and IVC are obscured by bowel gas..  IMPRESSION: Minimal cholelithiasis with minimal wallthickening measuring   4 mm. There is no Balderrama's sign. These findings are mild however may   represent early or mild cholecystitis and clinically indicated, HIDA scan   can be utilized for further evaluation. End-stage renal disease.  tress test: Chemically inducible myocardial ischemia involving the septum,   anterior and lateral walls of the left ventricle, and a partially   reversible defect involving the inferior wall, suggesting a triple-vessel   disease.  2. Global hypokinesia with anejection fraction of 43% and left   ventricular dilatation.

## 2017-02-13 NOTE — PROGRESS NOTE ADULT - PROBLEM/PLAN-8
DISPLAY PLAN FREE TEXT

## 2017-02-13 NOTE — DISCHARGE NOTE ADULT - PATIENT PORTAL LINK FT
“You can access the [facility] Patient Portal, offered by [facility], by registering with the following website: [email or web address]”

## 2017-02-13 NOTE — DISCHARGE NOTE ADULT - REASON FOR ADMISSION
Patient is a 87y old  Male who presents with a chief complaint of altered mental status after dialysis (01 Feb 2017 01:59)

## 2017-02-13 NOTE — DISCHARGE NOTE ADULT - PLAN OF CARE
Elevated troponin.  Plan: troponin peaked at .114; nuclear stress test - positive: S/P LHC: Non obstructive CAD; continue ASA, statin and BB Syncope, unspecified syncope type. unable to do MRI due to ICD.  stable dueing hospital stay AICD interrogated Hypertensive urgency. c/w coreg 25 bid  hydralazine 25 TID Chronic systolic congestive heart failure c/w lasix  ICD-D - interogated.

## 2017-02-13 NOTE — PROGRESS NOTE ADULT - PROVIDER SPECIALTY LIST ADULT
Cardiology
Family Medicine
Hospitalist
Infectious Disease
Nephrology
Cardiology

## 2017-02-13 NOTE — PROGRESS NOTE ADULT - PROBLEM SELECTOR PLAN 8
c/w levothyroxine 75  check TSH
c/w lantus  ISS  monitor FBG
c/w levothyroxine 75  check TSH

## 2017-02-13 NOTE — DISCHARGE NOTE ADULT - MEDICATION SUMMARY - MEDICATIONS TO TAKE
I will START or STAY ON the medications listed below when I get home from the hospital:    carvedilol 25 mg oral tablet  --   2 times a day   -- Indication: For CAD    hydrALAZINE 25 mg oral tablet  --   3 times a day   -- Indication: For HTN    Lasix 40 mg oral tablet  --   2 times a day   -- Indication: For CHF (Congestive Heart Failure)    aspirin 81 mg oral tablet  --   once a day   -- Indication: For CAD    lantus 25 units  --   once a day   -- Indication: For DM    isosorbide mononitrate 60 mg oral tablet, extended release  --  by mouth   -- Indication: For HTN    insulin lispro 100 units/mL subcutaneous solution  -- unit(s) subcutaneous prn ; 1 Unit(s) if Glucose 151 - 200  2 Unit(s) if Glucose 201 - 250  3 Unit(s) if Glucose 251 - 300  4 Unit(s) if Glucose 301 - 350  5 Unit(s) if Glucose 351 - 400  6 Unit(s) if Glucose Greater Than 400  -- Indication: For DM    atorvastatin 40 mg oral tablet  -- 1 tab(s) by mouth once a day  -- Indication: For Hyperlipidemia    docusate sodium 100 mg oral capsule  -- 1 cap(s) by mouth 3 times a day  -- Indication: For Constipation    levothyroxine 75 mcg (0.075 mg) oral capsule  -- 1 cap(s) by mouth once a day  -- Indication: For Hypothyroidism

## 2017-02-13 NOTE — PROGRESS NOTE ADULT - PROBLEM SELECTOR PROBLEM 4
ESRD (end stage renal disease)
R/O RUQ pain
Encephalopathy
R/O RUQ pain

## 2017-02-13 NOTE — PROGRESS NOTE ADULT - PROBLEM SELECTOR PROBLEM 8
Acquired hypothyroidism
Type 2 diabetes mellitus with diabetic chronic kidney disease, unspecified CKD stage, unspecified long term insulin use status

## 2017-02-13 NOTE — DISCHARGE NOTE ADULT - CARE PROVIDER_API CALL
Maliha Perales), Medicine  [address] [facility] [address]  [place] [address]  Phone: [phone]  Fax: [phone]    Natanael Castillo), Cardiology; Internal Medicine  [address] [place] [address]  Phone: [phone]  Fax: [phone]    Mayra Lerma), Internal Medicine; Nephrology  [address] [place] [address]  Phone: [phone]  Fax: [phone]

## 2017-02-13 NOTE — PROGRESS NOTE ADULT - PROBLEM SELECTOR PLAN 3
improving
improving  likely related to uncontrolled HTN, ESRD  check B12, TSH
resolved
resolved
resolved, now BP overcontrolled  c/w coreg 25 bid  hydralazine 25 TID  - orthostatics
2/2 Stable now. continue to monitor.
improving  likely related to uncontrolled HTN, ESRD

## 2017-02-13 NOTE — PROGRESS NOTE ADULT - PROBLEM SELECTOR PLAN 9
c/w levothyroxine 75
c/w levothyroxine 75  check TSH

## 2017-02-13 NOTE — DISCHARGE NOTE ADULT - CARE PLAN
Principal Discharge DX:	Elevated troponin  Goal:	Elevated troponin.  Plan: troponin peaked at .114; nuclear stress test - positive:  Instructions for follow-up, activity and diet:	S/P LHC: Non obstructive CAD; continue ASA, statin and BB  Goal:	Syncope, unspecified syncope type.  Instructions for follow-up, activity and diet:	unable to do MRI due to ICD.  stable dueing hospital stay AICD interrogated  Goal:	Hypertensive urgency.  Instructions for follow-up, activity and diet:	c/w coreg 25 bid  hydralazine 25 TID  Goal:	Chronic systolic congestive heart failure  Instructions for follow-up, activity and diet:	c/w lasix  ICD-D - interogated.

## 2017-02-13 NOTE — PROGRESS NOTE ADULT - PROBLEM SELECTOR PLAN 2
resolved, now BP overcontrolled  c/w coreg 12.5 bid   hydralazine 25 TID  - orthostatics
CT head - neg  unlikely meningitis observe off abx  neurology input appreciated  unable to do MRI due to ICD
c/w coreg 25 bid  increase hydralazine 25 TID--> 50 TID  - orthostatics
resolved, now BP overcontrolled  c/w coreg 12.5 bid   hydralazine 25 TID  - orthostatics
resolved, now BP overcontrolled  c/w coreg 25 bid   hydralazine 25 TID  - orthostatics
2/2 BP labile,  now remains below .   Will decrease Hydralazine dose.
c/w coreg 25 bid  increase hydralazine 25 TID--> 50 TID  - orthostatics

## 2017-02-13 NOTE — DISCHARGE NOTE ADULT - CARE PROVIDERS DIRECT ADDRESSES
,DirectAddress_Unknown,[email or web address],DirectAddress_Unknown,DirectAddress_Unknown

## 2017-02-13 NOTE — PROGRESS NOTE ADULT - PROBLEM SELECTOR PROBLEM 9
Acquired hypothyroidism

## 2017-02-13 NOTE — PROGRESS NOTE ADULT - SUBJECTIVE AND OBJECTIVE BOX
PCP Dr. Diaz Santos  nephrology Dr. Martínez  cardiology Dr. Castillo    HPI:  87 year old male with past medical history of iddm. htn. cad. chf. ppm, pe, esrd on hd (mwf w/ Dr Lerma) comes with AMS and dizzyness after HD this morning. Patient states that he usually gets dizzyness and slightly out of it after dialysis. As per ED note states with EMS pt was HTN with -200s and mildly tachycardic.  POC blood glucose 158 in field.  No known trauma. (31 Jan 2017 03:51)    JL  2/9: Patient has no new complaints.  Denies any CP or Dyspnea. Seen by cardio yesterday and plan is to do cath kenzie.   2/10: S/P Cardiac Cath today. No chest pain or dyspnea. spoke to . patient got accepted and will be going to [facility] on monday.  2/11:  No CP, dyspnea. No complaints.   2/12: Sleeping. No complaints.   2/13:  patient has no complaints. going to [facility] today    Review of system- Rest of the review of system are normal except mentioned in HPI    SUBJECTIVE & OBJECTIVE:   Vital Signs Last 24 Hrs  T(C): 36.6, Max: 36.8 (02-13 @ 07:04)  T(F): 97.9, Max: 98.3 (02-13 @ 07:04)  HR: 63 (59 - 65)  BP: 164/49 (100/42 - 164/49)  BP(mean): --  RR: 16 (16 - 18)  SpO2: 99% (99% - 100%)    Constitutional: NAD, awake and alert, well-developed  HEENT: PERR, EOMI, Normal Hearing, MMM  Neck: Soft and supple, No LAD, No JVD  Respiratory: Breath sounds are clear bilaterally, No wheezing, rales or rhonchi  Cardiovascular: S1 and S2, regular rate and rhythm, no Murmurs, gallops or rubs  Gastrointestinal: Bowel Sounds present, soft, nontender, nondistended, no guarding, no rebound  Extremities: No peripheral edema  Vascular: 2+ peripheral pulses  Neurological: A/O x 3, no focal deficits  Musculoskeletal: 5/5 strength b/l upper and lower extremities  Skin: No rashes    Lab Results:  CBC  CBC Full  -  ( 13 Feb 2017 07:35 )  WBC Count : 10.5 K/uL  Hemoglobin : 10.7 g/dL  Hematocrit : 29.3 %  Platelet Count - Automated : 248 K/uL  Mean Cell Volume : 87.3 fl  Mean Cell Hemoglobin : 32.0 pg  Mean Cell Hemoglobin Concentration : 36.6 gm/dL  Auto Neutrophil # : x  Auto Lymphocyte # : x  Auto Monocyte # : x  Auto Eosinophil # : x  Auto Basophil # : x  Auto Neutrophil % : x  Auto Lymphocyte % : x  Auto Monocyte % : x  Auto Eosinophil % : x  Auto Basophil % : x    .		Differential:	[] Automated		[] Manual  Chemistry                        10.7   10.5  )-----------( 248      ( 13 Feb 2017 07:35 )             29.3     13 Feb 2017 07:35    131    |  94     |  108    ----------------------------<  134    4.6     |  20     |  7.98     Ca    7.6        13 Feb 2017 07:35      MICROBIOLOGY/CULTURES:  Culture Results:   No growth at 5 days. (02-01 @ 14:44)  Culture Results:   No growth at 5 days. (02-01 @ 14:44)      RADIOLOGY RESULTS:          EXAM:  NM HEPATOBILIARY IMG                          2D ECHOCARDIOGRAM AD 01/31/2017    There is mild thickening of both mitral valve leaflets. The leaflet   opening appears normal.   Moderate (2+) mitral regurgitation is present.   Fibrocalcific changes noted to the aortic valve leaflets with mild   restriction in leaflet excursion. Trace aortic regurgitation is present.   Well seated prosthetic valve in the aortic position. Peak trans-trans   valve gradient is 23.43mmHg, consistent with   Mild aortic stenosis is present.   The tricuspid valve is not well visualized, but is probably normal.   Trace tricuspid valve regurgitation is present.   Pulmonic valve not well seen, probably normal pulmonic valve function.   The left atrium is normal.Borderline septal left ventricular hypertrophy is present. Left ventricle   function is mildly impaired; estimated left ventricle function is 45-50%   The right atrium is normal.   The right ventricle is normal in size, wall thickness, wall motion, and   contractility.   A device wire is seen in the RV Enmanuel.   Trace pericardial effusion cannot be ruled out    PROCEDURE DATE:  02/03/2017        INTERPRETATION:    RADIOPHARMACEUTICAL:  99mTc-Mebrofenin  DOSE: 3.6 mCi IV    CLINICAL INFORMATION: 87 year old male with sepsis, gallbladder wall   thickening on sonogram referred to evaluate for acute cholecystitis    TECHNIQUE: Dynamic images of the anterior abdomen were obtained for 20   minutes immediately following radiotracer injection. Static images of the   abdomen in the anterior, right anterioroblique and right lateral   projections were also obtained.    COMPARISON:No previous hepatobiliary scan for comparison    FINDINGS: There is prompt hepatic uptake of the injected radiotracer. The   gallbladder is first visualized at 20 minutes post tracer injection and   bowel activity by 15 minutes There is normal tracer clearance from the   liver by the end of the study.     IMPRESSION: Normal hepatobiliary scan.     No scan evidence of acute cholecystitis.     IMPRESSION: No acute abdominal pathology. CT bad      EXAM:  US COMPLETE ABDOMEN SONOGRAM                            PROCEDURE DATE:  02/02/2017        INTERPRETATION:  Exam Date:        Ultrasound of the abdomen         CLINICAL INFORMATION:       Pain.    TECHNIQUE:   Transcutaneous ultrasonography of the abdomen was performed.    FINDINGS:    No previous examinations are available for review.    The liver demonstrates homogeneous echotexture without focal lesion.    Hepatic size and contours are maintained.  The main hepatic and portal   veins appear patent.   No intrahepatic or ductal dilatation is found.     The common duct is not dilated, measuring 0.31 cm.  The gallbladder is   significant for minimal cholelithiasis with minimal wall thickening   measuring 4 mm but no pericholecystic fluid or Balderrama's sign. The   pancreas is obscured by bowel gas.  The spleen size is normal.    The right kidney measures 8.3 cm in length. The left kidney measures 8.7   cm in length.  Bilateral cortical thinning is noted with increased   echogenicity consistent with end-stage renal disease. 6 mm cyst is seen   in the LEFT midpole. It demonstrates no mass, calculus or hydronephrosis.    The abdominal aorta and IVC are obscured by bowel gas..      IMPRESSION: Minimal cholelithiasis with minimal wallthickening measuring   4 mm. There is no Balderrama's sign. These findings are mild however may   represent early or mild cholecystitis and clinically indicated, HIDA scan   can be utilized for further evaluation. End-stage renal disease.      stress test: Chemically inducible myocardial ischemia involving the septum,   anterior and lateral walls of the left ventricle, and a partially   reversible defect involving the inferior wall, suggesting a triple-vessel   disease.    2. Global hypokinesia with anejection fraction of 43% and left   ventricular dilatation.

## 2017-02-13 NOTE — PROGRESS NOTE ADULT - PROBLEM SELECTOR PLAN 7
c/w lantus  ISS  monitor FBG
c/w lasix  ICD-D - interogated
c/w lantus  ISS  monitor FBG

## 2017-02-13 NOTE — PROGRESS NOTE ADULT - SUBJECTIVE AND OBJECTIVE BOX
NEPHROLOGY INTERVAL HPI/OVERNIGHT EVENTS:  no c/o, discharge plan in progress      ROS: all other negative    MEDICATIONS  (STANDING):  aspirin  chewable 81milliGRAM(s) Oral daily  insulin glargine Injectable (LANTUS) 25Unit(s) SubCutaneous at bedtime  heparin  Injectable 5000Unit(s) SubCutaneous every 8 hours  docusate sodium 100milliGRAM(s) Oral three times a day  insulin lispro (HumaLOG) corrective regimen sliding scale  SubCutaneous three times a day before meals  dextrose 5%. 1000milliLiter(s) IV Continuous <Continuous>  dextrose 50% Injectable 12.5Gram(s) IV Push once  dextrose 50% Injectable 25Gram(s) IV Push once  dextrose 50% Injectable 25Gram(s) IV Push once  isosorbide   mononitrate ER Tablet (IMDUR) 60milliGRAM(s) Oral daily  levothyroxine 75MICROGram(s) Oral daily  hydrALAZINE 25milliGRAM(s) Oral every 8 hours  furosemide    Tablet 20milliGRAM(s) Oral two times a day  guaiFENesin    Syrup 200milliGRAM(s) Oral every 8 hours  carvedilol 25milliGRAM(s) Oral every 12 hours  heparin  Injectable 1500Unit(s) IV Push <User Schedule>  atorvastatin 40milliGRAM(s) Oral at bedtime    MEDICATIONS  (PRN):  senna 2Tablet(s) Oral at bedtime PRN Constipation  dextrose Gel 1Dose(s) Oral once PRN Blood Glucose LESS THAN 70 milliGRAM(s)/deciliter  glucagon  Injectable 1milliGRAM(s) IntraMuscular once PRN Glucose LESS THAN 70 milligrams/deciliter  ondansetron Injectable 4milliGRAM(s) IV Push every 6 hours PRN Nausea and/or Vomiting  acetaminophen  Suppository 650milliGRAM(s) Rectal every 6 hours PRN For Temp greater than 38 C (100.4 F)      Allergies    No Known Allergies    Intolerances        I&O's Detail    I & Os for current day (as of 2017 09:04)  =============================================  IN:    Oral Fluid: 680 ml    Total IN: 680 ml  ---------------------------------------------  OUT:    Total OUT: 0 ml  ---------------------------------------------  Total NET: 680 ml      .    Patient was seen and evaluated on dialysis.   Patient is tolerating the procedure well.   Continue dialysis:   QB: 410  Goal UF 1kg   on 2 K      Vital Signs Last 24 Hrs  T(C): 36.2, Max: 36.8 ( @ 07:04)  T(F): 97.1, Max: 98.3 ( @ 07:04)  HR: 60 (59 - 65)  BP: 132/56 (100/42 - 156/52)  BP(mean): --  RR: 16 (16 - 18)  SpO2: 99% (99% - 100%)  Daily     Daily Weight in k.3 (2017 01:46)    PHYSICAL EXAM:  General: alert. awake Ox3  HEENT: MMM  CV: s1s2 rrr  LUNGS: B/L CTA  EXT: no edema    LABS:                        10.7   10.5  )-----------( 248      ( 2017 07:35 )             29.3     2017 07:35    131    |  94     |  108    ----------------------------<  134    4.6     |  20     |  7.98     Ca    7.6        2017 07:35

## 2017-02-13 NOTE — PROGRESS NOTE ADULT - PROBLEM SELECTOR PROBLEM 6
Type 2 diabetes mellitus with diabetic chronic kidney disease, unspecified CKD stage, unspecified long term insulin use status
Chronic systolic congestive heart failure
ESRD (end stage renal disease)

## 2017-02-13 NOTE — PROGRESS NOTE ADULT - PROBLEM SELECTOR PROBLEM 2
Hypertensive urgency
Syncope, unspecified syncope type
Essential hypertension

## 2017-02-13 NOTE — DISCHARGE NOTE ADULT - HOSPITAL COURSE
PCP Dr. Diaz Santos  nephrology Dr. Martínez  cardiology Dr. Castillo    HPI:  87 year old male with past medical history of iddm. htn. cad. chf. ppm, pe, esrd on hd (mwf w/ Dr Lerma) comes with AMS and dizzyness after HD this morning. Patient states that he usually gets dizzyness and slightly out of it after dialysis. As per ED note states with EMS pt was HTN with -200s and mildly tachycardic.  POC blood glucose 158 in field.  No known trauma. (31 Jan 2017 03:51)    2/13:  patient has no complaints. going to [facility] today        Problem/Plan - 1:  ·  Problem: Elevated troponin.  Plan: troponin peaked at .114  nuclear stress test - positive: Chemically inducible myocardial ischemia involving the septum, anterior and lateral walls of the left ventricle, and a partially   reversible defect involving the inferior wall, suggesting a triple-vessel   disease. Global hypokinesia with an ejection fraction of 43% and left   ventricular dilatation.  S/P LHC: Non obstructive CAD  cardio FU appreciated   continue ASA, statin and BB  ECHO - Left ventricle function is mildly impaired; estimated left ventricle function is 45-50%.     Problem/Plan - 2:  ·  Problem: Syncope, unspecified syncope type.  Plan: CT head - neg  unlikely meningitis observe off abx  neurology input appreciated  unable to do MRI due to ICD.     Problem/Plan - 3:  ·  Problem: Hypertensive urgency.  Plan: resolved, now BP overcontrolled  c/w coreg 25 bid  hydralazine 25 TID  - orthostatics.     Problem/Plan - 4:  ·  Problem: Encephalopathy.  Plan: resolved  -continue to monitor.     Problem/Plan - 5:  ·  Problem: R/O RUQ pain.  Plan: abd sono - possible acute cholecystitis  HIDA scan  - negative  CT abd with IV contrast - negative  likely gastroenteritis vs bloating  surgery consult appreciated with Dr. Holcomb   s/p IV zosyn.     Problem/Plan - 6:  Problem: ESRD (end stage renal disease). Plan: HD as per renal team  renal consult appreciated.    Problem/Plan - 7:  ·  Problem: Chronic systolic congestive heart failure.  Plan: c/w lasix  ICD-D - interogated.     Problem/Plan - 8:  ·  Problem: Type 2 diabetes mellitus with diabetic chronic kidney disease, unspecified CKD stage, unspecified long term insulin use status.  Plan: c/w lantus  ISS  monitor FBG.     Problem/Plan - 9:  ·  Problem: Acquired hypothyroidism.  Plan: c/w levothyroxine 75      DISCHARGE INSTRUCTIONS/FOLLOW UP/PENDING LABS:  -d/c patient to [facility]  -continue with dialysis, [facility] renal Dr Lerma  -[facility] with cardioology in 1 week, Dr Hutchins  - [facility] with Dr Perales in 1 week    message left for Dr Perales  D/C Summary Faxed  Total time: 45 minutes

## 2017-02-16 DIAGNOSIS — N18.6 END STAGE RENAL DISEASE: ICD-10-CM

## 2017-02-16 DIAGNOSIS — I25.9 CHRONIC ISCHEMIC HEART DISEASE, UNSPECIFIED: ICD-10-CM

## 2017-02-16 DIAGNOSIS — Z95.0 PRESENCE OF CARDIAC PACEMAKER: ICD-10-CM

## 2017-02-16 DIAGNOSIS — Z86.711 PERSONAL HISTORY OF PULMONARY EMBOLISM: ICD-10-CM

## 2017-02-16 DIAGNOSIS — Z99.2 DEPENDENCE ON RENAL DIALYSIS: ICD-10-CM

## 2017-02-16 DIAGNOSIS — I50.22 CHRONIC SYSTOLIC (CONGESTIVE) HEART FAILURE: ICD-10-CM

## 2017-02-16 DIAGNOSIS — E11.22 TYPE 2 DIABETES MELLITUS WITH DIABETIC CHRONIC KIDNEY DISEASE: ICD-10-CM

## 2017-02-16 DIAGNOSIS — R41.82 ALTERED MENTAL STATUS, UNSPECIFIED: ICD-10-CM

## 2017-02-16 DIAGNOSIS — K80.40 CALCULUS OF BILE DUCT WITH CHOLECYSTITIS, UNSPECIFIED, WITHOUT OBSTRUCTION: ICD-10-CM

## 2017-02-16 DIAGNOSIS — R55 SYNCOPE AND COLLAPSE: ICD-10-CM

## 2017-02-16 DIAGNOSIS — I25.119 ATHEROSCLEROTIC HEART DISEASE OF NATIVE CORONARY ARTERY WITH UNSPECIFIED ANGINA PECTORIS: ICD-10-CM

## 2017-02-16 DIAGNOSIS — G93.40 ENCEPHALOPATHY, UNSPECIFIED: ICD-10-CM

## 2017-02-16 DIAGNOSIS — Z79.4 LONG TERM (CURRENT) USE OF INSULIN: ICD-10-CM

## 2017-02-16 DIAGNOSIS — I25.2 OLD MYOCARDIAL INFARCTION: ICD-10-CM

## 2017-02-16 DIAGNOSIS — R79.89 OTHER SPECIFIED ABNORMAL FINDINGS OF BLOOD CHEMISTRY: ICD-10-CM

## 2017-02-16 DIAGNOSIS — Z79.82 LONG TERM (CURRENT) USE OF ASPIRIN: ICD-10-CM

## 2017-02-16 DIAGNOSIS — R10.11 RIGHT UPPER QUADRANT PAIN: ICD-10-CM

## 2017-02-16 DIAGNOSIS — R00.0 TACHYCARDIA, UNSPECIFIED: ICD-10-CM

## 2017-02-16 DIAGNOSIS — I16.0 HYPERTENSIVE URGENCY: ICD-10-CM

## 2017-02-16 DIAGNOSIS — I13.2 HYPERTENSIVE HEART AND CHRONIC KIDNEY DISEASE WITH HEART FAILURE AND WITH STAGE 5 CHRONIC KIDNEY DISEASE, OR END STAGE RENAL DISEASE: ICD-10-CM

## 2017-02-16 DIAGNOSIS — E78.00 PURE HYPERCHOLESTEROLEMIA, UNSPECIFIED: ICD-10-CM

## 2017-02-16 DIAGNOSIS — E03.9 HYPOTHYROIDISM, UNSPECIFIED: ICD-10-CM

## 2017-03-28 ENCOUNTER — INPATIENT (INPATIENT)
Facility: HOSPITAL | Age: 82
LOS: 0 days | Discharge: HOME HEALTH | End: 2017-03-29
Attending: SPECIALIST | Admitting: SPECIALIST
Payer: MEDICARE

## 2017-03-28 VITALS
HEIGHT: 62 IN | TEMPERATURE: 98 F | DIASTOLIC BLOOD PRESSURE: 89 MMHG | RESPIRATION RATE: 18 BRPM | HEART RATE: 79 BPM | SYSTOLIC BLOOD PRESSURE: 158 MMHG | WEIGHT: 164.91 LBS | OXYGEN SATURATION: 98 %

## 2017-03-28 DIAGNOSIS — Z96.89 PRESENCE OF OTHER SPECIFIED FUNCTIONAL IMPLANTS: Chronic | ICD-10-CM

## 2017-03-28 DIAGNOSIS — Z95.810 PRESENCE OF AUTOMATIC (IMPLANTABLE) CARDIAC DEFIBRILLATOR: Chronic | ICD-10-CM

## 2017-03-28 LAB
ANION GAP SERPL CALC-SCNC: 13 MMOL/L — SIGNIFICANT CHANGE UP (ref 5–17)
APTT BLD: 24.9 SEC — LOW (ref 27.5–37.4)
BASOPHILS # BLD AUTO: 0.1 K/UL — SIGNIFICANT CHANGE UP (ref 0–0.2)
BASOPHILS NFR BLD AUTO: 1.2 % — SIGNIFICANT CHANGE UP (ref 0–2)
BUN SERPL-MCNC: 82 MG/DL — HIGH (ref 7–23)
CALCIUM SERPL-MCNC: 8.2 MG/DL — LOW (ref 8.5–10.1)
CHLORIDE SERPL-SCNC: 101 MMOL/L — SIGNIFICANT CHANGE UP (ref 96–108)
CO2 SERPL-SCNC: 22 MMOL/L — SIGNIFICANT CHANGE UP (ref 22–31)
CREAT SERPL-MCNC: 6.45 MG/DL — HIGH (ref 0.5–1.3)
EOSINOPHIL # BLD AUTO: 0.4 K/UL — SIGNIFICANT CHANGE UP (ref 0–0.5)
EOSINOPHIL NFR BLD AUTO: 5 % — SIGNIFICANT CHANGE UP (ref 0–6)
GLUCOSE SERPL-MCNC: 204 MG/DL — HIGH (ref 70–99)
HCT VFR BLD CALC: 39.4 % — SIGNIFICANT CHANGE UP (ref 39–50)
HGB BLD-MCNC: 12.7 G/DL — LOW (ref 13–17)
INR BLD: 0.94 RATIO — SIGNIFICANT CHANGE UP (ref 0.88–1.16)
LYMPHOCYTES # BLD AUTO: 0.8 K/UL — LOW (ref 1–3.3)
LYMPHOCYTES # BLD AUTO: 8.6 % — LOW (ref 13–44)
MCHC RBC-ENTMCNC: 28.7 PG — SIGNIFICANT CHANGE UP (ref 27–34)
MCHC RBC-ENTMCNC: 32.2 GM/DL — SIGNIFICANT CHANGE UP (ref 32–36)
MCV RBC AUTO: 89.3 FL — SIGNIFICANT CHANGE UP (ref 80–100)
MONOCYTES # BLD AUTO: 1.4 K/UL — HIGH (ref 0–0.9)
MONOCYTES NFR BLD AUTO: 15.3 % — HIGH (ref 2–14)
NEUTROPHILS # BLD AUTO: 6.3 K/UL — SIGNIFICANT CHANGE UP (ref 1.8–7.4)
NEUTROPHILS NFR BLD AUTO: 69.9 % — SIGNIFICANT CHANGE UP (ref 43–77)
PLATELET # BLD AUTO: 232 K/UL — SIGNIFICANT CHANGE UP (ref 150–400)
POTASSIUM SERPL-MCNC: 5.1 MMOL/L — SIGNIFICANT CHANGE UP (ref 3.5–5.3)
POTASSIUM SERPL-SCNC: 5.1 MMOL/L — SIGNIFICANT CHANGE UP (ref 3.5–5.3)
PROTHROM AB SERPL-ACNC: 10.1 SEC — SIGNIFICANT CHANGE UP (ref 9.8–12.7)
RBC # BLD: 4.41 M/UL — SIGNIFICANT CHANGE UP (ref 4.2–5.8)
RBC # FLD: 15.5 % — HIGH (ref 10.3–14.5)
SODIUM SERPL-SCNC: 136 MMOL/L — SIGNIFICANT CHANGE UP (ref 135–145)
WBC # BLD: 9 K/UL — SIGNIFICANT CHANGE UP (ref 3.8–10.5)
WBC # FLD AUTO: 9 K/UL — SIGNIFICANT CHANGE UP (ref 3.8–10.5)

## 2017-03-28 PROCEDURE — 76377 3D RENDER W/INTRP POSTPROCES: CPT | Mod: 26

## 2017-03-28 PROCEDURE — 73030 X-RAY EXAM OF SHOULDER: CPT | Mod: 26,LT

## 2017-03-28 PROCEDURE — 93010 ELECTROCARDIOGRAM REPORT: CPT

## 2017-03-28 PROCEDURE — 70450 CT HEAD/BRAIN W/O DYE: CPT | Mod: 26

## 2017-03-28 RX ORDER — GLUCAGON INJECTION, SOLUTION 0.5 MG/.1ML
1 INJECTION, SOLUTION SUBCUTANEOUS ONCE
Qty: 0 | Refills: 0 | Status: DISCONTINUED | OUTPATIENT
Start: 2017-03-28 | End: 2017-03-29

## 2017-03-28 RX ORDER — ISOSORBIDE MONONITRATE 60 MG/1
60 TABLET, EXTENDED RELEASE ORAL DAILY
Qty: 0 | Refills: 0 | Status: DISCONTINUED | OUTPATIENT
Start: 2017-03-28 | End: 2017-03-29

## 2017-03-28 RX ORDER — ACETAMINOPHEN 500 MG
650 TABLET ORAL ONCE
Qty: 0 | Refills: 0 | Status: COMPLETED | OUTPATIENT
Start: 2017-03-28 | End: 2017-03-28

## 2017-03-28 RX ORDER — TETANUS TOXOID, REDUCED DIPHTHERIA TOXOID AND ACELLULAR PERTUSSIS VACCINE, ADSORBED 5; 2.5; 8; 8; 2.5 [IU]/.5ML; [IU]/.5ML; UG/.5ML; UG/.5ML; UG/.5ML
0.5 SUSPENSION INTRAMUSCULAR ONCE
Qty: 0 | Refills: 0 | Status: COMPLETED | OUTPATIENT
Start: 2017-03-28 | End: 2017-03-28

## 2017-03-28 RX ORDER — DEXTROSE 50 % IN WATER 50 %
1 SYRINGE (ML) INTRAVENOUS ONCE
Qty: 0 | Refills: 0 | Status: DISCONTINUED | OUTPATIENT
Start: 2017-03-28 | End: 2017-03-29

## 2017-03-28 RX ORDER — SODIUM CHLORIDE 9 MG/ML
1000 INJECTION, SOLUTION INTRAVENOUS
Qty: 0 | Refills: 0 | Status: DISCONTINUED | OUTPATIENT
Start: 2017-03-28 | End: 2017-03-29

## 2017-03-28 RX ORDER — DOCUSATE SODIUM 100 MG
100 CAPSULE ORAL THREE TIMES A DAY
Qty: 0 | Refills: 0 | Status: DISCONTINUED | OUTPATIENT
Start: 2017-03-28 | End: 2017-03-29

## 2017-03-28 RX ORDER — CARVEDILOL PHOSPHATE 80 MG/1
25 CAPSULE, EXTENDED RELEASE ORAL EVERY 12 HOURS
Qty: 0 | Refills: 0 | Status: DISCONTINUED | OUTPATIENT
Start: 2017-03-28 | End: 2017-03-29

## 2017-03-28 RX ORDER — HYDRALAZINE HCL 50 MG
25 TABLET ORAL THREE TIMES A DAY
Qty: 0 | Refills: 0 | Status: DISCONTINUED | OUTPATIENT
Start: 2017-03-28 | End: 2017-03-29

## 2017-03-28 RX ORDER — DEXTROSE 50 % IN WATER 50 %
25 SYRINGE (ML) INTRAVENOUS ONCE
Qty: 0 | Refills: 0 | Status: DISCONTINUED | OUTPATIENT
Start: 2017-03-28 | End: 2017-03-29

## 2017-03-28 RX ORDER — DEXTROSE 50 % IN WATER 50 %
12.5 SYRINGE (ML) INTRAVENOUS ONCE
Qty: 0 | Refills: 0 | Status: DISCONTINUED | OUTPATIENT
Start: 2017-03-28 | End: 2017-03-29

## 2017-03-28 RX ORDER — LEVOTHYROXINE SODIUM 125 MCG
75 TABLET ORAL DAILY
Qty: 0 | Refills: 0 | Status: DISCONTINUED | OUTPATIENT
Start: 2017-03-28 | End: 2017-03-29

## 2017-03-28 RX ADMIN — TETANUS TOXOID, REDUCED DIPHTHERIA TOXOID AND ACELLULAR PERTUSSIS VACCINE, ADSORBED 0.5 MILLILITER(S): 5; 2.5; 8; 8; 2.5 SUSPENSION INTRAMUSCULAR at 20:12

## 2017-03-28 RX ADMIN — Medication 650 MILLIGRAM(S): at 20:14

## 2017-03-28 RX ADMIN — Medication 650 MILLIGRAM(S): at 21:00

## 2017-03-28 NOTE — ED PROVIDER NOTE - MUSCULOSKELETAL, MLM
Spine appears normal, range of motion is not limited, no muscle or joint tenderness.  No C spine tenderness. right arm av fistula. left anterior shoulder ttp with external rotation and abduction and extension against resistance. no palpable deformity or swelling.

## 2017-03-28 NOTE — H&P ADULT - NSHPLABSRESULTS_GEN_ALL_CORE
CBC Full  -  ( 28 Mar 2017 22:39 )  WBC Count : 9.0 K/uL  Hemoglobin : 12.7 g/dL  Hematocrit : 39.4 %  Platelet Count - Automated : 232 K/uL  Mean Cell Volume : 89.3 fl  Mean Cell Hemoglobin : 28.7 pg  Mean Cell Hemoglobin Concentration : 32.2 gm/dL  Auto Neutrophil # : 6.3 K/uL  Auto Lymphocyte # : 0.8 K/uL  Auto Monocyte # : 1.4 K/uL  Auto Eosinophil # : 0.4 K/uL  Auto Basophil # : 0.1 K/uL  Auto Neutrophil % : 69.9 %  Auto Lymphocyte % : 8.6 %  Auto Monocyte % : 15.3 %  Auto Eosinophil % : 5.0 %  Auto Basophil % : 1.2 %      EXAM:  CT BRAIN                            PROCEDURE DATE:  03/28/2017        INTERPRETATION:    Exam Date: 3/28/2017 8:00 PM    History:Head injury with right orbital swelling    Multiple axial sections were obtained from skull base to the vertex   without intravenous contrast.    Comparison: 1/30/2017    Findings:    Right periorbital soft tissue swelling. Tiny linear hyperdensity in the   right frontal lesion seen on series 2 image 23 and 24 may represent tiny   subarachnoid hemorrhage versus artifact.    There is no mass effect or midline shift. No extra-axial collection is   identified. There is no large acute territorial infarct.    There is moderate prominence of the ventricles and subarachnoid spaces,   compatible with age related involutional changes. Prominent   periventricular lucency is present, compatible with microvascular   ischemic change.    The visualized skull and mastoid are unremarkable.    The paranasal sinuses and orbits are within normal limits.    Impression:    Right periorbital soft tissue swelling. Tiny linear hyperdensity in the   right frontal lesion seen on series 2 image 23 and 24 may represent tiny   subarachnoid hemorrhage versus artifact.    Age related involutional and microvascular ischemic changes, without   evidence of  mass effect or midline shift.

## 2017-03-28 NOTE — ED PROVIDER NOTE - ENMT, MLM
Airway patent, Nasal mucosa clear. Mouth with normal mucosa. Throat has no vesicles, no oropharyngeal exudates and uvula is midline. no septal hematoma. right periorbital ecchymosis and swelling ith abrasions noted to right forehead and overlying zygomatic arch with associated periorbital tenderness. .

## 2017-03-28 NOTE — H&P ADULT - HISTORY OF PRESENT ILLNESS
87 year old male with past medical history of DM, HTN, CAD, CHF, PPM,  ESRD on HD on 81mg ASA presents  to ED s/p trip and fall this morning presenting with right facial injury and left shoulder pain. He denied HA, sob, nausea, weakness, numbness, tingling.   He was slipped and fall at home today in am.       PAST MEDICAL/SURGICAL/FAMILY/SOCIAL HISTORY:   Past Medical History:  AICD (automatic cardioverter/defibrillator) present    CHF (Congestive Heart Failure)    Diabetes    Dialysis Patient    Heart Attack    High Cholesterol    HTN (Hypertension)    Pancreatitis    Pulmonary edema.    Past Surgical History:  AICD (automatic cardioverter/defibrillator) present    History of Hernia Repair    History of penile implant.    Alcohol Use History:  · Have you ever consumed alcohol	never    Tobacco Usage:  · Tobacco Usage	Never smoker    ALLERGIES AND HOME MEDICATIONS:   Allergies:        Allergies:  	No Known Allergies:   MEDICATIONS  (STANDING):  isosorbide   mononitrate ER Tablet (IMDUR) 60milliGRAM(s) Oral daily  docusate sodium 100milliGRAM(s) Oral three times a day  levothyroxine 75MICROGram(s) Oral daily  hydrALAZINE 25milliGRAM(s) Oral three times a day  carvedilol 25milliGRAM(s) Oral every 12 hours  dextrose 5%. 1000milliLiter(s) IV Continuous <Continuous>  dextrose 50% Injectable 12.5Gram(s) IV Push once  dextrose 50% Injectable 25Gram(s) IV Push once  dextrose 50% Injectable 25Gram(s) IV Push once

## 2017-03-28 NOTE — ED PROVIDER NOTE - PROGRESS NOTE DETAILS
trauma alert cancelled ( Scribe: Margaret Chaudhry, scribing for and in the presence of   Dr Ayoub     )

## 2017-03-28 NOTE — ED PROVIDER NOTE - CARE PLAN
Principal Discharge DX:	SAH (subarachnoid hemorrhage)  Secondary Diagnosis:	Shoulder injury, left, initial encounter

## 2017-03-28 NOTE — H&P ADULT - ASSESSMENT
86Y/O male S/P Fall with TSAH, on ASA 81mg. Neuro non focal.    Admit to hospital tele for neurosurgery team.  Neuro check q2hs.  repeat CTH in 6 hours. unless change in neuro exam of pt than get stat CTH and call NS.  Stop ASA 81mg.  consult hospitalist for medical management.  Start scds for DVT prophylaxis.  Will follow labs.  case and images D/W Dr. Joya.

## 2017-03-28 NOTE — H&P ADULT - NSHPPHYSICALEXAM_GEN_ALL_CORE
Gen: NAD. Left shoulder pain on movement and restricted ROM due to pain.   Alert, awake , able to follow all commands.  AAO*3  EYES: PERRL, EOMI. Rigth orbital swelling +raccoon eyes.  CN II-XII intact grossly.  No pronator drift.  Motor: 5/5 Bilateral upper and lower extremities.  Sensory intact though out.  Toes downgoing B/L.  No rebekah or clonus appreciated.  reflexes normal.

## 2017-03-28 NOTE — ED PROVIDER NOTE - NS ED MD SCRIBE ATTENDING SCRIBE SECTIONS
REVIEW OF SYSTEMS/DISPOSITION/HISTORY OF PRESENT ILLNESS/PAST MEDICAL/SURGICAL/SOCIAL HISTORY/PHYSICAL EXAM/PROGRESS NOTE

## 2017-03-28 NOTE — ED PROVIDER NOTE - OBJECTIVE STATEMENT
87 year old male with past medical history of DM, HTN, CAD, CHF, PPM,  ESRD on HD on 81mg ASA presents  to ED s/p trip and fall this morning presenting with right facial injury and left shoulder pain. denies HA, sob, nausea, cp, abd pain or extremity pain..  .  PMD- Diaz.

## 2017-03-28 NOTE — ED PROVIDER NOTE - MEDICAL DECISION MAKING DETAILS
Pt seen by NSG PA, CT reviewed and discussed with NSG attending who requests patient be admitted for observation for possible SAH.  Dr. Joya to admit.

## 2017-03-29 VITALS
HEART RATE: 79 BPM | DIASTOLIC BLOOD PRESSURE: 43 MMHG | RESPIRATION RATE: 21 BRPM | SYSTOLIC BLOOD PRESSURE: 147 MMHG | OXYGEN SATURATION: 96 %

## 2017-03-29 DIAGNOSIS — I60.9 NONTRAUMATIC SUBARACHNOID HEMORRHAGE, UNSPECIFIED: ICD-10-CM

## 2017-03-29 DIAGNOSIS — Z99.2 DEPENDENCE ON RENAL DIALYSIS: ICD-10-CM

## 2017-03-29 DIAGNOSIS — I10 ESSENTIAL (PRIMARY) HYPERTENSION: ICD-10-CM

## 2017-03-29 DIAGNOSIS — I50.22 CHRONIC SYSTOLIC (CONGESTIVE) HEART FAILURE: ICD-10-CM

## 2017-03-29 DIAGNOSIS — E11.9 TYPE 2 DIABETES MELLITUS WITHOUT COMPLICATIONS: ICD-10-CM

## 2017-03-29 LAB
ALBUMIN SERPL ELPH-MCNC: 2.9 G/DL — LOW (ref 3.3–5)
ANION GAP SERPL CALC-SCNC: 14 MMOL/L — SIGNIFICANT CHANGE UP (ref 5–17)
BLD GP AB SCN SERPL QL: SIGNIFICANT CHANGE UP
BUN SERPL-MCNC: 88 MG/DL — HIGH (ref 7–23)
CALCIUM SERPL-MCNC: 7.8 MG/DL — LOW (ref 8.5–10.1)
CHLORIDE SERPL-SCNC: 102 MMOL/L — SIGNIFICANT CHANGE UP (ref 96–108)
CO2 SERPL-SCNC: 21 MMOL/L — LOW (ref 22–31)
CREAT SERPL-MCNC: 7.08 MG/DL — HIGH (ref 0.5–1.3)
GLUCOSE SERPL-MCNC: 276 MG/DL — HIGH (ref 70–99)
HAV IGM SER-ACNC: SIGNIFICANT CHANGE UP
HBV CORE IGM SER-ACNC: SIGNIFICANT CHANGE UP
HBV SURFACE AG SER-ACNC: SIGNIFICANT CHANGE UP
HCT VFR BLD CALC: 37.9 % — LOW (ref 39–50)
HCV AB S/CO SERPL IA: 0.1 S/CO — SIGNIFICANT CHANGE UP
HCV AB SERPL-IMP: SIGNIFICANT CHANGE UP
HGB BLD-MCNC: 12.3 G/DL — LOW (ref 13–17)
MCHC RBC-ENTMCNC: 29.4 PG — SIGNIFICANT CHANGE UP (ref 27–34)
MCHC RBC-ENTMCNC: 32.4 GM/DL — SIGNIFICANT CHANGE UP (ref 32–36)
MCV RBC AUTO: 90.8 FL — SIGNIFICANT CHANGE UP (ref 80–100)
PHOSPHATE SERPL-MCNC: 6.3 MG/DL — HIGH (ref 2.5–4.5)
PLATELET # BLD AUTO: 238 K/UL — SIGNIFICANT CHANGE UP (ref 150–400)
POTASSIUM SERPL-MCNC: 4.7 MMOL/L — SIGNIFICANT CHANGE UP (ref 3.5–5.3)
POTASSIUM SERPL-SCNC: 4.7 MMOL/L — SIGNIFICANT CHANGE UP (ref 3.5–5.3)
RBC # BLD: 4.18 M/UL — LOW (ref 4.2–5.8)
RBC # FLD: 15.8 % — HIGH (ref 10.3–14.5)
SODIUM SERPL-SCNC: 137 MMOL/L — SIGNIFICANT CHANGE UP (ref 135–145)
TYPE + AB SCN PNL BLD: SIGNIFICANT CHANGE UP
WBC # BLD: 7.9 K/UL — SIGNIFICANT CHANGE UP (ref 3.8–10.5)
WBC # FLD AUTO: 7.9 K/UL — SIGNIFICANT CHANGE UP (ref 3.8–10.5)

## 2017-03-29 PROCEDURE — 70450 CT HEAD/BRAIN W/O DYE: CPT | Mod: 26

## 2017-03-29 PROCEDURE — 99285 EMERGENCY DEPT VISIT HI MDM: CPT

## 2017-03-29 RX ORDER — CARVEDILOL PHOSPHATE 80 MG/1
1 CAPSULE, EXTENDED RELEASE ORAL
Qty: 0 | Refills: 0 | DISCHARGE
Start: 2017-03-29

## 2017-03-29 RX ORDER — LISINOPRIL 2.5 MG/1
5 TABLET ORAL DAILY
Qty: 0 | Refills: 0 | Status: DISCONTINUED | OUTPATIENT
Start: 2017-03-29 | End: 2017-03-29

## 2017-03-29 RX ORDER — LISINOPRIL 2.5 MG/1
1 TABLET ORAL
Qty: 0 | Refills: 0 | DISCHARGE
Start: 2017-03-29

## 2017-03-29 RX ORDER — INSULIN LISPRO 100/ML
VIAL (ML) SUBCUTANEOUS
Qty: 0 | Refills: 0 | Status: DISCONTINUED | OUTPATIENT
Start: 2017-03-29 | End: 2017-03-29

## 2017-03-29 RX ORDER — ATORVASTATIN CALCIUM 80 MG/1
40 TABLET, FILM COATED ORAL AT BEDTIME
Qty: 0 | Refills: 0 | Status: DISCONTINUED | OUTPATIENT
Start: 2017-03-29 | End: 2017-03-29

## 2017-03-29 RX ORDER — HYDRALAZINE HCL 50 MG
1 TABLET ORAL
Qty: 0 | Refills: 0 | COMMUNITY
Start: 2017-03-29

## 2017-03-29 RX ADMIN — Medication 100 MILLIGRAM(S): at 00:17

## 2017-03-29 RX ADMIN — CARVEDILOL PHOSPHATE 25 MILLIGRAM(S): 80 CAPSULE, EXTENDED RELEASE ORAL at 17:30

## 2017-03-29 RX ADMIN — Medication 2: at 12:23

## 2017-03-29 RX ADMIN — LISINOPRIL 5 MILLIGRAM(S): 2.5 TABLET ORAL at 12:25

## 2017-03-29 RX ADMIN — Medication 25 MILLIGRAM(S): at 06:12

## 2017-03-29 RX ADMIN — ISOSORBIDE MONONITRATE 60 MILLIGRAM(S): 60 TABLET, EXTENDED RELEASE ORAL at 12:26

## 2017-03-29 RX ADMIN — ISOSORBIDE MONONITRATE 60 MILLIGRAM(S): 60 TABLET, EXTENDED RELEASE ORAL at 00:17

## 2017-03-29 RX ADMIN — Medication 75 MICROGRAM(S): at 00:17

## 2017-03-29 RX ADMIN — CARVEDILOL PHOSPHATE 25 MILLIGRAM(S): 80 CAPSULE, EXTENDED RELEASE ORAL at 06:12

## 2017-03-29 RX ADMIN — Medication 25 MILLIGRAM(S): at 17:30

## 2017-03-29 NOTE — CONSULT NOTE ADULT - PROBLEM SELECTOR RECOMMENDATION 3
stable at present  Add low dose ACE-I  Recent 2D echo and cardiac cath in february  EF 45%. mild AS. non obstructive CAD.   Resume statin, BP control  No tele events. EKG: paced rhythm. If any signs of decompensation, may consider cardio evaluation but does not warrant at this juncture.

## 2017-03-29 NOTE — DISCHARGE NOTE ADULT - CARE PLAN
Principal Discharge DX:	SAH (subarachnoid hemorrhage)  Goal:	return to normal activity  Instructions for follow-up, activity and diet:	F/U PMD asap

## 2017-03-29 NOTE — PHYSICAL THERAPY INITIAL EVALUATION ADULT - MANUAL MUSCLE TESTING RESULTS, REHAB EVAL
WFL BLEs/RUE ,distal LUE is GOOD but patient with acute R shoulder pain since fall with difficulty elevating L shoulder due to pain

## 2017-03-29 NOTE — DISCHARGE NOTE ADULT - CARE PROVIDER_API CALL
Neeraj Jones), Neurological Surgery  [address] [facility] [address] [place] [address]  Phone: [phone]  Fax: [phone]

## 2017-03-29 NOTE — PROGRESS NOTE ADULT - SUBJECTIVE AND OBJECTIVE BOX
Patient seen and examined and discussed with the neurosurgery PA, agree with the notes.  Patient feels better this am, AAOx3, and neurologically non-focal, right eyelids hematoma improving.  CT head stable this am.  Plan to D/C home today, and follow up as out-patient.
seen at HD  tolerating tx  goal uf of 1.5 kg  on 2k with no heparin  epo with hd    outpt hd unit notified about no heparin in outpt unit until clearance with NSG
History of Present Illness:   87 year old male with past medical history of DM, HTN, CAD, CHF, PPM,  ESRD on HD on 81mg ASA presents  to ED s/p trip and fall this morning presenting with right facial injury and left shoulder pain. He denied HA, sob, nausea, weakness, numbness, tingling.   He was slipped and fall at home today in am.

## 2017-03-29 NOTE — DISCHARGE NOTE ADULT - PATIENT PORTAL LINK FT
“You can access the [employer] Patient Portal, offered by [facility], by registering with the following website: [email or web address]”

## 2017-03-29 NOTE — PHYSICAL THERAPY INITIAL EVALUATION ADULT - PERTINENT HX OF CURRENT PROBLEM, REHAB EVAL
pt tripped and fell while ambulating with cane (should have been using RW) sustaining R forehead contusion ,L shoulder injury

## 2017-03-29 NOTE — PHYSICAL THERAPY INITIAL EVALUATION ADULT - CRITERIA FOR SKILLED THERAPEUTIC INTERVENTIONS
anticipated discharge recommendation/functional limitations in following categories/risk reduction/prevention/impairments found

## 2017-03-29 NOTE — CONSULT NOTE ADULT - SUBJECTIVE AND OBJECTIVE BOX
87 year old male with past medical history of iddm. htn. cad. chronic systolic chf. ppm, esrd on hd (mwf w/ Dr Lerma) presented s/p mechanical fall. Spoke with patient in native tongue and stated he was using a cane when is customarily uses RW. Lost his balance and tripped. Fell and hit right side of face. Denies any preceding symptoms ie dizziness, CP/SOB/palpitaitons. Denies LOC. Sustained hematoma to right eye. Was admitted under neurosx service after he was found to have small right traumatic SAH. CTH x 2 shows stable size in hemorrhage.     ICU Vital Signs Last 24 Hrs  T(C): 36.1, Max: 36.9 (03-28 @ 22:33)  T(F): 97, Max: 98.5 (03-29 @ 03:41)  HR: 61 (61 - 79)  BP: 145/39 (132/40 - 171/60)  BP(mean): 65 (65 - 79)  ABP: --  ABP(mean): --  RR: 14 (13 - 19)  SpO2: 97% (94% - 98%)          REVIEW OF SYSTEMS:    CONSTITUTIONAL: No weakness, fevers or chills  EYES/ENT: No visual changes;  No vertigo or throat pain   NECK: No pain or stiffness  RESPIRATORY: No cough, wheezing, hemoptysis; No shortness of breath  CARDIOVASCULAR: No chest pain or palpitations  GASTROINTESTINAL: No abdominal or epigastric pain. No nausea, vomiting, or hematemesis; No diarrhea or constipation. No melena or hematochezia.  GENITOURINARY: No dysuria, frequency or hematuria  NEUROLOGICAL: No numbness or weakness  SKIN: No itching, burning, rashes, or lesions   All other review of systems is negative unless indicated above        PHYSICAL EXAM:    Constitutional: NAD, awake and alert, well-developed  HEENT: PERR, EOMI right eye hematoma, Normal Hearing, MMM  Neck: Soft and supple, No LAD, No JVD  Respiratory: Breath sounds are clear bilaterally, No wheezing, rales or rhonchi  Cardiovascular: S1 and S2, regular rate and rhythm, no Murmurs, gallops or rubs  Gastrointestinal: Bowel Sounds present, soft, nontender, nondistended, no guarding, no rebound  Extremities: No peripheral edema  Vascular: 2+ peripheral pulses  Neurological: A/O x 3, no focal deficits  Musculoskeletal: 5/5 strength b/l upper and lower extremities, right AVF, palpable thrill  Skin: No rashes

## 2017-03-29 NOTE — PATIENT PROFILE ADULT. - FUNCTIONAL SCREEN CURRENT LEVEL: COMMUNICATION, MLM
Nutrition Services:     D: Ferritin level noted; 133 ng/mL; Hemoglobin also noted. Current Iron supplementation at 3.2 mg/kg/day.    A: Ferritin level supports need for additional Iron.  Goal (total) Iron intake: ~4 mg/kg/day.     Recommend:     1). Maintaining supplemental Iron at 3.5 mg/kg/day for a total Iron intake of 4 mg/kg/day.     2). Recheck Ferritin level once Hgb decreases to <10 g/dL or 6 weeks of age, whichever is sooner.    P: RD will continue to follow.     Mary Flores RD LD   Pager [phone]     (0) understands/communicates without difficulty

## 2017-03-29 NOTE — CONSULT NOTE ADULT - PROBLEM SELECTOR RECOMMENDATION 9
management per primary team  cont neuro checks  Hold asa until cleared from Neurosx  PT consult prior to DC  D/W SW. Family arranging for HHA and will care for patient during the interim

## 2017-03-29 NOTE — DISCHARGE NOTE ADULT - MEDICATION SUMMARY - MEDICATIONS TO TAKE
I will START or STAY ON the medications listed below when I get home from the hospital:    Lasix 40 mg oral tablet  --   2 times a day   -- Indication: For Hypertention    lantus 25 units  --   once a day   -- Indication: For DM    lisinopril 5 mg oral tablet  -- 1 tab(s) by mouth once a day  -- Indication: For HTN (Hypertension)    isosorbide mononitrate 60 mg oral tablet, extended release  --  by mouth   -- Indication: For HTN (Hypertension)    insulin lispro 100 units/mL subcutaneous solution  -- unit(s) subcutaneous prn ; 1 Unit(s) if Glucose 151 - 200  2 Unit(s) if Glucose 201 - 250  3 Unit(s) if Glucose 251 - 300  4 Unit(s) if Glucose 301 - 350  5 Unit(s) if Glucose 351 - 400  6 Unit(s) if Glucose Greater Than 400  -- Indication: For DM    atorvastatin 40 mg oral tablet  -- 1 tab(s) by mouth once a day  -- Indication: For Hyperlipidemia    carvedilol 25 mg oral tablet  -- 1 tab(s) by mouth every 12 hours  -- Indication: For Hypertension    docusate sodium 100 mg oral capsule  -- 1 cap(s) by mouth 3 times a day  -- Indication: For Constiipation    levothyroxine 75 mcg (0.075 mg) oral capsule  -- 1 cap(s) by mouth once a day  -- Indication: For Hypothyroidism    hydrALAZINE 25 mg oral tablet  -- 1 tab(s) by mouth 3 times a day  -- Indication: For HTN (Hypertension)

## 2017-03-29 NOTE — DISCHARGE NOTE ADULT - HOSPITAL COURSE
86Y/O male S/P trip and fall with tiny TSAH. was admitted to hospital in neurosurgery service. He also had rigth eye contusion and left shoulder contusion. Patient had repeat CTH showed no change in hemorrhage. he remained stable and neuro non focal throughout hsopital course. he also get dialyzed in hospital as scheduled.  After stable neruo exam pt is D/C home with homecare. He will f/u with Dr. Jones in 1 week with CTh no contrast. Call for an appt. [phone].  If any change in neurological status please call office immediately or bring patient to er.   Patient should stop ASA 81mg at this time and will restart in 2 days.

## 2017-03-29 NOTE — ED ADULT NURSE REASSESSMENT NOTE - NS ED NURSE REASSESS COMMENT FT1
patient resting comfortably. VSS. Cardiac Monitoring in place. patient admitted to Surgical Step down; verbal order received from Dr. Joya 9 neuro surgery).

## 2017-03-29 NOTE — PHYSICAL THERAPY INITIAL EVALUATION ADULT - GENERAL OBSERVATIONS, REHAB EVAL
rec'd sitting up in bedside chair awake & alert (SDU) with moniters in place ,+ R periorbital ecchymosis

## 2017-04-04 DIAGNOSIS — Z99.2 DEPENDENCE ON RENAL DIALYSIS: ICD-10-CM

## 2017-04-04 DIAGNOSIS — S00.11XA CONTUSION OF RIGHT EYELID AND PERIOCULAR AREA, INITIAL ENCOUNTER: ICD-10-CM

## 2017-04-04 DIAGNOSIS — Z95.810 PRESENCE OF AUTOMATIC (IMPLANTABLE) CARDIAC DEFIBRILLATOR: ICD-10-CM
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
## 2017-04-04 DIAGNOSIS — I25.10 ATHEROSCLEROTIC HEART DISEASE OF NATIVE CORONARY ARTERY WITHOUT ANGINA PECTORIS: ICD-10-CM

## 2017-04-04 DIAGNOSIS — I50.22 CHRONIC SYSTOLIC (CONGESTIVE) HEART FAILURE: ICD-10-CM
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
## 2017-04-04 DIAGNOSIS — N18.6 END STAGE RENAL DISEASE: ICD-10-CM

## 2017-04-04 DIAGNOSIS — S40.012A CONTUSION OF LEFT SHOULDER, INITIAL ENCOUNTER: ICD-10-CM

## 2017-04-04 DIAGNOSIS — I13.2 HYPERTENSIVE HEART AND CHRONIC KIDNEY DISEASE WITH HEART FAILURE AND WITH STAGE 5 CHRONIC KIDNEY DISEASE, OR END STAGE RENAL DISEASE: ICD-10-CM

## 2017-04-04 DIAGNOSIS — I25.2 OLD MYOCARDIAL INFARCTION: ICD-10-CM

## 2017-04-04 DIAGNOSIS — E11.9 TYPE 2 DIABETES MELLITUS WITHOUT COMPLICATIONS: ICD-10-CM

## 2017-04-04 DIAGNOSIS — S06.6X0A TRAUMATIC SUBARACHNOID HEMORRHAGE WITHOUT LOSS OF CONSCIOUSNESS, INITIAL ENCOUNTER: ICD-10-CM

## 2017-04-04 DIAGNOSIS — E78.00 PURE HYPERCHOLESTEROLEMIA, UNSPECIFIED: ICD-10-CM

## 2017-04-04 DIAGNOSIS — M25.512 PAIN IN LEFT SHOULDER: ICD-10-CM

## 2017-04-28 NOTE — ED ADULT NURSE NOTE - TEMPERATURE IN FAHRENHEIT (DEGREES F)
AUDIOLOGY REPORT    BACKGROUND INFORMATION: Paulie Mcdonough was seen in the Audiology Clinic at the [facility] on 4/28/2017 for follow-up.  The patient has been seen previously in this clinic on 6/23/14 and results revealed bilateral sensorineural hearing loss with asymmetric word recognition scores.  The patient was fit with a long term loaner hearing aid in the right ear on 9/29/2014.  The loaner hearing aid recently went in for repair, Paulie returns today to return the temporary loaner he was provided at the previous visit and  the long term loaner which is back from repair.  He reports that his hearing may be declining as he feels he is beginning to miss the clarity of speech again    TEST RESULTS AND PROCEDURES: A listening check revealed that the hearing aid sounded crisp and clear with no distortion or weakness noted.  Adjustments were made including turning the high frequency gain up 3-5 dB and the patient reported that things sounded much more clear.  Sound recover was made slightly stronger as it reportedly also increased the clarity of speech.     It was discussed with Paulie that he has not had his hearing evaluated in a few years and it is possible that his hearing may have changed which is why he needs the high frequency gain adjusted.  He stated that he knows he needs a hearing test as well as a hearing aid consultation, he plans to make those appointments sometime this summer.    SUMMARY AND RECOMMENDATIONS: Patient picked up a long term loaner hearing aid from repair, patient was self-pay for the repair.  Adjustments were made as noted above.  It was recommended that he return for a hearing evaluation due to concerns regarding a change in hearing as well as for a hearing aid evaluation as the loaner hearing aid was not meant to be a long term solution.  Call this clinic with questions regarding today s visit.    Rupinder Herbert,  AuD  Audiologist  [place] License  #[id number]           98.2

## 2017-05-30 ENCOUNTER — APPOINTMENT (OUTPATIENT)
Dept: ELECTROPHYSIOLOGY | Facility: CLINIC | Age: 82
End: 2017-05-30

## 2017-05-30 VITALS — HEIGHT: 62 IN | BODY MASS INDEX: 32.39 KG/M2 | WEIGHT: 176 LBS | HEART RATE: 68 BPM

## 2017-05-30 VITALS — DIASTOLIC BLOOD PRESSURE: 70 MMHG | SYSTOLIC BLOOD PRESSURE: 160 MMHG

## 2017-07-31 NOTE — PATIENT PROFILE ADULT. - CAREGIVER
Treatment Type (Optional): Deep Cleanse Treatment Comments (Sticky): Ormedic cleanser, ormedic enzyme, steam, extractions, gentle cleanser, mandelic, phytocorrective, black mask metacell, UV daily.  Recommend 3 deep cleanse facials spaced 2 weeks apart. Exfoliation Type: enzyme Mask Type (Optional): vitamin C Facial Steaming: steamed Extraction Method: cotton-tipped applicator Detail Level: Zone Yes

## 2017-08-31 NOTE — CONSULT NOTE ADULT - SUBJECTIVE AND OBJECTIVE BOX
This office note has been dictated.     Chief complaints.  Fell while walking with a cane instead of walker.  denies passing out.      HPI:  86 yo man with ESRD on maintenance HD TIW.  Positive PMHX of CAD, CHF and PPM.   S/p recent hospitalization for Syncope.  Had positive stress test leading to cardiac cath.  Found to have non-obstructive disease  and was d/ольга to rehab with medical management.  Pt apparently had a mechanical fall tripping while walking with a cane.  Noted with tiny SAH on CT scan.  Neuro status is stable.  Pt fully alert and appropriate.  Denies any associated dizzyness or weakness.      PMHX and PSHX.  1.HTN  2.DM  3.CAD  4.CHF  5.PPM/AICD  6.Recent hx of Syncope    FAMILY HISTORY:  No pertinent family history in first degree relatives      SOCIAL HISTORY :   No hx of smoking or ETOH.   Lives at home with  aide.    Allergies  No Known Allergies    MEDICATIONS  (STANDING):  isosorbide   mononitrate ER Tablet (IMDUR) 60milliGRAM(s) Oral daily  docusate sodium 100milliGRAM(s) Oral three times a day  levothyroxine 75MICROGram(s) Oral daily  hydrALAZINE 25milliGRAM(s) Oral three times a day  carvedilol 25milliGRAM(s) Oral every 12 hours  dextrose 5%. 1000milliLiter(s) IV Continuous <Continuous>  dextrose 50% Injectable 12.5Gram(s) IV Push once  dextrose 50% Injectable 25Gram(s) IV Push once  dextrose 50% Injectable 25Gram(s) IV Push once  insulin lispro (HumaLOG) corrective regimen sliding scale  SubCutaneous three times a day before meals  atorvastatin 40milliGRAM(s) Oral at bedtime    MEDICATIONS  (PRN):  dextrose Gel 1Dose(s) Oral once PRN Blood Glucose LESS THAN 70 milliGRAM(s)/deciliter  glucagon  Injectable 1milliGRAM(s) IntraMuscular once PRN Glucose LESS THAN 70 milligrams/deciliter     Review of Systems.  Denies  constitutional symptoms.  Denies HA , Denies SOB.  Denies Chest pain.  Denies Palpatation  Denies abd pain  Denies pain in LE.    Vital Signs Last 24 Hrs  T(C): 36.1, Max: 36.9 ( @ 22:33)  T(F): 97, Max: 98.5 (03-29 @ 03:41)  HR: 61 (61 - 79)  BP: 145/39 (132/40 - 171/60)  BP(mean): 65 (65 - 79)  RR: 14 (13 - 19)  SpO2: 97% (94% - 98%)  Daily Height in cm: 157.48 (28 Mar 2017 19:33)    Daily Weight in k.2 (29 Mar 2017 05:35)  I&O's Summary      PHYSICAL EXAM:  Alert and appropriate  GEN: No apparent distress  HEENT: Right periorbital ecchymosis  NECK : supple  CV: S1S2 RRR  LUNGS: clear to aus  ABD: soft  EXT: no edema87     LABS:                        12.7   9.0   )-----------( 232      ( 28 Mar 2017 22:39 )             39.4     28 Mar 2017 22:39    136    |  101    |  82     ----------------------------<  204    5.1     |  22     |  6.45     Ca    8.2        28 Mar 2017 22:39      PT/INR - ( 28 Mar 2017 22:39 )   PT: 10.1 sec;   INR: 0.94 ratio         PTT - ( 28 Mar 2017 22:39 )  PTT:24.9 sec    EXAM:  CT BRAIN                            PROCEDURE DATE:  2017        INTERPRETATION:    Exam Date: 3/28/2017 8:00 PM    History:Head injury with right orbital swelling    Multiple axial sections were obtained from skull base to the vertex   without intravenous contrast.    Comparison: 2017    Findings:    Right periorbital soft tissue swelling. Tiny linear hyperdensity in the   right frontal lesion seen on series 2 image 23 and 24 may represent tiny   subarachnoid hemorrhage versus artifact.    There is no mass effect or midline shift. No extra-axial collection is   identified. There is no large acute territorial infarct.    There is moderate prominence of the ventricles and subarachnoid spaces,   compatible with age related involutional changes. Prominent   periventricular lucency is present, compatible with microvascular   ischemic change.    The visualized skull and mastoid are unremarkable.    The paranasal sinuses and orbits are within normal limits.    Impression:    Right periorbital soft tissue swelling. Tiny linear hyperdensity in the   right frontal lesion seen on series 2 image 23 and 24 may represent tiny   subarachnoid hemorrhage versus artifact.    Age related involutional and microvascular ischemic changes, without   evidence of  mass effect or midline shift.              CASIE STAFFORD M.D., ATTENDING RADIOLOGIST  This document has been electronically signed. Mar 28 2017  8:11PM

## 2017-09-07 ENCOUNTER — APPOINTMENT (OUTPATIENT)
Dept: ELECTROPHYSIOLOGY | Facility: CLINIC | Age: 82
End: 2017-09-07

## 2017-11-16 ENCOUNTER — APPOINTMENT (OUTPATIENT)
Dept: ELECTROPHYSIOLOGY | Facility: CLINIC | Age: 82
End: 2017-11-16
Payer: MEDICARE

## 2017-11-16 VITALS — HEART RATE: 75 BPM | OXYGEN SATURATION: 95 % | SYSTOLIC BLOOD PRESSURE: 150 MMHG | DIASTOLIC BLOOD PRESSURE: 55 MMHG

## 2017-11-16 PROCEDURE — 93290 INTERROG DEV EVAL ICPMS IP: CPT | Mod: 26

## 2017-11-16 PROCEDURE — 93284 PRGRMG EVAL IMPLANTABLE DFB: CPT

## 2018-01-09 ENCOUNTER — EMERGENCY (EMERGENCY)
Facility: HOSPITAL | Age: 83
LOS: 0 days | Discharge: HOME | End: 2018-01-09
Attending: STUDENT IN AN ORGANIZED HEALTH CARE EDUCATION/TRAINING PROGRAM | Admitting: STUDENT IN AN ORGANIZED HEALTH CARE EDUCATION/TRAINING PROGRAM
Payer: MEDICARE

## 2018-01-09 VITALS
SYSTOLIC BLOOD PRESSURE: 160 MMHG | DIASTOLIC BLOOD PRESSURE: 58 MMHG | HEART RATE: 72 BPM | RESPIRATION RATE: 18 BRPM | TEMPERATURE: 97 F | OXYGEN SATURATION: 99 %

## 2018-01-09 VITALS
DIASTOLIC BLOOD PRESSURE: 47 MMHG | HEART RATE: 70 BPM | SYSTOLIC BLOOD PRESSURE: 142 MMHG | TEMPERATURE: 97 F | OXYGEN SATURATION: 97 % | RESPIRATION RATE: 16 BRPM

## 2018-01-09 DIAGNOSIS — Z95.810 PRESENCE OF AUTOMATIC (IMPLANTABLE) CARDIAC DEFIBRILLATOR: Chronic | ICD-10-CM

## 2018-01-09 DIAGNOSIS — M54.5 LOW BACK PAIN: ICD-10-CM

## 2018-01-09 DIAGNOSIS — Z79.4 LONG TERM (CURRENT) USE OF INSULIN: ICD-10-CM

## 2018-01-09 DIAGNOSIS — Z99.2 DEPENDENCE ON RENAL DIALYSIS: ICD-10-CM

## 2018-01-09 DIAGNOSIS — N18.6 END STAGE RENAL DISEASE: ICD-10-CM

## 2018-01-09 DIAGNOSIS — E11.9 TYPE 2 DIABETES MELLITUS WITHOUT COMPLICATIONS: ICD-10-CM

## 2018-01-09 DIAGNOSIS — I50.9 HEART FAILURE, UNSPECIFIED: ICD-10-CM

## 2018-01-09 DIAGNOSIS — S09.90XA UNSPECIFIED INJURY OF HEAD, INITIAL ENCOUNTER: ICD-10-CM

## 2018-01-09 DIAGNOSIS — Z96.89 PRESENCE OF OTHER SPECIFIED FUNCTIONAL IMPLANTS: Chronic | ICD-10-CM

## 2018-01-09 DIAGNOSIS — I25.2 OLD MYOCARDIAL INFARCTION: ICD-10-CM

## 2018-01-09 DIAGNOSIS — I12.0 HYPERTENSIVE CHRONIC KIDNEY DISEASE WITH STAGE 5 CHRONIC KIDNEY DISEASE OR END STAGE RENAL DISEASE: ICD-10-CM
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
## 2018-01-09 DIAGNOSIS — Z95.810 PRESENCE OF AUTOMATIC (IMPLANTABLE) CARDIAC DEFIBRILLATOR: ICD-10-CM

## 2018-01-09 PROCEDURE — 72125 CT NECK SPINE W/O DYE: CPT | Mod: 26

## 2018-01-09 PROCEDURE — 99285 EMERGENCY DEPT VISIT HI MDM: CPT

## 2018-01-09 PROCEDURE — 93010 ELECTROCARDIOGRAM REPORT: CPT

## 2018-01-09 PROCEDURE — 74176 CT ABD & PELVIS W/O CONTRAST: CPT | Mod: 26

## 2018-01-09 PROCEDURE — 71250 CT THORAX DX C-: CPT | Mod: 26

## 2018-01-09 PROCEDURE — 73030 X-RAY EXAM OF SHOULDER: CPT | Mod: 26,LT

## 2018-01-09 PROCEDURE — 70450 CT HEAD/BRAIN W/O DYE: CPT | Mod: 26

## 2018-01-09 RX ORDER — ACETAMINOPHEN 500 MG
975 TABLET ORAL ONCE
Qty: 0 | Refills: 0 | Status: COMPLETED | OUTPATIENT
Start: 2018-01-09 | End: 2018-01-09

## 2018-01-09 RX ADMIN — Medication 975 MILLIGRAM(S): at 15:41

## 2018-01-09 NOTE — ED PROVIDER NOTE - OBJECTIVE STATEMENT
89 y/o M with a PMhx of AICD, CHF, DM, CAD, MI on ASA, HLD, HTN, pancreatitis, pulmonary edema, ESRD on dialysis presents to the ED c/o worsening lower sacral pain secondary to mechanical fall that occurred yesterday morning around 0500 with positive head injury. Pt family states that he was tying his shoe in bathroom with foot up on toilet seat, lost balance and fell. Pt family at bedside states that he has not been able to alleviate pain with Tylenol, called EMS to have him brought in. Pt family states that he is nml able to ambulate on his own, but now cant secondary to pain. Pt currently laying in POC on R side and denies CP, SOB, fever, cough, chills, NVD, abd pain or any other acute c/o at this time. Pt family states that since the fall, he has been experiencing little to no urine output. 89 y/o M with a PMhx of AICD, CHF, DM, CAD, MI on ASA, HLD, HTN, pancreatitis, pulmonary edema, ESRD on dialysis presents to the ED c/o worsening lower sacral pain secondary to mechanical fall that occurred yesterday morning around 0500 with positive head injury. Pt family states that he was tying his shoe in bathroom with foot up on toilet seat, lost balance and fell. Pt family at bedside states that he has not been able to alleviate pain with Tylenol, called EMS to have him brought in. Difficulty with ambulation 2/2 to pain;  denies CP, SOB, fever, cough, chills, NVD, abd pain or any other acute c/o at this time. Pt family states that since the fall.

## 2018-01-09 NOTE — ED PROVIDER NOTE - PROGRESS NOTE DETAILS
Rectal exam performed with positive rectal tone Rectal exam performed with good rectal tone. Himanshu DO: Patient able to ambulate unassisted with no difficulty; able to urinate with no difficulty; instructed to f/u with PMD in 1 day without fail; tylenol prn pain; return to ED for worsening pain or any other concerns.

## 2018-01-09 NOTE — ED ADULT TRIAGE NOTE - CHIEF COMPLAINT QUOTE
Patient presents post fall at 5:00AM yesterday. Patient on aspirin. Reports mechanical fall with walker, reports tailbone pain, left shoulder pain and head pain. States he fell on his bottom and fell back and hit his head

## 2018-01-09 NOTE — ED PROVIDER NOTE - MEDICAL DECISION MAKING DETAILS
89 y/o M c/o worsening sacral pain, L buttock pain secondary to fall that yesterday morning around 0500 with plans to receive xray imaging, CT imaging, Tylenol for pain.

## 2018-01-09 NOTE — ED ADULT NURSE NOTE - OBJECTIVE STATEMENT
Pt states yesterday he fell and hit his tailbone and head but denies LOC.  +ASA.  Pt states he has sacral pain, difficulty ambulating.  Pt also c/o left shoulder pain.  Pt hx HD MWF, right AV fistula.  Pt states he is uncomfortable at this time.  Safety maintained, will continue to monitor.

## 2018-01-09 NOTE — ED ADULT NURSE NOTE - CHPI ED SYMPTOMS NEG
no tingling/no fever/no loss of consciousness/no bleeding/no abrasion/no deformity/no vomiting/no numbness/no confusion

## 2018-01-09 NOTE — ED PROVIDER NOTE - SKIN, MLM
Skin normal color for race, warm, dry and intact. No evidence of rash. RUE AV fistula in place with thrill and bruit.

## 2018-03-16 ENCOUNTER — APPOINTMENT (OUTPATIENT)
Dept: ELECTROPHYSIOLOGY | Facility: CLINIC | Age: 83
End: 2018-03-16
Payer: MEDICARE

## 2018-03-16 VITALS
SYSTOLIC BLOOD PRESSURE: 151 MMHG | WEIGHT: 168 LBS | DIASTOLIC BLOOD PRESSURE: 63 MMHG | BODY MASS INDEX: 30.91 KG/M2 | HEIGHT: 62 IN | HEART RATE: 79 BPM

## 2018-03-16 PROCEDURE — 93284 PRGRMG EVAL IMPLANTABLE DFB: CPT

## 2018-03-16 PROCEDURE — 93290 INTERROG DEV EVAL ICPMS IP: CPT | Mod: 26

## 2018-03-19 NOTE — ED PROVIDER NOTE - NEUROLOGICAL, MLM
[facility] CARDIOLOGY OFFICE FOLLOW UP NOTE       Date of Encounter: 3/20/2018   YOB: 1956   MRN: [id number]   Primary Care Provider: Pravin Bowers DO       CHIEF COMPLAINT:  Patient presents today for 6 month follow-up for history of hypertension, chest pain, coronary calcium score of 0, hyperlipidemia.    ASSESSMENT AND PLAN       Assessment:   1. Essential hypertension, benign    2. Left ventricular diastolic dysfunction    3. Agatston coronary artery calcium score of 0      No orders of the defined types were placed in this encounter.    Return in about 3 months (around 6/20/2018).    RECOMMENDATIONS:  1. Her chest pain is identical in character and severity to the pain she was having when calcium score was performed last year and was 0. We will continue to observe this for the time being.  2. Her blood pressure elevation recently may be related to her pain. I offered to start the patient on a new medication, she would prefer to continue checking this at home and await the results of her procedure and new medication before determining whether or not to start a new medication for blood pressure. This is reasonable and we will proceed with this plan.  3. Follow Dr. Whitfield as scheduled.  4. Patient's cardiac disease is otherwise stable. We'll have her follow-up in 3 months with a log of her blood pressures to assess whether or not to start a new blood pressure medication.       HISTORY OF PRESENT ILLNESS       Bety Pro was seen in follow up at [facility] on 3/20/2018.      She is a very pleasant 61 year old female who presents for 6 month follow-up for history of hypertension, chest pain, coronary calcium score of 0, hyperlipidemia. She has a history of stress test in November 2016 without ischemia with normal LV function. Coronary calcium scores performed in March 2017 which was 0. She also has a history of symptoms that sounds like Raynaud's phenomenon. She is taking  metoprolol for hypertension, this may be contributing. She has a history of intolerance to statins, she takes lovastatin 10 mg tablet. Most recent lipid panel drawn on 2/10/2018 demonstrates total cholesterol 253, HDL 62, , triglyceride 185. She takes daily aspirin, has never smoked.    Patient presents today in follow-up without any cardiac concerns. She continues to have chest pain similar to as she had in the past, this has not changed in character or severity. She also has shortness of breath with activity, but notes this is also unchanged. Her primary concern today is her elevated blood pressure, this averages in the 140s to upper 90s at home, she attributes this primarily to pain. She follows with pain management relative to neck, arm, back pain. She was recently started on new medication for her pain and has a procedure scheduled for April to help with her pain. She is concerned about her elevated blood pressures and would like better control of them. She otherwise denies syncope or near syncope, leg swelling or claudication, orthopnea, palpitations. She mentions she is in a lot of distress today as her mother is dying and she does not believe she will live through tomorrow.    I have reviewed the patient's past medical history, surgical history, family history, social history, allergies, and current medications in the electronic medical record. I verify that they are complete and accurate.       PAST MEDICAL HISTORY     Past Medical History:   Diagnosis Date   • Blood clot associated with vein wall inflammation    • Bronchitis    • Chronic back pain    • Conversion disorder    • DVT (deep venous thrombosis) (CMS/[facility])    • Elevated blood pressure reading without diagnosis of hypertension 7/30/2013   • Failed moderate sedation during procedure     Woke up during general anesthesia and heard physician talking.   • Gastroesophageal reflux disease    • HTN (hypertension)    • Hyponatremia    • IBS  (irritable bowel syndrome)    • Migraine headache    • Occipital neuralgia 5/21/14   • Osteoarthritis    • PONV (postoperative nausea and vomiting)    • Post concussion syndrome 5/9/2013   • RAD (reactive airway disease)    • Reactive airway disease    • Reflex sympathetic dystrophy    • Skin cancer     Moh's procedure @ Dermatology   • Traumatic brain injury (CMS/HCC) 03/12/2013    slipped on the ice at work   • Urinary incontinence      Past Surgical History:   Procedure Laterality Date   • Abdomen surgery     • Appendectomy     • Cholecystectomy     • Colonoscopy  02/11/2011   • Colonoscopy  04/07/2016    Repeat 5 years-Dr. Davis   • Colonoscopy diagnostic  2/2011    Colonoscopy, Dx;repeat 5 years   • Esophagogastroduodenoscopy  02/11/2011   • Esophagogastroduodenoscopy transoral flex diag  1/27/16    EGD w/o Bx   • Foot surgery  1/28/2015    right 1st metatarsal phalangeal fusion, bone marrow aspiration    • Foot surgery Left 09/14/16    left 1st MTP fusion, BMA - Dr. Asad Sheikh    • Laparoscopy sling operation  08/18/2003   • Removal gallbladder     • Transect great occipital nerv       Family History   Problem Relation Age of Onset   • Arthritis Mother    • Heart disease Mother    • High cholesterol Mother    • Psychiatric Mother    • Miscarriages / Stillbirths Mother    • Stroke Mother    • Cancer, Colon Mother 55   • High cholesterol Father    • Cancer, Prostate Father    • Stroke Sister    • High cholesterol Sister    • Diabetes Brother    • High cholesterol Brother    • High cholesterol Sister    • Substance abuse Sister    • Hearing Loss Sister    • High cholesterol Sister    • Diabetes Brother    • * Brother      CABG. Sever allergic rxn during surgery, wound didn't heal for 6-8 months.   • Substance abuse Brother           SOCIAL HISTORY     History   Smoking Status   • Never Smoker   Smokeless Tobacco   • Never Used          ALLERGIES     ALLERGIES:   Allergen Reactions   • Topiramate SHORTNESS OF  BREATH   • Protriptyline HIVES   • Adhesive RASH   • Amlodipine SWELLING and CARDIAC DISTURBANCES   • Cat Dander Other (See Comments)     Allergic Rhinitis, Allergic Conjunctivitis   • Dog Dander Other (See Comments)     Allergic Rhinitis, Allergic Conjunctivitis   • Dust Mite Extract Other (See Comments)     Allergic Rhinitis, Allergic Conjunctivitis   • Grass Other (See Comments)     Allergic Rhinitis, Allergic Conjunctivitis   • Loratadine      [place] her throat was closing up   • Nyquil [[facility] Night Time]      Could not control movement of arms/legs    • Simvastatin      Muscle aches    • Trees Other (See Comments)     Allergic Rhinitis, Allergic Conjunctivitis   • Weeds Other (See Comments)     Allergic Rhinitis, Allergic Conjunctivitis        CURRENT MEDICATIONS       Current Outpatient Prescriptions   Medication Sig Dispense Refill   • losartan (COZAAR) 100 MG tablet Take 1 tablet by mouth daily. 90 tablet 3   • metoPROLOL tartrate (LOPRESSOR) 50 MG tablet TAKE 1 TABLET BY MOUTH TWO  TIMES DAILY 180 tablet 3   • albuterol 108 (90 Base) MCG/ACT inhaler Inhale 2 puffs into the lungs every 4 hours as needed for Shortness of Breath (tight cough). 1 Inhaler 0   • meloxicam (MOBIC) 15 MG tablet TAKE ONE-HALF TABLET BY  MOUTH TWO TIMES DAILY 90 tablet 1   • melatonin 3 MG Take 2 tablets by mouth nightly. 60 tablet 0   • VOLTAREN 1 % gel APPLY TOPICALLY 4 TIMES DAILY TO THE BACK AND NECK 100 g 1   • lovastatin (MEVACOR) 10 MG tablet Take 1 tablet by mouth nightly. 90 tablet 3   • pantoprazole (PROTONIX) 40 MG tablet Take 1 tablet by mouth  daily 90 tablet 3   • metoPROLOL (LOPRESSOR) 50 MG tablet Take 1 tablet by mouth 2 times daily. 14 tablet 1   • fluticasone (FLONASE) 50 MCG/ACT nasal spray Spray 2 sprays in each nostril daily. 1 Bottle 11   • azelastine (ASTELIN) 0.1 % nasal spray Spray 2 sprays in each nostril daily. Use in each nostril as directed 3 Bottle 2   • Polyvinyl Alcohol-Povidone (REFRESH OP) Place 1 drop  into both eyes 3 times daily.      • aspirin 81 MG tablet Take 81 mg by mouth daily.     • fish oil-omega-3 fatty acids 1000 MG CAPS Take 1 capsule by mouth 2 times daily.     • calcium carbonate-vitamin D (CALTRATE+D) 600-400 MG-UNIT per tablet Take 1 tablet by mouth 2 times daily.     • Daily Multiple Vitamins TABS Take 1 tablet by mouth daily.       No current facility-administered medications for this visit.           REVIEW OF SYSTEMS       Constitutional: fatigue, weakness  Eye Problem(s):glasses or contacts  ENT Problem(s):headaches, dizzy lightheaded  Cardiovascular problem(s):chest pain and dyspnea on exertion  Respiratory problem(s):negative  Gastro-intestinal problem(s):heartburn or reflux and nausea, IBS  Genito-urinary problem(s):hesitancy  Musculoskeletal problem(s):arthritis  Integumentary problem(s):negative  Neurological problem(s):unsteady gait, passing our or feeling like passing out  Psychiatric problem(s):anxiety  Endocrine problem(s):negative  Hematologic and/or Lymphatic problem(s):allergies     Patient does take aspirin.      Physical Exam     Visit Vitals  BP (!) 118/96   Pulse 83   Resp 16   Ht 5' 0.35\" (1.533 m)   Wt 75.4 kg   LMP 02/01/2012   SpO2 96%   BMI 32.10 kg/m²       Wt Readings from Last 4 Encounters:   03/20/18 75.4 kg   01/08/18 75 kg   12/26/17 76 kg   11/06/17 75.4 kg       Constitutional:  White  female in no acute distress.  Skin: Warm and dry. No rash.    HEENT:  Normocephalic without evidence of trauma.Conjunctiva clear without scleral icterus.  Mucous membranes moist.  Neck: Supple without JVD.  Carotids pulsations normal strength with normal upstroke. Carotids without bruits.  Lungs:   Breath sounds clear across all lung fields, without wheezes, rales, or rhonchi.  Heart: Regular rate and rhythm.  Normal S1, S2. S4 present without S3. No clear murmur, rub.  Abdomen:  Soft, non-tender, nondistended. Aorta in the midline and not enlarged.  Extremities:  No clubbing or  cyanosis. No edema noted.  Normal strength posterior tibial and dorsalis pedis pulses, equal bilaterally. Normal strength radial pulses, equal bilaterally.   Neurologic:  Alert & oriented x 3. Normal affect and mood.  No gross motor or sensory deficits.      LABS      Lab Results   Component Value Date    POTASSIUM 4.3 10/23/2017    SODIUM 135 10/23/2017    CO2 28 10/23/2017    CHLORIDE 98 10/23/2017    BUN 31 (H) 10/23/2017    CREATININE 0.91 10/23/2017    BNP 20 01/27/2017    GLUCOSE 100 (H) 10/23/2017    WBC 13.7 (H) 10/23/2017    RBC 4.62 10/23/2017    HCT 43.3 10/23/2017    HGB 14.4 10/23/2017     10/23/2017    TSH 3.219 12/05/2016    CHOLESTEROL 253 (H) 02/10/2018    HDL 62 02/10/2018    CHOHDL 4.1 02/10/2018    TRIGLYCERIDE 185 (H) 02/10/2018    CALCLDL 154 (H) 02/10/2018    INR 0.9 04/15/2016       Most Recent Lipid Panel:  CHOLESTEROL (mg/dL)   Date Value   02/10/2018 253 (H)     HDL (mg/dL)   Date Value   02/10/2018 62     CHOL/HDL (no units)   Date Value   02/10/2018 4.1     TRIGLYCERIDE (mg/dL)   Date Value   02/10/2018 185 (H)     CALCULATED LDL (mg/dL)   Date Value   02/10/2018 154 (H)         IMAGING       ECG (10/24/2017)  Sinus rhythm with sinus arrhythmia   Left axis deviation   Low voltage QRS   Cannot rule out Anterior infarct   (cited on or before 10-NOV-2016)   Abnormal ECG   When compared with ECG of 27-JAN-2017 22:21, no significant change noted    ECHOCARDIOGRAM (06/15/2015)  Normal left ventricular size, systolic function and wall thickness, with no regional wall motion abnormalities.  Left ventricular ejection fraction, 66 %.  Grade I/IV diastolic dysfunction (abnormal relaxation filling pattern), normal to mildly elevated filling pressures.  Prominent chiari network in the right atrium (normal variant).  This was more prominent on the previous study but seems very filamentous at this time.  Right ventricular systolic pressure 18.6 mmHg.  Agitated saline was injected through a  peripheral vein and did not show evidence of a shunt.  Compared to prior study, the right atrial presumed chiari network (normal variant) is less prominent. Diastolic dysfunction is also now noted.    NUCLEAR STRESS TEST (11/10/2016)  NUCLEAR INTERPRETATION:  1.  Raw data analysis reveals normal myocardial uptake.  2.  Gated analysis of left ventricular function with TID of 0.93 and lung to heart ratio of 0.35,  both normal.  Ejection fraction 0.76 at rest and 0.81 with stress.  Chamber size and wall motion normal throughout.  Left ventricular hypertrophy appears to be present.  3.  Processed SPECT images with and without CT attenuation correction are within normal limits with stress and at rest.     IMPRESSION:  Normal Cardiolite in terms of left ventricular function and myocardial perfusion.  Study of 06/15/2015 was also normal.    CALCIUM SCORING (04/12/2017)  The coronary calcium score is 0.0.     Dr. Elizabeth was present in the office suite at the time of today's visit and was available for consult.     Jimbo Gonzalez PA-C Cardiology.  3/20/2018  12:10 PM       No focal deficits, no motor or sensory deficits. MAEx4.

## 2018-05-04 ENCOUNTER — INPATIENT (INPATIENT)
Facility: HOSPITAL | Age: 83
LOS: 4 days | Discharge: HOME HEALTH | End: 2018-05-09
Payer: MEDICARE

## 2018-05-04 VITALS
HEIGHT: 66 IN | HEART RATE: 95 BPM | WEIGHT: 190.04 LBS | DIASTOLIC BLOOD PRESSURE: 81 MMHG | OXYGEN SATURATION: 94 % | RESPIRATION RATE: 16 BRPM | SYSTOLIC BLOOD PRESSURE: 172 MMHG | TEMPERATURE: 98 F

## 2018-05-04 DIAGNOSIS — Z95.810 PRESENCE OF AUTOMATIC (IMPLANTABLE) CARDIAC DEFIBRILLATOR: Chronic | ICD-10-CM

## 2018-05-04 DIAGNOSIS — Z96.89 PRESENCE OF OTHER SPECIFIED FUNCTIONAL IMPLANTS: Chronic | ICD-10-CM

## 2018-05-04 LAB
ALBUMIN SERPL ELPH-MCNC: 3.8 G/DL — SIGNIFICANT CHANGE UP (ref 3.3–5)
ALP SERPL-CCNC: 88 U/L — SIGNIFICANT CHANGE UP (ref 40–120)
ALT FLD-CCNC: 31 U/L — SIGNIFICANT CHANGE UP (ref 12–78)
ANION GAP SERPL CALC-SCNC: 9 MMOL/L — SIGNIFICANT CHANGE UP (ref 5–17)
APTT BLD: 25.9 SEC — LOW (ref 27.5–37.4)
AST SERPL-CCNC: 24 U/L — SIGNIFICANT CHANGE UP (ref 15–37)
BASOPHILS # BLD AUTO: 0.07 K/UL — SIGNIFICANT CHANGE UP (ref 0–0.2)
BASOPHILS NFR BLD AUTO: 0.6 % — SIGNIFICANT CHANGE UP (ref 0–2)
BILIRUB SERPL-MCNC: 0.4 MG/DL — SIGNIFICANT CHANGE UP (ref 0.2–1.2)
BUN SERPL-MCNC: 24 MG/DL — HIGH (ref 7–23)
CALCIUM SERPL-MCNC: 8.9 MG/DL — SIGNIFICANT CHANGE UP (ref 8.5–10.1)
CHLORIDE SERPL-SCNC: 96 MMOL/L — SIGNIFICANT CHANGE UP (ref 96–108)
CO2 SERPL-SCNC: 33 MMOL/L — HIGH (ref 22–31)
CREAT SERPL-MCNC: 3.71 MG/DL — HIGH (ref 0.5–1.3)
EOSINOPHIL # BLD AUTO: 0.14 K/UL — SIGNIFICANT CHANGE UP (ref 0–0.5)
EOSINOPHIL NFR BLD AUTO: 1.3 % — SIGNIFICANT CHANGE UP (ref 0–6)
GLUCOSE BLDC GLUCOMTR-MCNC: 151 MG/DL — HIGH (ref 70–99)
GLUCOSE SERPL-MCNC: 135 MG/DL — HIGH (ref 70–99)
HCT VFR BLD CALC: 41.2 % — SIGNIFICANT CHANGE UP (ref 39–50)
HGB BLD-MCNC: 13.6 G/DL — SIGNIFICANT CHANGE UP (ref 13–17)
IMM GRANULOCYTES NFR BLD AUTO: 0.5 % — SIGNIFICANT CHANGE UP (ref 0–1.5)
INR BLD: 0.92 RATIO — SIGNIFICANT CHANGE UP (ref 0.88–1.16)
LACTATE SERPL-SCNC: 1.1 MMOL/L — SIGNIFICANT CHANGE UP (ref 0.7–2)
LYMPHOCYTES # BLD AUTO: 0.47 K/UL — LOW (ref 1–3.3)
LYMPHOCYTES # BLD AUTO: 4.3 % — LOW (ref 13–44)
MCHC RBC-ENTMCNC: 29.2 PG — SIGNIFICANT CHANGE UP (ref 27–34)
MCHC RBC-ENTMCNC: 33 GM/DL — SIGNIFICANT CHANGE UP (ref 32–36)
MCV RBC AUTO: 88.4 FL — SIGNIFICANT CHANGE UP (ref 80–100)
MONOCYTES # BLD AUTO: 1.39 K/UL — HIGH (ref 0–0.9)
MONOCYTES NFR BLD AUTO: 12.8 % — SIGNIFICANT CHANGE UP (ref 2–14)
NEUTROPHILS # BLD AUTO: 8.74 K/UL — HIGH (ref 1.8–7.4)
NEUTROPHILS NFR BLD AUTO: 80.5 % — HIGH (ref 43–77)
NRBC # BLD: 0 /100 WBCS — SIGNIFICANT CHANGE UP (ref 0–0)
PLATELET # BLD AUTO: 175 K/UL — SIGNIFICANT CHANGE UP (ref 150–400)
POTASSIUM SERPL-MCNC: 3.6 MMOL/L — SIGNIFICANT CHANGE UP (ref 3.5–5.3)
POTASSIUM SERPL-SCNC: 3.6 MMOL/L — SIGNIFICANT CHANGE UP (ref 3.5–5.3)
PROT SERPL-MCNC: 8.2 GM/DL — SIGNIFICANT CHANGE UP (ref 6–8.3)
PROTHROM AB SERPL-ACNC: 9.9 SEC — SIGNIFICANT CHANGE UP (ref 9.8–12.7)
RAPID RVP RESULT: DETECTED
RBC # BLD: 4.66 M/UL — SIGNIFICANT CHANGE UP (ref 4.2–5.8)
RBC # FLD: 15 % — HIGH (ref 10.3–14.5)
RV+EV RNA SPEC QL NAA+PROBE: DETECTED
SODIUM SERPL-SCNC: 138 MMOL/L — SIGNIFICANT CHANGE UP (ref 135–145)
WBC # BLD: 10.86 K/UL — HIGH (ref 3.8–10.5)
WBC # FLD AUTO: 10.86 K/UL — HIGH (ref 3.8–10.5)

## 2018-05-04 PROCEDURE — 71045 X-RAY EXAM CHEST 1 VIEW: CPT | Mod: 26

## 2018-05-04 PROCEDURE — 93010 ELECTROCARDIOGRAM REPORT: CPT

## 2018-05-04 PROCEDURE — 99285 EMERGENCY DEPT VISIT HI MDM: CPT

## 2018-05-04 RX ORDER — ACETAMINOPHEN 500 MG
650 TABLET ORAL EVERY 6 HOURS
Qty: 0 | Refills: 0 | Status: DISCONTINUED | OUTPATIENT
Start: 2018-05-04 | End: 2018-05-09

## 2018-05-04 RX ORDER — LISINOPRIL 2.5 MG/1
5 TABLET ORAL DAILY
Qty: 0 | Refills: 0 | Status: DISCONTINUED | OUTPATIENT
Start: 2018-05-04 | End: 2018-05-09

## 2018-05-04 RX ORDER — CEFEPIME 1 G/1
1000 INJECTION, POWDER, FOR SOLUTION INTRAMUSCULAR; INTRAVENOUS ONCE
Qty: 0 | Refills: 0 | Status: COMPLETED | OUTPATIENT
Start: 2018-05-04 | End: 2018-05-04

## 2018-05-04 RX ORDER — ONDANSETRON 8 MG/1
4 TABLET, FILM COATED ORAL EVERY 6 HOURS
Qty: 0 | Refills: 0 | Status: DISCONTINUED | OUTPATIENT
Start: 2018-05-04 | End: 2018-05-09

## 2018-05-04 RX ORDER — SODIUM CHLORIDE 9 MG/ML
3 INJECTION INTRAMUSCULAR; INTRAVENOUS; SUBCUTANEOUS ONCE
Qty: 0 | Refills: 0 | Status: COMPLETED | OUTPATIENT
Start: 2018-05-04 | End: 2018-05-04

## 2018-05-04 RX ORDER — ATORVASTATIN CALCIUM 80 MG/1
40 TABLET, FILM COATED ORAL AT BEDTIME
Qty: 0 | Refills: 0 | Status: DISCONTINUED | OUTPATIENT
Start: 2018-05-04 | End: 2018-05-09

## 2018-05-04 RX ORDER — HEPARIN SODIUM 5000 [USP'U]/ML
5000 INJECTION INTRAVENOUS; SUBCUTANEOUS EVERY 12 HOURS
Qty: 0 | Refills: 0 | Status: DISCONTINUED | OUTPATIENT
Start: 2018-05-04 | End: 2018-05-09

## 2018-05-04 RX ORDER — HYDRALAZINE HCL 50 MG
100 TABLET ORAL EVERY 12 HOURS
Qty: 0 | Refills: 0 | Status: DISCONTINUED | OUTPATIENT
Start: 2018-05-04 | End: 2018-05-06

## 2018-05-04 RX ORDER — LEVOTHYROXINE SODIUM 125 MCG
75 TABLET ORAL DAILY
Qty: 0 | Refills: 0 | Status: DISCONTINUED | OUTPATIENT
Start: 2018-05-04 | End: 2018-05-09

## 2018-05-04 RX ORDER — CARVEDILOL PHOSPHATE 80 MG/1
25 CAPSULE, EXTENDED RELEASE ORAL EVERY 12 HOURS
Qty: 0 | Refills: 0 | Status: DISCONTINUED | OUTPATIENT
Start: 2018-05-04 | End: 2018-05-09

## 2018-05-04 RX ORDER — VANCOMYCIN HCL 1 G
1000 VIAL (EA) INTRAVENOUS ONCE
Qty: 0 | Refills: 0 | Status: COMPLETED | OUTPATIENT
Start: 2018-05-04 | End: 2018-05-04

## 2018-05-04 RX ORDER — ACETAMINOPHEN 500 MG
650 TABLET ORAL ONCE
Qty: 0 | Refills: 0 | Status: COMPLETED | OUTPATIENT
Start: 2018-05-04 | End: 2018-05-04

## 2018-05-04 RX ORDER — DOCUSATE SODIUM 100 MG
100 CAPSULE ORAL THREE TIMES A DAY
Qty: 0 | Refills: 0 | Status: DISCONTINUED | OUTPATIENT
Start: 2018-05-04 | End: 2018-05-09

## 2018-05-04 RX ORDER — ISOSORBIDE MONONITRATE 60 MG/1
60 TABLET, EXTENDED RELEASE ORAL DAILY
Qty: 0 | Refills: 0 | Status: DISCONTINUED | OUTPATIENT
Start: 2018-05-04 | End: 2018-05-09

## 2018-05-04 RX ORDER — CEFEPIME 1 G/1
1000 INJECTION, POWDER, FOR SOLUTION INTRAMUSCULAR; INTRAVENOUS ONCE
Qty: 0 | Refills: 0 | Status: DISCONTINUED | OUTPATIENT
Start: 2018-05-04 | End: 2018-05-04

## 2018-05-04 RX ADMIN — SODIUM CHLORIDE 3 MILLILITER(S): 9 INJECTION INTRAMUSCULAR; INTRAVENOUS; SUBCUTANEOUS at 12:48

## 2018-05-04 RX ADMIN — Medication 50 MILLIGRAM(S): at 18:34

## 2018-05-04 RX ADMIN — Medication 650 MILLIGRAM(S): at 13:08

## 2018-05-04 RX ADMIN — CARVEDILOL PHOSPHATE 25 MILLIGRAM(S): 80 CAPSULE, EXTENDED RELEASE ORAL at 18:33

## 2018-05-04 RX ADMIN — Medication 250 MILLIGRAM(S): at 13:20

## 2018-05-04 RX ADMIN — Medication 100 MILLIGRAM(S): at 22:05

## 2018-05-04 RX ADMIN — ISOSORBIDE MONONITRATE 60 MILLIGRAM(S): 60 TABLET, EXTENDED RELEASE ORAL at 18:33

## 2018-05-04 RX ADMIN — HEPARIN SODIUM 5000 UNIT(S): 5000 INJECTION INTRAVENOUS; SUBCUTANEOUS at 18:35

## 2018-05-04 RX ADMIN — ATORVASTATIN CALCIUM 40 MILLIGRAM(S): 80 TABLET, FILM COATED ORAL at 22:05

## 2018-05-04 RX ADMIN — CEFEPIME 1000 MILLIGRAM(S): 1 INJECTION, POWDER, FOR SOLUTION INTRAMUSCULAR; INTRAVENOUS at 13:10

## 2018-05-04 NOTE — ED PROVIDER NOTE - ENMT, MLM
Airway patent, Nasal mucosa clear. +Mouth with dry mucosa. Throat has no vesicles, no oropharyngeal exudates and uvula is midline.

## 2018-05-04 NOTE — ED ADULT NURSE NOTE - NS ED NURSE PATIENT LEFT UNIT TIME
----- Message from Rodrigue Alexis MD sent at 3/6/2018  8:57 PM CST -----  Please remind patient to schedule 2 week follow up after starting his blood pressure medication. It looks like his script was not sent to the pharmacy last week but should be there now. Thanks    TH   20:48

## 2018-05-04 NOTE — ED PROVIDER NOTE - CARDIAC, MLM
Normal rate, +paced rhythm, pacemaker in left chest wall.  Heart sounds S1, S2.  No murmurs, rubs or gallops.

## 2018-05-04 NOTE — ED ADULT NURSE NOTE - OBJECTIVE STATEMENT
pt presents to ED from diaylsis via EMS, tp awake, alert to swelf and slightly situation, pt denies pain, pt unable to tell me what happened after diayslsis and seems confused at times and falls to sleep during interview, neuros intact, safety maintained will continue to monitor

## 2018-05-04 NOTE — ED PROVIDER NOTE - SKIN, MLM
Skin normal color for race, warm, dry and intact. No evidence of rash. +AV fistula in right forearm, covered with bandage after dialysis, with bruit.

## 2018-05-04 NOTE — ED ADULT NURSE REASSESSMENT NOTE - NS ED NURSE REASSESS COMMENT FT1
IB teamc alled for IV placement, IV obtained to left AC using sono. Phelbotomy called to draw blood work, phelbotomist unable to obtain blood cultures or lactate. as per Dr. cotton - give antibiotics and do not wait to obtain cultures. Zuri adams made aware

## 2018-05-04 NOTE — ED ADULT TRIAGE NOTE - CHIEF COMPLAINT QUOTE
pt brought by EMS states weakness and HTN after dialysis today, received full 4 hours of dialysis today. pt is [place] speaking only, information obtained in [place].

## 2018-05-04 NOTE — H&P ADULT - ASSESSMENT
89 y/o m with PMHx of CHF, DM, CAD, ESRD on dialysis, MI with AICD, HLD, HTN, pancreatitis, pulmonary edema presenting to the ED BIBA from dialysis  c/o fever (100.7), weakness, body aches s/p dialysis today. Pt received full 4 hours of dialysis today. Patient currently is very poor historian, unclear what his baseline mentation. He says he had dialyisis yesturday. Very poor historian. Denies diarrhea, dizziness, cough.    Admit for tempurature of tempurature, lethargy , likely secondary to URI seconadry to Enterovirus  -metabolic encephlopathy secondary to viral prodrome  -hold lasix  -will avoid fluids secondary to dialyiss patient  -discussed case with nursing in ER >> apparently phlebotomy could not get blood cultures; d/w nursing to call re call phlebotomy or call the icu nursing team to come and obtain cultures  -hold abx; symptoms likely secondary to Enterovirus  -nephrology consult  -c/w home meds    dvt proph sc heparin

## 2018-05-04 NOTE — ED PROVIDER NOTE - OBJECTIVE STATEMENT
87 y/o m with PMHx of CHF, DM, CAD, ESRD on dialysis, MI with AICD, HLD, HTN, pancreatitis, pulmonary edema presenting to the ED BIBA c/o fever (100.7), weakness, body aches s/p dialysis today. Pt received full 4 hours of dialysis today. Denies diarrhea, dizziness, cough. PCP Dr. Contreras.  ID# [id number].

## 2018-05-04 NOTE — ED PROVIDER NOTE - MUSCULOSKELETAL, MLM
Spine appears normal, range of motion is not limited, no muscle or joint tenderness. +Generalized weakness, unable to support his core.

## 2018-05-04 NOTE — H&P ADULT - HISTORY OF PRESENT ILLNESS
89 y/o m with PMHx of CHF, DM, CAD, ESRD on dialysis, MI with AICD, HLD, HTN, pancreatitis, pulmonary edema presenting to the ED BIBA c/o fever (100.7), weakness, body aches s/p dialysis today. Pt received full 4 hours of dialysis today. Denies diarrhea, dizziness, cough. PCP Dr. Contreras.  ID# [id number]. 87 y/o m with PMHx of CHF, DM, CAD, ESRD on dialysis, MI with AICD, HLD, HTN, pancreatitis, pulmonary edema presenting to the ED BIBA from dialysis  c/o fever (100.7), weakness, body aches s/p dialysis today. Pt received full 4 hours of dialysis today. Patient currently is very poor historian, unclear what his baseline mentation. He says he had dialyisis yesturday. Very poor historian. Denies diarrhea, dizziness, cough.

## 2018-05-04 NOTE — H&P ADULT - NSHPLABSRESULTS_GEN_ALL_CORE
04 May 2018 12:45    138    |  96     |  24     ----------------------------<  135    3.6     |  33     |  3.71     Ca    8.9        04 May 2018 12:45    TPro  8.2    /  Alb  3.8    /  TBili  0.4    /  DBili  x      /  AST  24     /  ALT  31     /  AlkPhos  88     04 May 2018 12:45  LIVER FUNCTIONS - ( 04 May 2018 12:45 )  Alb: 3.8 g/dL / Pro: 8.2 gm/dL / ALK PHOS: 88 U/L / ALT: 31 U/L / AST: 24 U/L / GGT: x         PT/INR - ( 04 May 2018 12:45 )   PT: 9.9 sec;   INR: 0.92 ratio         PTT - ( 04 May 2018 12:45 )  PTT:25.9 secCBC Full  -  ( 04 May 2018 12:45 )  WBC Count : 10.86 K/uL  Hemoglobin : 13.6 g/dL  Hematocrit : 41.2 %  Platelet Count - Automated : 175 K/uL  Mean Cell Volume : 88.4 fl  Mean Cell Hemoglobin : 29.2 pg  Mean Cell Hemoglobin Concentration : 33.0 gm/dL  Auto Neutrophil # : 8.74 K/uL  Auto Lymphocyte # : 0.47 K/uL  Auto Monocyte # : 1.39 K/uL  Auto Eosinophil # : 0.14 K/uL  Auto Basophil # : 0.07 K/uL  Auto Neutrophil % : 80.5 %  Auto Lymphocyte % : 4.3 %  Auto Monocyte % : 12.8 %  Auto Eosinophil % : 1.3 %  Auto Basophil % : 0.6 %

## 2018-05-04 NOTE — ED ADULT NURSE NOTE - CHIEF COMPLAINT QUOTE
pt brought by EMS states weakness and HTN after dialysis today, received full 4 hours of dialysis today. pt is [place]an speaking only, information obtained in [place]an.

## 2018-05-04 NOTE — H&P ADULT - NSHPPHYSICALEXAM_GEN_ALL_CORE
Vital Signs Last 24 Hrs  T(C): 37.1 (04 May 2018 15:11), Max: 38.2 (04 May 2018 12:01)  T(F): 98.7 (04 May 2018 15:11), Max: 100.7 (04 May 2018 12:01)  HR: 81 (04 May 2018 15:11) (81 - 98)  BP: 150/53 (04 May 2018 15:11) (150/53 - 172/81)  BP(mean): --  RR: 15 (04 May 2018 15:11) (15 - 17)  SpO2: 100% (04 May 2018 15:11) (94% - 100%)    HEENT:   pupils equal and reactive, EOMI, no oropharyngeal lesions, erythema, exudates, oral thrush    NECK:   supple, no carotid bruits, no palpable lymph nodes, no thyromegaly    CV:  +S1, +S2, regular, no murmurs or rubs    RESP:   lungs clear to auscultation bilaterally, no wheezing, rales, rhonchi, good air entry bilaterally    BREAST:  not examined    GI:  abdomen soft, non-tender, non-distended, normal BS, no bruits, no abdominal masses, no palpable masses    RECTAL:  not examined    :  not examined    MSK:   normal muscle tone, no atrophy, no rigidity, no contractions    EXT:   no clubbing, no cyanosis, no edema, no calf pain, swelling or erythema    VASCULAR:  pulses equal and symmetric in the upper and lower extremities    NEURO:  AAOX3, no focal neurological deficits, follows all commands, able to move extremities spontaneously    SKIN:  no ulcers, lesions or rashes

## 2018-05-05 LAB
ANION GAP SERPL CALC-SCNC: 10 MMOL/L — SIGNIFICANT CHANGE UP (ref 5–17)
BASOPHILS # BLD AUTO: 0.06 K/UL — SIGNIFICANT CHANGE UP (ref 0–0.2)
BASOPHILS NFR BLD AUTO: 1 % — SIGNIFICANT CHANGE UP (ref 0–2)
BUN SERPL-MCNC: 46 MG/DL — HIGH (ref 7–23)
CALCIUM SERPL-MCNC: 8.2 MG/DL — LOW (ref 8.5–10.1)
CALCIUM SERPL-MCNC: 8.7 MG/DL — SIGNIFICANT CHANGE UP (ref 8.4–10.5)
CHLORIDE SERPL-SCNC: 98 MMOL/L — SIGNIFICANT CHANGE UP (ref 96–108)
CO2 SERPL-SCNC: 30 MMOL/L — SIGNIFICANT CHANGE UP (ref 22–31)
CREAT SERPL-MCNC: 5.83 MG/DL — HIGH (ref 0.5–1.3)
EOSINOPHIL # BLD AUTO: 0.13 K/UL — SIGNIFICANT CHANGE UP (ref 0–0.5)
EOSINOPHIL NFR BLD AUTO: 2 % — SIGNIFICANT CHANGE UP (ref 0–6)
GLUCOSE SERPL-MCNC: 166 MG/DL — HIGH (ref 70–99)
HAV IGM SER-ACNC: SIGNIFICANT CHANGE UP
HBV CORE IGM SER-ACNC: SIGNIFICANT CHANGE UP
HBV SURFACE AG SER-ACNC: SIGNIFICANT CHANGE UP
HCT VFR BLD CALC: 35.7 % — LOW (ref 39–50)
HCV AB S/CO SERPL IA: 0.12 S/CO — SIGNIFICANT CHANGE UP
HCV AB SERPL-IMP: SIGNIFICANT CHANGE UP
HGB BLD-MCNC: 11.4 G/DL — LOW (ref 13–17)
LYMPHOCYTES # BLD AUTO: 0.52 K/UL — LOW (ref 1–3.3)
LYMPHOCYTES # BLD AUTO: 8 % — LOW (ref 13–44)
MANUAL SMEAR VERIFICATION: SIGNIFICANT CHANGE UP
MCHC RBC-ENTMCNC: 29.3 PG — SIGNIFICANT CHANGE UP (ref 27–34)
MCHC RBC-ENTMCNC: 31.9 GM/DL — LOW (ref 32–36)
MCV RBC AUTO: 91.8 FL — SIGNIFICANT CHANGE UP (ref 80–100)
MONOCYTES # BLD AUTO: 1.1 K/UL — HIGH (ref 0–0.9)
MONOCYTES NFR BLD AUTO: 17 % — HIGH (ref 2–14)
NEUTROPHILS # BLD AUTO: 4.53 K/UL — SIGNIFICANT CHANGE UP (ref 1.8–7.4)
NEUTROPHILS NFR BLD AUTO: 65 % — SIGNIFICANT CHANGE UP (ref 43–77)
NEUTS BAND # BLD: 5 % — SIGNIFICANT CHANGE UP (ref 0–8)
NRBC # BLD: 0 /100 — SIGNIFICANT CHANGE UP (ref 0–0)
NRBC # BLD: SIGNIFICANT CHANGE UP /100 WBCS (ref 0–0)
PLAT MORPH BLD: NORMAL — SIGNIFICANT CHANGE UP
PLATELET # BLD AUTO: 166 K/UL — SIGNIFICANT CHANGE UP (ref 150–400)
POTASSIUM SERPL-MCNC: 4 MMOL/L — SIGNIFICANT CHANGE UP (ref 3.5–5.3)
POTASSIUM SERPL-SCNC: 4 MMOL/L — SIGNIFICANT CHANGE UP (ref 3.5–5.3)
PTH-INTACT FLD-MCNC: 175 PG/ML — HIGH (ref 15–65)
RBC # BLD: 3.89 M/UL — LOW (ref 4.2–5.8)
RBC # FLD: 15.3 % — HIGH (ref 10.3–14.5)
RBC BLD AUTO: NORMAL — SIGNIFICANT CHANGE UP
SODIUM SERPL-SCNC: 138 MMOL/L — SIGNIFICANT CHANGE UP (ref 135–145)
VARIANT LYMPHS # BLD: 2 % — SIGNIFICANT CHANGE UP (ref 0–6)
WBC # BLD: 6.47 K/UL — SIGNIFICANT CHANGE UP (ref 3.8–10.5)
WBC # FLD AUTO: 6.47 K/UL — SIGNIFICANT CHANGE UP (ref 3.8–10.5)

## 2018-05-05 RX ORDER — DOXERCALCIFEROL 2.5 UG/1
1 CAPSULE ORAL
Qty: 0 | Refills: 0 | Status: DISCONTINUED | OUTPATIENT
Start: 2018-05-05 | End: 2018-05-09

## 2018-05-05 RX ADMIN — HEPARIN SODIUM 5000 UNIT(S): 5000 INJECTION INTRAVENOUS; SUBCUTANEOUS at 17:54

## 2018-05-05 RX ADMIN — HEPARIN SODIUM 5000 UNIT(S): 5000 INJECTION INTRAVENOUS; SUBCUTANEOUS at 05:58

## 2018-05-05 RX ADMIN — Medication 75 MICROGRAM(S): at 05:58

## 2018-05-05 RX ADMIN — ATORVASTATIN CALCIUM 40 MILLIGRAM(S): 80 TABLET, FILM COATED ORAL at 22:35

## 2018-05-05 RX ADMIN — ISOSORBIDE MONONITRATE 60 MILLIGRAM(S): 60 TABLET, EXTENDED RELEASE ORAL at 12:27

## 2018-05-05 RX ADMIN — Medication 100 MILLIGRAM(S): at 05:58

## 2018-05-05 RX ADMIN — LISINOPRIL 5 MILLIGRAM(S): 2.5 TABLET ORAL at 12:27

## 2018-05-05 NOTE — PROGRESS NOTE ADULT - ASSESSMENT
89 y/o m with PMHx of CHF, DM, CAD, ESRD on dialysis, MI with AICD, HLD, HTN, pancreatitis, pulmonary edema presenting to the ED BIBA from dialysis  c/o fever (100.7), weakness, body aches s/p dialysis today. Pt received full 4 hours of dialysis today. Patient currently is very poor historian, unclear what his baseline mentation. He says he had dialyisis yesturday. Very poor historian. Denies diarrhea, dizziness, cough.    Admit for tempurature of tempurature, lethargy , likely secondary to URI seconadry to Enterovirus  -metabolic encephlopathy secondary to viral prodrome  -hold lasix  -will avoid fluids secondary to dialyiss patient  -discussed case with nursing in ER >> apparently phlebotomy could not get blood cultures; d/w nursing to call re call phlebotomy or call the icu nursing team to come and obtain cultures  -hold abx; symptoms likely secondary to Enterovirus  -nephrology consult  -c/w home meds    dvt proph sc heparin 87 y/o m with PMHx of Systolic CHF, s/p AICD, DM, CAD, ESRD on dialysis, HLD, HTN, pancreatitis     1. Weakness and Low grade fevers due to Viral URI with dehydraion  - RVP + Enterovirus  - CXR no PNA  - Monitor for fevers, Tylenol PRN   - Supportive care  - encourage oral intake     2. Metabolic  encephelopathy secondary to viral syndrome and dehydration   - supportive care  - Fall precautions    3.  Borderline BP, due to dehydration and poor oral intake in settings of acute viral infection   - had full HD and also on lasix, hold it   - Hold  IVF, encourage and monitor oral intake, d/w RN   - Hold BP meds for now     4. Systolic CHF, Ischemic Cardiomyopathy. EF 40%. CAD. S/p AICD  - no CP, no signs  of overload, dehydrated   - Check ECHO  - Check BNP  - Hold hydralazine and Carvedilol, Lisinopril and Isosorbide will reeval in am     5. ESRD on HD  - monitor  Renal FX  - Renal eval     6. HLD  - c/w lipitor     7. DVT PPxs: on Heparin SQ

## 2018-05-05 NOTE — CONSULT NOTE ADULT - ASSESSMENT
87 y/o m with PMHx of CHF, DM, CAD, ESRD on dialysis, MI with AICD, HLD, HTN, pancreatitis, pulmonary edema presenting to the ED BIBA from dialysis  c/o fever (100.7), weakness, body aches s/p dialysis today. Pt received full 4 hours of dialysis today. Patient currently is very poor historian, unclear what his baseline mentation. He says he had dialysis yesterday Very poor historian. Denies diarrhea, dizziness, cough.  Above noted  feels much better today  dialyzed yesterday  next hd due Monday

## 2018-05-05 NOTE — PROGRESS NOTE ADULT - SUBJECTIVE AND OBJECTIVE BOX
CC: fever (04 May 2018 16:58)    HPI:  87 y/o m with PMHx of CHF, DM, CAD, ESRD on dialysis, MI with AICD, HLD, HTN, pancreatitis, pulmonary edema presenting to the ED BIBA from dialysis  c/o fever (100.7), weakness, body aches s/p dialysis today. Pt received full 4 hours of dialysis today. Patient currently is very poor historian, unclear what his baseline mentation. He says he had dialyisis yesturday. Very poor historian. Denies diarrhea, dizziness, cough. (04 May 2018 16:58)    INTERVAL HPI/OVERNIGHT EVENTS:    Vital Signs Last 24 Hrs  T(C): 36.7 (05 May 2018 11:37), Max: 37 (04 May 2018 19:12)  T(F): 98.1 (05 May 2018 11:37), Max: 98.6 (04 May 2018 19:12)  HR: 67 (05 May 2018 17:42) (67 - 78)  BP: 100/48 (05 May 2018 18:18) (90/41 - 126/45)  BP(mean): --  RR: 18 (05 May 2018 11:37) (16 - 19)  SpO2: 97% (05 May 2018 11:37) (97% - 100%)  I&O's Detail    REVIEW OF SYSTEMS:    CONSTITUTIONAL: No weakness, fevers or chills  EYES/ENT: No visual changes;  No vertigo or throat pain   NECK: No pain or stiffness  RESPIRATORY: No cough, wheezing, hemoptysis; No shortness of breath  CARDIOVASCULAR: No chest pain or palpitations  GASTROINTESTINAL: No abdominal or epigastric pain. No nausea, vomiting, or hematemesis; No diarrhea or constipation. No melena or hematochezia.  GENITOURINARY: No dysuria, frequency or hematuria  NEUROLOGICAL: No numbness or weakness  SKIN: No itching, burning, rashes, or lesions   All other review of systems is negative unless indicated above.  PHYSICAL EXAM:    General: Well developed; well nourished; in no acute distress  Eyes: PERRLA, EOMI; conjunctiva and sclera clear  Head: Normocephalic; atraumatic  ENMT: No nasal discharge; airway clear  Neck: Supple; non tender; no masses  Respiratory: No wheezes, rales or rhonchi  Cardiovascular: Regular rate and rhythm. S1 and S2 Normal; No murmurs, gallops or rubs  Gastrointestinal: Soft non-tender non-distended; Normal bowel sounds  Genitourinary: No costovertebral angle tenderness  Extremities: Normal range of motion, No clubbing, cyanosis or edema  Vascular: Peripheral pulses palpable 2+ bilaterally  Neurological: Alert and oriented x4  Skin: Warm and dry. No acute rash  Lymph Nodes: No acute cervical adenopathy  Musculoskeletal: Normal gait, tone, without deformities  Psychiatric: Cooperative and appropriate                            11.4   6.47  )-----------( 166      ( 05 May 2018 08:45 )             35.7     05 May 2018 08:45    138    |  98     |  46     ----------------------------<  166    4.0     |  30     |  5.83     Ca    8.2        05 May 2018 08:45    TPro  8.2    /  Alb  3.8    /  TBili  0.4    /  DBili  x      /  AST  24     /  ALT  31     /  AlkPhos  88     04 May 2018 12:45    PT/INR - ( 04 May 2018 12:45 )   PT: 9.9 sec;   INR: 0.92 ratio         PTT - ( 04 May 2018 12:45 )  PTT:25.9 sec  CAPILLARY BLOOD GLUCOSE      POCT Blood Glucose.: 224 mg/dL (04 May 2018 22:20)    LIVER FUNCTIONS - ( 04 May 2018 12:45 )  Alb: 3.8 g/dL / Pro: 8.2 gm/dL / ALK PHOS: 88 U/L / ALT: 31 U/L / AST: 24 U/L / GGT: x               MEDICATIONS  (STANDING):  atorvastatin 40 milliGRAM(s) Oral at bedtime  carvedilol 25 milliGRAM(s) Oral every 12 hours  docusate sodium 100 milliGRAM(s) Oral three times a day  doxercalciferol Injectable 1 MICROGram(s) IV Push <User Schedule>  heparin  Injectable 5000 Unit(s) SubCutaneous every 12 hours  hydrALAZINE 100 milliGRAM(s) Oral every 12 hours  isosorbide   mononitrate ER Tablet (IMDUR) 60 milliGRAM(s) Oral daily  levothyroxine 75 MICROGram(s) Oral daily  lisinopril 5 milliGRAM(s) Oral daily    MEDICATIONS  (PRN):  acetaminophen   Tablet 650 milliGRAM(s) Oral every 6 hours PRN For Temp greater than 38 C (100.4 F) or  mild pain  ondansetron Injectable 4 milliGRAM(s) IV Push every 6 hours PRN Nausea      RADIOLOGY & ADDITIONAL TESTS: CC: fever (04 May 2018 16:58)    HPI:  89 y/o m with PMHx of Systolic CHF, s/p AICD, DM, CAD, ESRD on dialysis, HLD, HTN, pancreatitis presented to the ED BIBA from dialysis  c/o fever (100.7), weakness, body aches s/p dialysis today. Pt received full 4 hours of dialysis today. Patient currently is very poor historian, unclear what his baseline mentation. He says he had dialysis yesterday.  Very poor historian. Denies diarrhea, dizziness, cough. (04 May 2018 16:58)    INTERVAL HPI/ OVERNIGHT EVENTS: Chart reviewed, Pt was seen and examined, reports that felt weak and legs  gave up when got up at Dialysis.  Walking to the bathroom today,  still  some lightheadedness. Admits poor liquids intake. Denies SOB or CP, states has some "allergies" past week.   Was told that had low grade fever yesterday.     Vital Signs Last 24 Hrs  T(C): 36.7 (05 May 2018 11:37), Max: 37 (04 May 2018 19:12)  T(F): 98.1 (05 May 2018 11:37), Max: 98.6 (04 May 2018 19:12)  HR: 67 (05 May 2018 17:42) (67 - 78)  BP: 100/48 (05 May 2018 18:18) (90/41 - 126/45)  RR: 18 (05 May 2018 11:37) (16 - 19)  SpO2: 97% (05 May 2018 11:37) (97% - 100%)      REVIEW OF SYSTEMS:  All other review of systems is negative unless indicated above.      PHYSICAL EXAM:  General: Well developed; well nourished; in no acute distress  Eyes: PERRLA, EOMI; conjunctiva and sclera clear  Head: Normocephalic; atraumatic  ENMT: No nasal discharge; airway clear  Neck: Supple; non tender; no masses  Respiratory: Good air entry. No wheezes, rales or rhonchi  Cardiovascular: Regular rate and rhythm. S1 and S2 Normal; No murmurs  Gastrointestinal: Soft non-tender non-distended; Normal bowel sounds  Genitourinary: No costovertebral angle tenderness  Extremities: Normal range of motion, No  edema  Vascular: Peripheral pulses palpable 2+ bilaterally  Neurological: Alert and oriented x4, non focal   Skin: Warm and dry. No acute rash  Musculoskeletal: Normal muscle  tone, without deformities  Psychiatric: Cooperative and appropriate         LABS:                      11.4   6.47  )-----------( 166      ( 05 May 2018 08:45 )             35.7     05 May 2018 08:45    138    |  98     |  46     ----------------------------<  166    4.0     |  30     |  5.83     Ca    8.2        05 May 2018 08:45    TPro  8.2    /  Alb  3.8    /  TBili  0.4    /  DBili  x      /  AST  24     /  ALT  31     /  AlkPhos  88     04 May 2018 12:45    PT/INR - ( 04 May 2018 12:45 )   PT: 9.9 sec;   INR: 0.92 ratio    PTT - ( 04 May 2018 12:45 )  PTT:25.9 sec    LIVER FUNCTIONS - ( 04 May 2018 12:45 )  Alb: 3.8 g/dL / Pro: 8.2 gm/dL / ALK PHOS: 88 U/L / ALT: 31 U/L / AST: 24 U/L / GGT: x           CAPILLARY BLOOD GLUCOSE  POCT Blood Glucose.: 224 mg/dL (04 May 2018 22:20)      MEDICATIONS  (STANDING):  atorvastatin 40 milliGRAM(s) Oral at bedtime  carvedilol 25 milliGRAM(s) Oral every 12 hours  docusate sodium 100 milliGRAM(s) Oral three times a day  doxercalciferol Injectable 1 MICROGram(s) IV Push <User Schedule>  heparin  Injectable 5000 Unit(s) SubCutaneous every 12 hours  hydrALAZINE 100 milliGRAM(s) Oral every 12 hours  isosorbide   mononitrate ER Tablet (IMDUR) 60 milliGRAM(s) Oral daily  levothyroxine 75 MICROGram(s) Oral daily  lisinopril 5 milliGRAM(s) Oral daily    MEDICATIONS  (PRN):  acetaminophen   Tablet 650 milliGRAM(s) Oral every 6 hours PRN For Temp greater than 38 C (100.4 F) or  mild pain  ondansetron Injectable 4 milliGRAM(s) IV Push every 6 hours PRN Nausea        RADIOLOGY & ADDITIONAL TESTS:    EXAM:  XR CHEST AP OR PA 1V                          Single frontal view of chest dated 5/4/2018 1:55 PM, compared to prior   study of 2/1/2017    IMPRESSION:   Is shallow inspiration. Bibasilar atelectasis. Borderline cardiomegaly   with pacemaker/ICD unchanged.    There are degenerative changes.

## 2018-05-05 NOTE — CONSULT NOTE ADULT - SUBJECTIVE AND OBJECTIVE BOX
NEPHROLOGY CONSULT  HPI:  89 y/o m with PMHx of CHF, DM, CAD, ESRD on dialysis, MI with AICD, HLD, HTN, pancreatitis, pulmonary edema presenting to the ED BIBA from dialysis  c/o fever (100.7), weakness, body aches s/p dialysis today. Pt received full 4 hours of dialysis today. Patient currently is very poor historian, unclear what his baseline mentation. He says he had dialysis yesterday Very poor historian. Denies diarrhea, dizziness, cough.  Above noted  feels much better today  dialyzed yesterday  next hd due monday      PAST MEDICAL & SURGICAL HISTORY:  Pulmonary edema  Pancreatitis  AICD (automatic cardioverter/defibrillator) present  CHF (Congestive Heart Failure)  HTN (Hypertension)  High Cholesterol  Heart Attack  Dialysis Patient  Diabetes  AICD (automatic cardioverter/defibrillator) present  History of penile implant  History of Hernia Repair      FAMILY HISTORY:  No pertinent family history in first degree relatives      MEDICATIONS  (STANDING):  atorvastatin 40 milliGRAM(s) Oral at bedtime  carvedilol 25 milliGRAM(s) Oral every 12 hours  docusate sodium 100 milliGRAM(s) Oral three times a day  doxercalciferol Injectable 1 MICROGram(s) IV Push <User Schedule>  heparin  Injectable 5000 Unit(s) SubCutaneous every 12 hours  hydrALAZINE 100 milliGRAM(s) Oral every 12 hours  isosorbide   mononitrate ER Tablet (IMDUR) 60 milliGRAM(s) Oral daily  levothyroxine 75 MICROGram(s) Oral daily  lisinopril 5 milliGRAM(s) Oral daily    MEDICATIONS  (PRN):  acetaminophen   Tablet 650 milliGRAM(s) Oral every 6 hours PRN For Temp greater than 38 C (100.4 F) or  mild pain  ondansetron Injectable 4 milliGRAM(s) IV Push every 6 hours PRN Nausea      Allergies    No Known Allergies    Intolerances        I&O's Summary        REVIEW OF SYSTEMS:    all negative except for new onset productive cough      Vital Signs Last 24 Hrs  T(C): 36.7 (05 May 2018 11:37), Max: 37 (05 May 2018 05:48)  T(F): 98.1 (05 May 2018 11:37), Max: 98.6 (05 May 2018 05:48)  HR: 67 (05 May 2018 17:42) (67 - 72)  BP: 100/48 (05 May 2018 18:18) (90/41 - 126/45)  BP(mean): --  RR: 18 (05 May 2018 11:37) (18 - 19)  SpO2: 97% (05 May 2018 11:37) (97% - 100%)  Daily     Daily   I&O's Summary      PHYSICAL EXAM:    General:  Alert, well-developed ,No acute distress.    Neuro:  alert    HEENT:  No JVD,    Cardiovascular:  Regular rate and rhythm    Lungs:  clear. no rales, no wheezing, .    Abdomen:  Normoactive bowel sounds. Soft, flat, non-tender    Skin:  Warm, dry, well-perfused. No rashes or other lesions.     Extremities:  warm no edema    LABS:                        11.4   6.47  )-----------( 166      ( 05 May 2018 08:45 )             35.7     05-05    138  |  98  |  46<H>  ----------------------------<  166<H>  4.0   |  30  |  5.83<H>    Ca    8.2<L>      05 May 2018 08:45    TPro  8.2  /  Alb  3.8  /  TBili  0.4  /  DBili  x   /  AST  24  /  ALT  31  /  AlkPhos  88  05-04    PT/INR - ( 04 May 2018 12:45 )   PT: 9.9 sec;   INR: 0.92 ratio         PTT - ( 04 May 2018 12:45 )  PTT:25.9 sec

## 2018-05-06 LAB
ANION GAP SERPL CALC-SCNC: 11 MMOL/L — SIGNIFICANT CHANGE UP (ref 5–17)
APPEARANCE UR: CLEAR — SIGNIFICANT CHANGE UP
BACTERIA # UR AUTO: (no result)
BILIRUB UR-MCNC: NEGATIVE — SIGNIFICANT CHANGE UP
BUN SERPL-MCNC: 69 MG/DL — HIGH (ref 7–23)
CALCIUM SERPL-MCNC: 8.4 MG/DL — LOW (ref 8.5–10.1)
CHLORIDE SERPL-SCNC: 97 MMOL/L — SIGNIFICANT CHANGE UP (ref 96–108)
CO2 SERPL-SCNC: 26 MMOL/L — SIGNIFICANT CHANGE UP (ref 22–31)
COLOR SPEC: YELLOW — SIGNIFICANT CHANGE UP
CREAT SERPL-MCNC: 7.39 MG/DL — HIGH (ref 0.5–1.3)
DIFF PNL FLD: (no result)
EPI CELLS # UR: SIGNIFICANT CHANGE UP
GLUCOSE SERPL-MCNC: 193 MG/DL — HIGH (ref 70–99)
GLUCOSE UR QL: 50 MG/DL
HCT VFR BLD CALC: 34 % — LOW (ref 39–50)
HGB BLD-MCNC: 10.9 G/DL — LOW (ref 13–17)
KETONES UR-MCNC: NEGATIVE — SIGNIFICANT CHANGE UP
LEUKOCYTE ESTERASE UR-ACNC: (no result)
MCHC RBC-ENTMCNC: 29 PG — SIGNIFICANT CHANGE UP (ref 27–34)
MCHC RBC-ENTMCNC: 32.1 GM/DL — SIGNIFICANT CHANGE UP (ref 32–36)
MCV RBC AUTO: 90.4 FL — SIGNIFICANT CHANGE UP (ref 80–100)
NITRITE UR-MCNC: NEGATIVE — SIGNIFICANT CHANGE UP
NRBC # BLD: 0 /100 WBCS — SIGNIFICANT CHANGE UP (ref 0–0)
NT-PROBNP SERPL-SCNC: 2967 PG/ML — HIGH (ref 0–450)
PH UR: 6 — SIGNIFICANT CHANGE UP (ref 5–8)
PHOSPHATE SERPL-MCNC: 6.2 MG/DL — HIGH (ref 2.5–4.5)
PLATELET # BLD AUTO: 173 K/UL — SIGNIFICANT CHANGE UP (ref 150–400)
POTASSIUM SERPL-MCNC: 4.8 MMOL/L — SIGNIFICANT CHANGE UP (ref 3.5–5.3)
POTASSIUM SERPL-SCNC: 4.8 MMOL/L — SIGNIFICANT CHANGE UP (ref 3.5–5.3)
PROT UR-MCNC: 500 MG/DL
RBC # BLD: 3.76 M/UL — LOW (ref 4.2–5.8)
RBC # FLD: 14.8 % — HIGH (ref 10.3–14.5)
RBC CASTS # UR COMP ASSIST: SIGNIFICANT CHANGE UP /HPF (ref 0–4)
SODIUM SERPL-SCNC: 134 MMOL/L — LOW (ref 135–145)
SP GR SPEC: 1.01 — SIGNIFICANT CHANGE UP (ref 1.01–1.02)
TROPONIN I SERPL-MCNC: 0.07 NG/ML — HIGH (ref 0.01–0.04)
UROBILINOGEN FLD QL: NEGATIVE MG/DL — SIGNIFICANT CHANGE UP
WBC # BLD: 6.27 K/UL — SIGNIFICANT CHANGE UP (ref 3.8–10.5)
WBC # FLD AUTO: 6.27 K/UL — SIGNIFICANT CHANGE UP (ref 3.8–10.5)
WBC UR QL: SIGNIFICANT CHANGE UP

## 2018-05-06 PROCEDURE — 93010 ELECTROCARDIOGRAM REPORT: CPT

## 2018-05-06 RX ORDER — DEXTROSE 50 % IN WATER 50 %
25 SYRINGE (ML) INTRAVENOUS ONCE
Qty: 0 | Refills: 0 | Status: DISCONTINUED | OUTPATIENT
Start: 2018-05-06 | End: 2018-05-09

## 2018-05-06 RX ORDER — TAMSULOSIN HYDROCHLORIDE 0.4 MG/1
0.4 CAPSULE ORAL AT BEDTIME
Qty: 0 | Refills: 0 | Status: DISCONTINUED | OUTPATIENT
Start: 2018-05-06 | End: 2018-05-09

## 2018-05-06 RX ORDER — SODIUM CHLORIDE 9 MG/ML
400 INJECTION, SOLUTION INTRAVENOUS
Qty: 0 | Refills: 0 | Status: DISCONTINUED | OUTPATIENT
Start: 2018-05-06 | End: 2018-05-09

## 2018-05-06 RX ORDER — HYDRALAZINE HCL 50 MG
50 TABLET ORAL EVERY 12 HOURS
Qty: 0 | Refills: 0 | Status: DISCONTINUED | OUTPATIENT
Start: 2018-05-06 | End: 2018-05-08

## 2018-05-06 RX ORDER — DEXTROSE 50 % IN WATER 50 %
12.5 SYRINGE (ML) INTRAVENOUS ONCE
Qty: 0 | Refills: 0 | Status: DISCONTINUED | OUTPATIENT
Start: 2018-05-06 | End: 2018-05-09

## 2018-05-06 RX ORDER — DEXTROSE 50 % IN WATER 50 %
1 SYRINGE (ML) INTRAVENOUS ONCE
Qty: 0 | Refills: 0 | Status: DISCONTINUED | OUTPATIENT
Start: 2018-05-06 | End: 2018-05-09

## 2018-05-06 RX ORDER — INSULIN LISPRO 100/ML
VIAL (ML) SUBCUTANEOUS AT BEDTIME
Qty: 0 | Refills: 0 | Status: DISCONTINUED | OUTPATIENT
Start: 2018-05-06 | End: 2018-05-09

## 2018-05-06 RX ORDER — GLUCAGON INJECTION, SOLUTION 0.5 MG/.1ML
1 INJECTION, SOLUTION SUBCUTANEOUS ONCE
Qty: 0 | Refills: 0 | Status: DISCONTINUED | OUTPATIENT
Start: 2018-05-06 | End: 2018-05-09

## 2018-05-06 RX ORDER — INSULIN LISPRO 100/ML
VIAL (ML) SUBCUTANEOUS
Qty: 0 | Refills: 0 | Status: DISCONTINUED | OUTPATIENT
Start: 2018-05-06 | End: 2018-05-09

## 2018-05-06 RX ORDER — SODIUM CHLORIDE 9 MG/ML
1000 INJECTION, SOLUTION INTRAVENOUS
Qty: 0 | Refills: 0 | Status: DISCONTINUED | OUTPATIENT
Start: 2018-05-06 | End: 2018-05-09

## 2018-05-06 RX ORDER — LIDOCAINE HCL 20 MG/ML
2 VIAL (ML) INJECTION ONCE
Qty: 0 | Refills: 0 | Status: COMPLETED | OUTPATIENT
Start: 2018-05-06 | End: 2018-05-07

## 2018-05-06 RX ADMIN — Medication 100 MILLIGRAM(S): at 21:27

## 2018-05-06 RX ADMIN — Medication 100 MILLIGRAM(S): at 06:01

## 2018-05-06 RX ADMIN — Medication 75 MICROGRAM(S): at 06:01

## 2018-05-06 RX ADMIN — TAMSULOSIN HYDROCHLORIDE 0.4 MILLIGRAM(S): 0.4 CAPSULE ORAL at 21:27

## 2018-05-06 RX ADMIN — ATORVASTATIN CALCIUM 40 MILLIGRAM(S): 80 TABLET, FILM COATED ORAL at 21:27

## 2018-05-06 RX ADMIN — LISINOPRIL 5 MILLIGRAM(S): 2.5 TABLET ORAL at 06:31

## 2018-05-06 RX ADMIN — SODIUM CHLORIDE 50 MILLILITER(S): 9 INJECTION, SOLUTION INTRAVENOUS at 13:17

## 2018-05-06 RX ADMIN — HEPARIN SODIUM 5000 UNIT(S): 5000 INJECTION INTRAVENOUS; SUBCUTANEOUS at 06:01

## 2018-05-06 RX ADMIN — HEPARIN SODIUM 5000 UNIT(S): 5000 INJECTION INTRAVENOUS; SUBCUTANEOUS at 17:47

## 2018-05-06 RX ADMIN — CARVEDILOL PHOSPHATE 25 MILLIGRAM(S): 80 CAPSULE, EXTENDED RELEASE ORAL at 06:31

## 2018-05-06 RX ADMIN — Medication 4: at 17:48

## 2018-05-06 NOTE — PROGRESS NOTE ADULT - ASSESSMENT
87 y/o m with PMHx of Systolic CHF, s/p AICD, DM, CAD, ESRD on dialysis, HLD, HTN, pancreatitis     1. Weakness and Low grade fevers due to Viral URI with dehydration  - now with loose stool. R/o C.Diff   - RVP + Enterovirus  - CXR no PNA  - Monitor for fevers, Tylenol PRN   - Supportive care  - encourage oral intake   - Started on gentle IVF for hydration     2. Metabolic  encephelopathy secondary to viral syndrome and dehydration, improved   - supportive care  - Fall precautions    3.  Borderline BP, due to dehydration and poor oral intake in settings of acute viral infection   - had full HD and also on lasix, hold it   - BP better, still on lower side  - Decreased Hydralazine  - 400ml of gentle IV  hydration overnight     4. Systolic CHF,  Likely Ischemic Cardiomyopathy. EF 40%. CAD. S/p AICD  - no CP, no signs  of overload, dehydrated   - Check ECHO  - BNP elevated, but clinically not in failure   - C/w  hydralazine and Carvedilol, Lisinopril and Isosorbide, dose adjusted   - Mildly elevated troponins, no CP  - cardio eval   -EP eval for PPM interrogation       5. ESRD on HD  - monitor  Renal FX  -  For HD in am  - D/w DR Lozano     6. HLD  - c/w lipitor     7. DM   diabetic diet  Monitor BS and cover with ISS     8. Urinary retention and urgency  bladder scan PRN if still retaining will need Urology eval  Start Flomax   Will send UA and UCX     9. DVT PPxs: on Heparin SQ

## 2018-05-06 NOTE — PROGRESS NOTE ADULT - SUBJECTIVE AND OBJECTIVE BOX
CC: fever (04 May 2018 16:58)    HPI:  87 y/o m with PMHx of Systolic CHF, s/p AICD, DM, CAD, ESRD on dialysis, HLD, HTN, pancreatitis presented to the ED BIBA from dialysis  c/o fever (100.7), weakness, body aches s/p dialysis today. Pt received full 4 hours of dialysis today. Patient currently is very poor historian, unclear what his baseline mentation. He says he had dialysis yesterday.  Very poor historian. Denies diarrhea, dizziness, cough. (04 May 2018 16:58)    INTERVAL HPI/ OVERNIGHT EVENTS:  Pt was seen and examined with RN, reports that feels better now, had few loose stools this am and x5 overnight. No abd pain, no N/V. Denies CP.  LAso was c/o urgency and was not able to void, was straight cathed and about 400ml  drained. Pt  reports that had similar episode few years ago, no on meds and not following Urology. As per RN felt resistance at meatus and distal urethra Pt has penile balloon pump. Pt states that has no cardiologist and was seen by DR Garza about 3 years ago      Vital Signs Last 24 Hrs  T(C): 36.3 (06 May 2018 12:27), Max: 36.8 (05 May 2018 21:07)  T(F): 97.4 (06 May 2018 12:27), Max: 98.2 (05 May 2018 21:07)  HR: 61 (06 May 2018 12:27) (61 - 70)  BP: 111/32 (06 May 2018 12:27) (94/41 - 141/51)  RR: 19 (06 May 2018 12:27) (16 - 19)  SpO2: 100% (06 May 2018 12:27) (98% - 100%)      REVIEW OF SYSTEMS:  All other review of systems is negative unless indicated above.      PHYSICAL EXAM:  General: Well developed; well nourished; in no acute distress  Eyes: PERRLA, EOMI; conjunctiva and sclera clear  Head: Normocephalic; atraumatic  ENMT: No nasal discharge; airway clear  Neck: Supple; non tender; no masses  Respiratory: Good air entry. No wheezes, rales or rhonchi  Cardiovascular: Regular rate and rhythm. S1 and S2 Normal; No murmurs  Gastrointestinal: Soft non-tender, distended; Normal bowel sounds  Genitourinary: No costovertebral angle tenderness  Extremities: Normal range of motion, No  edema  Vascular: Peripheral pulses palpable 2+ bilaterally  Neurological: Alert and oriented x4, non focal   Skin: Warm and dry. No acute rash  Musculoskeletal: Normal muscle  tone, without deformities  Psychiatric: Cooperative and appropriate         LABS:                         10.9   6.27  )-----------( 173      ( 06 May 2018 07:39 )             34.0     05-    134<L>  |  97  |  69<H>  ----------------------------<  193<H>  4.8   |  26  |  7.39<H>    Ca    8.4<L>      06 May 2018 07:39  Phos  6.2     05-06      CARDIAC MARKERS ( 06 May 2018 07:39 )  0.065 ng/mL / x     / x     / x     / x          Urinalysis Basic - ( 06 May 2018 14:00 )    Color: Yellow / Appearance: Clear / S.015 / pH: x  Gluc: x / Ketone: Negative  / Bili: Negative / Urobili: Negative mg/dL   Blood: x / Protein: 500 mg/dL / Nitrite: Negative   Leuk Esterase: Trace / RBC: 0-2 /HPF / WBC 3-5   Sq Epi: x / Non Sq Epi: Few / Bacteria: Many                             11.4   6.47  )-----------( 166      ( 05 May 2018 08:45 )             35.7     05 May 2018 08:45    138    |  98     |  46     ----------------------------<  166    4.0     |  30     |  5.83     Ca    8.2        05 May 2018 08:45    TPro  8.2    /  Alb  3.8    /  TBili  0.4    /  DBili  x      /  AST  24     /  ALT  31     /  AlkPhos  88     04 May 2018 12:45    PT/INR - ( 04 May 2018 12:45 )   PT: 9.9 sec;   INR: 0.92 ratio    PTT - ( 04 May 2018 12:45 )  PTT:25.9 sec    LIVER FUNCTIONS - ( 04 May 2018 12:45 )  Alb: 3.8 g/dL / Pro: 8.2 gm/dL / ALK PHOS: 88 U/L / ALT: 31 U/L / AST: 24 U/L / GGT: x           CAPILLARY BLOOD GLUCOSE  POCT Blood Glucose.: 224 mg/dL (04 May 2018 22:20)    MEDICATIONS  (STANDING):  atorvastatin 40 milliGRAM(s) Oral at bedtime  carvedilol 25 milliGRAM(s) Oral every 12 hours  dextrose 5%. 1000 milliLiter(s) (50 mL/Hr) IV Continuous <Continuous>  dextrose 50% Injectable 12.5 Gram(s) IV Push once  dextrose 50% Injectable 25 Gram(s) IV Push once  dextrose 50% Injectable 25 Gram(s) IV Push once  docusate sodium 100 milliGRAM(s) Oral three times a day  doxercalciferol Injectable 1 MICROGram(s) IV Push <User Schedule>  heparin  Injectable 5000 Unit(s) SubCutaneous every 12 hours  hydrALAZINE 100 milliGRAM(s) Oral every 12 hours  insulin lispro (HumaLOG) corrective regimen sliding scale   SubCutaneous three times a day before meals  insulin lispro (HumaLOG) corrective regimen sliding scale   SubCutaneous at bedtime  isosorbide   mononitrate ER Tablet (IMDUR) 60 milliGRAM(s) Oral daily  levothyroxine 75 MICROGram(s) Oral daily  lidocaine 2% Jelly 2 milliLiter(s) IntraUrethral once  lisinopril 5 milliGRAM(s) Oral daily  sodium chloride 0.45%. 400 milliLiter(s) (50 mL/Hr) IV Continuous <Continuous>  tamsulosin 0.4 milliGRAM(s) Oral at bedtime    MEDICATIONS  (PRN):  acetaminophen   Tablet 650 milliGRAM(s) Oral every 6 hours PRN For Temp greater than 38 C (100.4 F) or  mild pain  dextrose Gel 1 Dose(s) Oral once PRN Blood Glucose LESS THAN 70 milliGRAM(s)/deciliter  glucagon  Injectable 1 milliGRAM(s) IntraMuscular once PRN Glucose LESS THAN 70 milligrams/deciliter  ondansetron Injectable 4 milliGRAM(s) IV Push every 6 hours PRN Nausea        RADIOLOGY & ADDITIONAL TESTS:    EXAM:  XR CHEST AP OR PA 1V                          Single frontal view of chest dated 2018 1:55 PM, compared to prior   study of 2017    IMPRESSION:   Is shallow inspiration. Bibasilar atelectasis. Borderline cardiomegaly   with pacemaker/ICD unchanged.    There are degenerative changes.

## 2018-05-06 NOTE — PROGRESS NOTE ADULT - ASSESSMENT
87 y/o m with PMHx of CHF, DM, CAD, ESRD on dialysis, MI with AICD, HLD, HTN, pancreatitis, pulmonary edema presenting to the ED BIBA from dialysis  c/o fever (100.7), weakness, body aches s/p dialysis today. Pt received full 4 hours of dialysis today. Patient currently is very poor historian, unclear what his baseline mentation. He says he had dialysis yesterday Very poor historian. Denies diarrhea, dizziness, cough.  Above noted  feels much better today  dialyzed yesterday  next hd due Monday 5/6  Diarrhea  r/o c dif  straight cath urine c/s  1/2 NS x 400 ml  hd monday

## 2018-05-06 NOTE — PROGRESS NOTE ADULT - SUBJECTIVE AND OBJECTIVE BOX
NEPHROLOGY CONSULT  HPI:  87 y/o m with PMHx of CHF, DM, CAD, ESRD on dialysis, MI with AICD, HLD, HTN, pancreatitis, pulmonary edema presenting to the ED BIBA from dialysis  c/o fever (100.7), weakness, body aches s/p dialysis today. Pt received full 4 hours of dialysis today. Patient currently is very poor historian, unclear what his baseline mentation. He says he had dialysis yesterday Very poor historian. Denies diarrhea, dizziness, cough.  Above noted  feels much better today  dialyzed yesterday  next hd due Monday    5/6  diarrhea overnight, low BP  c/o inability to urinate  minimal fluid intake  instruvted pt to increase fluid intake            PAST MEDICAL & SURGICAL HISTORY:  Pulmonary edema  Pancreatitis  AICD (automatic cardioverter/defibrillator) present  CHF (Congestive Heart Failure)  HTN (Hypertension)  High Cholesterol  Heart Attack  Dialysis Patient  Diabetes  AICD (automatic cardioverter/defibrillator) present  History of penile implant  History of Hernia Repair      FAMILY HISTORY:  No pertinent family history in first degree relatives      MEDICATIONS  (STANDING):  atorvastatin 40 milliGRAM(s) Oral at bedtime  carvedilol 25 milliGRAM(s) Oral every 12 hours  dextrose 5%. 1000 milliLiter(s) (50 mL/Hr) IV Continuous <Continuous>  dextrose 50% Injectable 12.5 Gram(s) IV Push once  dextrose 50% Injectable 25 Gram(s) IV Push once  dextrose 50% Injectable 25 Gram(s) IV Push once  docusate sodium 100 milliGRAM(s) Oral three times a day  doxercalciferol Injectable 1 MICROGram(s) IV Push <User Schedule>  heparin  Injectable 5000 Unit(s) SubCutaneous every 12 hours  hydrALAZINE 100 milliGRAM(s) Oral every 12 hours  insulin lispro (HumaLOG) corrective regimen sliding scale   SubCutaneous three times a day before meals  insulin lispro (HumaLOG) corrective regimen sliding scale   SubCutaneous at bedtime  isosorbide   mononitrate ER Tablet (IMDUR) 60 milliGRAM(s) Oral daily  levothyroxine 75 MICROGram(s) Oral daily  lidocaine 2% Jelly 2 milliLiter(s) IntraUrethral once  lisinopril 5 milliGRAM(s) Oral daily  sodium chloride 0.45%. 400 milliLiter(s) (50 mL/Hr) IV Continuous <Continuous>    MEDICATIONS  (PRN):  acetaminophen   Tablet 650 milliGRAM(s) Oral every 6 hours PRN For Temp greater than 38 C (100.4 F) or  mild pain  dextrose Gel 1 Dose(s) Oral once PRN Blood Glucose LESS THAN 70 milliGRAM(s)/deciliter  glucagon  Injectable 1 milliGRAM(s) IntraMuscular once PRN Glucose LESS THAN 70 milligrams/deciliter  ondansetron Injectable 4 milliGRAM(s) IV Push every 6 hours PRN Nausea      Allergies    No Known Allergies    Intolerances        I&O's Summary        REVIEW OF SYSTEMS:    all negative  except diff urinating  less cough   diarrhea        PHYSICAL EXAM:    General:  Alert, well-developed ,No acute distress.    Neuro:  alert    HEENT:  No JVD,    Cardiovascular:  Regular rate and rhythm    Lungs:  clear. no rales, no wheezing, .    Abdomen:  Normoactive bowel sounds. Soft, flat, non-tender    Skin:  Warm, dry, well-perfused. No rashes or other lesions.     Extremities:  warm no edema    LABS:           05-06    134<L>  |  97  |  69<H>  ----------------------------<  193<H>  4.8   |  26  |  7.39<H>    Ca    8.4<L>      06 May 2018 07:39  Phos  6.2     05-06                          10.9   6.27  )-----------( 173      ( 06 May 2018 07:39 )             34.0

## 2018-05-07 LAB
C DIFF BY PCR RESULT: SIGNIFICANT CHANGE UP
C DIFF TOX GENS STL QL NAA+PROBE: SIGNIFICANT CHANGE UP
CULTURE RESULTS: NO GROWTH — SIGNIFICANT CHANGE UP
HBA1C BLD-MCNC: 7.6 % — HIGH (ref 4–5.6)
HCT VFR BLD CALC: 32.1 % — LOW (ref 39–50)
HGB BLD-MCNC: 10.7 G/DL — LOW (ref 13–17)
MCHC RBC-ENTMCNC: 29.2 PG — SIGNIFICANT CHANGE UP (ref 27–34)
MCHC RBC-ENTMCNC: 33.3 GM/DL — SIGNIFICANT CHANGE UP (ref 32–36)
MCV RBC AUTO: 87.7 FL — SIGNIFICANT CHANGE UP (ref 80–100)
NRBC # BLD: 0 /100 WBCS — SIGNIFICANT CHANGE UP (ref 0–0)
PLATELET # BLD AUTO: 187 K/UL — SIGNIFICANT CHANGE UP (ref 150–400)
RBC # BLD: 3.66 M/UL — LOW (ref 4.2–5.8)
RBC # FLD: 14.4 % — SIGNIFICANT CHANGE UP (ref 10.3–14.5)
SPECIMEN SOURCE: SIGNIFICANT CHANGE UP
WBC # BLD: 6.85 K/UL — SIGNIFICANT CHANGE UP (ref 3.8–10.5)
WBC # FLD AUTO: 6.85 K/UL — SIGNIFICANT CHANGE UP (ref 3.8–10.5)

## 2018-05-07 RX ADMIN — ISOSORBIDE MONONITRATE 60 MILLIGRAM(S): 60 TABLET, EXTENDED RELEASE ORAL at 13:18

## 2018-05-07 RX ADMIN — Medication 100 MILLIGRAM(S): at 20:26

## 2018-05-07 RX ADMIN — HEPARIN SODIUM 5000 UNIT(S): 5000 INJECTION INTRAVENOUS; SUBCUTANEOUS at 20:27

## 2018-05-07 RX ADMIN — Medication 4: at 17:37

## 2018-05-07 RX ADMIN — CARVEDILOL PHOSPHATE 25 MILLIGRAM(S): 80 CAPSULE, EXTENDED RELEASE ORAL at 20:26

## 2018-05-07 RX ADMIN — Medication 50 MILLIGRAM(S): at 20:26

## 2018-05-07 RX ADMIN — DOXERCALCIFEROL 1 MICROGRAM(S): 2.5 CAPSULE ORAL at 10:30

## 2018-05-07 RX ADMIN — HEPARIN SODIUM 5000 UNIT(S): 5000 INJECTION INTRAVENOUS; SUBCUTANEOUS at 05:06

## 2018-05-07 RX ADMIN — Medication 2 MILLILITER(S): at 06:55

## 2018-05-07 RX ADMIN — TAMSULOSIN HYDROCHLORIDE 0.4 MILLIGRAM(S): 0.4 CAPSULE ORAL at 20:26

## 2018-05-07 RX ADMIN — ATORVASTATIN CALCIUM 40 MILLIGRAM(S): 80 TABLET, FILM COATED ORAL at 20:26

## 2018-05-07 RX ADMIN — Medication 4: at 06:56

## 2018-05-07 RX ADMIN — Medication 75 MICROGRAM(S): at 05:07

## 2018-05-07 NOTE — PROGRESS NOTE ADULT - SUBJECTIVE AND OBJECTIVE BOX
NEPHROLOGY INTERVAL HPI/OVERNIGHT EVENTS:  given ivf yesterday for low bp  c/o of LE weakness.  feels needs more support when he is walking  states diarrhea better       MEDICATIONS  (STANDING):  atorvastatin 40 milliGRAM(s) Oral at bedtime  carvedilol 25 milliGRAM(s) Oral every 12 hours  dextrose 5%. 1000 milliLiter(s) (50 mL/Hr) IV Continuous <Continuous>  dextrose 50% Injectable 12.5 Gram(s) IV Push once  dextrose 50% Injectable 25 Gram(s) IV Push once  dextrose 50% Injectable 25 Gram(s) IV Push once  docusate sodium 100 milliGRAM(s) Oral three times a day  doxercalciferol Injectable 1 MICROGram(s) IV Push <User Schedule>  heparin  Injectable 5000 Unit(s) SubCutaneous every 12 hours  hydrALAZINE 50 milliGRAM(s) Oral every 12 hours  insulin lispro (HumaLOG) corrective regimen sliding scale   SubCutaneous three times a day before meals  insulin lispro (HumaLOG) corrective regimen sliding scale   SubCutaneous at bedtime  isosorbide   mononitrate ER Tablet (IMDUR) 60 milliGRAM(s) Oral daily  levothyroxine 75 MICROGram(s) Oral daily  lisinopril 5 milliGRAM(s) Oral daily  sodium chloride 0.45%. 400 milliLiter(s) (50 mL/Hr) IV Continuous <Continuous>  tamsulosin 0.4 milliGRAM(s) Oral at bedtime    MEDICATIONS  (PRN):  acetaminophen   Tablet 650 milliGRAM(s) Oral every 6 hours PRN For Temp greater than 38 C (100.4 F) or  mild pain  dextrose Gel 1 Dose(s) Oral once PRN Blood Glucose LESS THAN 70 milliGRAM(s)/deciliter  glucagon  Injectable 1 milliGRAM(s) IntraMuscular once PRN Glucose LESS THAN 70 milligrams/deciliter  ondansetron Injectable 4 milliGRAM(s) IV Push every 6 hours PRN Nausea      Allergies    No Known Allergies    Intolerances        I&O's Detail    06 May 2018 07:01  -  07 May 2018 07:00  --------------------------------------------------------  IN:  Total IN: 0 mL    OUT:    Post-Void Residual per Intermittent Catheterization: 400 mL  Total OUT: 400 mL    Total NET: -400 mL      07 May 2018 07:01  -  07 May 2018 11:43  --------------------------------------------------------  IN:    Oral Fluid: 240 mL  Total IN: 240 mL    OUT:  Total OUT: 0 mL    Total NET: 240 mL          .    Patient was seen and evaluated on dialysis.   Patient is tolerating the procedure well.   Continue dialysis:   Dialyzer:    revaclear 300 with bfr of 350-400 on 2k uf goal of 2kg    Vital Signs Last 24 Hrs  T(C): 36.7 (07 May 2018 08:01), Max: 36.9 (06 May 2018 21:22)  T(F): 98 (07 May 2018 08:01), Max: 98.5 (07 May 2018 05:42)  HR: 75 (07 May 2018 11:19) (61 - 77)  BP: 120/71 (07 May 2018 11:19) (111/32 - 165/53)  BP(mean): --  RR: 17 (07 May 2018 10:32) (16 - 19)  SpO2: 93% (07 May 2018 05:42) (93% - 100%)  Daily     Daily Weight in k.8 (07 May 2018 05:42)    PHYSICAL EXAM:  General: alert. awake Ox3  HEENT: MMM  CV: s1s2 rrr  LUNGS: B/L CTA  EXT: no edema    LABS:                        10.7   6.85  )-----------( 187      ( 07 May 2018 07:30 )             32.1     05-    131<L>  |  94<L>  |  94<H>  ----------------------------<  243<H>  5.3   |  20<L>  |  8.28<H>    Ca    7.8<L>      07 May 2018 07:30  Phos  5.8     05-    TPro  x   /  Alb  3.1<L>  /  TBili  x   /  DBili  x   /  AST  x   /  ALT  x   /  AlkPhos  x   05-07      Urinalysis Basic - ( 06 May 2018 14:00 )    Color: Yellow / Appearance: Clear / S.015 / pH: x  Gluc: x / Ketone: Negative  / Bili: Negative / Urobili: Negative mg/dL   Blood: x / Protein: 500 mg/dL / Nitrite: Negative   Leuk Esterase: Trace / RBC: 0-2 /HPF / WBC 3-5   Sq Epi: x / Non Sq Epi: Few / Bacteria: Many      Phosphorus Level, Serum: 5.8 mg/dL ( @ 07:30)

## 2018-05-07 NOTE — PROGRESS NOTE ADULT - SUBJECTIVE AND OBJECTIVE BOX
CC: fever (04 May 2018 16:58)    HPI:  89 y/o m with PMHx of Systolic CHF, s/p AICD, DM, CAD, ESRD on dialysis, HLD, HTN, pancreatitis presented to the ED BIBA from dialysis  c/o fever (100.7), weakness, body aches s/p dialysis today. Pt received full 4 hours of dialysis today. Patient currently is very poor historian, unclear what his baseline mentation. He says he had dialysis yesterday.  Very poor historian. Denies diarrhea, dizziness, cough. (04 May 2018 16:58)    INTERVAL HPI/ OVERNIGHT EVENTS:  Pt was seen and examined  OOB  to chair, comfortable, s/p HD this am.  No episodes of loose stool. No Cp or SOB, weak on ambulation   As per RN needed straight cath last nigh, but was able to urinate small amount after HD.     T(C): 36.7 (07 May 2018 11:57), Max: 36.9 (06 May 2018 21:22)  T(F): 98 (07 May 2018 11:57), Max: 98.5 (07 May 2018 05:42)  HR: 92 (07 May 2018 13:18) (72 - 92)  BP: 165/62 (07 May 2018 13:18) (120/71 - 165/62)  RR: 16 (07 May 2018 11:57) (16 - 19)  SpO2: 93% (07 May 2018 05:42) (93% - 94%)    REVIEW OF SYSTEMS:  All other review of systems is negative unless indicated above.      PHYSICAL EXAM:  General: Well developed; well nourished; in no acute distress  Eyes: PERRLA, EOMI; conjunctiva and sclera clear  Head: Normocephalic; atraumatic  ENMT: No nasal discharge; airway clear  Neck: Supple; non tender; no masses  Respiratory: Good air entry. No wheezes, rales or rhonchi  Cardiovascular: Regular rate and rhythm. S1 and S2 Normal; No murmurs  Gastrointestinal: Soft non-tender, distended; Normal bowel sounds  Genitourinary: No costovertebral angle tenderness  Extremities: Normal range of motion, No  edema  Vascular: Peripheral pulses palpable 2+ bilaterally  Neurological: Alert and oriented x2,  non focal   Skin: Warm and dry. No acute rash  Musculoskeletal: Normal muscle  tone, without deformities  Psychiatric: Cooperative and appropriate         LABS:                         10.7   6.85  )-----------( 187      ( 07 May 2018 07:30 )             32.1     05-07    131<L>  |  94<L>  |  94<H>  ----------------------------<  243<H>  5.3   |  20<L>  |  8.28<H>    Ca    7.8<L>      07 May 2018 07:30  Phos  5.8     05-07    TPro  x   /  Alb  3.1<L>  /  TBili  x   /  DBili  x   /  AST  x   /  ALT  x   /  AlkPhos  x   05-07    CARDIAC MARKERS ( 06 May 2018 07:39 )  0.065 ng/mL / x     / x     / x     / x          LIVER FUNCTIONS - ( 07 May 2018 07:30 )  Alb: 3.1 g/dL / Pro: x     / ALK PHOS: x     / ALT: x     / AST: x     / GGT: x             Urinalysis Basic - ( 06 May 2018 14:00 )  Color: Yellow / Appearance: Clear / S.015 / pH: x  Gluc: x / Ketone: Negative  / Bili: Negative / Urobili: Negative mg/dL   Blood: x / Protein: 500 mg/dL / Nitrite: Negative   Leuk Esterase: Trace / RBC: 0-2 /HPF / WBC 3-5   Sq Epi: x / Non Sq Epi: Few / Bacteria: Many                              10.9   6.27  )-----------( 173      ( 06 May 2018 07:39 )             34.0     05-06    134<L>  |  97  |  69<H>  ----------------------------<  193<H>  4.8   |  26  |  7.39<H>    Ca    8.4<L>      06 May 2018 07:39  Phos  6.2     05-06      CARDIAC MARKERS ( 06 May 2018 07:39 )  0.065 ng/mL / x     / x     / x     / x          Urinalysis Basic - ( 06 May 2018 14:00 )    Color: Yellow / Appearance: Clear / S.015 / pH: x  Gluc: x / Ketone: Negative  / Bili: Negative / Urobili: Negative mg/dL   Blood: x / Protein: 500 mg/dL / Nitrite: Negative   Leuk Esterase: Trace / RBC: 0-2 /HPF / WBC 3-5   Sq Epi: x / Non Sq Epi: Few / Bacteria: Many                             11.4   6.47  )-----------( 166      ( 05 May 2018 08:45 )             35.7     05 May 2018 08:45    138    |  98     |  46     ----------------------------<  166    4.0     |  30     |  5.83     Ca    8.2        05 May 2018 08:45    TPro  8.2    /  Alb  3.8    /  TBili  0.4    /  DBili  x      /  AST  24     /  ALT  31     /  AlkPhos  88     04 May 2018 12:45    PT/INR - ( 04 May 2018 12:45 )   PT: 9.9 sec;   INR: 0.92 ratio    PTT - ( 04 May 2018 12:45 )  PTT:25.9 sec    LIVER FUNCTIONS - ( 04 May 2018 12:45 )  Alb: 3.8 g/dL / Pro: 8.2 gm/dL / ALK PHOS: 88 U/L / ALT: 31 U/L / AST: 24 U/L / GGT: x           CAPILLARY BLOOD GLUCOSE  POCT Blood Glucose.: 224 mg/dL (04 May 2018 22:20)    MEDICATIONS  (STANDING):  atorvastatin 40 milliGRAM(s) Oral at bedtime  carvedilol 25 milliGRAM(s) Oral every 12 hours  dextrose 5%. 1000 milliLiter(s) (50 mL/Hr) IV Continuous <Continuous>  dextrose 50% Injectable 12.5 Gram(s) IV Push once  dextrose 50% Injectable 25 Gram(s) IV Push once  dextrose 50% Injectable 25 Gram(s) IV Push once  docusate sodium 100 milliGRAM(s) Oral three times a day  doxercalciferol Injectable 1 MICROGram(s) IV Push <User Schedule>  heparin  Injectable 5000 Unit(s) SubCutaneous every 12 hours  hydrALAZINE 50 milliGRAM(s) Oral every 12 hours  insulin lispro (HumaLOG) corrective regimen sliding scale   SubCutaneous three times a day before meals  insulin lispro (HumaLOG) corrective regimen sliding scale   SubCutaneous at bedtime  isosorbide   mononitrate ER Tablet (IMDUR) 60 milliGRAM(s) Oral daily  levothyroxine 75 MICROGram(s) Oral daily  lisinopril 5 milliGRAM(s) Oral daily  sodium chloride 0.45%. 400 milliLiter(s) (50 mL/Hr) IV Continuous <Continuous>  tamsulosin 0.4 milliGRAM(s) Oral at bedtime    MEDICATIONS  (PRN):  acetaminophen   Tablet 650 milliGRAM(s) Oral every 6 hours PRN For Temp greater than 38 C (100.4 F) or  mild pain  dextrose Gel 1 Dose(s) Oral once PRN Blood Glucose LESS THAN 70 milliGRAM(s)/deciliter  glucagon  Injectable 1 milliGRAM(s) IntraMuscular once PRN Glucose LESS THAN 70 milligrams/deciliter  ondansetron Injectable 4 milliGRAM(s) IV Push every 6 hours PRN Nausea          RADIOLOGY & ADDITIONAL TESTS:    EXAM:  XR CHEST AP OR PA 1V                          Single frontal view of chest dated 2018 1:55 PM, compared to prior   study of 2017    IMPRESSION:   Is shallow inspiration. Bibasilar atelectasis. Borderline cardiomegaly   with pacemaker/ICD unchanged.    There are degenerative changes.

## 2018-05-07 NOTE — CONSULT NOTE ADULT - SUBJECTIVE AND OBJECTIVE BOX
History of Present Illness:  Reason for Admission: fever	  History of Present Illness: 	  87 y/o m with PMHx of CHF, DM, CAD, ESRD on dialysis, MI with AICD, HLD, HTN, pancreatitis, pulmonary edema presenting to the ED BIBA from dialysis  c/o fever (100.7), weakness, body aches s/p dialysis today. Pt received full 4 hours of dialysis today. Patient currently is very poor historian, unclear what his baseline mentation. He says he had dialyisis yesturday. Very poor historian. Denies diarrhea, dizziness, cough.      Review of Systems:  Other Review of Systems: All other review of systems negative, except as noted in HPI 	      Allergies and Intolerances:        Allergies:  	No Known Allergies:     Home Medications:   * Incomplete Medication History as of 09-Jan-2018 14:57 documented in Structured Notes  · 	lisinopril 5 mg oral tablet: 1 tab(s) orally once a day  · 	carvedilol 25 mg oral tablet: 1 tab(s) orally every 12 hours  · 	insulin lispro 100 units/mL subcutaneous solution: unit(s) subcutaneous prn , 1 Unit(s) if Glucose 151 - 200  	2 Unit(s) if Glucose 201 - 250  	3 Unit(s) if Glucose 251 - 300  	4 Unit(s) if Glucose 301 - 350  	5 Unit(s) if Glucose 351 - 400  	6 Unit(s) if Glucose Greater Than 400  · 	docusate sodium 100 mg oral capsule: 1 cap(s) orally 3 times a day  · 	lantus 25 units:   once a day   · 	Lasix 40 mg oral tablet:   2 times a day   · 	atorvastatin 40 mg oral tablet: 1 tab(s) orally once a day  · 	isosorbide mononitrate 60 mg oral tablet, extended release:  orally   · 	levothyroxine 75 mcg (0.075 mg) oral capsule: 1 cap(s) orally once a day  · 	hydrALAZINE 100 mg oral tablet: 1 tab(s) orally 2 times a day    .    Patient History:    Past Medical History:  AICD (automatic cardioverter/defibrillator) present    CHF (Congestive Heart Failure)    Diabetes    Dialysis Patient    Heart Attack    High Cholesterol    HTN (Hypertension)    Pancreatitis    Pulmonary edema.     Past Surgical History:  AICD (automatic cardioverter/defibrillator) present    History of Hernia Repair    History of penile implant.     Family History:  No pertinent family history in first degree relatives.     Social History:  Social History (marital status, living situation, occupation, tobacco use, alcohol and drug use, and sexual history): patient lives alone, no smoking, is a dialysis patient, not etoh , no illicit drug use	     Tobacco Screening:  · Core Measure Site	Yes	  · Has the patient used tobacco in the past 30 days?	No 	    Risk Assessment:    Present on Admission:  Deep Venous Thrombosis	no 	  Pulmonary Embolus	no 	     Heart Failure:  Does this patient have a history of or has been diagnosed with heart failure? yes.     LV Function Assessment (LVS function was evaluated before arrival and/or during hospitalization) no.       Physical Exam:  Physical Exam: Vital Signs Last 24 Hrs  T(C): 37.1 (04 May 2018 15:11), Max: 38.2 (04 May 2018 12:01)  T(F): 98.7 (04 May 2018 15:11), Max: 100.7 (04 May 2018 12:01)  HR: 81 (04 May 2018 15:11) (81 - 98)  BP: 150/53 (04 May 2018 15:11) (150/53 - 172/81)  BP(mean): --  RR: 15 (04 May 2018 15:11) (15 - 17)  SpO2: 100% (04 May 2018 15:11) (94% - 100%)   HEENT:   pupils equal and reactive, EOMI, no oropharyngeal lesions, erythema, exudates, oral thrush   NECK:   supple, no carotid bruits, no palpable lymph nodes, no thyromegaly   CV:  +S1, +S2, regular, no murmurs or rubs   RESP:   lungs clear to auscultation bilaterally, no wheezing, rales, rhonchi, good air entry bilaterally   BREAST:  not examined   GI:  abdomen soft, non-tender, non-distended, normal BS, no bruits, no abdominal masses, no palpable masses   RECTAL:  not examined   :  not examined   MSK:   normal muscle tone, no atrophy, no rigidity, no contractions   EXT:   no clubbing, no cyanosis, no edema, no calf pain, swelling or erythema   VASCULAR:  pulses equal and symmetric in the upper and lower extremities   NEURO:  AAOX3, no focal neurological deficits, follows all commands, able to move extremities spontaneously   SKIN:  no ulcers, lesions or rashes	       Labs and Results:  Labs, Radiology, Cardiology, and Other Results: 04 May 2018 12:45   138    |  96     |  24     ----------------------------<  135    3.6     |  33     |  3.71    Ca    8.9        04 May 2018 12:45   TPro  8.2    /  Alb  3.8    /  TBili  0.4    /  DBili  x      /  AST  24     /  ALT  31     /  AlkPhos  88     04 May 2018 12:45  LIVER FUNCTIONS - ( 04 May 2018 12:45 )  Alb: 3.8 g/dL / Pro: 8.2 gm/dL / ALK PHOS: 88 U/L / ALT: 31 U/L / AST: 24 U/L / GGT: x         PT/INR - ( 04 May 2018 12:45 )   PT: 9.9 sec;   INR: 0.92 ratio        PTT - ( 04 May 2018 12:45 )  PTT:25.9 secCBC Full  -  ( 04 May 2018 12:45 )  WBC Count : 10.86 K/uL  Hemoglobin : 13.6 g/dL  Hematocrit : 41.2 %  Platelet Count - Automated : 175 K/uL  Mean Cell Volume : 88.4 fl  Mean Cell Hemoglobin : 29.2 pg  Mean Cell Hemoglobin Concentration : 33.0 gm/dL  Auto Neutrophil # : 8.74 K/uL  Auto Lymphocyte # : 0.47 K/uL  Auto Monocyte # : 1.39 K/uL  Auto Eosinophil # : 0.14 K/uL  Auto Basophil # : 0.07 K/uL  Auto Neutrophil % : 80.5 %  Auto Lymphocyte % : 4.3 %  Auto Monocyte % : 12.8 %  Auto Eosinophil % : 1.3 % Auto Basophil % : 0.6 %	    Assessment and Plan:    Assessment:  · Assessment		  87 y/o m with PMHx of CHF, DM, CAD, ESRD on dialysis, MI with AICD, HLD, HTN, pancreatitis, pulmonary edema presenting to the ED BIBA from dialysis  c/o fever (100.7), weakness, body aches s/p dialysis today. Pt received full 4 hours of dialysis today. Patient currently is very poor historian, unclear what his baseline mentation. He says he had dialyisis yesturday. Very poor historian. Denies diarrhea, dizziness, cough. History of Present Illness:  Reason for Admission: fever	  History of Present Illness: 	  87 y/o m with PMHx of CHF, DM, CAD, ESRD on dialysis, MI with AICD, HLD, HTN, pancreatitis, pulmonary edema presenting to the ED BIBA from dialysis  c/o fever (100.7), weakness, body aches s/p dialysis today. Pt received full 4 hours of dialysis today. Patient currently is very poor historian, unclear what his baseline mentation. He says he had dialysis yesterday Very poor historian. Denies diarrhea, dizziness, cough.     Examined at bed side, feels fine now, denies angina, PND, lately. States he had device implanted about 8 years ago with gen change 3 years ago at another facility, states Dr Garza is his EP but does not have a general cards to follow up with.      Review of Systems:  Other Review of Systems: All other review of systems negative, except as noted in HPI 	      Allergies and Intolerances:        Allergies:  	No Known Allergies:     Home Medications:   * Incomplete Medication History as of 09-Jan-2018 14:57 documented in Structured Notes  · 	lisinopril 5 mg oral tablet: 1 tab(s) orally once a day  · 	carvedilol 25 mg oral tablet: 1 tab(s) orally every 12 hours  · 	insulin lispro 100 units/mL subcutaneous solution: unit(s) subcutaneous prn , 1 Unit(s) if Glucose 151 - 200  	2 Unit(s) if Glucose 201 - 250  	3 Unit(s) if Glucose 251 - 300  	4 Unit(s) if Glucose 301 - 350  	5 Unit(s) if Glucose 351 - 400  	6 Unit(s) if Glucose Greater Than 400  · 	docusate sodium 100 mg oral capsule: 1 cap(s) orally 3 times a day  · 	lantus 25 units:   once a day   · 	Lasix 40 mg oral tablet:   2 times a day   · 	atorvastatin 40 mg oral tablet: 1 tab(s) orally once a day  · 	isosorbide mononitrate 60 mg oral tablet, extended release:  orally   · 	levothyroxine 75 mcg (0.075 mg) oral capsule: 1 cap(s) orally once a day  · 	hydrALAZINE 100 mg oral tablet: 1 tab(s) orally 2 times a day    .    Patient History:    Past Medical History:  AICD (automatic cardioverter/defibrillator) present    CHF (Congestive Heart Failure)    Diabetes    Dialysis Patient    Heart Attack    High Cholesterol    HTN (Hypertension)    Pancreatitis    Pulmonary edema.     Past Surgical History:  AICD (automatic cardioverter/defibrillator) present    History of Hernia Repair    History of penile implant.     Family History:  No pertinent family history in first degree relatives.     Social History:  Social History (marital status, living situation, occupation, tobacco use, alcohol and drug use, and sexual history): patient lives alone, no smoking, is a dialysis patient, not etoh , no illicit drug use	     Tobacco Screening:  · Core Measure Site	Yes	  · Has the patient used tobacco in the past 30 days?	No 	    Risk Assessment:    Present on Admission:  Deep Venous Thrombosis	no 	  Pulmonary Embolus	no 	     Heart Failure:  Does this patient have a history of or has been diagnosed with heart failure? yes.     LV Function Assessment (LVS function was evaluated before arrival and/or during hospitalization) no.       Physical Exam:  Physical Exam: Vital Signs Last 24 Hrs  T(C): 37.1 (04 May 2018 15:11), Max: 38.2 (04 May 2018 12:01)  T(F): 98.7 (04 May 2018 15:11), Max: 100.7 (04 May 2018 12:01)  HR: 81 (04 May 2018 15:11) (81 - 98)  BP: 150/53 (04 May 2018 15:11) (150/53 - 172/81)  BP(mean): --  RR: 15 (04 May 2018 15:11) (15 - 17)  SpO2: 100% (04 May 2018 15:11) (94% - 100%)   HEENT:   pupils equal and reactive, EOMI, no oropharyngeal lesions, erythema, exudates, oral thrush   NECK:   supple, no carotid bruits, no palpable lymph nodes, no thyromegaly   CV:  +S1, +S2, regular, no murmurs or rubs   RESP:   lungs clear to auscultation bilaterally, no wheezing, rales, rhonchi, good air entry bilaterally   BREAST:  not examined   GI:  abdomen soft, non-tender, non-distended, normal BS, no bruits, no abdominal masses, no palpable masses   RECTAL:  not examined   :  not examined   MSK:   normal muscle tone, no atrophy, no rigidity, no contractions   EXT:   no clubbing, no cyanosis, no edema, no calf pain, swelling or erythema   VASCULAR:  pulses equal and symmetric in the upper and lower extremities   NEURO:  AAOX3, no focal neurological deficits, follows all commands, able to move extremities spontaneously   SKIN:  no ulcers, lesions or rashes	       Labs and Results:  Labs, Radiology, Cardiology, and Other Results: 04 May 2018 12:45   138    |  96     |  24     ----------------------------<  135    3.6     |  33     |  3.71    Ca    8.9        04 May 2018 12:45   TPro  8.2    /  Alb  3.8    /  TBili  0.4    /  DBili  x      /  AST  24     /  ALT  31     /  AlkPhos  88     04 May 2018 12:45  LIVER FUNCTIONS - ( 04 May 2018 12:45 )  Alb: 3.8 g/dL / Pro: 8.2 gm/dL / ALK PHOS: 88 U/L / ALT: 31 U/L / AST: 24 U/L / GGT: x         PT/INR - ( 04 May 2018 12:45 )   PT: 9.9 sec;   INR: 0.92 ratio        PTT - ( 04 May 2018 12:45 )  PTT:25.9 secCBC Full  -  ( 04 May 2018 12:45 )  WBC Count : 10.86 K/uL  Hemoglobin : 13.6 g/dL  Hematocrit : 41.2 %  Platelet Count - Automated : 175 K/uL  Mean Cell Volume : 88.4 fl  Mean Cell Hemoglobin : 29.2 pg  Mean Cell Hemoglobin Concentration : 33.0 gm/dL  Auto Neutrophil # : 8.74 K/uL  Auto Lymphocyte # : 0.47 K/uL  Auto Monocyte # : 1.39 K/uL  Auto Eosinophil # : 0.14 K/uL  Auto Basophil # : 0.07 K/uL  Auto Neutrophil % : 80.5 %  Auto Lymphocyte % : 4.3 %  Auto Monocyte % : 12.8 %  Auto Eosinophil % : 1.3 % Auto Basophil % : 0.6 %	    Assessment and Plan:    Assessment:  · Assessment		  87 y/o m with PMHx of CHF, DM, CAD, ESRD on dialysis, MI with AICD, HLD, HTN, pancreatitis, pulmonary edema presenting to the ED BIBA from dialysis  c/o fever (100.7), weakness, body aches s/p dialysis today. Pt received full 4 hours of dialysis today. Patient currently is very poor historian, unclear what his baseline mentation. He says he had dialysis yesterday Very poor historian. Denies diarrhea, dizziness, cough.    * Reportedly h/o CMP, ICD, with recent questionable syncope per patient's history and elevated cTn I possibly related to his underlying CAD, ESRD.  Suggest echo and stress MPI   Suggest device interrogation    Otherwise cont optimal medical regimen for HF, CAD.    I personally reviewed hospital records labs, ecg and imaging.

## 2018-05-07 NOTE — PHYSICAL THERAPY INITIAL EVALUATION ADULT - PERTINENT HX OF CURRENT PROBLEM, REHAB EVAL
BIBA from dialysis  c/o fever (100.7), weakness, body aches s/p dialysis today. Pt received full 4 hours of dialysis today. Patient currently is very poor historian, unclear what his baseline mentation.

## 2018-05-07 NOTE — PROGRESS NOTE ADULT - ASSESSMENT
89 y/o m with PMHx of Systolic CHF, s/p AICD, DM, CAD, ESRD on dialysis, HLD, HTN, pancreatitis     1. Weakness and Low grade fevers due to Viral URI with dehydration, resolved   - loose stools, resolved  -  C.Diff  nit detected   - RVP + Enterovirus  - CXR no PNA  - Monitor for fevers, Tylenol PRN   - Supportive care  - encourage oral intake   - was given  IVF for hydration last night      2. Metabolic  encephelopathy secondary to viral syndrome and dehydration, improved   - supportive care  - Fall precautions      3. Borderline BP, due to dehydration and poor oral intake in settings of acute viral infection, resolved   - likely had Low BP due to dehydration  - Decreased Hydralazine, monitor   - S/p 400ml of gentle IV  hydration overnight     4. Systolic CHF,  Likely Ischemic Cardiomyopathy. EF 40% as per old records. CAD. S/p AICD  - no CP, no signs  of overload, dehydrated   -  ECHO  pending   - BNP elevated, but clinically not in failure   - C/w  hydralazine and Carvedilol, Lisinopril and Isosorbide, dose adjusted   - Mildly elevated troponins, no CP  -EP eval for PPM interrogated, normal Fx  - Cardio eval appreciated, plan for Stress test in am       5. ESRD on HD  - monitor  Renal FX  - had HD today   - D/w DR Newman     6. HLD  - c/w lipitor     7. DM   diabetic diet  Monitor BS and cover with ISS     8. Urinary retention and urgency  bladder scan PRN   need Urology eval  Started on  Flomax   F/u  UA and UCX     9. DVT PPxs: on Heparin SQ

## 2018-05-07 NOTE — PROVIDER CONTACT NOTE (OTHER) - SITUATION
Dr. Lerma's office notified about pt.
SPOKE With service will notify 
called md service spoke with luis

## 2018-05-07 NOTE — PROGRESS NOTE ADULT - ASSESSMENT
89 y/o m with PMHx of CHF, DM, CAD, ESRD on dialysis, MI with AICD, HLD, HTN, pancreatitis, pulmonary edema presenting to the ED BIBA from dialysis  c/o fever (100.7), weakness, body aches s/p dialysis today. Pt received full 4 hours of dialysis today. Patient currently is very poor historian, unclear what his baseline mentation. He says he had dialysis yesterday Very poor historian. Denies diarrhea, dizziness, cough.  Above noted  feels much better today  dialyzed yesterday  next hd due Monday 5/6  Diarrhea  r/o c dif  straight cath urine c/s  1/2 NS x 400 ml  hd monday 5/7 MK  - ESRD: tolerating HD with goal uf fo 2kg ion 2k  fu trend of the bp, may need to hold lisinopril trend.   fu Na after fluid removal with HD   - diarrrhea:  cdiff neg  - weakness.  PT eval ongoing  DW Dr RADHA Jones

## 2018-05-07 NOTE — PHYSICAL THERAPY INITIAL EVALUATION ADULT - ADDITIONAL COMMENTS
pt lives in a ranch style home with daughter/grand daughter.  pt is a house-hold ambulator, ambulates with a RW.

## 2018-05-07 NOTE — PROVIDER CONTACT NOTE (OTHER) - NAME OF MD/NP/PA/DO NOTIFIED:
Consult for Dr. Garza notified; spoke with Liseth.
EP, DR. BOWENS RECEIVING CONSULT. SPOKE WITH CLAIR.
DR Hoyos
dr allred

## 2018-05-08 LAB
ANION GAP SERPL CALC-SCNC: 9 MMOL/L — SIGNIFICANT CHANGE UP (ref 5–17)
BUN SERPL-MCNC: 55 MG/DL — HIGH (ref 7–23)
CALCIUM SERPL-MCNC: 8.2 MG/DL — LOW (ref 8.5–10.1)
CHLORIDE SERPL-SCNC: 96 MMOL/L — SIGNIFICANT CHANGE UP (ref 96–108)
CO2 SERPL-SCNC: 26 MMOL/L — SIGNIFICANT CHANGE UP (ref 22–31)
CREAT SERPL-MCNC: 5.55 MG/DL — HIGH (ref 0.5–1.3)
GLUCOSE SERPL-MCNC: 192 MG/DL — HIGH (ref 70–99)
HCT VFR BLD CALC: 33.4 % — LOW (ref 39–50)
HGB BLD-MCNC: 11 G/DL — LOW (ref 13–17)
MCHC RBC-ENTMCNC: 29.1 PG — SIGNIFICANT CHANGE UP (ref 27–34)
MCHC RBC-ENTMCNC: 32.9 GM/DL — SIGNIFICANT CHANGE UP (ref 32–36)
MCV RBC AUTO: 88.4 FL — SIGNIFICANT CHANGE UP (ref 80–100)
NRBC # BLD: 0 /100 WBCS — SIGNIFICANT CHANGE UP (ref 0–0)
PLATELET # BLD AUTO: 183 K/UL — SIGNIFICANT CHANGE UP (ref 150–400)
POTASSIUM SERPL-MCNC: 4.8 MMOL/L — SIGNIFICANT CHANGE UP (ref 3.5–5.3)
POTASSIUM SERPL-SCNC: 4.8 MMOL/L — SIGNIFICANT CHANGE UP (ref 3.5–5.3)
RBC # BLD: 3.78 M/UL — LOW (ref 4.2–5.8)
RBC # FLD: 14.3 % — SIGNIFICANT CHANGE UP (ref 10.3–14.5)
SODIUM SERPL-SCNC: 131 MMOL/L — LOW (ref 135–145)
WBC # BLD: 6.51 K/UL — SIGNIFICANT CHANGE UP (ref 3.8–10.5)
WBC # FLD AUTO: 6.51 K/UL — SIGNIFICANT CHANGE UP (ref 3.8–10.5)

## 2018-05-08 PROCEDURE — 93018 CV STRESS TEST I&R ONLY: CPT

## 2018-05-08 PROCEDURE — 93016 CV STRESS TEST SUPVJ ONLY: CPT

## 2018-05-08 PROCEDURE — 78452 HT MUSCLE IMAGE SPECT MULT: CPT | Mod: 26

## 2018-05-08 PROCEDURE — 93306 TTE W/DOPPLER COMPLETE: CPT | Mod: 26

## 2018-05-08 RX ORDER — REGADENOSON 0.08 MG/ML
0.4 INJECTION, SOLUTION INTRAVENOUS ONCE
Qty: 0 | Refills: 0 | Status: COMPLETED | OUTPATIENT
Start: 2018-05-08 | End: 2018-05-08

## 2018-05-08 RX ORDER — HYDRALAZINE HCL 50 MG
100 TABLET ORAL EVERY 12 HOURS
Qty: 0 | Refills: 0 | Status: DISCONTINUED | OUTPATIENT
Start: 2018-05-08 | End: 2018-05-09

## 2018-05-08 RX ADMIN — CARVEDILOL PHOSPHATE 25 MILLIGRAM(S): 80 CAPSULE, EXTENDED RELEASE ORAL at 17:57

## 2018-05-08 RX ADMIN — Medication 50 MILLIGRAM(S): at 17:58

## 2018-05-08 RX ADMIN — Medication 75 MICROGRAM(S): at 05:39

## 2018-05-08 RX ADMIN — CARVEDILOL PHOSPHATE 25 MILLIGRAM(S): 80 CAPSULE, EXTENDED RELEASE ORAL at 05:41

## 2018-05-08 RX ADMIN — HEPARIN SODIUM 5000 UNIT(S): 5000 INJECTION INTRAVENOUS; SUBCUTANEOUS at 17:59

## 2018-05-08 RX ADMIN — Medication 2: at 17:58

## 2018-05-08 RX ADMIN — ISOSORBIDE MONONITRATE 60 MILLIGRAM(S): 60 TABLET, EXTENDED RELEASE ORAL at 13:22

## 2018-05-08 RX ADMIN — TAMSULOSIN HYDROCHLORIDE 0.4 MILLIGRAM(S): 0.4 CAPSULE ORAL at 22:05

## 2018-05-08 RX ADMIN — LISINOPRIL 5 MILLIGRAM(S): 2.5 TABLET ORAL at 05:39

## 2018-05-08 RX ADMIN — HEPARIN SODIUM 5000 UNIT(S): 5000 INJECTION INTRAVENOUS; SUBCUTANEOUS at 05:39

## 2018-05-08 RX ADMIN — ATORVASTATIN CALCIUM 40 MILLIGRAM(S): 80 TABLET, FILM COATED ORAL at 22:05

## 2018-05-08 RX ADMIN — REGADENOSON 0.4 MILLIGRAM(S): 0.08 INJECTION, SOLUTION INTRAVENOUS at 09:30

## 2018-05-08 RX ADMIN — Medication 2: at 13:21

## 2018-05-08 RX ADMIN — Medication 50 MILLIGRAM(S): at 05:39

## 2018-05-08 NOTE — PROGRESS NOTE ADULT - ASSESSMENT
89 y/o m with PMHx of CHF, DM, CAD, ESRD on dialysis, MI with AICD, HLD, HTN, pancreatitis, pulmonary edema presenting to the ED BIBA from dialysis  c/o fever (100.7), weakness, body aches s/p dialysis today. Pt received full 4 hours of dialysis today. Patient currently is very poor historian, unclear what his baseline mentation. He says he had dialysis yesterday Very poor historian. Denies diarrhea, dizziness, cough.     5/8-  scheduled for echo and nuclear stress test today.   continue medical management.

## 2018-05-08 NOTE — PROGRESS NOTE ADULT - SUBJECTIVE AND OBJECTIVE BOX
NEPHROLOGY INTERVAL HPI/OVERNIGHT EVENTS:  given ivf yesterday for low bp  c/o of LE weakness.  feels needs more support when he is walking  states diarrhea better       5/8  feels better  no new events   d/w Dr Jones  s/p nuclear stress    MEDICATIONS  (STANDING):  atorvastatin 40 milliGRAM(s) Oral at bedtime  carvedilol 25 milliGRAM(s) Oral every 12 hours  dextrose 5%. 1000 milliLiter(s) (50 mL/Hr) IV Continuous <Continuous>  dextrose 50% Injectable 12.5 Gram(s) IV Push once  dextrose 50% Injectable 25 Gram(s) IV Push once  dextrose 50% Injectable 25 Gram(s) IV Push once  docusate sodium 100 milliGRAM(s) Oral three times a day  doxercalciferol Injectable 1 MICROGram(s) IV Push <User Schedule>  heparin  Injectable 5000 Unit(s) SubCutaneous every 12 hours  hydrALAZINE 50 milliGRAM(s) Oral every 12 hours  insulin lispro (HumaLOG) corrective regimen sliding scale   SubCutaneous three times a day before meals  insulin lispro (HumaLOG) corrective regimen sliding scale   SubCutaneous at bedtime  isosorbide   mononitrate ER Tablet (IMDUR) 60 milliGRAM(s) Oral daily  levothyroxine 75 MICROGram(s) Oral daily  lisinopril 5 milliGRAM(s) Oral daily  sodium chloride 0.45%. 400 milliLiter(s) (50 mL/Hr) IV Continuous <Continuous>  tamsulosin 0.4 milliGRAM(s) Oral at bedtime    MEDICATIONS  (PRN):  acetaminophen   Tablet 650 milliGRAM(s) Oral every 6 hours PRN For Temp greater than 38 C (100.4 F) or  mild pain  dextrose Gel 1 Dose(s) Oral once PRN Blood Glucose LESS THAN 70 milliGRAM(s)/deciliter  glucagon  Injectable 1 milliGRAM(s) IntraMuscular once PRN Glucose LESS THAN 70 milligrams/deciliter  ondansetron Injectable 4 milliGRAM(s) IV Push every 6 hours PRN Nausea      Allergies    No Known Allergies    Intolerances    Vital Signs Last 24 Hrs  T(C): 36.3 (08 May 2018 11:41), Max: 36.9 (08 May 2018 06:23)  T(F): 97.3 (08 May 2018 11:41), Max: 98.4 (08 May 2018 06:23)  HR: 74 (08 May 2018 11:41) (74 - 79)  BP: 134/94 (08 May 2018 11:41) (134/94 - 172/63)  BP(mean): --  RR: 16 (08 May 2018 11:41) (16 - 18)  SpO2: 100% (08 May 2018 11:41) (94% - 100%)    PHYSICAL EXAM:  General: alert. awake Ox3  HEENT: MMM  CV: s1s2 rrr  LUNGS: B/L CTA  EXT: no edema    LABS:                                   11.0   6.51  )-----------( 183      ( 08 May 2018 07:46 )             33.4       05-08    131<L>  |  96  |  55<H>  ----------------------------<  192<H>  4.8   |  26  |  5.55<H>    Ca    8.2<L>      08 May 2018 07:46  Phos  5.8     05-07    TPro  x   /  Alb  3.1<L>  /  TBili  x   /  DBili  x   /  AST  x   /  ALT  x   /  AlkPhos  x   05-07

## 2018-05-08 NOTE — PROGRESS NOTE ADULT - ASSESSMENT
89 y/o m with PMHx of Systolic CHF, s/p AICD, DM, CAD, ESRD on dialysis, HLD, HTN, pancreatitis     1. Weakness and Low grade fevers due to Viral URI with dehydration, resolved   - loose stools, improved   -  C.Diff  nit detected   - RVP + Enterovirus  - CXR no PNA  - Monitor for fevers, Tylenol PRN   - Supportive care  - encourage oral intake   - was given  IVF for hydration on 5/6       2. Metabolic  encephelopathy secondary to viral syndrome and dehydration, improved   - supportive care  - Fall precautions      3. Borderline BP, due to dehydration and poor oral intake in settings of acute viral infection, resolved   - likely had Low BP due to dehydration  - Decreased Hydralazine, monitor   - S/p 400ml of gentle IV  hydration overnight     4. Systolic CHF,  Likely Ischemic Cardiomyopathy. EF 40% as per old records. CAD. S/p AICD  - no CP, no signs  of overload, dehydrated   -  ECHO: Improved EF 60%   - BNP elevated, but clinically not in failure   - C/w  hydralazine and Carvedilol, Lisinopril and Isosorbide  - Mildly elevated troponins, no CP  -EP eval for PPM interrogated, normal Fx  - Cardio eval appreciated,, Stress test today, has reversible defect but not changed from last stress test. Will discuss further management with cardio in am       5. ESRD on HD  - monitor  Renal FX  -  HD in am as per Renal orders   - D/w DR Lozano      6. HLD  - c/w lipitor     7. DM   diabetic diet  Monitor BS and cover with ISS     8. Urinary retention and urgency  bladder scan PRN, no retention today ( likely  due to HD yesterday)   need Urology eval  Started on  Flomax   F/u  UA and UCX neg     9. HTN  BP now elevated   Will increase  Hydralazine dose back to 100mg PO BID     10.  DVT PPxs: on Heparin SQ       Dispo: D/c planning to [place] vs Home with HC, discussed with Pts daughter on the phone, Pt wants to go home. Will ask PT to reeval

## 2018-05-08 NOTE — PROGRESS NOTE ADULT - SUBJECTIVE AND OBJECTIVE BOX
CC: fever (04 May 2018 16:58)    HPI:  87 y/o m with PMHx of Systolic CHF, s/p AICD, DM, CAD, ESRD on dialysis, HLD, HTN, pancreatitis presented to the ED BIBA from dialysis  c/o fever (100.7), weakness, body aches s/p dialysis today. Pt received full 4 hours of dialysis today. Patient currently is very poor historian, unclear what his baseline mentation. He says he had dialysis yesterday.  Very poor historian. Denies diarrhea, dizziness, cough. (04 May 2018 16:58)    INTERVAL HPI/ OVERNIGHT EVENTS:  Pt was seen and examined  reports no new complains, had 2 loose stools today. S/p Stress test     Vital Signs Last 24 Hrs  T(C): 36.3 (08 May 2018 11:41), Max: 36.9 (08 May 2018 06:23)  T(F): 97.3 (08 May 2018 11:41), Max: 98.4 (08 May 2018 06:23)  HR: 75 (08 May 2018 17:59) (74 - 79)  BP: 182/49 (08 May 2018 17:59) (134/94 - 182/49)  RR: 16 (08 May 2018 11:41) (16 - 18)  SpO2: 100% (08 May 2018 11:41) (94% - 100%)    REVIEW OF SYSTEMS:  All other review of systems is negative unless indicated above.      PHYSICAL EXAM:  General: Well developed; well nourished; in no acute distress  Eyes: PERRLA, EOMI; conjunctiva and sclera clear  Head: Normocephalic; atraumatic  ENMT: No nasal discharge; airway clear  Neck: Supple; non tender; no masses  Respiratory: Good air entry. No wheezes, rales or rhonchi  Cardiovascular: Regular rate and rhythm. S1 and S2 Normal; No murmurs  Gastrointestinal: Soft non-tender, distended; Normal bowel sounds  Genitourinary: No costovertebral angle tenderness  Extremities: Normal range of motion, No  edema  Vascular: Peripheral pulses palpable 2+ bilaterally  Neurological: Alert and oriented x2,  non focal   Skin: Warm and dry. No acute rash  Musculoskeletal: Normal muscle  tone, without deformities  Psychiatric: Cooperative and appropriate         LABS:                         11.0   6.51  )-----------( 183      ( 08 May 2018 07:46 )             33.4     05-08    131<L>  |  96  |  55<H>  ----------------------------<  192<H>  4.8   |  26  |  5.55<H>    Ca    8.2<L>      08 May 2018 07:46  Phos  5.8     05-07    TPro  x   /  Alb  3.1<L>  /  TBili  x   /  DBili  x   /  AST  x   /  ALT  x   /  AlkPhos  x   05-07  LIVER FUNCTIONS - ( 07 May 2018 07:30 )  Alb: 3.1 g/dL / Pro: x     / ALK PHOS: x     / ALT: x     / AST: x     / GGT: x                                 10.7   6.85  )-----------( 187      ( 07 May 2018 07:30 )             32.1     05-07    131<L>  |  94<L>  |  94<H>  ----------------------------<  243<H>  5.3   |  20<L>  |  8.28<H>    Ca    7.8<L>      07 May 2018 07:30  Phos  5.8     05-07    TPro  x   /  Alb  3.1<L>  /  TBili  x   /  DBili  x   /  AST  x   /  ALT  x   /  AlkPhos  x   05-07    CARDIAC MARKERS ( 06 May 2018 07:39 )  0.065 ng/mL / x     / x     / x     / x          LIVER FUNCTIONS - ( 07 May 2018 07:30 )  Alb: 3.1 g/dL / Pro: x     / ALK PHOS: x     / ALT: x     / AST: x     / GGT: x             Urinalysis Basic - ( 06 May 2018 14:00 )  Color: Yellow / Appearance: Clear / S.015 / pH: x  Gluc: x / Ketone: Negative  / Bili: Negative / Urobili: Negative mg/dL   Blood: x / Protein: 500 mg/dL / Nitrite: Negative   Leuk Esterase: Trace / RBC: 0-2 /HPF / WBC 3-5   Sq Epi: x / Non Sq Epi: Few / Bacteria: Many    Culture - Urine (18 @ 17:55)    Specimen Source: .Urine Catheterized    Culture Results:   No growth                              10.9   6.27  )-----------( 173      ( 06 May 2018 07:39 )             34.0     05-06    134<L>  |  97  |  69<H>  ----------------------------<  193<H>  4.8   |  26  |  7.39<H>    Ca    8.4<L>      06 May 2018 07:39  Phos  6.2     05-06      CARDIAC MARKERS ( 06 May 2018 07:39 )  0.065 ng/mL / x     / x     / x     / x                               11.4   6.47  )-----------( 166      ( 05 May 2018 08:45 )             35.7     05 May 2018 08:45    138    |  98     |  46     ----------------------------<  166    4.0     |  30     |  5.83     Ca    8.2        05 May 2018 08:45    TPro  8.2    /  Alb  3.8    /  TBili  0.4    /  DBili  x      /  AST  24     /  ALT  31     /  AlkPhos  88     04 May 2018 12:45    PT/INR - ( 04 May 2018 12:45 )   PT: 9.9 sec;   INR: 0.92 ratio    PTT - ( 04 May 2018 12:45 )  PTT:25.9 sec    LIVER FUNCTIONS - ( 04 May 2018 12:45 )  Alb: 3.8 g/dL / Pro: 8.2 gm/dL / ALK PHOS: 88 U/L / ALT: 31 U/L / AST: 24 U/L / GGT: x           CAPILLARY BLOOD GLUCOSE  POCT Blood Glucose.: 224 mg/dL (04 May 2018 22:20)    MEDICATIONS  (STANDING):  atorvastatin 40 milliGRAM(s) Oral at bedtime  carvedilol 25 milliGRAM(s) Oral every 12 hours  dextrose 5%. 1000 milliLiter(s) (50 mL/Hr) IV Continuous <Continuous>  dextrose 50% Injectable 12.5 Gram(s) IV Push once  dextrose 50% Injectable 25 Gram(s) IV Push once  dextrose 50% Injectable 25 Gram(s) IV Push once  docusate sodium 100 milliGRAM(s) Oral three times a day  doxercalciferol Injectable 1 MICROGram(s) IV Push <User Schedule>  heparin  Injectable 5000 Unit(s) SubCutaneous every 12 hours  hydrALAZINE 50 milliGRAM(s) Oral every 12 hours  insulin lispro (HumaLOG) corrective regimen sliding scale   SubCutaneous three times a day before meals  insulin lispro (HumaLOG) corrective regimen sliding scale   SubCutaneous at bedtime  isosorbide   mononitrate ER Tablet (IMDUR) 60 milliGRAM(s) Oral daily  levothyroxine 75 MICROGram(s) Oral daily  lisinopril 5 milliGRAM(s) Oral daily  sodium chloride 0.45%. 400 milliLiter(s) (50 mL/Hr) IV Continuous <Continuous>  tamsulosin 0.4 milliGRAM(s) Oral at bedtime    MEDICATIONS  (PRN):  acetaminophen   Tablet 650 milliGRAM(s) Oral every 6 hours PRN For Temp greater than 38 C (100.4 F) or  mild pain  dextrose Gel 1 Dose(s) Oral once PRN Blood Glucose LESS THAN 70 milliGRAM(s)/deciliter  glucagon  Injectable 1 milliGRAM(s) IntraMuscular once PRN Glucose LESS THAN 70 milligrams/deciliter  ondansetron Injectable 4 milliGRAM(s) IV Push every 6 hours PRN Nausea          RADIOLOGY & ADDITIONAL TESTS:    EXAM:  XR CHEST AP OR PA 1V                          Single frontal view of chest dated 2018 1:55 PM, compared to prior   study of 2017    IMPRESSION:   Is shallow inspiration. Bibasilar atelectasis. Borderline cardiomegaly   with pacemaker/ICD unchanged.    There are degenerative changes.          EXAM:  2D ECHOCARDIOGRAM AD    PROCEDURE DATE:  2018       Summary   The left ventricle is normal in size, wall thickness, wall motion and   contractility.   The estimated LVEF is 60%   The left atrium is normal.   The right atrium is normal.   The right ventricle is normal in size.   Mild calcific aortic stenosis   No aortic insufficiency is seen   Mild (1+) mitral regurgitation is present.   A pericardial effusion is not present.          EXAM:  NM NUCLEAR STRESS MULTI PHARM                        PROCEDURE DATE:  2018    < from: NM Nuclear Stress Pharmacologic Multiple (18 @ 11:35) >  IMPRESSION: Abnormal SPECT Myocardial Perfusion Imaging.     Reversible perfusion defects in the mid and basal segments of the   inferolateral wall and possibly in the apical segment of the anteroseptal   wall. Findings are not significantly changed compared to the previous   study dated 2017.    Reduced contractility and wall thickening in the inferolateral wall of   the left ventricle and a mildly hypokinetic septum.    Normal left ejection fraction of 64% (Normal: 50% or greater), improved   since 2017.

## 2018-05-08 NOTE — PROGRESS NOTE ADULT - SUBJECTIVE AND OBJECTIVE BOX
Patient is a 88y old  Male who presents with a chief complaint of fever (04 May 2018 16:58)    89 y/o m with PMHx of CHF, DM, CAD, ESRD on dialysis, MI with AICD, HLD, HTN, pancreatitis, pulmonary edema presenting to the ED BIBA from dialysis  c/o fever (100.7), weakness, body aches s/p dialysis today. Pt received full 4 hours of dialysis today. Patient currently is very poor historian, unclear what his baseline mentation. He says he had dialysis yesterday Very poor historian. Denies diarrhea, dizziness, cough.     Examined at bed side, feels fine now, denies angina, PND, lately. States he had device implanted about 8 years ago with gen change 3 years ago at another facility, states Dr Garza is his EP but does not have a general cards to follow up with.     -  had some diarrhea today. has ruled out for Cdiff.  scheduled for echo and stress test today  denies chest pain or shortness of breath.    Allergies    No Known Allergies    Intolerances        MEDICATIONS  (STANDING):  atorvastatin 40 milliGRAM(s) Oral at bedtime  carvedilol 25 milliGRAM(s) Oral every 12 hours  dextrose 5%. 1000 milliLiter(s) (50 mL/Hr) IV Continuous <Continuous>  dextrose 50% Injectable 12.5 Gram(s) IV Push once  dextrose 50% Injectable 25 Gram(s) IV Push once  dextrose 50% Injectable 25 Gram(s) IV Push once  docusate sodium 100 milliGRAM(s) Oral three times a day  doxercalciferol Injectable 1 MICROGram(s) IV Push <User Schedule>  heparin  Injectable 5000 Unit(s) SubCutaneous every 12 hours  hydrALAZINE 50 milliGRAM(s) Oral every 12 hours  insulin lispro (HumaLOG) corrective regimen sliding scale   SubCutaneous three times a day before meals  insulin lispro (HumaLOG) corrective regimen sliding scale   SubCutaneous at bedtime  isosorbide   mononitrate ER Tablet (IMDUR) 60 milliGRAM(s) Oral daily  levothyroxine 75 MICROGram(s) Oral daily  lisinopril 5 milliGRAM(s) Oral daily  sodium chloride 0.45%. 400 milliLiter(s) (50 mL/Hr) IV Continuous <Continuous>  tamsulosin 0.4 milliGRAM(s) Oral at bedtime    MEDICATIONS  (PRN):  acetaminophen   Tablet 650 milliGRAM(s) Oral every 6 hours PRN For Temp greater than 38 C (100.4 F) or  mild pain  dextrose Gel 1 Dose(s) Oral once PRN Blood Glucose LESS THAN 70 milliGRAM(s)/deciliter  glucagon  Injectable 1 milliGRAM(s) IntraMuscular once PRN Glucose LESS THAN 70 milligrams/deciliter  ondansetron Injectable 4 milliGRAM(s) IV Push every 6 hours PRN Nausea    REVIEW OF SYSTEMS:    RESPIRATORY: No cough, wheezing, hemoptysis; No shortness of breath  CARDIOVASCULAR: No chest pain or palpitations  All other review of systems is negative unless indicated above      PHYSICAL EXAM:  Daily     Daily Weight in k.1 (08 May 2018 06:36)  Vital Signs Last 24 Hrs  T(C): 36.9 (08 May 2018 06:23), Max: 36.9 (08 May 2018 06:23)  T(F): 98.4 (08 May 2018 06:23), Max: 98.4 (08 May 2018 06:23)  HR: 74 (08 May 2018 06:23) (73 - 92)  BP: 172/63 (08 May 2018 06:23) (120/71 - 172/63)  BP(mean): --  RR: 18 (08 May 2018 06:23) (16 - 18)  SpO2: 97% (08 May 2018 06:23) (94% - 97%)    Constitutional: NAD, awake and alert, well-developed  HEENT: PERR, EOMI, Normal Hearing, MMM  Neck: Soft and supple, No LAD, No JVD  Respiratory: Breath sounds are clear bilaterally, No wheezing, rales or rhonchi  Cardiovascular: S1 and S2, regular rate and rhythm, no Murmurs, gallops or rubs  Gastrointestinal: Bowel Sounds present, soft, nontender, nondistended, no guarding, no rebound  Extremities: No peripheral edema  Vascular: 2+ peripheral pulses  Neurological: A/O x 3, no focal deficits  Musculoskeletal: 5/5 strength b/l upper and lower extremities  Skin: No rashes    LABS: All Labs Reviewed:                        10.7   6.85  )-----------( 187      ( 07 May 2018 07:30 )             32.1     05-07    131<L>  |  94<L>  |  94<H>  ----------------------------<  243<H>  5.3   |  20<L>  |  8.28<H>    Ca    7.8<L>      07 May 2018 07:30  Phos  5.8         TPro  x   /  Alb  3.1<L>  /  TBili  x   /  DBili  x   /  AST  x   /  ALT  x   /  AlkPhos  x

## 2018-05-08 NOTE — PROGRESS NOTE ADULT - ASSESSMENT
89 y/o m with PMHx of CHF, DM, CAD, ESRD on dialysis, MI with AICD, HLD, HTN, pancreatitis, pulmonary edema presenting to the ED BIBA from dialysis  c/o fever (100.7), weakness, body aches s/p dialysis today. Pt received full 4 hours of dialysis today. Patient currently is very poor historian, unclear what his baseline mentation. He says he had dialysis yesterday Very poor historian. Denies diarrhea, dizziness, cough.  Above noted  feels much better today  dialyzed yesterday  next hd due Monday 5/6  Diarrhea  r/o c dif  straight cath urine c/s  1/2 NS x 400 ml  hd monday 5/7 MK  - ESRD: tolerating HD with goal uf fo 2kg ion 2k  fu trend of the bp, may need to hold lisinopril trend.   fu Na after fluid removal with HD   - diarrrhea:  cdiff neg  - weakness.  PT eval ongoing  DW Dr RADHA Jones    5/8  reviewed results of ECHO, nuclear stress  pt states does PT isma Haines  from renal standpoint stable for DC

## 2018-05-09 ENCOUNTER — TRANSCRIPTION ENCOUNTER (OUTPATIENT)
Age: 83
End: 2018-05-09

## 2018-05-09 VITALS — WEIGHT: 178.57 LBS

## 2018-05-09 DIAGNOSIS — R34 ANURIA AND OLIGURIA: ICD-10-CM

## 2018-05-09 LAB
ALBUMIN SERPL ELPH-MCNC: 3.5 G/DL — SIGNIFICANT CHANGE UP (ref 3.3–5)
ANION GAP SERPL CALC-SCNC: 15 MMOL/L — SIGNIFICANT CHANGE UP (ref 5–17)
BASOPHILS # BLD AUTO: 0.05 K/UL — SIGNIFICANT CHANGE UP (ref 0–0.2)
BASOPHILS NFR BLD AUTO: 0.8 % — SIGNIFICANT CHANGE UP (ref 0–2)
BUN SERPL-MCNC: 82 MG/DL — HIGH (ref 7–23)
CALCIUM SERPL-MCNC: 8.4 MG/DL — LOW (ref 8.5–10.1)
CHLORIDE SERPL-SCNC: 96 MMOL/L — SIGNIFICANT CHANGE UP (ref 96–108)
CO2 SERPL-SCNC: 21 MMOL/L — LOW (ref 22–31)
CREAT SERPL-MCNC: 7.26 MG/DL — HIGH (ref 0.5–1.3)
CULTURE RESULTS: SIGNIFICANT CHANGE UP
CULTURE RESULTS: SIGNIFICANT CHANGE UP
EOSINOPHIL # BLD AUTO: 0.25 K/UL — SIGNIFICANT CHANGE UP (ref 0–0.5)
EOSINOPHIL NFR BLD AUTO: 3.9 % — SIGNIFICANT CHANGE UP (ref 0–6)
GLUCOSE SERPL-MCNC: 198 MG/DL — HIGH (ref 70–99)
HCT VFR BLD CALC: 33.2 % — LOW (ref 39–50)
HGB BLD-MCNC: 11.4 G/DL — LOW (ref 13–17)
IMM GRANULOCYTES NFR BLD AUTO: 0.3 % — SIGNIFICANT CHANGE UP (ref 0–1.5)
LYMPHOCYTES # BLD AUTO: 0.78 K/UL — LOW (ref 1–3.3)
LYMPHOCYTES # BLD AUTO: 12.1 % — LOW (ref 13–44)
MCHC RBC-ENTMCNC: 30 PG — SIGNIFICANT CHANGE UP (ref 27–34)
MCHC RBC-ENTMCNC: 34.3 GM/DL — SIGNIFICANT CHANGE UP (ref 32–36)
MCV RBC AUTO: 87.4 FL — SIGNIFICANT CHANGE UP (ref 80–100)
MONOCYTES # BLD AUTO: 0.61 K/UL — SIGNIFICANT CHANGE UP (ref 0–0.9)
MONOCYTES NFR BLD AUTO: 9.5 % — SIGNIFICANT CHANGE UP (ref 2–14)
NEUTROPHILS # BLD AUTO: 4.72 K/UL — SIGNIFICANT CHANGE UP (ref 1.8–7.4)
NEUTROPHILS NFR BLD AUTO: 73.4 % — SIGNIFICANT CHANGE UP (ref 43–77)
NRBC # BLD: 0 /100 WBCS — SIGNIFICANT CHANGE UP (ref 0–0)
PHOSPHATE SERPL-MCNC: 6.9 MG/DL — HIGH (ref 2.5–4.5)
PLATELET # BLD AUTO: 189 K/UL — SIGNIFICANT CHANGE UP (ref 150–400)
POTASSIUM SERPL-MCNC: 5 MMOL/L — SIGNIFICANT CHANGE UP (ref 3.5–5.3)
POTASSIUM SERPL-SCNC: 5 MMOL/L — SIGNIFICANT CHANGE UP (ref 3.5–5.3)
RBC # BLD: 3.8 M/UL — LOW (ref 4.2–5.8)
RBC # FLD: 14.3 % — SIGNIFICANT CHANGE UP (ref 10.3–14.5)
SODIUM SERPL-SCNC: 132 MMOL/L — LOW (ref 135–145)
SPECIMEN SOURCE: SIGNIFICANT CHANGE UP
SPECIMEN SOURCE: SIGNIFICANT CHANGE UP
WBC # BLD: 6.43 K/UL — SIGNIFICANT CHANGE UP (ref 3.8–10.5)
WBC # FLD AUTO: 6.43 K/UL — SIGNIFICANT CHANGE UP (ref 3.8–10.5)

## 2018-05-09 PROCEDURE — 99232 SBSQ HOSP IP/OBS MODERATE 35: CPT

## 2018-05-09 RX ORDER — TAMSULOSIN HYDROCHLORIDE 0.4 MG/1
1 CAPSULE ORAL
Qty: 14 | Refills: 0
Start: 2018-05-09 | End: 2018-05-22

## 2018-05-09 RX ADMIN — HEPARIN SODIUM 5000 UNIT(S): 5000 INJECTION INTRAVENOUS; SUBCUTANEOUS at 05:51

## 2018-05-09 RX ADMIN — ISOSORBIDE MONONITRATE 60 MILLIGRAM(S): 60 TABLET, EXTENDED RELEASE ORAL at 17:47

## 2018-05-09 RX ADMIN — HEPARIN SODIUM 5000 UNIT(S): 5000 INJECTION INTRAVENOUS; SUBCUTANEOUS at 17:48

## 2018-05-09 RX ADMIN — CARVEDILOL PHOSPHATE 25 MILLIGRAM(S): 80 CAPSULE, EXTENDED RELEASE ORAL at 17:47

## 2018-05-09 RX ADMIN — Medication 2: at 08:13

## 2018-05-09 RX ADMIN — Medication 100 MILLIGRAM(S): at 17:47

## 2018-05-09 RX ADMIN — Medication 75 MICROGRAM(S): at 05:51

## 2018-05-09 RX ADMIN — LISINOPRIL 5 MILLIGRAM(S): 2.5 TABLET ORAL at 05:51

## 2018-05-09 RX ADMIN — CARVEDILOL PHOSPHATE 25 MILLIGRAM(S): 80 CAPSULE, EXTENDED RELEASE ORAL at 05:51

## 2018-05-09 RX ADMIN — ONDANSETRON 4 MILLIGRAM(S): 8 TABLET, FILM COATED ORAL at 08:13

## 2018-05-09 RX ADMIN — Medication 100 MILLIGRAM(S): at 05:51

## 2018-05-09 RX ADMIN — Medication 4: at 12:09

## 2018-05-09 NOTE — PROGRESS NOTE ADULT - ASSESSMENT
87 y/o m with PMHx of CHF, DM, CAD, ESRD on dialysis, MI with AICD, HLD, HTN, pancreatitis, pulmonary edema presenting to the ED BIBA from dialysis  c/o fever (100.7), weakness, body aches s/p dialysis today. Pt received full 4 hours of dialysis today. Patient currently is very poor historian, unclear what his baseline mentation. He says he had dialysis yesterday Very poor historian. Denies diarrhea, dizziness, cough.     5/8-  scheduled for echo and nuclear stress test today.   continue medical management.

## 2018-05-09 NOTE — DISCHARGE NOTE ADULT - CARE PLAN
Principal Discharge DX:	Viral URI  Goal:	resolve  Assessment and plan of treatment:	oral hydration  Secondary Diagnosis:	Dehydration  Goal:	resolve  Secondary Diagnosis:	Elevated troponin  Goal:	monitor  Assessment and plan of treatment:	c/w current meds.  F/u with Cardiology  Secondary Diagnosis:	Urinary retention  Goal:	resolve  Assessment and plan of treatment:	C/w Flomax and follow up with urologist  Secondary Diagnosis:	Weakness  Goal:	improve  Assessment and plan of treatment:	PT at home

## 2018-05-09 NOTE — DISCHARGE NOTE ADULT - OTHER SIGNIFICANT FINDINGS
EXAM:  2D ECHOCARDIOGRAM AD    PROCEDURE DATE:  05/08/2018    < from: Transthoracic Echocardiogram (05.08.18 @ 14:14) >   Summary     The left ventricle is normal in size, wall thickness, wall motion and   contractility.   The estimated LVEF is 60%   The left atrium is normal.   The right atrium is normal.   The right ventricle is normal in size.   Mild calcific aortic stenosis   No aortic insufficiency is seen   Mild (1+) mitral regurgitation is present.   A pericardial effusion is not present.    < end of copied text >

## 2018-05-09 NOTE — DISCHARGE NOTE ADULT - MEDICATION SUMMARY - MEDICATIONS TO STOP TAKING
I will STOP taking the medications listed below when I get home from the hospital:    Lasix 40 mg oral tablet  --   2 times a day

## 2018-05-09 NOTE — PROGRESS NOTE ADULT - ASSESSMENT
87 y/o m with PMHx of CHF, DM, CAD, ESRD on dialysis, MI with AICD, HLD, HTN, pancreatitis, pulmonary edema presenting to the ED BIBA from dialysis  c/o fever (100.7), weakness, body aches s/p dialysis today. Pt received full 4 hours of dialysis today. Patient currently is very poor historian, unclear what his baseline mentation. He says he had dialysis yesterday Very poor historian. Denies diarrhea, dizziness, cough.  Above noted  feels much better today  dialyzed yesterday  next hd due Monday 5/6  Diarrhea  r/o c dif  straight cath urine c/s  1/2 NS x 400 ml  hd monday 5/7 MK  - ESRD: tolerating HD with goal uf fo 2kg ion 2k  fu trend of the bp, may need to hold lisinopril trend.   fu Na after fluid removal with HD   - diarrrhea:  cdiff neg  - weakness.  PT eval ongoing  DW Dr RADHA Jones    5/8  reviewed results of ECHO, nuclear stress  pt states does PT isma Haines  from renal standpoint stable for DC    5/9 MK  - ESRD: tolerating tx, will attempt UF goal  - Hypotension episodes.  to avoid frequent IVF, will dc the hydralazine and fu the trend of the BP  - Weakness: NSt today as per cards

## 2018-05-09 NOTE — DISCHARGE NOTE ADULT - CARE PROVIDER_API CALL
Veronica Perales), Medicine  [address] [facility]  [address]  [place] [address]  Phone: [phone]  Fax: [phone]    Jorge Turner), Internal Medicine  [address] [place] [address]  Phone: [phone]  Fax: [phone]    Adama Hoyos (MD), Urology  [address] [facility] [address] [facility]  [address] [place] [address]  Phone: [phone]  Fax: [phone]    Silas Lozano (DO), Nephrology  [address] [facility]  [address]  [place] [address]  Phone: [phone]  Fax: [phone]

## 2018-05-09 NOTE — PROGRESS NOTE ADULT - SUBJECTIVE AND OBJECTIVE BOX
NEPHROLOGY INTERVAL HPI/OVERNIGHT EVENTS:  seen at HD  + [facility]   for NST today       MEDICATIONS  (STANDING):  atorvastatin 40 milliGRAM(s) Oral at bedtime  carvedilol 25 milliGRAM(s) Oral every 12 hours  dextrose 5%. 1000 milliLiter(s) (50 mL/Hr) IV Continuous <Continuous>  dextrose 50% Injectable 12.5 Gram(s) IV Push once  dextrose 50% Injectable 25 Gram(s) IV Push once  dextrose 50% Injectable 25 Gram(s) IV Push once  docusate sodium 100 milliGRAM(s) Oral three times a day  doxercalciferol Injectable 1 MICROGram(s) IV Push <User Schedule>  heparin  Injectable 5000 Unit(s) SubCutaneous every 12 hours  hydrALAZINE 100 milliGRAM(s) Oral every 12 hours  insulin lispro (HumaLOG) corrective regimen sliding scale   SubCutaneous three times a day before meals  insulin lispro (HumaLOG) corrective regimen sliding scale   SubCutaneous at bedtime  isosorbide   mononitrate ER Tablet (IMDUR) 60 milliGRAM(s) Oral daily  levothyroxine 75 MICROGram(s) Oral daily  lisinopril 5 milliGRAM(s) Oral daily  sodium chloride 0.45%. 400 milliLiter(s) (50 mL/Hr) IV Continuous <Continuous>  tamsulosin 0.4 milliGRAM(s) Oral at bedtime    MEDICATIONS  (PRN):  acetaminophen   Tablet 650 milliGRAM(s) Oral every 6 hours PRN For Temp greater than 38 C (100.4 F) or  mild pain  dextrose Gel 1 Dose(s) Oral once PRN Blood Glucose LESS THAN 70 milliGRAM(s)/deciliter  glucagon  Injectable 1 milliGRAM(s) IntraMuscular once PRN Glucose LESS THAN 70 milligrams/deciliter  ondansetron Injectable 4 milliGRAM(s) IV Push every 6 hours PRN Nausea      Allergies    No Known Allergies    Intolerances        I&O's Detail    08 May 2018 07:01  -  09 May 2018 07:00  --------------------------------------------------------  IN:    Oral Fluid: 360 mL  Total IN: 360 mL    OUT:  Total OUT: 0 mL    Total NET: 360 mL          .    Patient was seen and evaluated on dialysis.   Patient is tolerating the procedure well.   Continue dialysis:   Dialyzer:  revaclear with BFR of 350-400 on 2k with ug goal of 2.5 kg    Vital Signs Last 24 Hrs  T(C): 36.1 (09 May 2018 12:40), Max: 37 (09 May 2018 05:32)  T(F): 97 (09 May 2018 12:40), Max: 98.6 (09 May 2018 05:32)  HR: 62 (09 May 2018 12:40) (62 - 81)  BP: 129/57 (09 May 2018 12:40) (129/57 - 182/49)  BP(mean): --  RR: 16 (09 May 2018 12:40) (16 - 18)  SpO2: 100% (09 May 2018 11:20) (95% - 100%)  Daily     Daily Weight in k (09 May 2018 03:01)    PHYSICAL EXAM:  General: alert. awake Ox3  HEENT: MMM  CV: s1s2 rrr  LUNGS: B/L CTA  EXT: no edema    LABS:                        11.4   6.43  )-----------( 189      ( 09 May 2018 12:30 )             33.2     05-08    131<L>  |  96  |  55<H>  ----------------------------<  192<H>  4.8   |  26  |  5.55<H>    Ca    8.2<L>      08 May 2018 07:46

## 2018-05-09 NOTE — CONSULT NOTE ADULT - SUBJECTIVE AND OBJECTIVE BOX
CHIEF COMPLAINT:Patient with multple problems and age related debility with penile implant    HISTORY OF PRESENT ILLNESS:He has no  complaints and makes little urine, being on dialysis.    PAST MEDICAL & SURGICAL HISTORY:  Pulmonary edema  Pancreatitis  AICD (automatic cardioverter/defibrillator) present  CHF (Congestive Heart Failure)  HTN (Hypertension)  High Cholesterol  Heart Attack  Dialysis Patient  Diabetes  AICD (automatic cardioverter/defibrillator) present  History of penile implant  History of Hernia Repair      REVIEW OF SYSTEMS:    CONSTITUTIONAL: No weakness, fevers or chills/debility of age  EYES/ENT: No visual changes;  No vertigo or throat pain   NECK: No pain or stiffness  RESPIRATORY: No cough, wheezing, hemoptysis; No shortness of breath  CARDIOVASCULAR: No chest pain or palpitations  GASTROINTESTINAL: No abdominal or epigastric pain. No nausea, vomiting, or hematemesis; No diarrhea or constipation. No melena or hematochezia.  GENITOURINARY: No dysuria, frequency or hematuria  NEUROLOGICAL: No numbness or weakness  SKIN: No itching, burning, rashes, or lesions   All other review of systems is negative unless indicated above.    MEDICATIONS  (STANDING):  atorvastatin 40 milliGRAM(s) Oral at bedtime  carvedilol 25 milliGRAM(s) Oral every 12 hours  dextrose 5%. 1000 milliLiter(s) (50 mL/Hr) IV Continuous <Continuous>  dextrose 50% Injectable 12.5 Gram(s) IV Push once  dextrose 50% Injectable 25 Gram(s) IV Push once  dextrose 50% Injectable 25 Gram(s) IV Push once  docusate sodium 100 milliGRAM(s) Oral three times a day  doxercalciferol Injectable 1 MICROGram(s) IV Push <User Schedule>  heparin  Injectable 5000 Unit(s) SubCutaneous every 12 hours  hydrALAZINE 100 milliGRAM(s) Oral every 12 hours  insulin lispro (HumaLOG) corrective regimen sliding scale   SubCutaneous three times a day before meals  insulin lispro (HumaLOG) corrective regimen sliding scale   SubCutaneous at bedtime  isosorbide   mononitrate ER Tablet (IMDUR) 60 milliGRAM(s) Oral daily  levothyroxine 75 MICROGram(s) Oral daily  lisinopril 5 milliGRAM(s) Oral daily  sodium chloride 0.45%. 400 milliLiter(s) (50 mL/Hr) IV Continuous <Continuous>  tamsulosin 0.4 milliGRAM(s) Oral at bedtime    MEDICATIONS  (PRN):  acetaminophen   Tablet 650 milliGRAM(s) Oral every 6 hours PRN For Temp greater than 38 C (100.4 F) or  mild pain  dextrose Gel 1 Dose(s) Oral once PRN Blood Glucose LESS THAN 70 milliGRAM(s)/deciliter  glucagon  Injectable 1 milliGRAM(s) IntraMuscular once PRN Glucose LESS THAN 70 milligrams/deciliter  ondansetron Injectable 4 milliGRAM(s) IV Push every 6 hours PRN Nausea      Allergies    No Known Allergies    Intolerances        SOCIAL HISTORY:    FAMILY HISTORY:  No pertinent family history in first degree relatives      Vital Signs Last 24 Hrs  T(C): 36.8 (09 May 2018 11:20), Max: 37 (09 May 2018 05:32)  T(F): 98.2 (09 May 2018 11:20), Max: 98.6 (09 May 2018 05:32)  HR: 67 (09 May 2018 11:20) (67 - 81)  BP: 134/44 (09 May 2018 11:20) (134/44 - 182/49)  BP(mean): --  RR: 16 (09 May 2018 11:20) (16 - 18)  SpO2: 100% (09 May 2018 11:20) (95% - 100%)    PHYSICAL EXAM:    Constitutional: NAD, well-developed/debility of age  HEENT: CARLYN, EOMI, Normal Hearing, MMM  Neck: No LAD, No JVD  Back: Normal spine flexure, No CVA tenderness  Respiratory: CTAB   Cardiovascular: S1 and S2, RRR, no M/G/R  Abd: BS+, soft, NT/ND, No CVAT  : Normal phallus,open meatus,bilateral descended testes, no masses/prosthetic present  Extremities: No peripheral edema  Vascular: 2+ peripheral pulses  Neurological: A/O x 3, no focal deficits  Psychiatric: Normal mood, normal affect  Musculoskeletal: 5/5 strength b/l upper and lower extremities  Skin: No rashes    LABS:                        11.0   6.51  )-----------( 183      ( 08 May 2018 07:46 )             33.4     05-08    131<L>  |  96  |  55<H>  ----------------------------<  192<H>  4.8   |  26  |  5.55<H>    Ca    8.2<L>      08 May 2018 07:46          Urine Culture:     RADIOLOGY & ADDITIONAL STUDIES:

## 2018-05-09 NOTE — DISCHARGE NOTE ADULT - PLAN OF CARE
resolve oral hydration monitor c/w current meds.  F/u with Cardiology C/w Flomax and follow up with urologist improve PT at home

## 2018-05-09 NOTE — PROGRESS NOTE ADULT - SUBJECTIVE AND OBJECTIVE BOX
Patient is a 88y old  Male who presents with a chief complaint of fever (04 May 2018 16:58)    87 y/o m with PMHx of CHF, DM, CAD, ESRD on dialysis, MI with AICD, HLD, HTN, pancreatitis, pulmonary edema presenting to the ED BIBA from dialysis  c/o fever (100.7), weakness, body aches s/p dialysis today. Pt received full 4 hours of dialysis today. Patient currently is very poor historian, unclear what his baseline mentation. He says he had dialysis yesterday Very poor historian. Denies diarrhea, dizziness, cough.     Examined at bed side, feels fine now, denies angina, PND, lately. States he had device implanted about 8 years ago with gen change 3 years ago at another facility, states Dr Garza is his EP but does not have a general cards to follow up with.     -  had some diarrhea today. has ruled out for Cdiff.  scheduled for echo and stress test today  denies chest pain or shortness of breath.      - Feels fine today. Has not had angina, orthopnea, PND recently.   Stress SPECT MPIs reviewed, including the one from 2017 that prompted cath. All defects are old and they did not correlate with obstructive CAD then. He is not having CV symptoms at moment.  Also, his echo revealed recovered LVEF with no other significant structural or functional issues. His device interrogation also within normal parameters with no significant arrhythmias  Suggest continuing optimal medical therapy for CAD, HF.   Will follow up as needed.       Allergies    No Known Allergies    Intolerances        MEDICATIONS  (STANDING):  atorvastatin 40 milliGRAM(s) Oral at bedtime  carvedilol 25 milliGRAM(s) Oral every 12 hours  dextrose 5%. 1000 milliLiter(s) (50 mL/Hr) IV Continuous <Continuous>  dextrose 50% Injectable 12.5 Gram(s) IV Push once  dextrose 50% Injectable 25 Gram(s) IV Push once  dextrose 50% Injectable 25 Gram(s) IV Push once  docusate sodium 100 milliGRAM(s) Oral three times a day  doxercalciferol Injectable 1 MICROGram(s) IV Push <User Schedule>  heparin  Injectable 5000 Unit(s) SubCutaneous every 12 hours  hydrALAZINE 50 milliGRAM(s) Oral every 12 hours  insulin lispro (HumaLOG) corrective regimen sliding scale   SubCutaneous three times a day before meals  insulin lispro (HumaLOG) corrective regimen sliding scale   SubCutaneous at bedtime  isosorbide   mononitrate ER Tablet (IMDUR) 60 milliGRAM(s) Oral daily  levothyroxine 75 MICROGram(s) Oral daily  lisinopril 5 milliGRAM(s) Oral daily  sodium chloride 0.45%. 400 milliLiter(s) (50 mL/Hr) IV Continuous <Continuous>  tamsulosin 0.4 milliGRAM(s) Oral at bedtime    MEDICATIONS  (PRN):  acetaminophen   Tablet 650 milliGRAM(s) Oral every 6 hours PRN For Temp greater than 38 C (100.4 F) or  mild pain  dextrose Gel 1 Dose(s) Oral once PRN Blood Glucose LESS THAN 70 milliGRAM(s)/deciliter  glucagon  Injectable 1 milliGRAM(s) IntraMuscular once PRN Glucose LESS THAN 70 milligrams/deciliter  ondansetron Injectable 4 milliGRAM(s) IV Push every 6 hours PRN Nausea    REVIEW OF SYSTEMS:    RESPIRATORY: No cough, wheezing, hemoptysis; No shortness of breath  CARDIOVASCULAR: No chest pain or palpitations  All other review of systems is negative unless indicated above      PHYSICAL EXAM:  Daily     Daily Weight in k.1 (08 May 2018 06:36)  Vital Signs Last 24 Hrs  T(C): 36.9 (08 May 2018 06:23), Max: 36.9 (08 May 2018 06:23)  T(F): 98.4 (08 May 2018 06:23), Max: 98.4 (08 May 2018 06:23)  HR: 74 (08 May 2018 06:23) (73 - 92)  BP: 172/63 (08 May 2018 06:23) (120/71 - 172/63)  BP(mean): --  RR: 18 (08 May 2018 06:23) (16 - 18)  SpO2: 97% (08 May 2018 06:23) (94% - 97%)    Constitutional: NAD, awake and alert, well-developed  HEENT: PERR, EOMI, Normal Hearing, MMM  Neck: Soft and supple, No LAD, No JVD  Respiratory: Breath sounds are clear bilaterally, No wheezing, rales or rhonchi  Cardiovascular: S1 and S2, regular rate and rhythm, no Murmurs, gallops or rubs  Gastrointestinal: Bowel Sounds present, soft, nontender, nondistended, no guarding, no rebound  Extremities: No peripheral edema  Vascular: 2+ peripheral pulses  Neurological: A/O x 3, no focal deficits  Musculoskeletal: 5/5 strength b/l upper and lower extremities  Skin: No rashes    LABS: All Labs Reviewed:                        10.7   6.85  )-----------( 187      ( 07 May 2018 07:30 )             32.1         131<L>  |  94<L>  |  94<H>  ----------------------------<  243<H>  5.3   |  20<L>  |  8.28<H>    Ca    7.8<L>      07 May 2018 07:30  Phos  5.8         TPro  x   /  Alb  3.1<L>  /  TBili  x   /  DBili  x   /  AST  x   /  ALT  x   /  AlkPhos  x

## 2018-05-09 NOTE — DISCHARGE NOTE ADULT - ADDITIONAL INSTRUCTIONS
F/u with PCP in 2-3 days,  Renal Doctor at dialysis, urologist in 2 weeks, Cardiologist in 2-4 weeks

## 2018-05-09 NOTE — DISCHARGE NOTE ADULT - CARE PROVIDERS DIRECT ADDRESSES
,DirectAddress_Unknown,DirectAddress_Unknown,[email or web address],DirectAddress_Unknown

## 2018-05-09 NOTE — DISCHARGE NOTE ADULT - MEDICATION SUMMARY - MEDICATIONS TO TAKE
I will START or STAY ON the medications listed below when I get home from the hospital:    lantus 25 units  -- once a day (at bedtime)  -- Indication: For DM    lisinopril 5 mg oral tablet  -- 1 tab(s) by mouth once a day  -- Indication: For HTN    tamsulosin 0.4 mg oral capsule  -- 1 cap(s) by mouth once a day (at bedtime)  -- Indication: For urinary retention     isosorbide mononitrate 60 mg oral tablet, extended release  --  by mouth   -- Indication: For Cardiomyopathy     insulin lispro 100 units/mL subcutaneous solution  -- unit(s) subcutaneous prn ; 1 Unit(s) if Glucose 151 - 200  2 Unit(s) if Glucose 201 - 250  3 Unit(s) if Glucose 251 - 300  4 Unit(s) if Glucose 301 - 350  5 Unit(s) if Glucose 351 - 400  6 Unit(s) if Glucose Greater Than 400  -- Indication: For DM    atorvastatin 40 mg oral tablet  -- 1 tab(s) by mouth once a day  -- Indication: For HLD    carvedilol 25 mg oral tablet  -- 1 tab(s) by mouth every 12 hours  -- Indication: For CArdiomyopathy     docusate sodium 100 mg oral capsule  -- 1 cap(s) by mouth 3 times a day, As Needed for constipathion  -- Indication: For constipation      levothyroxine 75 mcg (0.075 mg) oral capsule  -- 1 cap(s) by mouth once a day  -- Indication: For Hypothryoidism    hydrALAZINE 100 mg oral tablet  -- 1 tab(s) by mouth 2 times a day  -- Indication: For HTN

## 2018-05-09 NOTE — DISCHARGE NOTE ADULT - PATIENT PORTAL LINK FT
You can access the [email or web address] Portal, offered by [facility], by registering with the following website: [email or web address]

## 2018-05-09 NOTE — DISCHARGE NOTE ADULT - HOSPITAL COURSE
89 y/o m with PMHx of Systolic CHF, s/p AICD, DM, CAD, ESRD on dialysis, HLD, HTN, pancreatitis presented to the ED BIBA from dialysis  c/o fever (100.7), weakness, body aches s/p dialysis. Pt received full 4 hours of dialysis.  in ED was found to be dehydrated and RVP positive for entervirus. CXR  neg for PNA.  Also noted to be confused.  Admitted for  further evaluation Pt had few episodes of Low/borderline BP  and loose stools, gentle IVF were given with improvement of BP. Also Pt had episode of urinary retention and needed straight cath twice. Stated on Flomax.  Also Pt noted to have mildly elevated trop and has H/o Cardiomyopathy which is as per cardiology is non ischemic as his LHC in the past was neg.  Pt had  cardiac  stress test done which was abnormal but not changed from last admission. ECHO showed improvement of EF to 60%. Pt has AICD which was interrogated and normal Fx. Pt was followed by renal and had HD today, tolerated well. Pt was evaluated by PT and recommended HI vs home PT, Pt does not want to go to rehab, has aids and HC arranged [facility]. Pt  was seen at HD and reports no complains, baseline confused,  wants to go home.   Vital Signs Last 24 Hrs  T(C): 36.8 (09 May 2018 17:39), Max: 37 (09 May 2018 05:32)  T(F): 98.2 (09 May 2018 17:39), Max: 98.6 (09 May 2018 05:32)  HR: 81 (09 May 2018 17:39) (62 - 81)  BP: 125/53 (09 May 2018 17:39) (104/48 - 173/48)  RR: 16 (09 May 2018 17:39) (16 - 18)  SpO2: 98% (09 May 2018 17:39) (95% - 100%)    PHYSICAL EXAM:  General: Well developed; well nourished; in no acute distress  Eyes: PERRLA, EOMI; conjunctiva and sclera clear  Head: Normocephalic; atraumatic  ENMT: No nasal discharge; airway clear  Neck: Supple; non tender; no masses  Respiratory: Good air entry. No wheezes, rales or rhonchi  Cardiovascular: Regular rate and rhythm. S1 and S2 Normal; No murmurs  Gastrointestinal: Soft non-tender, distended; Normal bowel sounds  Genitourinary: No costovertebral angle tenderness  Extremities: Normal range of motion, No  edema  Vascular: Peripheral pulses palpable 2+ bilaterally  Neurological: Alert and oriented x2,  non focal   Skin: Warm and dry. No acute rash  Musculoskeletal: Normal muscle  tone, without deformities  Psychiatric: Cooperative and appropriate    1. Weakness and Low grade fevers due to Viral URI with dehydration, resolved   - loose stools, resolved viral vs due to RTC colace, held   -  C.Diff not  detected   - RVP + Enterovirus  - CXR no PNA  - was given  IVF for hydration on 5/6       2. Metabolic  encephelopathy secondary to viral syndrome and dehydration, improved, Suspect baseline dementia   - supportive care  - Fall precautions      3. Borderline BP, due to dehydration and poor oral intake in settings of acute viral infection, resolved   - likely had Low BP due to dehydration   - S/p 400ml of gentle IV  hydration   - BP  stable now     4. Systolic CHF,  Non Ischemic Cardiomyopathy. EF 40% as per old records. CAD. S/p AICD  - had LHC in the past and was neg   - no CP, no signs  of overload, dehydrated   -  ECHO: Improved EF 60%   - BNP elevated, but clinically not in failure   - C/w  hydralazine and Carvedilol, Lisinopril and Isosorbide  - Mildly elevated troponins, no CP  -EP eval for PPM interrogated, normal Fx  - Stress test negative, d/w DR Turner, c/w current meds and follow up outPt       5. ESRD on HD  - monitor  Renal FX  - S/p  HD today, continue regular schedule   - D/w DR Newman     6. HLD  - c/w lipitor     7. DM   diabetic diet  C/w lantus and ISS   F/u with PCP for further management     8. Urinary retention and urgency  bladder scan PRN, no retention today  Started on  Flomax   F/u  UA and UCX neg   Urology eval noted, follow outPt     9. HTN  BP stable   C/w home BP meds   - D/w DR Newman will follow and adjust outPt         Dispo: stable for d/c ome with Home care today. D/w CM HC and aids arranged

## 2018-05-11 DIAGNOSIS — I42.9 CARDIOMYOPATHY, UNSPECIFIED: ICD-10-CM

## 2018-05-11 DIAGNOSIS — R33.9 RETENTION OF URINE, UNSPECIFIED: ICD-10-CM

## 2018-05-11 DIAGNOSIS — E86.0 DEHYDRATION: ICD-10-CM

## 2018-05-11 DIAGNOSIS — Z99.2 DEPENDENCE ON RENAL DIALYSIS: ICD-10-CM

## 2018-05-11 DIAGNOSIS — B97.10 UNSPECIFIED ENTEROVIRUS AS THE CAUSE OF DISEASES CLASSIFIED ELSEWHERE: ICD-10-CM

## 2018-05-11 DIAGNOSIS — R39.15 URGENCY OF URINATION: ICD-10-CM

## 2018-05-11 DIAGNOSIS — I13.2 HYPERTENSIVE HEART AND CHRONIC KIDNEY DISEASE WITH HEART FAILURE AND WITH STAGE 5 CHRONIC KIDNEY DISEASE, OR END STAGE RENAL DISEASE: ICD-10-CM

## 2018-05-11 DIAGNOSIS — N18.6 END STAGE RENAL DISEASE: ICD-10-CM

## 2018-05-11 DIAGNOSIS — R34 ANURIA AND OLIGURIA: ICD-10-CM

## 2018-05-11 DIAGNOSIS — Z95.810 PRESENCE OF AUTOMATIC (IMPLANTABLE) CARDIAC DEFIBRILLATOR: ICD-10-CM

## 2018-05-11 DIAGNOSIS — R50.9 FEVER, UNSPECIFIED: ICD-10-CM

## 2018-05-11 DIAGNOSIS — E11.22 TYPE 2 DIABETES MELLITUS WITH DIABETIC CHRONIC KIDNEY DISEASE: ICD-10-CM

## 2018-05-11 DIAGNOSIS — I50.22 CHRONIC SYSTOLIC (CONGESTIVE) HEART FAILURE: ICD-10-CM

## 2018-05-11 DIAGNOSIS — J06.9 ACUTE UPPER RESPIRATORY INFECTION, UNSPECIFIED: ICD-10-CM

## 2018-05-11 DIAGNOSIS — Z79.899 OTHER LONG TERM (CURRENT) DRUG THERAPY: ICD-10-CM

## 2018-05-11 DIAGNOSIS — G93.41 METABOLIC ENCEPHALOPATHY: ICD-10-CM

## 2018-05-11 DIAGNOSIS — Z79.4 LONG TERM (CURRENT) USE OF INSULIN: ICD-10-CM

## 2018-05-11 DIAGNOSIS — I25.2 OLD MYOCARDIAL INFARCTION: ICD-10-CM

## 2018-06-17 ENCOUNTER — INPATIENT (INPATIENT)
Facility: HOSPITAL | Age: 83
LOS: 3 days | Discharge: NURSING, REHAB OR LONG-TERM CARE | End: 2018-06-21
Attending: INTERNAL MEDICINE | Admitting: INTERNAL MEDICINE
Payer: MEDICARE

## 2018-06-17 VITALS
SYSTOLIC BLOOD PRESSURE: 178 MMHG | DIASTOLIC BLOOD PRESSURE: 63 MMHG | RESPIRATION RATE: 20 BRPM | HEIGHT: 67 IN | HEART RATE: 78 BPM | TEMPERATURE: 99 F | WEIGHT: 179.9 LBS | OXYGEN SATURATION: 96 %

## 2018-06-17 DIAGNOSIS — Z95.810 PRESENCE OF AUTOMATIC (IMPLANTABLE) CARDIAC DEFIBRILLATOR: Chronic | ICD-10-CM

## 2018-06-17 DIAGNOSIS — Z96.89 PRESENCE OF OTHER SPECIFIED FUNCTIONAL IMPLANTS: Chronic | ICD-10-CM

## 2018-06-17 LAB
ALBUMIN SERPL ELPH-MCNC: 3.4 G/DL — SIGNIFICANT CHANGE UP (ref 3.3–5)
ALP SERPL-CCNC: 81 U/L — SIGNIFICANT CHANGE UP (ref 40–120)
ALT FLD-CCNC: 22 U/L — SIGNIFICANT CHANGE UP (ref 12–78)
ANION GAP SERPL CALC-SCNC: 8 MMOL/L — SIGNIFICANT CHANGE UP (ref 5–17)
APTT BLD: 25.5 SEC — LOW (ref 27.5–37.4)
AST SERPL-CCNC: 32 U/L — SIGNIFICANT CHANGE UP (ref 15–37)
BASOPHILS # BLD AUTO: 0.07 K/UL — SIGNIFICANT CHANGE UP (ref 0–0.2)
BASOPHILS NFR BLD AUTO: 0.6 % — SIGNIFICANT CHANGE UP (ref 0–2)
BILIRUB SERPL-MCNC: 0.4 MG/DL — SIGNIFICANT CHANGE UP (ref 0.2–1.2)
BUN SERPL-MCNC: 57 MG/DL — HIGH (ref 7–23)
CALCIUM SERPL-MCNC: 8.5 MG/DL — SIGNIFICANT CHANGE UP (ref 8.5–10.1)
CHLORIDE SERPL-SCNC: 98 MMOL/L — SIGNIFICANT CHANGE UP (ref 96–108)
CK SERPL-CCNC: 500 U/L — HIGH (ref 26–308)
CO2 SERPL-SCNC: 29 MMOL/L — SIGNIFICANT CHANGE UP (ref 22–31)
CREAT SERPL-MCNC: 6.38 MG/DL — HIGH (ref 0.5–1.3)
EOSINOPHIL # BLD AUTO: 0.15 K/UL — SIGNIFICANT CHANGE UP (ref 0–0.5)
EOSINOPHIL NFR BLD AUTO: 1.3 % — SIGNIFICANT CHANGE UP (ref 0–6)
GLUCOSE SERPL-MCNC: 173 MG/DL — HIGH (ref 70–99)
HCT VFR BLD CALC: 35.9 % — LOW (ref 39–50)
HGB BLD-MCNC: 11.8 G/DL — LOW (ref 13–17)
IMM GRANULOCYTES NFR BLD AUTO: 0.3 % — SIGNIFICANT CHANGE UP (ref 0–1.5)
INR BLD: 0.96 RATIO — SIGNIFICANT CHANGE UP (ref 0.88–1.16)
LYMPHOCYTES # BLD AUTO: 0.72 K/UL — LOW (ref 1–3.3)
LYMPHOCYTES # BLD AUTO: 6.3 % — LOW (ref 13–44)
MCHC RBC-ENTMCNC: 29.1 PG — SIGNIFICANT CHANGE UP (ref 27–34)
MCHC RBC-ENTMCNC: 32.9 GM/DL — SIGNIFICANT CHANGE UP (ref 32–36)
MCV RBC AUTO: 88.6 FL — SIGNIFICANT CHANGE UP (ref 80–100)
MONOCYTES # BLD AUTO: 1.01 K/UL — HIGH (ref 0–0.9)
MONOCYTES NFR BLD AUTO: 8.9 % — SIGNIFICANT CHANGE UP (ref 2–14)
NEUTROPHILS # BLD AUTO: 9.41 K/UL — HIGH (ref 1.8–7.4)
NEUTROPHILS NFR BLD AUTO: 82.6 % — HIGH (ref 43–77)
NRBC # BLD: 0 /100 WBCS — SIGNIFICANT CHANGE UP (ref 0–0)
NT-PROBNP SERPL-SCNC: HIGH PG/ML (ref 0–450)
PLATELET # BLD AUTO: 213 K/UL — SIGNIFICANT CHANGE UP (ref 150–400)
POTASSIUM SERPL-MCNC: 5.1 MMOL/L — SIGNIFICANT CHANGE UP (ref 3.5–5.3)
POTASSIUM SERPL-SCNC: 5.1 MMOL/L — SIGNIFICANT CHANGE UP (ref 3.5–5.3)
PROT SERPL-MCNC: 7.2 GM/DL — SIGNIFICANT CHANGE UP (ref 6–8.3)
PROTHROM AB SERPL-ACNC: 10.4 SEC — SIGNIFICANT CHANGE UP (ref 9.8–12.7)
RBC # BLD: 4.05 M/UL — LOW (ref 4.2–5.8)
RBC # FLD: 13.7 % — SIGNIFICANT CHANGE UP (ref 10.3–14.5)
SODIUM SERPL-SCNC: 135 MMOL/L — SIGNIFICANT CHANGE UP (ref 135–145)
TROPONIN I SERPL-MCNC: 0.6 NG/ML — HIGH (ref 0.01–0.04)
WBC # BLD: 11.39 K/UL — HIGH (ref 3.8–10.5)
WBC # FLD AUTO: 11.39 K/UL — HIGH (ref 3.8–10.5)

## 2018-06-17 PROCEDURE — 93010 ELECTROCARDIOGRAM REPORT: CPT

## 2018-06-17 PROCEDURE — 71045 X-RAY EXAM CHEST 1 VIEW: CPT | Mod: 26

## 2018-06-17 RX ORDER — ASPIRIN/CALCIUM CARB/MAGNESIUM 324 MG
325 TABLET ORAL ONCE
Qty: 0 | Refills: 0 | Status: COMPLETED | OUTPATIENT
Start: 2018-06-17 | End: 2018-06-17

## 2018-06-17 RX ORDER — SODIUM CHLORIDE 9 MG/ML
3 INJECTION INTRAMUSCULAR; INTRAVENOUS; SUBCUTANEOUS ONCE
Qty: 0 | Refills: 0 | Status: COMPLETED | OUTPATIENT
Start: 2018-06-17 | End: 2018-06-17

## 2018-06-17 RX ORDER — FUROSEMIDE 40 MG
40 TABLET ORAL ONCE
Qty: 0 | Refills: 0 | Status: COMPLETED | OUTPATIENT
Start: 2018-06-17 | End: 2018-06-17

## 2018-06-17 NOTE — ED PROVIDER NOTE - CARDIAC, MLM
Normal rate, regular rhythm.  Heart sounds S1, S2.  No murmurs, rubs or gallops. Palpable pacemaker. 2+Pitting edema

## 2018-06-17 NOTE — ED ADULT NURSE NOTE - OBJECTIVE STATEMENT
88 year old male with a past medical history of CHF, ESRD (Monday, Wednesday, Friday Dialysis), and hypothyroidism presenting to the ED via EMS for SOB. Patient's daughter states that the patient was having increasing SOB/dyspnea at home while at rest. EMS found patient's SPO2 on room air to be up to 89%. Up to 99% on 2 LPM NC. Patient states that he has had worsening SOB for the past few days, additionally endorses non-productive cough and activity intolerance. Denies fevers, chills, syncope, chest pain, abdominal pain, nausea, vomiting, constipation, diarrhea, missed dialysis, or changes to medications.

## 2018-06-17 NOTE — ED PROVIDER NOTE - PROGRESS NOTE DETAILS
results noted.  pt and family agree with plan for admit.   case d/w [place] and will admit to telemetry

## 2018-06-17 NOTE — ED PROVIDER NOTE - CARE PLAN
Principal Discharge DX:	ACS (acute coronary syndrome)  Secondary Diagnosis:	Congestive heart failure, unspecified HF chronicity, unspecified heart failure type

## 2018-06-17 NOTE — ED PROVIDER NOTE - OBJECTIVE STATEMENT
87 y/o male with PMHx of ACID, CHF, HTN presents to the ED c/o SOB starting yesterday but worsening today. Pt is unsure if he had a fever but felt hot. Not sure if he is on any diuretics.

## 2018-06-18 DIAGNOSIS — I10 ESSENTIAL (PRIMARY) HYPERTENSION: ICD-10-CM

## 2018-06-18 DIAGNOSIS — E11.9 TYPE 2 DIABETES MELLITUS WITHOUT COMPLICATIONS: ICD-10-CM

## 2018-06-18 DIAGNOSIS — E78.5 HYPERLIPIDEMIA, UNSPECIFIED: ICD-10-CM

## 2018-06-18 DIAGNOSIS — N18.6 END STAGE RENAL DISEASE: ICD-10-CM

## 2018-06-18 DIAGNOSIS — I25.10 ATHEROSCLEROTIC HEART DISEASE OF NATIVE CORONARY ARTERY WITHOUT ANGINA PECTORIS: ICD-10-CM

## 2018-06-18 DIAGNOSIS — R74.8 ABNORMAL LEVELS OF OTHER SERUM ENZYMES: ICD-10-CM

## 2018-06-18 DIAGNOSIS — I50.30 UNSPECIFIED DIASTOLIC (CONGESTIVE) HEART FAILURE: ICD-10-CM

## 2018-06-18 DIAGNOSIS — Z29.9 ENCOUNTER FOR PROPHYLACTIC MEASURES, UNSPECIFIED: ICD-10-CM

## 2018-06-18 LAB
ADD ON TEST-SPECIMEN IN LAB: SIGNIFICANT CHANGE UP
ALBUMIN SERPL ELPH-MCNC: 3.1 G/DL — LOW (ref 3.3–5)
ANION GAP SERPL CALC-SCNC: 8 MMOL/L — SIGNIFICANT CHANGE UP (ref 5–17)
BUN SERPL-MCNC: 61 MG/DL — HIGH (ref 7–23)
CALCIUM SERPL-MCNC: 8.3 MG/DL — LOW (ref 8.5–10.1)
CHLORIDE SERPL-SCNC: 99 MMOL/L — SIGNIFICANT CHANGE UP (ref 96–108)
CO2 SERPL-SCNC: 28 MMOL/L — SIGNIFICANT CHANGE UP (ref 22–31)
CREAT SERPL-MCNC: 7.3 MG/DL — HIGH (ref 0.5–1.3)
GLUCOSE BLDC GLUCOMTR-MCNC: 131 MG/DL — HIGH (ref 70–99)
GLUCOSE BLDC GLUCOMTR-MCNC: 139 MG/DL — HIGH (ref 70–99)
GLUCOSE BLDC GLUCOMTR-MCNC: 165 MG/DL — HIGH (ref 70–99)
GLUCOSE BLDC GLUCOMTR-MCNC: 234 MG/DL — HIGH (ref 70–99)
GLUCOSE SERPL-MCNC: 253 MG/DL — HIGH (ref 70–99)
HAV IGM SER-ACNC: SIGNIFICANT CHANGE UP
HBV CORE IGM SER-ACNC: SIGNIFICANT CHANGE UP
HBV SURFACE AG SER-ACNC: SIGNIFICANT CHANGE UP
HCT VFR BLD CALC: 32.5 % — LOW (ref 39–50)
HCV AB S/CO SERPL IA: 0.1 S/CO — SIGNIFICANT CHANGE UP
HCV AB SERPL-IMP: SIGNIFICANT CHANGE UP
HGB BLD-MCNC: 10.7 G/DL — LOW (ref 13–17)
MCHC RBC-ENTMCNC: 30 PG — SIGNIFICANT CHANGE UP (ref 27–34)
MCHC RBC-ENTMCNC: 32.9 GM/DL — SIGNIFICANT CHANGE UP (ref 32–36)
MCV RBC AUTO: 91 FL — SIGNIFICANT CHANGE UP (ref 80–100)
NRBC # BLD: 0 /100 WBCS — SIGNIFICANT CHANGE UP (ref 0–0)
NT-PROBNP SERPL-SCNC: HIGH PG/ML (ref 0–450)
PHOSPHATE SERPL-MCNC: 2.6 MG/DL — SIGNIFICANT CHANGE UP (ref 2.5–4.5)
PLATELET # BLD AUTO: 183 K/UL — SIGNIFICANT CHANGE UP (ref 150–400)
POTASSIUM SERPL-MCNC: 4.7 MMOL/L — SIGNIFICANT CHANGE UP (ref 3.5–5.3)
POTASSIUM SERPL-SCNC: 4.7 MMOL/L — SIGNIFICANT CHANGE UP (ref 3.5–5.3)
RBC # BLD: 3.57 M/UL — LOW (ref 4.2–5.8)
RBC # FLD: 13.9 % — SIGNIFICANT CHANGE UP (ref 10.3–14.5)
SODIUM SERPL-SCNC: 135 MMOL/L — SIGNIFICANT CHANGE UP (ref 135–145)
TROPONIN I SERPL-MCNC: 0.38 NG/ML — HIGH (ref 0.01–0.04)
TROPONIN I SERPL-MCNC: 0.51 NG/ML — HIGH (ref 0.01–0.04)
WBC # BLD: 8.17 K/UL — SIGNIFICANT CHANGE UP (ref 3.8–10.5)
WBC # FLD AUTO: 8.17 K/UL — SIGNIFICANT CHANGE UP (ref 3.8–10.5)

## 2018-06-18 PROCEDURE — 93010 ELECTROCARDIOGRAM REPORT: CPT

## 2018-06-18 PROCEDURE — 99285 EMERGENCY DEPT VISIT HI MDM: CPT

## 2018-06-18 RX ORDER — GLUCAGON INJECTION, SOLUTION 0.5 MG/.1ML
1 INJECTION, SOLUTION SUBCUTANEOUS ONCE
Qty: 0 | Refills: 0 | Status: DISCONTINUED | OUTPATIENT
Start: 2018-06-18 | End: 2018-06-21

## 2018-06-18 RX ORDER — SODIUM CHLORIDE 9 MG/ML
1000 INJECTION, SOLUTION INTRAVENOUS
Qty: 0 | Refills: 0 | Status: DISCONTINUED | OUTPATIENT
Start: 2018-06-18 | End: 2018-06-21

## 2018-06-18 RX ORDER — DEXTROSE 50 % IN WATER 50 %
25 SYRINGE (ML) INTRAVENOUS ONCE
Qty: 0 | Refills: 0 | Status: DISCONTINUED | OUTPATIENT
Start: 2018-06-18 | End: 2018-06-21

## 2018-06-18 RX ORDER — DOCUSATE SODIUM 100 MG
100 CAPSULE ORAL THREE TIMES A DAY
Qty: 0 | Refills: 0 | Status: DISCONTINUED | OUTPATIENT
Start: 2018-06-18 | End: 2018-06-21

## 2018-06-18 RX ORDER — FUROSEMIDE 40 MG
40 TABLET ORAL DAILY
Qty: 0 | Refills: 0 | Status: DISCONTINUED | OUTPATIENT
Start: 2018-06-18 | End: 2018-06-21

## 2018-06-18 RX ORDER — ASPIRIN/CALCIUM CARB/MAGNESIUM 324 MG
325 TABLET ORAL DAILY
Qty: 0 | Refills: 0 | Status: DISCONTINUED | OUTPATIENT
Start: 2018-06-18 | End: 2018-06-21

## 2018-06-18 RX ORDER — ATORVASTATIN CALCIUM 80 MG/1
40 TABLET, FILM COATED ORAL AT BEDTIME
Qty: 0 | Refills: 0 | Status: DISCONTINUED | OUTPATIENT
Start: 2018-06-18 | End: 2018-06-21

## 2018-06-18 RX ORDER — INSULIN GLARGINE 100 [IU]/ML
25 INJECTION, SOLUTION SUBCUTANEOUS AT BEDTIME
Qty: 0 | Refills: 0 | Status: DISCONTINUED | OUTPATIENT
Start: 2018-06-18 | End: 2018-06-21

## 2018-06-18 RX ORDER — HYDRALAZINE HCL 50 MG
50 TABLET ORAL
Qty: 0 | Refills: 0 | Status: DISCONTINUED | OUTPATIENT
Start: 2018-06-18 | End: 2018-06-21

## 2018-06-18 RX ORDER — SEVELAMER CARBONATE 2400 MG/1
1600 POWDER, FOR SUSPENSION ORAL THREE TIMES A DAY
Qty: 0 | Refills: 0 | Status: DISCONTINUED | OUTPATIENT
Start: 2018-06-18 | End: 2018-06-21

## 2018-06-18 RX ORDER — HEPARIN SODIUM 5000 [USP'U]/ML
5000 INJECTION INTRAVENOUS; SUBCUTANEOUS EVERY 8 HOURS
Qty: 0 | Refills: 0 | Status: DISCONTINUED | OUTPATIENT
Start: 2018-06-18 | End: 2018-06-21

## 2018-06-18 RX ORDER — DEXTROSE 50 % IN WATER 50 %
12.5 SYRINGE (ML) INTRAVENOUS ONCE
Qty: 0 | Refills: 0 | Status: DISCONTINUED | OUTPATIENT
Start: 2018-06-18 | End: 2018-06-21

## 2018-06-18 RX ORDER — CARVEDILOL PHOSPHATE 80 MG/1
25 CAPSULE, EXTENDED RELEASE ORAL EVERY 12 HOURS
Qty: 0 | Refills: 0 | Status: DISCONTINUED | OUTPATIENT
Start: 2018-06-18 | End: 2018-06-21

## 2018-06-18 RX ORDER — LEVOTHYROXINE SODIUM 125 MCG
75 TABLET ORAL DAILY
Qty: 0 | Refills: 0 | Status: DISCONTINUED | OUTPATIENT
Start: 2018-06-18 | End: 2018-06-21

## 2018-06-18 RX ORDER — LISINOPRIL 2.5 MG/1
5 TABLET ORAL DAILY
Qty: 0 | Refills: 0 | Status: DISCONTINUED | OUTPATIENT
Start: 2018-06-18 | End: 2018-06-21

## 2018-06-18 RX ORDER — ISOSORBIDE MONONITRATE 60 MG/1
60 TABLET, EXTENDED RELEASE ORAL DAILY
Qty: 0 | Refills: 0 | Status: DISCONTINUED | OUTPATIENT
Start: 2018-06-18 | End: 2018-06-21

## 2018-06-18 RX ORDER — DEXTROSE 50 % IN WATER 50 %
15 SYRINGE (ML) INTRAVENOUS ONCE
Qty: 0 | Refills: 0 | Status: DISCONTINUED | OUTPATIENT
Start: 2018-06-18 | End: 2018-06-21

## 2018-06-18 RX ORDER — INSULIN LISPRO 100/ML
VIAL (ML) SUBCUTANEOUS
Qty: 0 | Refills: 0 | Status: DISCONTINUED | OUTPATIENT
Start: 2018-06-18 | End: 2018-06-21

## 2018-06-18 RX ORDER — TAMSULOSIN HYDROCHLORIDE 0.4 MG/1
0.4 CAPSULE ORAL AT BEDTIME
Qty: 0 | Refills: 0 | Status: DISCONTINUED | OUTPATIENT
Start: 2018-06-18 | End: 2018-06-21

## 2018-06-18 RX ADMIN — SEVELAMER CARBONATE 1600 MILLIGRAM(S): 2400 POWDER, FOR SUSPENSION ORAL at 17:28

## 2018-06-18 RX ADMIN — ATORVASTATIN CALCIUM 40 MILLIGRAM(S): 80 TABLET, FILM COATED ORAL at 21:20

## 2018-06-18 RX ADMIN — INSULIN GLARGINE 25 UNIT(S): 100 INJECTION, SOLUTION SUBCUTANEOUS at 21:26

## 2018-06-18 RX ADMIN — SODIUM CHLORIDE 3 MILLILITER(S): 9 INJECTION INTRAMUSCULAR; INTRAVENOUS; SUBCUTANEOUS at 02:14

## 2018-06-18 RX ADMIN — Medication 50 MILLIGRAM(S): at 06:21

## 2018-06-18 RX ADMIN — HEPARIN SODIUM 5000 UNIT(S): 5000 INJECTION INTRAVENOUS; SUBCUTANEOUS at 17:27

## 2018-06-18 RX ADMIN — HEPARIN SODIUM 5000 UNIT(S): 5000 INJECTION INTRAVENOUS; SUBCUTANEOUS at 06:21

## 2018-06-18 RX ADMIN — SEVELAMER CARBONATE 1600 MILLIGRAM(S): 2400 POWDER, FOR SUSPENSION ORAL at 21:19

## 2018-06-18 RX ADMIN — HEPARIN SODIUM 5000 UNIT(S): 5000 INJECTION INTRAVENOUS; SUBCUTANEOUS at 21:20

## 2018-06-18 RX ADMIN — Medication 325 MILLIGRAM(S): at 02:14

## 2018-06-18 RX ADMIN — Medication 50 MILLIGRAM(S): at 17:14

## 2018-06-18 RX ADMIN — LISINOPRIL 5 MILLIGRAM(S): 2.5 TABLET ORAL at 06:22

## 2018-06-18 RX ADMIN — Medication 75 MICROGRAM(S): at 06:21

## 2018-06-18 RX ADMIN — TAMSULOSIN HYDROCHLORIDE 0.4 MILLIGRAM(S): 0.4 CAPSULE ORAL at 21:20

## 2018-06-18 RX ADMIN — Medication 40 MILLIGRAM(S): at 02:13

## 2018-06-18 RX ADMIN — Medication 2: at 08:10

## 2018-06-18 RX ADMIN — CARVEDILOL PHOSPHATE 25 MILLIGRAM(S): 80 CAPSULE, EXTENDED RELEASE ORAL at 06:21

## 2018-06-18 RX ADMIN — Medication 325 MILLIGRAM(S): at 17:13

## 2018-06-18 RX ADMIN — CARVEDILOL PHOSPHATE 25 MILLIGRAM(S): 80 CAPSULE, EXTENDED RELEASE ORAL at 21:16

## 2018-06-18 RX ADMIN — Medication 40 MILLIGRAM(S): at 21:20

## 2018-06-18 RX ADMIN — ISOSORBIDE MONONITRATE 60 MILLIGRAM(S): 60 TABLET, EXTENDED RELEASE ORAL at 17:14

## 2018-06-18 NOTE — CONSULT NOTE ADULT - SUBJECTIVE AND OBJECTIVE BOX
NEPHROLOGY INTERVAL HPI/OVERNIGHT EVENTS:  pt interviewed with intrepretor  87 y/o M with hx of diastolic CHF, ESRD presents with worsening sob that started on the evening of his last hd on friday with associated cough and sputum production.  sob worsened to being worse overnigh.  admits to increased intake of fluid and food ? salty  food at father;s day celebration  + complaince with medication   no f/c no diarrhea  no abd pain    Noted with + troponin and mild leukocytossi      PAST MEDICAL & SURGICAL HISTORY:  1.HTN  2.DM  3.CAD, diastolic chf  4.CHF  5.PPM/AICD  6.Recent hx of Syncope  7. esrd on hd  8. pancreatitits      Allergies and Intolerances:        Allergies:  	No Known Allergies:     Home Medications:   * Incomplete Medication History as of 2018 14:57 documented in Structured Notes  · 	lisinopril 5 mg oral tablet: 1 tab(s) orally once a day  · 	carvedilol 25 mg oral tablet: 1 tab(s) orally every 12 hours  · 	insulin lispro 100 units/mL subcutaneous solution: unit(s) subcutaneous prn , 1 Unit(s) if Glucose 151 - 200  	2 Unit(s) if Glucose 201 - 250  	3 Unit(s) if Glucose 251 - 300  	4 Unit(s) if Glucose 301 - 350  	5 Unit(s) if Glucose 351 - 400  	6 Unit(s) if Glucose Greater Than 400  · 	docusate sodium 100 mg oral capsule: 1 cap(s) orally 3 times a day  · 	lantus 25 units:   once a day   · 	Lasix 40 mg oral tablet:   2 times a day   · 	atorvastatin 40 mg oral tablet: 1 tab(s) orally once a day  · 	isosorbide mononitrate 60 mg oral tablet, extended release:  orally   · 	levothyroxine 75 mcg (0.075 mg) oral capsule: 1 cap(s) orally once a day  · 	hydrALAZINE 100 mg oral tablet: 1 tab(s) orally 2 times a day    SOC hx; non smoker, no drinking, lives with daughter      FAMILY HISTORY:  No pertinent family history in first degree relatives      MEDICATIONS  (STANDING):  aspirin 325 milliGRAM(s) Oral daily  atorvastatin 40 milliGRAM(s) Oral at bedtime  carvedilol 25 milliGRAM(s) Oral every 12 hours  dextrose 5%. 1000 milliLiter(s) (50 mL/Hr) IV Continuous <Continuous>  dextrose 50% Injectable 12.5 Gram(s) IV Push once  dextrose 50% Injectable 25 Gram(s) IV Push once  dextrose 50% Injectable 25 Gram(s) IV Push once  furosemide   Injectable 40 milliGRAM(s) IV Push daily  heparin  Injectable 5000 Unit(s) SubCutaneous every 8 hours  hydrALAZINE 50 milliGRAM(s) Oral two times a day  insulin glargine Injectable (LANTUS) 25 Unit(s) SubCutaneous at bedtime  insulin lispro (HumaLOG) corrective regimen sliding scale   SubCutaneous three times a day before meals  isosorbide   mononitrate ER Tablet (IMDUR) 60 milliGRAM(s) Oral daily  levothyroxine 75 MICROGram(s) Oral daily  lisinopril 5 milliGRAM(s) Oral daily  tamsulosin 0.4 milliGRAM(s) Oral at bedtime    MEDICATIONS  (PRN):  dextrose 40% Gel 15 Gram(s) Oral once PRN Blood Glucose LESS THAN 70 milliGRAM(s)/deciliter  docusate sodium 100 milliGRAM(s) Oral three times a day PRN Constipation  glucagon  Injectable 1 milliGRAM(s) IntraMuscular once PRN Glucose LESS THAN 70 milligrams/deciliter      Allergies    No Known Allergies    Intolerances        I&O's Summary          Vital Signs Last 24 Hrs  T(C): 36.6 (2018 10:17), Max: 37 (2018 21:47)  T(F): 97.9 (2018 10:17), Max: 98.6 (2018 21:47)  HR: 62 (2018 10:17) (62 - 78)  BP: 147/51 (2018 10:17) (147/51 - 188/67)  BP(mean): --  RR: 19 (2018 10:17) (17 - 20)  SpO2: 100% (2018 10:17) (95% - 100%)  Daily Height in cm: 170.18 (2018 21:47)    Daily Weight in k.2 (2018 06:53)  I&O's Summary      PHYSICAL EXAM:  GEN: alert awake O X 3, able to speak in complete sentences  HEENT: MMM  NECK supple no jvd  CV: RRR s1s2  LUNGS: b/l crackles  ABD: + soft,   EXT: no edema    LABS:                        11.8   11.39 )-----------( 213      ( 2018 22:15 )             35.9         135  |  98  |  57<H>  ----------------------------<  173<H>  5.1   |  29  |  6.38<H>    Ca    8.5      2018 22:15    TPro  7.2  /  Alb  3.4  /  TBili  0.4  /  DBili  x   /  AST  32  /  ALT  22  /  AlkPhos  81      PT/INR - ( 2018 22:15 )   PT: 10.4 sec;   INR: 0.96 ratio         PTT - ( 2018 22:15 )  PTT:25.5 sec
Reason for Admission: Shortness of Breath	  History of Present Illness: 	  88M w/PMH of HFpEF (LVEF 60%) S/P AICD, ESRD on HD MWF, HTN, HLD presents w/ a two day history of SoB. Pt is a poor historian, but reports worsening dyspnea at rest the two days before admission, made worse when lying flat and improved when sitting in his couch upright. Pt also c/o occasional nonproductive cough, which is baseline for patient. Pt does not recall any recent changes in weight. Pt denies fever/chills, CP, NVD.    In the ED pt received Lasix 40mg IV x1. Trop I elevated to 0.597, BMP 15k, WBC mildly elevated 11.4. EKG revealed paced rhythm w/o ST changes.    Examned at bedside, feels slightly better today. Pending HD today. Tele AV paced, no sig arrhytmia. ECG unchanged. No angina, or other chest symtpms reported.      Review of Systems:  Other Review of Systems: All other review of systems negative, except as noted in HPI	     Review of Systems:  · Negative General Symptoms	no fever; no chills	  · Negative Respiratory and Thorax Symptoms	no wheezing; no pleuritic chest pain	  · Respiratory and Thorax Symptoms	dyspnea  cough	      Allergies and Intolerances:        Allergies:  	No Known Allergies:     Home Medications:   * Patient Currently Takes Medications as of 09-May-2018 18:19 documented in Structured Notes  · 	tamsulosin 0.4 mg oral capsule: 1 cap(s) orally once a day (at bedtime)  · 	lisinopril 5 mg oral tablet: 1 tab(s) orally once a day  · 	carvedilol 25 mg oral tablet: 1 tab(s) orally every 12 hours  · 	insulin lispro 100 units/mL subcutaneous solution: unit(s) subcutaneous prn , 1 Unit(s) if Glucose 151 - 200  	2 Unit(s) if Glucose 201 - 250  	3 Unit(s) if Glucose 251 - 300  	4 Unit(s) if Glucose 301 - 350  	5 Unit(s) if Glucose 351 - 400  	6 Unit(s) if Glucose Greater Than 400  · 	docusate sodium 100 mg oral capsule: 1 cap(s) orally 3 times a day, As Needed for constipathion  · 	lantus 25 units: once a day (at bedtime)  · 	atorvastatin 40 mg oral tablet: 1 tab(s) orally once a day  · 	isosorbide mononitrate 60 mg oral tablet, extended release:  orally   · 	levothyroxine 75 mcg (0.075 mg) oral capsule: 1 cap(s) orally once a day  · 	hydrALAZINE 100 mg oral tablet: 1 tab(s) orally 2 times a day    .    Patient History:    Past Medical History:  AICD (automatic cardioverter/defibrillator) present    CHF (Congestive Heart Failure)    Diabetes    Dialysis Patient    Heart Attack    High Cholesterol    HTN (Hypertension)    Pancreatitis    Pulmonary edema.     Past Surgical History:  AICD (automatic cardioverter/defibrillator) present    History of Hernia Repair    History of penile implant.     Family History:  No pertinent family history in first degree relatives.     Tobacco Screening:  · Core Measure Site	Yes	  · Has the patient used tobacco in the past 30 days?	No	    Risk Assessment:    Present on Admission:  Deep Venous Thrombosis	no	  Pulmonary Embolus	no	     Heart Failure:  Does this patient have a history of or has been diagnosed with heart failure? yes.     LV Function Assessment (LVS function was evaluated before arrival and/or during hospitalization) yes.     Is the Ejection Fraction >40% ? yes.     Ejection Fraction 60 %.       Physical Exam:  Physical Exam: Constitutional: Pt lying in bed, lethargic with eyes shut, obese. NAD  HEENT: EOMI, normal hearing, moist mucous membranes  Neck: Soft and supple, no JVD  Respiratory: B/L LL rales. No wheezing or rhonchi  Cardiovascular: S1S2+, RRR, no M/G/R  Gastrointestinal: BS+, soft, NT/ND, no guarding, no rebound  Extremities: 1+ peripheral edema  Vascular: 2+ peripheral pulses  Neurological: AAOx3, no focal deficits  Musculoskeletal: 5/5 strength b/l upper and lower extremities Skin: No rashes. Palpable pacemaker L pectoral	      Laboratory:   General Chemistry:	    17-Jun-2018 22:15, Comprehensive Metabolic Panel	  Sodium, Serum: 135, [135 - 145 mmol/L]	  Potassium, Serum: 5.1, [3.5 - 5.3 mmol/L], Specimen slightly hemolyzed	  Chloride, Serum: 98, [96 - 108 mmol/L]	  Carbon Dioxide, Serum: 29, [22 - 31 mmol/L]	  Anion Gap, Serum: 8, [5 - 17 mmol/L]	  Blood Urea Nitrogen, Serum:    57, [7 - 23 mg/dL]	  Creatinine, Serum:    6.38, [0.50 - 1.30 mg/dL]	  Glucose, Serum:    173, [70 - 99 mg/dL]	  Calcium, Total Serum: 8.5, [8.5 - 10.1 mg/dL]	  Protein Total, Serum: 7.2, [6.0 - 8.3 gm/dL]	  Albumin, Serum: 3.4, [3.3 - 5.0 g/dL]	  Bilirubin Total, Serum: 0.4, [0.2 - 1.2 mg/dL]	  Alkaline Phosphatase, Serum: 81, [40 - 120 U/L]	  Aspartate Aminotransferase (AST/SGOT): 32, [15 - 37 U/L]	  Alanine Aminotransferase (ALT/SGPT): 22, [12 - 78 U/L]	  eGFR if Non :    7, [>=60 mL/min/1.73M2], Interpretative comment  The units for eGFR are ml/min/1.73m2 (normalized body surface area). The  eGFR is calculated from a serum creatinine using the CKD-EPI equation.  Other variables required for calculation are race, age and sex. Among  patients with chronic kidney disease (CKD), the eGFR is useful in  determining the stage of disease according to KDOQI CKD classification.  All eGFR results are reported numerically with the following  interpretation.          GFR                    With                 Without     (ml/min/1.73 m2)    Kidney Damage       Kidney Damage        >= 90                    Stage 1                     Normal        60-89                    Stage 2                     Decreased GFR        30-59     Stage 3                     Stage 3        15-29                    Stage 4                     Stage 4        < 15                      Stage 5                     Stage 5  Each stage of CKD assumes that the associated GFR level has been in  effect for at least 3 months. Determination of stages one and two (with  eGFR > 59 ml/min/m2) requires estimation of kidney damage for at least 3  months as defined by structural or functional abnormalities.  Limitations: All estimates of GFR will be less accurate for patients at  extremes of muscle mass (including but not limited to frail elderly,  critically ill, or cancer patients), those with unusual diets, and those  with conditions associated with reduced secretion or extrarenal  elimination of creatinine. The eGFR equation is not recommended for use  in patients with unstable creatinine levels.	  eGFR if :    8, [>=60 mL/min/1.73M2]	    17-Jun-2018 22:15, Creatine Kinase, Serum	  Creatine Kinase, Serum:    500, [26 - 308 U/L]	    17-Jun-2018 22:15, Serum Pro-Brain Natriuretic Peptide	  Serum Pro-Brain Natriuretic Peptide:    93579, [0 - 450 pg/mL]	    17-Jun-2018 22:15, Troponin I, Serum	  Troponin I, Serum:    0.597, [0.015 - 0.045 ng/mL], informed Ryann Platt 06/17/18 22:54  High Sensitivity Troponin and new reference  range effective 7/6/2016	  Coagulation:	    17-Jun-2018 22:15, Activated Partial Thromboplastin Time	  Activated Partial Thromboplastin Time:    25.5, [27.5 - 37.4 sec], The recommended therapeutic heparin range (full dose) is 58-99 seconds.  Recommended therapeutic Argatroban range is 1.5 to 3.0 times the baseline  APTT value, not to exceed 100 seconds. Recommended therapeutic Refludan  range is 1.5 to 2.5 times thebaseline APTT.	    17-Jun-2018 22:15, Prothrombin Time and INR, Plasma	  Prothrombin Time, Plasma: 10.4, [9.8 - 12.7 sec]	  INR: 0.96, [0.88 - 1.16 ratio]	  Hematology:	    17-Jun-2018 22:15, Complete Blood Count + Automated Diff	  WBC Count:    11.39, [3.80 - 10.50 K/uL]	  RBC Count:    4.05, [4.20 - 5.80 M/uL]	  Hemoglobin:    11.8, [13.0 - 17.0 g/dL]	  Hematocrit:    35.9, [39.0 - 50.0 %]	  Mean Cell Volume: 88.6, [80.0 - 100.0 fl]	  Mean Cell Hemoglobin: 29.1, [27.0 - 34.0 pg]	  Mean Cell Hemoglobin Conc: 32.9, [32.0 - 36.0 gm/dL]	  Red Cell Distrib Width: 13.7, [10.3 - 14.5 %]	  Platelet Count - Automated: 213, [150 - 400 K/uL]	  Auto Neutrophil #:    9.41, [1.80 - 7.40 K/uL]	  Auto Lymphocyte #:    0.72, [1.00 - 3.30 K/uL]	  Auto Monocyte #:    1.01, [0.00 - 0.90 K/uL]	  Auto Eosinophil #: 0.15, [0.00 - 0.50 K/uL]	  Auto Basophil #: 0.07, [0.00 - 0.20 K/uL]	  Auto Neutrophil %:    82.6, [43.0 - 77.0 %], Differential percentages must be correlated with absolute numbers for  clinical significance.	  Auto Lymphocyte %:    6.3, [13.0 - 44.0 %]	  Auto Monocyte %: 8.9, [2.0 - 14.0 %]	  Auto Eosinophil %: 1.3, [0.0 - 6.0 %]	  Auto Basophil %: 0.6, [0.0 - 2.0 %]	  Auto Immature Granulocyte %: 0.3, [0.0 - 1.5 %]	    17-Jun-2018 22:15, Nucleated RBC	  Nucleated RBC: 0, [0 - 0 /100 WBCs]	    Assessment and Plan:    Assessment:  · Assessment		  88M w/PMH of HFpEF (LVEF 60%) S/P AICD, ESRD on HD MWF, T2DM, HTN, HLD presents w/ a two day history of SoB likely 2/2 acute on chronic respiratory failure in the setting of HFpEF exacerbation and ESRD    * Acute on chronic Heart failure with preserved ejection fraction.  Possibly decompensated by fluid overload from his ESRD.  HD as per nephro.   Cont current regimen.      * Elevated troponin.  In setting of sig baseline CAD with prior abnormal yet stable MPIs, no anginal symptoms   Troponin elevation With no obvious peak or fall pattern. Likely 2/2 demand - supply mismatch ( type 2 MI)  Fluid management per nephro.  Cont optimal CAD regimen otherwise.       * HTN (Hypertension).    - Continue Lisinopril 5mg qd  - Continue Carvedilol 25mg bid  - Continue Hydralazine 50mg bid.    *  Hyperlipidemia.     - Continue Atorvastatin 40mg qd.     I personally reviewed hospital records, tele, ecg, labs and imaging  Will follow

## 2018-06-18 NOTE — PROGRESS NOTE ADULT - PROBLEM SELECTOR PLAN 2
- In the setting of ESRD, no CP  - Likely 2/2 demand ischemia  - EKG revealed paced rhythm without ST changes  - Repeat troponins trended down   - s/p ASA 325mg in the ED  - Start ASA 325mg qd  - Cardiology consult Dr Metz appreciated:

## 2018-06-18 NOTE — H&P ADULT - ASSESSMENT
88M w/PMH of HFpEF (LVEF 60%) S/P AICD, ESRD on HD MWF, T2DM, HTN, HLD presents w/ a two day history of SoB likely 2/2 HFpEF exacerbation in the setting of ESRD 88M w/PMH of HFpEF (LVEF 60%) S/P AICD, ESRD on HD MWF, T2DM, HTN, HLD presents w/ a two day history of SoB likely 2/2 acute on chronic respiratory failure in the setting of HFpEF exacerbation and ESRD

## 2018-06-18 NOTE — PROGRESS NOTE ADULT - SUBJECTIVE AND OBJECTIVE BOX
CHIEF COMPLAINT: SOB    SUBJECTIVE: HPI:  88M w/PMH of HFpEF (LVEF 60%) S/P AICD, ESRD on HD MWF, HTN, HLD presents w/ a two day history of SoB. Pt is a poor historian, but reports worsening dyspnea at rest the two days before admission, made worse when lying flat and improved when sitting in his couch upright. Pt also c/o occasional nonproductive cough, which is baseline for patient. Pt does not recall any recent changes in weight. Pt denies fever/chills, CP, NVD.    In the ED pt received Lasix 40mg IV x1. Trop I elevated to 0.597, BMP 15k, WBC mildly elevated 11.4. EKG revealed paced rhythm w/o ST changes.     6/18/18: Pt was seen and examined at bedside this morning. [facility]  ID #[id number] was used. Pt was very lethargic and was falling asleep mid-sentence. He denies any CP and described improvement in SOB with Lasix given in ED and oxygen supplementation. Pt describes using 2 pillows at night to sleep, yet still having difficulty breathing. He denies any  weight gain within the past week. Pt may have increased fluid intake and may have consumed potentially salty foods at Father's Day feast yesterday  . Pt says he had last HD session on Fri 6/15. Pt scheduled for HD this afternoon.    REVIEW OF SYSTEMS:  CONSTITUTIONAL: No weakness, fevers or chills  EYES/ENT: No visual changes;  No vertigo or throat pain   NECK: No pain or stiffness  RESPIRATORY: + Nonproductive Cough; +SOB  CARDIOVASCULAR: No chest pain or palpitations  GASTROINTESTINAL: No abdominal or epigastric pain. No nausea, vomiting, or hematemesis; No diarrhea or constipation. No melena or hematochezia.  GENITOURINARY: No dysuria, frequency or hematuria  NEUROLOGICAL: No numbness or weakness  SKIN: No itching, burning, rashes, or lesions   All other review of systems is negative unless indicated above    Vital Signs Last 24 Hrs  T(C): 36.2 (18 Jun 2018 12:35), Max: 37 (17 Jun 2018 21:47)  T(F): 97.2 (18 Jun 2018 12:35), Max: 98.6 (17 Jun 2018 21:47)  HR: 66 (18 Jun 2018 15:51) (62 - 78)  BP: 114/55 (18 Jun 2018 15:51) (105/46 - 188/67)  RR: 16 (18 Jun 2018 15:51) (16 - 22)  SpO2: 100% (18 Jun 2018 10:17) (95% - 100%)    CAPILLARY BLOOD GLUCOSE  POCT Blood Glucose.: 131 mg/dL (18 Jun 2018 11:22)  POCT Blood Glucose.: 165 mg/dL (18 Jun 2018 07:50)      PHYSICAL EXAM:  Constitutional: elderly man lying in bed breathing via NC, obese body habitus  HEENT: PERR, EOMI, Normal Hearing, MMM  Neck: Soft and supple, No LAD, Mild JVD  Respiratory: Rales appreciated at bilateral lung bases  Cardiovascular: S1 and S2, regular rate and rhythm, no Murmurs, gallops or rubs  Gastrointestinal: Bowel Sounds present, soft, nontender, nondistended, no guarding, no rebound  Extremities: No peripheral edema  Vascular: 2+ peripheral pulses  Neurological: A/O x 3, no focal deficits  Musculoskeletal: 5/5 strength b/l upper and lower extremities  Skin: No rashes    MEDICATIONS:  MEDICATIONS  (STANDING):  aspirin 325 milliGRAM(s) Oral daily  atorvastatin 40 milliGRAM(s) Oral at bedtime  carvedilol 25 milliGRAM(s) Oral every 12 hours  dextrose 5%. 1000 milliLiter(s) (50 mL/Hr) IV Continuous <Continuous>  dextrose 50% Injectable 12.5 Gram(s) IV Push once  dextrose 50% Injectable 25 Gram(s) IV Push once  dextrose 50% Injectable 25 Gram(s) IV Push once  furosemide   Injectable 40 milliGRAM(s) IV Push daily  heparin  Injectable 5000 Unit(s) SubCutaneous every 8 hours  hydrALAZINE 50 milliGRAM(s) Oral two times a day  insulin glargine Injectable (LANTUS) 25 Unit(s) SubCutaneous at bedtime  insulin lispro (HumaLOG) corrective regimen sliding scale   SubCutaneous three times a day before meals  isosorbide   mononitrate ER Tablet (IMDUR) 60 milliGRAM(s) Oral daily  levothyroxine 75 MICROGram(s) Oral daily  lisinopril 5 milliGRAM(s) Oral daily  Nephro-belkys 1 Tablet(s) Oral daily  sevelamer hydrochloride 1600 milliGRAM(s) Oral three times a day  tamsulosin 0.4 milliGRAM(s) Oral at bedtime      LABS: All Labs Reviewed:                        10.7   8.17  )-----------( 183      ( 18 Jun 2018 12:30 )             32.5     06-18    135  |  99  |  61<H>  ----------------------------<  253<H>  4.7   |  28  |  7.30<H>    Ca    8.3<L>      18 Jun 2018 12:30  Phos  2.6     06-18    TPro  x   /  Alb  3.1<L>  /  TBili  x   /  DBili  x   /  AST  x   /  ALT  x   /  AlkPhos  x   06-18    PT/INR - ( 17 Jun 2018 22:15 )   PT: 10.4 sec;   INR: 0.96 ratio         PTT - ( 17 Jun 2018 22:15 )  PTT:25.5 sec  CARDIAC MARKERS ( 18 Jun 2018 12:30 )  0.381 ng/mL / x     / x     / x     / x      CARDIAC MARKERS ( 18 Jun 2018 07:24 )  0.514 ng/mL / x     / x     / x     / x      CARDIAC MARKERS ( 17 Jun 2018 22:15 )  0.597 ng/mL / x     / 500 U/L / x     / x          RADIOLOGY/EKG: < from: Xray Chest 1 View AP/PA. (06.17.18 @ 22:55) >  IMPRESSION:  Cardiomegaly and interval development of congestive heart failure.      DVT PPX: Heparin SQ    DISPOSITION: Telemetry

## 2018-06-18 NOTE — PROGRESS NOTE ADULT - SUBJECTIVE AND OBJECTIVE BOX
Pt has been seen and examined with FP resident, resident supervised agree with a/p       Patient is a 88y old  Male who presents with a chief complaint of Shortness of Breath (18 Jun 2018 02:39)          PHYSICAL EXAM:  Vital Signs Last 24 Hrs  T(C): 36.2 (18 Jun 2018 12:35), Max: 37 (17 Jun 2018 21:47)  T(F): 97.2 (18 Jun 2018 12:35), Max: 98.6 (17 Jun 2018 21:47)  HR: 63 (18 Jun 2018 14:29) (62 - 78)  BP: 117/48 (18 Jun 2018 14:29) (105/46 - 188/67)  BP(mean): --  RR: 16 (18 Jun 2018 14:29) (16 - 22)  SpO2: 100% (18 Jun 2018 10:17) (95% - 100%)  general- comfortable   -rs-aeeb, b/l basal rales present   -cvs-s1s2 normal , JVD+  -p/a-soft,bs+  -extremity- no asymmetrical swelling noted   -cns- non focal         A/P    #Acute decompensated diastolic heart failure due to fluid overload due to ESRDD   -maximum removal of fluid during HD  -monitor pt

## 2018-06-18 NOTE — H&P ADULT - NSHPPHYSICALEXAM_GEN_ALL_CORE
Constitutional: Pt lying in bed, lethargic with eyes shut, obese. NAD  HEENT: EOMI, normal hearing, moist mucous membranes  Neck: Soft and supple, no JVD  Respiratory: B/L LL rales. No wheezing or rhonchi  Cardiovascular: S1S2+, RRR, no M/G/R  Gastrointestinal: BS+, soft, NT/ND, no guarding, no rebound  Extremities: 1+ peripheral edema  Vascular: 2+ peripheral pulses  Neurological: AAOx3, no focal deficits  Musculoskeletal: 5/5 strength b/l upper and lower extremities  Skin: No rashes. Palpable pacemaker L pectoral

## 2018-06-18 NOTE — H&P ADULT - PROBLEM SELECTOR PLAN 1
- In the setting of ESRD and elevated Trop I  - Pt appears clinically hypervolemic, likely cause of SoB  - Echo in May 2018 revealed LVEF 60%  - Admit to tele  - - In the setting of ESRD, elevated Trop I  - Pt appears clinically hypervolemic, likely cause of SoB  - Echo in May 2018 revealed LVEF 60%  - S/P Lasix 40mg IV x1 in the ED  - BNP 15K on admission  - Admit to tele  - Dialysis for fluid removal  - Continue Lisinopril 5mg qd  - Cardiology consult Dr Metz - In the setting of ESRD, elevated Trop I  - Pt appears clinically hypervolemic, likely cause of SoB  - Echo in May 2018 revealed LVEF 60%  - S/P Lasix 40mg IV x1 in the ED  - BNP 15K on admission  - Admit to tele  - Start Lasix 40mg qd  - Strict I&O  - Daily weights  - Fluid restriction  - Low salt, low cholesterol diet  - Continue Lisinopril 5mg qd

## 2018-06-18 NOTE — ED ADULT NURSE REASSESSMENT NOTE - NS ED NURSE REASSESS COMMENT FT1
Pt. has very difficult IV access, RNs Ryann, Raymon, Juana, and Darcy all made attempts for IV access without success. Dr. Dorado placed IV with ultrasound. Medication delivery delayed due to difficulty with gaining IV access.

## 2018-06-18 NOTE — CONSULT NOTE ADULT - ASSESSMENT
87 y/o with esrd on hd with diastolic chf, acute on chronic with mild leukocytosis.    PLAN  - HD today as per outpt prescription and will attempt increased UF with hd  - hold epogen  - ABRAHAN; will restart his renvela,   - fu cxr post hd

## 2018-06-18 NOTE — H&P ADULT - ATTENDING COMMENTS
Patient seen and examined after intial evaluation above by family medicine resident. Case discussed and reviewed in detail. Please note my plan below.    87 y/o M with PMH of Diastolic CHF (EF 60%), CAD, ESRD on HD, s/p AICD, HTN, dyslipidemia, h/o pancreatitis, h/o pulmonary edema, p/w SOB    *Acute respiratory failure 2/2 acute on chronic diastolic CHF vs pulmonary edema 2/2 ESRD   -Lasix 40 IV daily   -I/Os + Daily weights  -BB + ACEi  -Tele monitoring  -Cardio consult  -Fluid restriction + Low salt diet  -Will also need dialysis     *Elevated trops / CK - possibly 2/2 acute CHF vs ESRD  -Given that previous troponin was 0.065-0.1, and patient has abnormal Nuclear stress test on 5/8/18. Will call cardio regarding heparin drip / plavix. Will f/u 2nd set of troponin  -Statin  -Serial EKGs  -Cardio consult  -Last Echo     *Mild Leukocytosis   -No signs of infection at this time  -Repeat WBC in AM to check for improvement    *Microcytic anemia  -Trend H/H and if stable during hospitalization -> outpatient f/u for further management     *ESRD on HD  -Nephro consult   -Potassium 5.1, specimen slightly hemolyzed -> will repeat BMP    *DM2  -C/w Lantus + ISS  -Diabetic diet      *H/o HTN / dyslipidemia   -C/w home meds + outpatient management     *DVT Ppx  -Heparin SubQ Patient seen and examined after intial evaluation above by family medicine resident. Case discussed and reviewed in detail. Please note my plan below.    87 y/o M with PMH of Diastolic CHF (EF 60%), CAD, ESRD on HD, s/p AICD, HTN, dyslipidemia, h/o pancreatitis, h/o pulmonary edema, p/w SOB    *Acute respiratory failure 2/2 acute on chronic diastolic CHF vs pulmonary edema 2/2 ESRD   -Lasix 40 IV daily   -I/Os + Daily weights  -BB + ACEi  -Tele monitoring  -Cardio consult  -Fluid restriction + Low salt diet  -Will also need dialysis     *Elevated trops / CK - possibly 2/2 acute CHF vs ESRD  -Given that previous troponin was 0.065-0.1, and patient has abnormal Nuclear stress test on 5/8/18. Will call cardio regarding heparin drip / plavix. Will f/u 2nd set of troponin  -Statin  -Serial EKGs  -Cardio consult    *Mild Leukocytosis   -No signs of infection at this time  -Repeat WBC in AM to check for improvement    *Microcytic anemia  -Trend H/H and if stable during hospitalization -> outpatient f/u for further management     *ESRD on HD  -Nephro consult   -Potassium 5.1, specimen slightly hemolyzed -> will repeat BMP    *DM2  -C/w Lantus + ISS  -Diabetic diet      *H/o HTN / dyslipidemia   -C/w home meds + outpatient management     *DVT Ppx  -Heparin SubQ

## 2018-06-18 NOTE — H&P ADULT - PROBLEM SELECTOR PLAN 6
- Continue Lisinopril 5mg qd  - Continue Carvedilol 25mg bid  - Continue Hydralazine 100mg bid - Continue Lisinopril 5mg qd  - Continue Carvedilol 25mg bid  - Continue Hydralazine 50mg bid

## 2018-06-18 NOTE — PROGRESS NOTE ADULT - ASSESSMENT
88M w/PMH significant for ESRD on HD MWF, HFpEF presents w/ a two day history of SoB likely acute decompensated diastolic heart failure due to fluid overload state.

## 2018-06-18 NOTE — H&P ADULT - PROBLEM SELECTOR PLAN 2
- In the setting of ESRD, no CP  - Likely 2/2 demand hypoxia  - F/U repeat Trop I x2  - EKG as above, paced rhythm without ST changes - In the setting of ESRD, no CP  - Likely 2/2 demand hypoxia  - EKG revealed paced rhythm without ST changes  - F/U repeat Trop I x2  - ASA 325mg in the ED  - Start ASA 325mg qd  - Cardiology consult Dr Metz - In the setting of ESRD, no CP  - Likely 2/2 demand hypoxia  - EKG revealed paced rhythm without ST changes  - F/U repeat Trop I x2  - ASA 325mg in the ED  - Start ASA 325mg qd  - Cardiology consult Dr Metz  - Spoke to Dr Romeo, cardiology: no indication for heparin drip at this time

## 2018-06-18 NOTE — PROGRESS NOTE ADULT - PROBLEM SELECTOR PLAN 1
- In the setting of ESRD, elevated Trop I  - Pt appears clinically hypervolemic, likely cause of SoB  - Echo in May 2018 revealed LVEF 60%  - S/P Lasix 40mg IV x1 in the ED  - BNP 15K on admission  - Telemetry monitoring  - Lasix 40mg qd  - Strict I&O  - Daily weights  - Fluid restriction  - Low salt, low cholesterol diet  - Continue Lisinopril 5mg qd

## 2018-06-18 NOTE — H&P ADULT - HISTORY OF PRESENT ILLNESS
88M w/PMH of HFpEF (LVEF 60%) S/P AICD, ESRD on HD MWF, HTN, HLD presents w/ a two day history of SoB. Pt is a poor historian and was lethargic during interview, but reports worsening dyspnea at rest the two days before admission, made worse when lying flat and improved when sitting in his couch upright. Pt also c/o occasional nonproductive cough. Pt does not recall any recent changes in weight. Pt denies fever/chills, CP, NVD.    In the ED pt received Lasix 40mg IV x1. Trop I elevated to 0.597, BMP 15k, WBC mildly elevated 11.4. EKG revealed paced rhythm w/o ST changes. 88M w/PMH of HFpEF (LVEF 60%) S/P AICD, ESRD on HD MWF, HTN, HLD presents w/ a two day history of SoB. Pt is a poor historian, but reports worsening dyspnea at rest the two days before admission, made worse when lying flat and improved when sitting in his couch upright. Pt also c/o occasional nonproductive cough. Pt does not recall any recent changes in weight. Pt denies fever/chills, CP, NVD.    In the ED pt received Lasix 40mg IV x1. Trop I elevated to 0.597, BMP 15k, WBC mildly elevated 11.4. EKG revealed paced rhythm w/o ST changes. 88M w/PMH of HFpEF (LVEF 60%) S/P AICD, ESRD on HD MWF, HTN, HLD presents w/ a two day history of SoB. Pt is a poor historian, but reports worsening dyspnea at rest the two days before admission, made worse when lying flat and improved when sitting in his couch upright. Pt also c/o occasional nonproductive cough, which is baseline for patient. Pt does not recall any recent changes in weight. Pt denies fever/chills, CP, NVD.    In the ED pt received Lasix 40mg IV x1. Trop I elevated to 0.597, BMP 15k, WBC mildly elevated 11.4. EKG revealed paced rhythm w/o ST changes.

## 2018-06-19 LAB
ALBUMIN SERPL ELPH-MCNC: 3.1 G/DL — LOW (ref 3.3–5)
ANION GAP SERPL CALC-SCNC: 8 MMOL/L — SIGNIFICANT CHANGE UP (ref 5–17)
BUN SERPL-MCNC: 34 MG/DL — HIGH (ref 7–23)
CALCIUM SERPL-MCNC: 8.3 MG/DL — LOW (ref 8.5–10.1)
CHLORIDE SERPL-SCNC: 100 MMOL/L — SIGNIFICANT CHANGE UP (ref 96–108)
CO2 SERPL-SCNC: 30 MMOL/L — SIGNIFICANT CHANGE UP (ref 22–31)
CREAT SERPL-MCNC: 4.6 MG/DL — HIGH (ref 0.5–1.3)
GLUCOSE BLDC GLUCOMTR-MCNC: 128 MG/DL — HIGH (ref 70–99)
GLUCOSE BLDC GLUCOMTR-MCNC: 143 MG/DL — HIGH (ref 70–99)
GLUCOSE BLDC GLUCOMTR-MCNC: 162 MG/DL — HIGH (ref 70–99)
GLUCOSE BLDC GLUCOMTR-MCNC: 237 MG/DL — HIGH (ref 70–99)
GLUCOSE SERPL-MCNC: 119 MG/DL — HIGH (ref 70–99)
HCT VFR BLD CALC: 33.1 % — LOW (ref 39–50)
HGB BLD-MCNC: 10.8 G/DL — LOW (ref 13–17)
MCHC RBC-ENTMCNC: 29.1 PG — SIGNIFICANT CHANGE UP (ref 27–34)
MCHC RBC-ENTMCNC: 32.6 GM/DL — SIGNIFICANT CHANGE UP (ref 32–36)
MCV RBC AUTO: 89.2 FL — SIGNIFICANT CHANGE UP (ref 80–100)
NRBC # BLD: 0 /100 WBCS — SIGNIFICANT CHANGE UP (ref 0–0)
PHOSPHATE SERPL-MCNC: 2.7 MG/DL — SIGNIFICANT CHANGE UP (ref 2.5–4.5)
PLATELET # BLD AUTO: 185 K/UL — SIGNIFICANT CHANGE UP (ref 150–400)
POTASSIUM SERPL-MCNC: 3.8 MMOL/L — SIGNIFICANT CHANGE UP (ref 3.5–5.3)
POTASSIUM SERPL-SCNC: 3.8 MMOL/L — SIGNIFICANT CHANGE UP (ref 3.5–5.3)
RBC # BLD: 3.71 M/UL — LOW (ref 4.2–5.8)
RBC # FLD: 13.7 % — SIGNIFICANT CHANGE UP (ref 10.3–14.5)
SODIUM SERPL-SCNC: 138 MMOL/L — SIGNIFICANT CHANGE UP (ref 135–145)
WBC # BLD: 7.75 K/UL — SIGNIFICANT CHANGE UP (ref 3.8–10.5)
WBC # FLD AUTO: 7.75 K/UL — SIGNIFICANT CHANGE UP (ref 3.8–10.5)

## 2018-06-19 PROCEDURE — 71045 X-RAY EXAM CHEST 1 VIEW: CPT | Mod: 26

## 2018-06-19 PROCEDURE — 93010 ELECTROCARDIOGRAM REPORT: CPT

## 2018-06-19 RX ADMIN — Medication 40 MILLIGRAM(S): at 05:55

## 2018-06-19 RX ADMIN — CARVEDILOL PHOSPHATE 25 MILLIGRAM(S): 80 CAPSULE, EXTENDED RELEASE ORAL at 17:32

## 2018-06-19 RX ADMIN — Medication 75 MICROGRAM(S): at 05:55

## 2018-06-19 RX ADMIN — HEPARIN SODIUM 5000 UNIT(S): 5000 INJECTION INTRAVENOUS; SUBCUTANEOUS at 05:55

## 2018-06-19 RX ADMIN — Medication 50 MILLIGRAM(S): at 05:55

## 2018-06-19 RX ADMIN — HEPARIN SODIUM 5000 UNIT(S): 5000 INJECTION INTRAVENOUS; SUBCUTANEOUS at 21:52

## 2018-06-19 RX ADMIN — TAMSULOSIN HYDROCHLORIDE 0.4 MILLIGRAM(S): 0.4 CAPSULE ORAL at 21:52

## 2018-06-19 RX ADMIN — Medication 50 MILLIGRAM(S): at 17:32

## 2018-06-19 RX ADMIN — ATORVASTATIN CALCIUM 40 MILLIGRAM(S): 80 TABLET, FILM COATED ORAL at 21:52

## 2018-06-19 RX ADMIN — SEVELAMER CARBONATE 1600 MILLIGRAM(S): 2400 POWDER, FOR SUSPENSION ORAL at 13:33

## 2018-06-19 RX ADMIN — CARVEDILOL PHOSPHATE 25 MILLIGRAM(S): 80 CAPSULE, EXTENDED RELEASE ORAL at 05:55

## 2018-06-19 RX ADMIN — LISINOPRIL 5 MILLIGRAM(S): 2.5 TABLET ORAL at 05:55

## 2018-06-19 RX ADMIN — ISOSORBIDE MONONITRATE 60 MILLIGRAM(S): 60 TABLET, EXTENDED RELEASE ORAL at 11:05

## 2018-06-19 RX ADMIN — HEPARIN SODIUM 5000 UNIT(S): 5000 INJECTION INTRAVENOUS; SUBCUTANEOUS at 13:33

## 2018-06-19 RX ADMIN — Medication 325 MILLIGRAM(S): at 11:05

## 2018-06-19 RX ADMIN — Medication 1 TABLET(S): at 11:05

## 2018-06-19 RX ADMIN — SEVELAMER CARBONATE 1600 MILLIGRAM(S): 2400 POWDER, FOR SUSPENSION ORAL at 05:55

## 2018-06-19 RX ADMIN — Medication 4: at 12:04

## 2018-06-19 NOTE — DIETITIAN INITIAL EVALUATION ADULT. - PROBLEM SELECTOR PLAN 2
- In the setting of ESRD, no CP  - Likely 2/2 demand hypoxia  - EKG revealed paced rhythm without ST changes  - F/U repeat Trop I x2  - ASA 325mg in the ED  - Start ASA 325mg qd  - Cardiology consult Dr Metz  - Spoke to Dr Romeo, cardiology: no indication for heparin drip at this time

## 2018-06-19 NOTE — DIETITIAN INITIAL EVALUATION ADULT. - PERTINENT MEDS FT
Aspirin, Heparn, Lipitor, Coreg, Colace, Apresoline, Lasix, Humalog, Imdur, synthroid, Zestril, renagel,

## 2018-06-19 NOTE — PHYSICAL THERAPY INITIAL EVALUATION ADULT - PERTINENT HX OF CURRENT PROBLEM, REHAB EVAL
88M w/PMH of HFpEF (LVEF 60%) S/P AICD, ESRD on HD MWF, HTN, HLD presents w/ a two day history of SoB. Pt is a poor historian, but reports worsening dyspnea at rest the two days before admission, made worse when lying flat and improved when sitting in his couch upright. Pt also c/o occasional nonproductive cough, which is baseline for patient.

## 2018-06-19 NOTE — PROGRESS NOTE ADULT - SUBJECTIVE AND OBJECTIVE BOX
Reason for Admission: Shortness of Breath	  History of Present Illness: 	  88M w/PMH of HFpEF (LVEF 60%) S/P AICD, ESRD on HD MWF, HTN, HLD presents w/ a two day history of SoB. Pt is a poor historian, but reports worsening dyspnea at rest the two days before admission, made worse when lying flat and improved when sitting in his couch upright. Pt also c/o occasional nonproductive cough, which is baseline for patient. Pt does not recall any recent changes in weight. Pt denies fever/chills, CP, NVD.    In the ED pt received Lasix 40mg IV x1. Trop I elevated to 0.597, BMP 15k, WBC mildly elevated 11.4. EKG revealed paced rhythm w/o ST changes.    Examined at bedside, feels slightly better today. Pending HD today. Tele AV paced, no sig arrhytmia. ECG unchanged. No angina, or other chest symtpms reported.       6/19 - feels much better, able to be supine in bed without orthopnea or PND, tolerating HD well.  No angina at all ever.   Tele AV paced. Continue current CV regimen and HD schedule. Can go home from CV stand point.        Review of Systems:  Other Review of Systems: All other review of systems negative, except as noted in HPI	     Review of Systems:  · Negative General Symptoms	no fever; no chills	  · Negative Respiratory and Thorax Symptoms	no wheezing; no pleuritic chest pain	  · Respiratory and Thorax Symptoms	dyspnea  cough	      Allergies and Intolerances:        Allergies:  	No Known Allergies:     Home Medications:   * Patient Currently Takes Medications as of 09-May-2018 18:19 documented in Structured Notes  · 	tamsulosin 0.4 mg oral capsule: 1 cap(s) orally once a day (at bedtime)  · 	lisinopril 5 mg oral tablet: 1 tab(s) orally once a day  · 	carvedilol 25 mg oral tablet: 1 tab(s) orally every 12 hours  · 	insulin lispro 100 units/mL subcutaneous solution: unit(s) subcutaneous prn , 1 Unit(s) if Glucose 151 - 200  	2 Unit(s) if Glucose 201 - 250  	3 Unit(s) if Glucose 251 - 300  	4 Unit(s) if Glucose 301 - 350  	5 Unit(s) if Glucose 351 - 400  	6 Unit(s) if Glucose Greater Than 400  · 	docusate sodium 100 mg oral capsule: 1 cap(s) orally 3 times a day, As Needed for constipathion  · 	lantus 25 units: once a day (at bedtime)  · 	atorvastatin 40 mg oral tablet: 1 tab(s) orally once a day  · 	isosorbide mononitrate 60 mg oral tablet, extended release:  orally   · 	levothyroxine 75 mcg (0.075 mg) oral capsule: 1 cap(s) orally once a day  · 	hydrALAZINE 100 mg oral tablet: 1 tab(s) orally 2 times a day    .    Patient History:    Past Medical History:  AICD (automatic cardioverter/defibrillator) present    CHF (Congestive Heart Failure)    Diabetes    Dialysis Patient    Heart Attack    High Cholesterol    HTN (Hypertension)    Pancreatitis    Pulmonary edema.     Past Surgical History:  AICD (automatic cardioverter/defibrillator) present    History of Hernia Repair    History of penile implant.     Family History:  No pertinent family history in first degree relatives.     Tobacco Screening:  · Core Measure Site	Yes	  · Has the patient used tobacco in the past 30 days?	No	    Risk Assessment:    Present on Admission:  Deep Venous Thrombosis	no	  Pulmonary Embolus	no	     Heart Failure:  Does this patient have a history of or has been diagnosed with heart failure? yes.     LV Function Assessment (LVS function was evaluated before arrival and/or during hospitalization) yes.     Is the Ejection Fraction >40% ? yes.     Ejection Fraction 60 %.       Physical Exam:  Physical Exam: Constitutional: Pt lying in bed, lethargic with eyes shut, obese. NAD  HEENT: EOMI, normal hearing, moist mucous membranes  Neck: Soft and supple, no JVD  Respiratory: B/L LL rales. No wheezing or rhonchi  Cardiovascular: S1S2+, RRR, no M/G/R  Gastrointestinal: BS+, soft, NT/ND, no guarding, no rebound  Extremities: 1+ peripheral edema  Vascular: 2+ peripheral pulses  Neurological: AAOx3, no focal deficits  Musculoskeletal: 5/5 strength b/l upper and lower extremities Skin: No rashes. Palpable pacemaker L pectoral	      Laboratory:   General Chemistry:	    17-Jun-2018 22:15, Comprehensive Metabolic Panel	  Sodium, Serum: 135, [135 - 145 mmol/L]	  Potassium, Serum: 5.1, [3.5 - 5.3 mmol/L], Specimen slightly hemolyzed	  Chloride, Serum: 98, [96 - 108 mmol/L]	  Carbon Dioxide, Serum: 29, [22 - 31 mmol/L]	  Anion Gap, Serum: 8, [5 - 17 mmol/L]	  Blood Urea Nitrogen, Serum:    57, [7 - 23 mg/dL]	  Creatinine, Serum:    6.38, [0.50 - 1.30 mg/dL]	  Glucose, Serum:    173, [70 - 99 mg/dL]	  Calcium, Total Serum: 8.5, [8.5 - 10.1 mg/dL]	  Protein Total, Serum: 7.2, [6.0 - 8.3 gm/dL]	  Albumin, Serum: 3.4, [3.3 - 5.0 g/dL]	  Bilirubin Total, Serum: 0.4, [0.2 - 1.2 mg/dL]	  Alkaline Phosphatase, Serum: 81, [40 - 120 U/L]	  Aspartate Aminotransferase (AST/SGOT): 32, [15 - 37 U/L]	  Alanine Aminotransferase (ALT/SGPT): 22, [12 - 78 U/L]	  eGFR if Non :    7, [>=60 mL/min/1.73M2], Interpretative comment  The units for eGFR are ml/min/1.73m2 (normalized body surface area). The  eGFR is calculated from a serum creatinine using the CKD-EPI equation.  Other variables required for calculation are race, age and sex. Among  patients with chronic kidney disease (CKD), the eGFR is useful in  determining the stage of disease according to KDOQI CKD classification.  All eGFR results are reported numerically with the following  interpretation.          GFR                    With                 Without     (ml/min/1.73 m2)    Kidney Damage       Kidney Damage        >= 90                    Stage 1                     Normal        60-89                    Stage 2                     Decreased GFR        30-59     Stage 3                     Stage 3        15-29                    Stage 4                     Stage 4        < 15                      Stage 5                     Stage 5  Each stage of CKD assumes that the associated GFR level has been in  effect for at least 3 months. Determination of stages one and two (with  eGFR > 59 ml/min/m2) requires estimation of kidney damage for at least 3  months as defined by structural or functional abnormalities.  Limitations: All estimates of GFR will be less accurate for patients at  extremes of muscle mass (including but not limited to frail elderly,  critically ill, or cancer patients), those with unusual diets, and those  with conditions associated with reduced secretion or extrarenal  elimination of creatinine. The eGFR equation is not recommended for use  in patients with unstable creatinine levels.	  eGFR if :    8, [>=60 mL/min/1.73M2]	    17-Jun-2018 22:15, Creatine Kinase, Serum	  Creatine Kinase, Serum:    500, [26 - 308 U/L]	    17-Jun-2018 22:15, Serum Pro-Brain Natriuretic Peptide	  Serum Pro-Brain Natriuretic Peptide:    63480, [0 - 450 pg/mL]	    17-Jun-2018 22:15, Troponin I, Serum	  Troponin I, Serum:    0.597, [0.015 - 0.045 ng/mL], informed Ryann Platt 06/17/18 22:54  High Sensitivity Troponin and new reference  range effective 7/6/2016	  Coagulation:	    17-Jun-2018 22:15, Activated Partial Thromboplastin Time	  Activated Partial Thromboplastin Time:    25.5, [27.5 - 37.4 sec], The recommended therapeutic heparin range (full dose) is 58-99 seconds.  Recommended therapeutic Argatroban range is 1.5 to 3.0 times the baseline  APTT value, not to exceed 100 seconds. Recommended therapeutic Refludan  range is 1.5 to 2.5 times thebaseline APTT.	    17-Jun-2018 22:15, Prothrombin Time and INR, Plasma	  Prothrombin Time, Plasma: 10.4, [9.8 - 12.7 sec]	  INR: 0.96, [0.88 - 1.16 ratio]	  Hematology:	    17-Jun-2018 22:15, Complete Blood Count + Automated Diff	  WBC Count:    11.39, [3.80 - 10.50 K/uL]	  RBC Count:    4.05, [4.20 - 5.80 M/uL]	  Hemoglobin:    11.8, [13.0 - 17.0 g/dL]	  Hematocrit:    35.9, [39.0 - 50.0 %]	  Mean Cell Volume: 88.6, [80.0 - 100.0 fl]	  Mean Cell Hemoglobin: 29.1, [27.0 - 34.0 pg]	  Mean Cell Hemoglobin Conc: 32.9, [32.0 - 36.0 gm/dL]	  Red Cell Distrib Width: 13.7, [10.3 - 14.5 %]	  Platelet Count - Automated: 213, [150 - 400 K/uL]	  Auto Neutrophil #:    9.41, [1.80 - 7.40 K/uL]	  Auto Lymphocyte #:    0.72, [1.00 - 3.30 K/uL]	  Auto Monocyte #:    1.01, [0.00 - 0.90 K/uL]	  Auto Eosinophil #: 0.15, [0.00 - 0.50 K/uL]	  Auto Basophil #: 0.07, [0.00 - 0.20 K/uL]	  Auto Neutrophil %:    82.6, [43.0 - 77.0 %], Differential percentages must be correlated with absolute numbers for  clinical significance.	  Auto Lymphocyte %:    6.3, [13.0 - 44.0 %]	  Auto Monocyte %: 8.9, [2.0 - 14.0 %]	  Auto Eosinophil %: 1.3, [0.0 - 6.0 %]	  Auto Basophil %: 0.6, [0.0 - 2.0 %]	  Auto Immature Granulocyte %: 0.3, [0.0 - 1.5 %]	    17-Jun-2018 22:15, Nucleated RBC	  Nucleated RBC: 0, [0 - 0 /100 WBCs]	    Assessment and Plan:    Assessment:  · Assessment		  88M w/PMH of HFpEF (LVEF 60%) S/P AICD, ESRD on HD MWF, T2DM, HTN, HLD presents w/ a two day history of SoB likely 2/2 acute on chronic respiratory failure in the setting of HFpEF exacerbation and ESRD    * Acute on chronic Heart failure with preserved ejection fraction.  Possibly decompensated by fluid overload from his ESRD.  HD as per nephro.   Cont current regimen.      * Elevated troponin.  In setting of sig baseline CAD with prior abnormal yet stable MPIs, no anginal symptoms   Troponin elevation With no obvious peak or fall pattern. Likely 2/2 demand - supply mismatch ( type 2 MI)  Fluid management per nephro.  Cont optimal CAD regimen otherwise.       * HTN (Hypertension).    - Continue Lisinopril 5mg qd  - Continue Carvedilol 25mg bid  - Continue Hydralazine 50mg bid.    *  Hyperlipidemia.     - Continue Atorvastatin 40mg qd.     I personally reviewed hospital records, tele, ecg, labs and imaging  Will follow

## 2018-06-19 NOTE — PROGRESS NOTE ADULT - ASSESSMENT
88M w/PMH of HFpEF (LVEF 60%) S/P AICD, ESRD on HD MWF, HTN, HLD presents w/ a two day history of SoB. Pt is a poor historian, but reports worsening dyspnea at rest the two days before admission, made worse when lying flat and improved when sitting in his couch upright. Pt also c/o occasional nonproductive cough, which is baseline for patient. Pt does not recall any recent changes in weight. Pt denies fever/chills, CP, NVD.    In the ED pt received Lasix 40mg IV x1. Trop I elevated to 0.597, BMP 15k, WBC mildly elevated 11.4. EKG revealed paced rhythm w/o ST changes. (18 Jun 2018 02:39)    6/19  sitting up in chair feels well after HD yesterday  due for dialysis tomorrow

## 2018-06-19 NOTE — DIETITIAN INITIAL EVALUATION ADULT. - OTHER INFO
Consult for HF admit and pt screened for ESRD. Pt's pmhx noted below. Sig for CHF, ESRD, DM and HLD. Pt reports good appetite and PO intake no changes. Pt denies chewing or swallowing difficulty. Pt recalls diet with RD. Pt sates he eats a lot of chicken and rice. Pt avoids foods high in potassiums and phosphours. When asked pt able to list foods high in salt and potassium. Pt had defficulty with phosphorus but when mentioned pt knew to avoid those foods. Pt states he typically is good but wants to know more about diet. Pt states that even thought dgt know information, he needs to know it as well. Pt recall of diet was very good. provided pt with requested written material. Will continue to monitor pt's nutritional status.

## 2018-06-19 NOTE — DIETITIAN INITIAL EVALUATION ADULT. - DIET TYPE
consistent carbohydrate (no snacks)/renal replacement pts:no protein restr,no conc K & phos, low sodium/DASH/TLC (sodium and cholesterol restricted diet)/1500ml

## 2018-06-19 NOTE — PROGRESS NOTE ADULT - SUBJECTIVE AND OBJECTIVE BOX
NEPHROLOGY CONSULT  HPI:  88M w/PMH of HFpEF (LVEF 60%) S/P AICD, ESRD on HD MWF, HTN, HLD presents w/ a two day history of SoB. Pt is a poor historian, but reports worsening dyspnea at rest the two days before admission, made worse when lying flat and improved when sitting in his couch upright. Pt also c/o occasional nonproductive cough, which is baseline for patient. Pt does not recall any recent changes in weight. Pt denies fever/chills, CP, NVD.    In the ED pt received Lasix 40mg IV x1. Trop I elevated to 0.597, BMP 15k, WBC mildly elevated 11.4. EKG revealed paced rhythm w/o ST changes. (18 Jun 2018 02:39)    6/19  sitting up in chair feels well after HD yesterday  due for dialysis tomorrow        PAST MEDICAL & SURGICAL HISTORY:  Pulmonary edema  Pancreatitis  AICD (automatic cardioverter/defibrillator) present  CHF (Congestive Heart Failure)  HTN (Hypertension)  High Cholesterol  Heart Attack  Dialysis Patient  Diabetes  AICD (automatic cardioverter/defibrillator) present  History of penile implant  History of Hernia Repair      FAMILY HISTORY:  No pertinent family history in first degree relatives      MEDICATIONS  (STANDING):  aspirin 325 milliGRAM(s) Oral daily  atorvastatin 40 milliGRAM(s) Oral at bedtime  carvedilol 25 milliGRAM(s) Oral every 12 hours  dextrose 5%. 1000 milliLiter(s) (50 mL/Hr) IV Continuous <Continuous>  dextrose 50% Injectable 12.5 Gram(s) IV Push once  dextrose 50% Injectable 25 Gram(s) IV Push once  dextrose 50% Injectable 25 Gram(s) IV Push once  furosemide   Injectable 40 milliGRAM(s) IV Push daily  heparin  Injectable 5000 Unit(s) SubCutaneous every 8 hours  hydrALAZINE 50 milliGRAM(s) Oral two times a day  insulin glargine Injectable (LANTUS) 25 Unit(s) SubCutaneous at bedtime  insulin lispro (HumaLOG) corrective regimen sliding scale   SubCutaneous three times a day before meals  isosorbide   mononitrate ER Tablet (IMDUR) 60 milliGRAM(s) Oral daily  levothyroxine 75 MICROGram(s) Oral daily  lisinopril 5 milliGRAM(s) Oral daily  Nephro-belkys 1 Tablet(s) Oral daily  sevelamer hydrochloride 1600 milliGRAM(s) Oral three times a day  tamsulosin 0.4 milliGRAM(s) Oral at bedtime    MEDICATIONS  (PRN):  dextrose 40% Gel 15 Gram(s) Oral once PRN Blood Glucose LESS THAN 70 milliGRAM(s)/deciliter  docusate sodium 100 milliGRAM(s) Oral three times a day PRN Constipation  glucagon  Injectable 1 milliGRAM(s) IntraMuscular once PRN Glucose LESS THAN 70 milligrams/deciliter      Allergies    No Known Allergies    Intolerances      I&O's Detail    19 Jun 2018 07:01  -  19 Jun 2018 13:10  --------------------------------------------------------  IN:  Total IN: 0 mL    OUT:    Voided: 1 mL  Total OUT: 1 mL    Total NET: -1 mL              REVIEW OF SYSTEMS:    all negative    Vital Signs Last 24 Hrs  T(C): 36.3 (19 Jun 2018 10:23), Max: 37.1 (18 Jun 2018 17:34)  T(F): 97.3 (19 Jun 2018 10:23), Max: 98.7 (18 Jun 2018 17:34)  HR: 69 (19 Jun 2018 10:23) (62 - 75)  BP: 146/46 (19 Jun 2018 10:23) (105/46 - 169/53)  BP(mean): --  RR: 18 (19 Jun 2018 10:23) (16 - 18)  SpO2: 95% (19 Jun 2018 10:23) (95% - 95%)        PHYSICAL EXAM:    General:  Alert, well-developed ,No acute distress.  oob in chair eating lunch no sob    Neuro:  Alert and oriented to person, place, and time.    HEENT:  No JVD, no masses, Eyes anicteric,    Cardiovascular:  Regular rate and rhythm    Lungs:  clear. no rales, no wheezing, .    Abdomen:  Normoactive bowel sounds.     Skin:  Warm, dry, well-perfused. No rashes or other lesions.     Extremities:  2+ pulses in upper and lower extremities.                             10.8   7.75  )-----------( 185      ( 19 Jun 2018 05:35 )             33.1       06-19    138  |  100  |  34<H>  ----------------------------<  119<H>  3.8   |  30  |  4.60<H>    Ca    8.3<L>      19 Jun 2018 05:35  Phos  2.7     06-19    TPro  x   /  Alb  3.1<L>  /  TBili  x   /  DBili  x   /  AST  x   /  ALT  x   /  AlkPhos  x   06-19

## 2018-06-19 NOTE — DIETITIAN INITIAL EVALUATION ADULT. - ADHERENCE
Pt with good understanding of diet. Recently went out for fathers day and over indulged in food and drinks. Pt states he dose not typically do this. Pt's dgt helps control food, but she is not always around./fair

## 2018-06-19 NOTE — DIETITIAN INITIAL EVALUATION ADULT. - PROBLEM SELECTOR PLAN 1
- In the setting of ESRD, elevated Trop I  - Pt appears clinically hypervolemic, likely cause of SoB  - Echo in May 2018 revealed LVEF 60%  - S/P Lasix 40mg IV x1 in the ED  - BNP 15K on admission  - Admit to tele  - Start Lasix 40mg qd  - Strict I&O  - Daily weights  - Fluid restriction  - Low salt, low cholesterol diet  - Continue Lisinopril 5mg qd

## 2018-06-19 NOTE — PROGRESS NOTE ADULT - SUBJECTIVE AND OBJECTIVE BOX
Pt has been seen and examined with FP resident, resident supervised agree with a/p       Patient is a 88y old  Male who presents with a chief complaint of Shortness of Breath (18 Jun 2018 02:39)          PHYSICAL EXAM:    general- comfortable   -rs-aeeb, b/l basal rales present   -cvs-s1s2 normal   -p/a-soft,bs+  -extremity- no asymmetrical swelling noted   -cns- non focal         A/P    #Acute decompensated diastolic heart failure due to fluid overload due to ESRDD   -appears compensated now, ct HD and removal of fluids during hd   -discharge plan

## 2018-06-19 NOTE — PROGRESS NOTE ADULT - SUBJECTIVE AND OBJECTIVE BOX
CHIEF COMPLAINT: SOB    SUBJECTIVE: HPI:  88M w/PMH of HFpEF (LVEF 60%) S/P AICD, ESRD on HD MWF, HTN, HLD presents w/ a two day history of SoB. Pt is a poor historian, but reports worsening dyspnea at rest the two days before admission, made worse when lying flat and improved when sitting in his couch upright. Pt also c/o occasional nonproductive cough, which is baseline for patient. Pt does not recall any recent changes in weight. Pt denies fever/chills, CP, NVD.    In the ED pt received Lasix 40mg IV x1. Trop I elevated to 0.597, BMP 15k, WBC mildly elevated 11.4. EKG revealed paced rhythm w/o ST changes.     6/18/18: Pt was seen and examined at bedside this morning. [facility]  ID #[id number] was used. Pt was very lethargic and was falling asleep mid-sentence. He denies any CP and described improvement in SOB with Lasix given in ED and oxygen supplementation. Pt describes using 2 pillows at night to sleep, yet still having difficulty breathing. He denies any  weight gain within the past week. Pt may have increased fluid intake and may have consumed potentially salty foods at Father's Day feast yesterday  . Pt says he had last HD session on Fri 6/15. Pt scheduled for HD this afternoon.     6/19/18: Pt was seen and examined at bedside. He is much more awake and alert today. He feels better and less SOB after HD yesterday. Pt was assessed by PT, recommend rehab on discharge. Discharge planning likely for tomorrow discussed,  pending rehab placement as per .     REVIEW OF SYSTEMS:  CONSTITUTIONAL: No weakness, fevers or chills  EYES/ENT: No visual changes;  No vertigo or throat pain   NECK: No pain or stiffness  RESPIRATORY: + Nonproductive Cough; +SOB  CARDIOVASCULAR: No chest pain or palpitations  GASTROINTESTINAL: No abdominal or epigastric pain. No nausea, vomiting, or hematemesis; No diarrhea or constipation. No melena or hematochezia.  GENITOURINARY: No dysuria, frequency or hematuria  NEUROLOGICAL: No numbness or weakness  SKIN: No itching, burning, rashes, or lesions   All other review of systems is negative unless indicated above    Vital Signs Last 24 Hrs  Vital Signs Last 24 Hrs  T(C): 36.3 (19 Jun 2018 10:23), Max: 37.1 (18 Jun 2018 17:34)  T(F): 97.3 (19 Jun 2018 10:23), Max: 98.7 (18 Jun 2018 17:34)  HR: 69 (19 Jun 2018 10:23) (64 - 75)  BP: 146/46 (19 Jun 2018 10:23) (114/55 - 169/53)  RR: 18 (19 Jun 2018 10:23) (16 - 18)  SpO2: 95% (19 Jun 2018 10:23) (95% - 95%)      CAPILLARY BLOOD GLUCOSE  POCT Blood Glucose.: 237 mg/dL (19 Jun 2018 11:29)  POCT Blood Glucose.: 143 mg/dL (19 Jun 2018 08:07)  POCT Blood Glucose.: 234 mg/dL (18 Jun 2018 21:13)  POCT Blood Glucose.: 139 mg/dL (18 Jun 2018 17:15)      PHYSICAL EXAM:  Constitutional: elderly man lying in bed breathing via NC, obese body habitus  HEENT: PERR, EOMI, Normal Hearing, MMM  Neck: Soft and supple, No LAD, Mild JVD  Respiratory: Rales appreciated at bilateral lung bases  Cardiovascular: S1 and S2, regular rate and rhythm, no Murmurs, gallops or rubs  Gastrointestinal: Bowel Sounds present, soft, nontender, nondistended, no guarding, no rebound  Extremities: No peripheral edema  Vascular: 2+ peripheral pulses  Neurological: A/O x 3, no focal deficits  Musculoskeletal: 5/5 strength b/l upper and lower extremities  Skin: No rashes    MEDICATIONS:  MEDICATIONS  (STANDING):  aspirin 325 milliGRAM(s) Oral daily  atorvastatin 40 milliGRAM(s) Oral at bedtime  carvedilol 25 milliGRAM(s) Oral every 12 hours  dextrose 5%. 1000 milliLiter(s) (50 mL/Hr) IV Continuous <Continuous>  dextrose 50% Injectable 12.5 Gram(s) IV Push once  dextrose 50% Injectable 25 Gram(s) IV Push once  dextrose 50% Injectable 25 Gram(s) IV Push once  furosemide   Injectable 40 milliGRAM(s) IV Push daily  heparin  Injectable 5000 Unit(s) SubCutaneous every 8 hours  hydrALAZINE 50 milliGRAM(s) Oral two times a day  insulin glargine Injectable (LANTUS) 25 Unit(s) SubCutaneous at bedtime  insulin lispro (HumaLOG) corrective regimen sliding scale   SubCutaneous three times a day before meals  isosorbide   mononitrate ER Tablet (IMDUR) 60 milliGRAM(s) Oral daily  levothyroxine 75 MICROGram(s) Oral daily  lisinopril 5 milliGRAM(s) Oral daily  Nephro-belkys 1 Tablet(s) Oral daily  sevelamer hydrochloride 1600 milliGRAM(s) Oral three times a day  tamsulosin 0.4 milliGRAM(s) Oral at bedtime      LABS: All Labs Reviewed:                                           10.8   7.75  )-----------( 185      ( 19 Jun 2018 05:35 )             33.1       06-19    138  |  100  |  34<H>  ----------------------------<  119<H>  3.8   |  30  |  4.60<H>    Ca    8.3<L>      19 Jun 2018 05:35  Phos  2.7     06-19    TPro  x   /  Alb  3.1<L>  /  TBili  x   /  DBili  x   /  AST  x   /  ALT  x   /  AlkPhos  x   06-19    TPro  x   /  Alb  3.1<L>  /  TBili  x   /  DBili  x   /  AST  x   /  ALT  x   /  AlkPhos  x   06-18    PT/INR - ( 17 Jun 2018 22:15 )   PT: 10.4 sec;   INR: 0.96 ratio         PTT - ( 17 Jun 2018 22:15 )  PTT:25.5 sec  CARDIAC MARKERS ( 18 Jun 2018 12:30 )  0.381 ng/mL / x     / x     / x     / x      CARDIAC MARKERS ( 18 Jun 2018 07:24 )  0.514 ng/mL / x     / x     / x     / x      CARDIAC MARKERS ( 17 Jun 2018 22:15 )  0.597 ng/mL / x     / 500 U/L / x     / x          RADIOLOGY/EKG: < from: Xray Chest 1 View AP/PA. (06.17.18 @ 22:55) >  IMPRESSION:  Cardiomegaly and interval development of congestive heart failure.      DVT PPX: Heparin SQ    DISPOSITION: Telemetry

## 2018-06-20 LAB
ALBUMIN SERPL ELPH-MCNC: 3.1 G/DL — LOW (ref 3.3–5)
ANION GAP SERPL CALC-SCNC: 12 MMOL/L — SIGNIFICANT CHANGE UP (ref 5–17)
BUN SERPL-MCNC: 64 MG/DL — HIGH (ref 7–23)
CALCIUM SERPL-MCNC: 8.5 MG/DL — SIGNIFICANT CHANGE UP (ref 8.5–10.1)
CHLORIDE SERPL-SCNC: 98 MMOL/L — SIGNIFICANT CHANGE UP (ref 96–108)
CO2 SERPL-SCNC: 24 MMOL/L — SIGNIFICANT CHANGE UP (ref 22–31)
CREAT SERPL-MCNC: 6.14 MG/DL — HIGH (ref 0.5–1.3)
GLUCOSE BLDC GLUCOMTR-MCNC: 105 MG/DL — HIGH (ref 70–99)
GLUCOSE BLDC GLUCOMTR-MCNC: 164 MG/DL — HIGH (ref 70–99)
GLUCOSE BLDC GLUCOMTR-MCNC: 200 MG/DL — HIGH (ref 70–99)
GLUCOSE BLDC GLUCOMTR-MCNC: 204 MG/DL — HIGH (ref 70–99)
GLUCOSE SERPL-MCNC: 141 MG/DL — HIGH (ref 70–99)
HCT VFR BLD CALC: 33.2 % — LOW (ref 39–50)
HGB BLD-MCNC: 11.1 G/DL — LOW (ref 13–17)
MCHC RBC-ENTMCNC: 29.4 PG — SIGNIFICANT CHANGE UP (ref 27–34)
MCHC RBC-ENTMCNC: 33.4 GM/DL — SIGNIFICANT CHANGE UP (ref 32–36)
MCV RBC AUTO: 87.8 FL — SIGNIFICANT CHANGE UP (ref 80–100)
NRBC # BLD: 0 /100 WBCS — SIGNIFICANT CHANGE UP (ref 0–0)
NT-PROBNP SERPL-SCNC: 8971 PG/ML — HIGH (ref 0–450)
PHOSPHATE SERPL-MCNC: 3.6 MG/DL — SIGNIFICANT CHANGE UP (ref 2.5–4.5)
PLATELET # BLD AUTO: 188 K/UL — SIGNIFICANT CHANGE UP (ref 150–400)
POTASSIUM SERPL-MCNC: 4.2 MMOL/L — SIGNIFICANT CHANGE UP (ref 3.5–5.3)
POTASSIUM SERPL-SCNC: 4.2 MMOL/L — SIGNIFICANT CHANGE UP (ref 3.5–5.3)
RBC # BLD: 3.78 M/UL — LOW (ref 4.2–5.8)
RBC # FLD: 13.6 % — SIGNIFICANT CHANGE UP (ref 10.3–14.5)
SODIUM SERPL-SCNC: 134 MMOL/L — LOW (ref 135–145)
WBC # BLD: 8.27 K/UL — SIGNIFICANT CHANGE UP (ref 3.8–10.5)
WBC # FLD AUTO: 8.27 K/UL — SIGNIFICANT CHANGE UP (ref 3.8–10.5)

## 2018-06-20 RX ADMIN — CARVEDILOL PHOSPHATE 25 MILLIGRAM(S): 80 CAPSULE, EXTENDED RELEASE ORAL at 05:46

## 2018-06-20 RX ADMIN — HEPARIN SODIUM 5000 UNIT(S): 5000 INJECTION INTRAVENOUS; SUBCUTANEOUS at 21:07

## 2018-06-20 RX ADMIN — ATORVASTATIN CALCIUM 40 MILLIGRAM(S): 80 TABLET, FILM COATED ORAL at 21:07

## 2018-06-20 RX ADMIN — Medication 325 MILLIGRAM(S): at 12:30

## 2018-06-20 RX ADMIN — ISOSORBIDE MONONITRATE 60 MILLIGRAM(S): 60 TABLET, EXTENDED RELEASE ORAL at 12:30

## 2018-06-20 RX ADMIN — Medication 40 MILLIGRAM(S): at 05:45

## 2018-06-20 RX ADMIN — Medication 1 TABLET(S): at 12:29

## 2018-06-20 RX ADMIN — SEVELAMER CARBONATE 1600 MILLIGRAM(S): 2400 POWDER, FOR SUSPENSION ORAL at 05:45

## 2018-06-20 RX ADMIN — Medication 50 MILLIGRAM(S): at 05:46

## 2018-06-20 RX ADMIN — LISINOPRIL 5 MILLIGRAM(S): 2.5 TABLET ORAL at 05:46

## 2018-06-20 RX ADMIN — HEPARIN SODIUM 5000 UNIT(S): 5000 INJECTION INTRAVENOUS; SUBCUTANEOUS at 05:45

## 2018-06-20 RX ADMIN — Medication 75 MICROGRAM(S): at 05:46

## 2018-06-20 RX ADMIN — Medication 50 MILLIGRAM(S): at 17:36

## 2018-06-20 RX ADMIN — HEPARIN SODIUM 5000 UNIT(S): 5000 INJECTION INTRAVENOUS; SUBCUTANEOUS at 13:00

## 2018-06-20 RX ADMIN — INSULIN GLARGINE 25 UNIT(S): 100 INJECTION, SOLUTION SUBCUTANEOUS at 21:07

## 2018-06-20 RX ADMIN — SEVELAMER CARBONATE 1600 MILLIGRAM(S): 2400 POWDER, FOR SUSPENSION ORAL at 17:34

## 2018-06-20 RX ADMIN — SEVELAMER CARBONATE 1600 MILLIGRAM(S): 2400 POWDER, FOR SUSPENSION ORAL at 12:30

## 2018-06-20 RX ADMIN — CARVEDILOL PHOSPHATE 25 MILLIGRAM(S): 80 CAPSULE, EXTENDED RELEASE ORAL at 17:36

## 2018-06-20 RX ADMIN — TAMSULOSIN HYDROCHLORIDE 0.4 MILLIGRAM(S): 0.4 CAPSULE ORAL at 21:07

## 2018-06-20 RX ADMIN — Medication 2: at 17:15

## 2018-06-20 RX ADMIN — Medication 2: at 07:09

## 2018-06-20 NOTE — PROGRESS NOTE ADULT - PROBLEM SELECTOR PLAN 4
- HD on MWF  - Renal consult Dr Lerma appreciated  - Discharge planning to [facility] rehab tomorrow

## 2018-06-20 NOTE — PROGRESS NOTE ADULT - SUBJECTIVE AND OBJECTIVE BOX
Pt has been seen and examined with FP resident, resident supervised agree with a/p       Patient is a 88y old  Male who presents with a chief complaint of Shortness of Breath (18 Jun 2018 02:39)          PHYSICAL EXAM:    general- comfortable   -rs-aeeb, b/l basal rales present   -cvs-s1s2 normal   -p/a-soft,bs+  -extremity- no asymmetrical swelling noted   -cns- non focal         A/P    #Acute decompensated diastolic heart failure due to fluid overload due to ESRDD   -resolved       #discharge plan to rehab

## 2018-06-20 NOTE — PROGRESS NOTE ADULT - PROBLEM SELECTOR PLAN 1
- In the setting of ESRD, elevated Trop I  - Echo in May 2018 revealed LVEF 60%  - S/P Lasix 40mg IV x1 in the ED  - BNP 15K on admission  - Telemetry monitoring  - Lasix 40mg qd  - Strict I&O  - Daily weights  - Fluid restriction  - Low salt, low cholesterol diet  - Continue Lisinopril 5mg qd  - Improving clinically

## 2018-06-20 NOTE — PROGRESS NOTE ADULT - SUBJECTIVE AND OBJECTIVE BOX
NEPHROLOGY INTERVAL HPI/OVERNIGHT EVENTS:   SY  No acute events overnight.  Resting comfortably.      pt interviewed with intrepretor  87 y/o M with hx of diastolic CHF, ESRD presents with worsening sob that started on the evening of his last hd on friday with associated cough and sputum production.  sob worsened to being worse overnigh.  admits to increased intake of fluid and food ? salty  food at father;s day celebration  + complaince with medication   no f/c no diarrhea  no abd pain    Noted with + troponin and mild leukocytossi      PAST MEDICAL & SURGICAL HISTORY:  1.HTN  2.DM  3.CAD, diastolic chf  4.CHF  5.PPM/AICD  6.Recent hx of Syncope  7. esrd on hd  8. pancreatitits    MEDICATIONS  (STANDING):  aspirin 325 milliGRAM(s) Oral daily  atorvastatin 40 milliGRAM(s) Oral at bedtime  carvedilol 25 milliGRAM(s) Oral every 12 hours  dextrose 5%. 1000 milliLiter(s) (50 mL/Hr) IV Continuous <Continuous>  dextrose 50% Injectable 12.5 Gram(s) IV Push once  dextrose 50% Injectable 25 Gram(s) IV Push once  dextrose 50% Injectable 25 Gram(s) IV Push once  furosemide   Injectable 40 milliGRAM(s) IV Push daily  heparin  Injectable 5000 Unit(s) SubCutaneous every 8 hours  hydrALAZINE 50 milliGRAM(s) Oral two times a day  insulin glargine Injectable (LANTUS) 25 Unit(s) SubCutaneous at bedtime  insulin lispro (HumaLOG) corrective regimen sliding scale   SubCutaneous three times a day before meals  isosorbide   mononitrate ER Tablet (IMDUR) 60 milliGRAM(s) Oral daily  levothyroxine 75 MICROGram(s) Oral daily  lisinopril 5 milliGRAM(s) Oral daily  Nephro-belkys 1 Tablet(s) Oral daily  sevelamer hydrochloride 1600 milliGRAM(s) Oral three times a day  tamsulosin 0.4 milliGRAM(s) Oral at bedtime    MEDICATIONS  (PRN):  dextrose 40% Gel 15 Gram(s) Oral once PRN Blood Glucose LESS THAN 70 milliGRAM(s)/deciliter  docusate sodium 100 milliGRAM(s) Oral three times a day PRN Constipation  glucagon  Injectable 1 milliGRAM(s) IntraMuscular once PRN Glucose LESS THAN 70 milligrams/deciliter          Vital Signs Last 24 Hrs  T(C): 36.2 (2018 07:46), Max: 36.6 (2018 17:30)  T(F): 97.2 (2018 07:46), Max: 97.8 (2018 17:30)  HR: 67 (2018 08:08) (67 - 74)  BP: 140/60 (2018 08:08) (140/60 - 197/64)  BP(mean): --  RR: 20 (2018 08:08) (18 - 20)  SpO2: 95% (2018 04:52) (95% - 100%)  Daily     Daily Weight in k (2018 10:26)     @ 07:01  -  06 @ 07:00  --------------------------------------------------------  IN: 0 mL / OUT: 1 mL / NET: -1 mL        PHYSICAL EXAM: Alert and appropriate  GENERAL: No distress  CHEST/LUNG: Bilateral scattered rhonchi  HEART: S1S2 RRR  ABDOMEN: soft  EXTREMITIES: no edema  SKIN:     LABS:                        11.1   8.27  )-----------( 188      ( 2018 06:16 )             33.2     06-20    134<L>  |  98  |  64<H>  ----------------------------<  141<H>  4.2   |  24  |  6.14<H>    Ca    8.5      2018 06:16  Phos  3.6     06-20    TPro  x   /  Alb  3.1<L>  /  TBili  x   /  DBili  x   /  AST  x   /  ALT  x   /  AlkPhos  x   -20        Phosphorus Level, Serum: 3.6 mg/dL ( @ 06:16)          RADIOLOGY & ADDITIONAL TESTS:

## 2018-06-20 NOTE — PROGRESS NOTE ADULT - ASSESSMENT
88M w/PMH of HFpEF (LVEF 60%) S/P AICD, ESRD on HD MWF, HTN, HLD presents w/ a two day history of SoB. Pt is a poor historian, but reports worsening dyspnea at rest the two days before admission, made worse when lying flat and improved when sitting in his couch upright. Pt also c/o occasional nonproductive cough, which is baseline for patient. Pt does not recall any recent changes in weight. Pt denies fever/chills, CP, NVD.    In the ED pt received Lasix 40mg IV x1. Trop I elevated to 0.597, BMP 15k, WBC mildly elevated 11.4. EKG revealed paced rhythm w/o ST changes. (18 Jun 2018 02:39)    6/19  sitting up in chair feels well after HD yesterday  due for dialysis tomorrow    6/20 SY  --ESRD : HD order and tx reviewed.  Tolerating tx well.  Aim for another 3 kg fluid removal today.  --CHF : clinically much improved.  --D/c planning for rehab. : PT follow up appreciated.

## 2018-06-21 ENCOUNTER — APPOINTMENT (OUTPATIENT)
Dept: ELECTROPHYSIOLOGY | Facility: CLINIC | Age: 83
End: 2018-06-21

## 2018-06-21 ENCOUNTER — TRANSCRIPTION ENCOUNTER (OUTPATIENT)
Age: 83
End: 2018-06-21

## 2018-06-21 VITALS — WEIGHT: 165.57 LBS

## 2018-06-21 LAB
ALBUMIN SERPL ELPH-MCNC: 3.1 G/DL — LOW (ref 3.3–5)
ANION GAP SERPL CALC-SCNC: 8 MMOL/L — SIGNIFICANT CHANGE UP (ref 5–17)
BUN SERPL-MCNC: 37 MG/DL — HIGH (ref 7–23)
CALCIUM SERPL-MCNC: 8.3 MG/DL — LOW (ref 8.5–10.1)
CHLORIDE SERPL-SCNC: 101 MMOL/L — SIGNIFICANT CHANGE UP (ref 96–108)
CO2 SERPL-SCNC: 29 MMOL/L — SIGNIFICANT CHANGE UP (ref 22–31)
CREAT SERPL-MCNC: 4.38 MG/DL — HIGH (ref 0.5–1.3)
GLUCOSE BLDC GLUCOMTR-MCNC: 118 MG/DL — HIGH (ref 70–99)
GLUCOSE BLDC GLUCOMTR-MCNC: 88 MG/DL — SIGNIFICANT CHANGE UP (ref 70–99)
GLUCOSE SERPL-MCNC: 67 MG/DL — LOW (ref 70–99)
HCT VFR BLD CALC: 33.2 % — LOW (ref 39–50)
HGB BLD-MCNC: 10.9 G/DL — LOW (ref 13–17)
MCHC RBC-ENTMCNC: 28.8 PG — SIGNIFICANT CHANGE UP (ref 27–34)
MCHC RBC-ENTMCNC: 32.8 GM/DL — SIGNIFICANT CHANGE UP (ref 32–36)
MCV RBC AUTO: 87.8 FL — SIGNIFICANT CHANGE UP (ref 80–100)
NRBC # BLD: 0 /100 WBCS — SIGNIFICANT CHANGE UP (ref 0–0)
PHOSPHATE SERPL-MCNC: 3.2 MG/DL — SIGNIFICANT CHANGE UP (ref 2.5–4.5)
PLATELET # BLD AUTO: 198 K/UL — SIGNIFICANT CHANGE UP (ref 150–400)
POTASSIUM SERPL-MCNC: 3.9 MMOL/L — SIGNIFICANT CHANGE UP (ref 3.5–5.3)
POTASSIUM SERPL-SCNC: 3.9 MMOL/L — SIGNIFICANT CHANGE UP (ref 3.5–5.3)
RBC # BLD: 3.78 M/UL — LOW (ref 4.2–5.8)
RBC # FLD: 13.3 % — SIGNIFICANT CHANGE UP (ref 10.3–14.5)
SODIUM SERPL-SCNC: 138 MMOL/L — SIGNIFICANT CHANGE UP (ref 135–145)
WBC # BLD: 7.22 K/UL — SIGNIFICANT CHANGE UP (ref 3.8–10.5)
WBC # FLD AUTO: 7.22 K/UL — SIGNIFICANT CHANGE UP (ref 3.8–10.5)

## 2018-06-21 RX ORDER — ASPIRIN/CALCIUM CARB/MAGNESIUM 324 MG
1 TABLET ORAL
Qty: 0 | Refills: 0 | COMMUNITY
Start: 2018-06-21

## 2018-06-21 RX ORDER — INSULIN GLARGINE 100 [IU]/ML
20 INJECTION, SOLUTION SUBCUTANEOUS
Qty: 0 | Refills: 0 | DISCHARGE
Start: 2018-06-21

## 2018-06-21 RX ORDER — SEVELAMER CARBONATE 2400 MG/1
2 POWDER, FOR SUSPENSION ORAL
Qty: 0 | Refills: 0 | DISCHARGE
Start: 2018-06-21

## 2018-06-21 RX ORDER — ASPIRIN/CALCIUM CARB/MAGNESIUM 324 MG
1 TABLET ORAL
Qty: 0 | Refills: 0 | DISCHARGE
Start: 2018-06-21

## 2018-06-21 RX ADMIN — Medication 75 MICROGRAM(S): at 05:14

## 2018-06-21 RX ADMIN — HEPARIN SODIUM 5000 UNIT(S): 5000 INJECTION INTRAVENOUS; SUBCUTANEOUS at 05:14

## 2018-06-21 RX ADMIN — Medication 1 TABLET(S): at 12:19

## 2018-06-21 RX ADMIN — HEPARIN SODIUM 5000 UNIT(S): 5000 INJECTION INTRAVENOUS; SUBCUTANEOUS at 12:25

## 2018-06-21 RX ADMIN — Medication 50 MILLIGRAM(S): at 05:14

## 2018-06-21 RX ADMIN — Medication 40 MILLIGRAM(S): at 05:14

## 2018-06-21 RX ADMIN — LISINOPRIL 5 MILLIGRAM(S): 2.5 TABLET ORAL at 05:14

## 2018-06-21 RX ADMIN — Medication 325 MILLIGRAM(S): at 12:19

## 2018-06-21 RX ADMIN — CARVEDILOL PHOSPHATE 25 MILLIGRAM(S): 80 CAPSULE, EXTENDED RELEASE ORAL at 05:14

## 2018-06-21 RX ADMIN — ISOSORBIDE MONONITRATE 60 MILLIGRAM(S): 60 TABLET, EXTENDED RELEASE ORAL at 12:19

## 2018-06-21 RX ADMIN — SEVELAMER CARBONATE 1600 MILLIGRAM(S): 2400 POWDER, FOR SUSPENSION ORAL at 09:05

## 2018-06-21 RX ADMIN — SEVELAMER CARBONATE 1600 MILLIGRAM(S): 2400 POWDER, FOR SUSPENSION ORAL at 12:19

## 2018-06-21 NOTE — DISCHARGE NOTE ADULT - PLAN OF CARE
- Please continue all hemodialysis according to schedule    - Please continue Renagel and take daily multivitamin.    - Please follow low salt, low potassium, low phosphorus diet    - Please follow up with your PCP and Nephrologist Your glucose reading was low on morning of discharge.    Please decrease dose of Lantus to 20 units at bedtime.    - Please follow up with your PCP. Please take statin every night Please continue to take lisinopril, Coreg and hydralazine. To prevent CHF exacerbations You were admitted for an acute heart failure exacerbation likely due to excess fluid in your body.    - Please continue hemodialysis according to schedule    - Please avoid foods high in salt/sodium.    - Please limit fluid intake to 1.5L    - Please follow up with your PCP and Cardiologist. Please continue statin, aspirin, Coreg  -    - Please follow up with your PCP and Cardiologist

## 2018-06-21 NOTE — DISCHARGE NOTE ADULT - SECONDARY DIAGNOSIS.
ESRD (end stage renal disease) on dialysis T2DM (type 2 diabetes mellitus) Hyperlipidemia HTN (Hypertension) CAD (coronary artery disease)

## 2018-06-21 NOTE — DISCHARGE NOTE ADULT - MEDICATION SUMMARY - MEDICATIONS TO CHANGE
I will SWITCH the dose or number of times a day I take the medications listed below when I get home from the hospital:    lantus 25 units  -- once a day (at bedtime)

## 2018-06-21 NOTE — PROGRESS NOTE ADULT - SUBJECTIVE AND OBJECTIVE BOX
Reason for Admission: Shortness of Breath	  History of Present Illness: 	  88M w/PMH of HFpEF (LVEF 60%) S/P AICD, ESRD on HD MWF, HTN, HLD presents w/ a two day history of SoB. Pt is a poor historian, but reports worsening dyspnea at rest the two days before admission, made worse when lying flat and improved when sitting in his couch upright. Pt also c/o occasional nonproductive cough, which is baseline for patient. Pt does not recall any recent changes in weight. Pt denies fever/chills, CP, NVD.    In the ED pt received Lasix 40mg IV x1. Trop I elevated to 0.597, BMP 15k, WBC mildly elevated 11.4. EKG revealed paced rhythm w/o ST changes.    Examined at bedside, feels slightly better today. Pending HD today. Tele AV paced, no sig arrhytmia. ECG unchanged. No angina, or other chest symtpms reported.       6/19 - feels much better, able to be supine in bed without orthopnea or PND, tolerating HD well.  No angina at all ever.   Tele AV paced. Continue current CV regimen and HD schedule. Can go home from CV stand point.       6/21 - no new issues from CV stand point, doing better on adjusted HD schedule. Can go to rehab. Follow up with me upon dc.      Review of Systems:  Other Review of Systems: All other review of systems negative, except as noted in HPI	     Review of Systems:  · Negative General Symptoms	no fever; no chills	  · Negative Respiratory and Thorax Symptoms	no wheezing; no pleuritic chest pain	  · Respiratory and Thorax Symptoms	dyspnea  cough	      Allergies and Intolerances:        Allergies:  	No Known Allergies:     Home Medications:   * Patient Currently Takes Medications as of 09-May-2018 18:19 documented in Structured Notes  · 	tamsulosin 0.4 mg oral capsule: 1 cap(s) orally once a day (at bedtime)  · 	lisinopril 5 mg oral tablet: 1 tab(s) orally once a day  · 	carvedilol 25 mg oral tablet: 1 tab(s) orally every 12 hours  · 	insulin lispro 100 units/mL subcutaneous solution: unit(s) subcutaneous prn , 1 Unit(s) if Glucose 151 - 200  	2 Unit(s) if Glucose 201 - 250  	3 Unit(s) if Glucose 251 - 300  	4 Unit(s) if Glucose 301 - 350  	5 Unit(s) if Glucose 351 - 400  	6 Unit(s) if Glucose Greater Than 400  · 	docusate sodium 100 mg oral capsule: 1 cap(s) orally 3 times a day, As Needed for constipathion  · 	lantus 25 units: once a day (at bedtime)  · 	atorvastatin 40 mg oral tablet: 1 tab(s) orally once a day  · 	isosorbide mononitrate 60 mg oral tablet, extended release:  orally   · 	levothyroxine 75 mcg (0.075 mg) oral capsule: 1 cap(s) orally once a day  · 	hydrALAZINE 100 mg oral tablet: 1 tab(s) orally 2 times a day    .    Patient History:    Past Medical History:  AICD (automatic cardioverter/defibrillator) present    CHF (Congestive Heart Failure)    Diabetes    Dialysis Patient    Heart Attack    High Cholesterol    HTN (Hypertension)    Pancreatitis    Pulmonary edema.     Past Surgical History:  AICD (automatic cardioverter/defibrillator) present    History of Hernia Repair    History of penile implant.     Family History:  No pertinent family history in first degree relatives.     Tobacco Screening:  · Core Measure Site	Yes	  · Has the patient used tobacco in the past 30 days?	No	    Risk Assessment:    Present on Admission:  Deep Venous Thrombosis	no	  Pulmonary Embolus	no	     Heart Failure:  Does this patient have a history of or has been diagnosed with heart failure? yes.     LV Function Assessment (LVS function was evaluated before arrival and/or during hospitalization) yes.     Is the Ejection Fraction >40% ? yes.     Ejection Fraction 60 %.       Physical Exam:  Physical Exam: Constitutional: Pt lying in bed, lethargic with eyes shut, obese. NAD  HEENT: EOMI, normal hearing, moist mucous membranes  Neck: Soft and supple, no JVD  Respiratory: B/L LL rales. No wheezing or rhonchi  Cardiovascular: S1S2+, RRR, no M/G/R  Gastrointestinal: BS+, soft, NT/ND, no guarding, no rebound  Extremities: 1+ peripheral edema  Vascular: 2+ peripheral pulses  Neurological: AAOx3, no focal deficits  Musculoskeletal: 5/5 strength b/l upper and lower extremities Skin: No rashes. Palpable pacemaker L pectoral	      Laboratory:   General Chemistry:	    17-Jun-2018 22:15, Comprehensive Metabolic Panel	  Sodium, Serum: 135, [135 - 145 mmol/L]	  Potassium, Serum: 5.1, [3.5 - 5.3 mmol/L], Specimen slightly hemolyzed	  Chloride, Serum: 98, [96 - 108 mmol/L]	  Carbon Dioxide, Serum: 29, [22 - 31 mmol/L]	  Anion Gap, Serum: 8, [5 - 17 mmol/L]	  Blood Urea Nitrogen, Serum:    57, [7 - 23 mg/dL]	  Creatinine, Serum:    6.38, [0.50 - 1.30 mg/dL]	  Glucose, Serum:    173, [70 - 99 mg/dL]	  Calcium, Total Serum: 8.5, [8.5 - 10.1 mg/dL]	  Protein Total, Serum: 7.2, [6.0 - 8.3 gm/dL]	  Albumin, Serum: 3.4, [3.3 - 5.0 g/dL]	  Bilirubin Total, Serum: 0.4, [0.2 - 1.2 mg/dL]	  Alkaline Phosphatase, Serum: 81, [40 - 120 U/L]	  Aspartate Aminotransferase (AST/SGOT): 32, [15 - 37 U/L]	  Alanine Aminotransferase (ALT/SGPT): 22, [12 - 78 U/L]	  eGFR if Non :    7, [>=60 mL/min/1.73M2], Interpretative comment  The units for eGFR are ml/min/1.73m2 (normalized body surface area). The  eGFR is calculated from a serum creatinine using the CKD-EPI equation.  Other variables required for calculation are race, age and sex. Among  patients with chronic kidney disease (CKD), the eGFR is useful in  determining the stage of disease according to KDOQI CKD classification.  All eGFR results are reported numerically with the following  interpretation.          GFR                    With                 Without     (ml/min/1.73 m2)    Kidney Damage       Kidney Damage        >= 90                    Stage 1                     Normal        60-89                    Stage 2                     Decreased GFR        30-59     Stage 3                     Stage 3        15-29                    Stage 4                     Stage 4        < 15                      Stage 5                     Stage 5  Each stage of CKD assumes that the associated GFR level has been in  effect for at least 3 months. Determination of stages one and two (with  eGFR > 59 ml/min/m2) requires estimation of kidney damage for at least 3  months as defined by structural or functional abnormalities.  Limitations: All estimates of GFR will be less accurate for patients at  extremes of muscle mass (including but not limited to frail elderly,  critically ill, or cancer patients), those with unusual diets, and those  with conditions associated with reduced secretion or extrarenal  elimination of creatinine. The eGFR equation is not recommended for use  in patients with unstable creatinine levels.	  eGFR if :    8, [>=60 mL/min/1.73M2]	    17-Jun-2018 22:15, Creatine Kinase, Serum	  Creatine Kinase, Serum:    500, [26 - 308 U/L]	    17-Jun-2018 22:15, Serum Pro-Brain Natriuretic Peptide	  Serum Pro-Brain Natriuretic Peptide:    05892, [0 - 450 pg/mL]	    17-Jun-2018 22:15, Troponin I, Serum	  Troponin I, Serum:    0.597, [0.015 - 0.045 ng/mL], informed Ryann Platt 06/17/18 22:54  High Sensitivity Troponin and new reference  range effective 7/6/2016	  Coagulation:	    17-Jun-2018 22:15, Activated Partial Thromboplastin Time	  Activated Partial Thromboplastin Time:    25.5, [27.5 - 37.4 sec], The recommended therapeutic heparin range (full dose) is 58-99 seconds.  Recommended therapeutic Argatroban range is 1.5 to 3.0 times the baseline  APTT value, not to exceed 100 seconds. Recommended therapeutic Refludan  range is 1.5 to 2.5 times thebaseline APTT.	    17-Jun-2018 22:15, Prothrombin Time and INR, Plasma	  Prothrombin Time, Plasma: 10.4, [9.8 - 12.7 sec]	  INR: 0.96, [0.88 - 1.16 ratio]	  Hematology:	    17-Jun-2018 22:15, Complete Blood Count + Automated Diff	  WBC Count:    11.39, [3.80 - 10.50 K/uL]	  RBC Count:    4.05, [4.20 - 5.80 M/uL]	  Hemoglobin:    11.8, [13.0 - 17.0 g/dL]	  Hematocrit:    35.9, [39.0 - 50.0 %]	  Mean Cell Volume: 88.6, [80.0 - 100.0 fl]	  Mean Cell Hemoglobin: 29.1, [27.0 - 34.0 pg]	  Mean Cell Hemoglobin Conc: 32.9, [32.0 - 36.0 gm/dL]	  Red Cell Distrib Width: 13.7, [10.3 - 14.5 %]	  Platelet Count - Automated: 213, [150 - 400 K/uL]	  Auto Neutrophil #:    9.41, [1.80 - 7.40 K/uL]	  Auto Lymphocyte #:    0.72, [1.00 - 3.30 K/uL]	  Auto Monocyte #:    1.01, [0.00 - 0.90 K/uL]	  Auto Eosinophil #: 0.15, [0.00 - 0.50 K/uL]	  Auto Basophil #: 0.07, [0.00 - 0.20 K/uL]	  Auto Neutrophil %:    82.6, [43.0 - 77.0 %], Differential percentages must be correlated with absolute numbers for  clinical significance.	  Auto Lymphocyte %:    6.3, [13.0 - 44.0 %]	  Auto Monocyte %: 8.9, [2.0 - 14.0 %]	  Auto Eosinophil %: 1.3, [0.0 - 6.0 %]	  Auto Basophil %: 0.6, [0.0 - 2.0 %]	  Auto Immature Granulocyte %: 0.3, [0.0 - 1.5 %]	    17-Jun-2018 22:15, Nucleated RBC	  Nucleated RBC: 0, [0 - 0 /100 WBCs]	    Assessment and Plan:    Assessment:  · Assessment		  88M w/PMH of HFpEF (LVEF 60%) S/P AICD, ESRD on HD MWF, T2DM, HTN, HLD presents w/ a two day history of SoB likely 2/2 acute on chronic respiratory failure in the setting of HFpEF exacerbation and ESRD    * Acute on chronic Heart failure with preserved ejection fraction.  Possibly decompensated by fluid overload from his ESRD.  HD as per nephro.   Cont current regimen.      * Elevated troponin.  In setting of sig baseline CAD with prior abnormal yet stable MPIs, no anginal symptoms   Troponin elevation With no obvious peak or fall pattern. Likely 2/2 demand - supply mismatch ( type 2 MI)  Fluid management per nephro.  Cont optimal CAD regimen otherwise.       * HTN (Hypertension).    - Continue Lisinopril 5mg qd  - Continue Carvedilol 25mg bid  - Continue Hydralazine 50mg bid.    *  Hyperlipidemia.     - Continue Atorvastatin 40mg qd.     I personally reviewed hospital records, tele, ecg, labs and imaging  Will follow

## 2018-06-21 NOTE — DISCHARGE NOTE ADULT - MEDICATION SUMMARY - MEDICATIONS TO TAKE
I will START or STAY ON the medications listed below when I get home from the hospital:    aspirin 325 mg oral tablet  -- 1 tab(s) by mouth once a day  -- Indication: For CAD (coronary artery disease)    lisinopril 5 mg oral tablet  -- 1 tab(s) by mouth once a day  -- Indication: For HTN (Hypertension)    tamsulosin 0.4 mg oral capsule  -- 1 cap(s) by mouth once a day (at bedtime)  -- Indication: For BPH    isosorbide mononitrate 60 mg oral tablet, extended release  --  by mouth   -- Indication: For CAD (coronary artery disease)    insulin lispro 100 units/mL subcutaneous solution  -- unit(s) subcutaneous prn ; 1 Unit(s) if Glucose 151 - 200  2 Unit(s) if Glucose 201 - 250  3 Unit(s) if Glucose 251 - 300  4 Unit(s) if Glucose 301 - 350  5 Unit(s) if Glucose 351 - 400  6 Unit(s) if Glucose Greater Than 400  -- Indication: For T2DM (type 2 diabetes mellitus)    insulin glargine  -- 20 unit(s) subcutaneous once a day (at bedtime)  -- Indication: For T2DM (type 2 diabetes mellitus)    atorvastatin 40 mg oral tablet  -- 1 tab(s) by mouth once a day  -- Indication: For Hyperlipidemia    carvedilol 25 mg oral tablet  -- 1 tab(s) by mouth every 12 hours  -- Indication: For Congestive heart failure, unspecified HF chronicity, unspecified heart failure type    docusate sodium 100 mg oral capsule  -- 1 cap(s) by mouth 3 times a day, As Needed for constipathion  -- Indication: For Constipation    sevelamer hydrochloride 800 mg oral tablet  -- 2 tab(s) by mouth 3 times a day  -- Indication: For ESRD (end stage renal disease) on dialysis    levothyroxine 75 mcg (0.075 mg) oral capsule  -- 1 cap(s) by mouth once a day  -- Indication: For Hypothyroidism    hydrALAZINE 100 mg oral tablet  -- 1 tab(s) by mouth 2 times a day  -- Indication: For HTN (Hypertension)    multivitamin  -- 1 tab(s) by mouth once a day  -- Indication: For ESRD (end stage renal disease) on dialysis

## 2018-06-21 NOTE — DISCHARGE NOTE ADULT - CARE PLAN
Principal Discharge DX:	Acute decompensated heart failure  Goal:	To prevent CHF exacerbations  Assessment and plan of treatment:	You were admitted for an acute heart failure exacerbation likely due to excess fluid in your body.    - Please continue hemodialysis according to schedule    - Please avoid foods high in salt/sodium.    - Please limit fluid intake to 1.5L    - Please follow up with your PCP and Cardiologist.  Secondary Diagnosis:	CAD (coronary artery disease)  Assessment and plan of treatment:	Please continue statin, aspirin, Coreg  -    - Please follow up with your PCP and Cardiologist  Secondary Diagnosis:	ESRD (end stage renal disease) on dialysis  Assessment and plan of treatment:	- Please continue all hemodialysis according to schedule    - Please continue Renagel and take daily multivitamin.    - Please follow low salt, low potassium, low phosphorus diet    - Please follow up with your PCP and Nephrologist  Secondary Diagnosis:	T2DM (type 2 diabetes mellitus)  Assessment and plan of treatment:	Your glucose reading was low on morning of discharge.    Please decrease dose of Lantus to 20 units at bedtime.    - Please follow up with your PCP.  Secondary Diagnosis:	Hyperlipidemia  Assessment and plan of treatment:	Please take statin every night  Secondary Diagnosis:	HTN (Hypertension)  Assessment and plan of treatment:	Please continue to take lisinopril, Coreg and hydralazine.

## 2018-06-21 NOTE — DISCHARGE NOTE ADULT - PATIENT PORTAL LINK FT
You can access the [facility] Patient Portal, offered by [facility], by registering with the following website: [email or web address]

## 2018-06-21 NOTE — DISCHARGE NOTE ADULT - CARE PROVIDER_API CALL
Jorge Turner), Internal Medicine  [address] [place] [address]  Phone: [phone]  Fax: [phone]

## 2018-06-21 NOTE — PROGRESS NOTE ADULT - SUBJECTIVE AND OBJECTIVE BOX
Pt has been seen and examined with FP resident, resident supervised agree with a/p       Patient is a 88y old  Male who presents with a chief complaint of Shortness of Breath (18 Jun 2018 02:39)          PHYSICAL EXAM:    general- comfortable   -rs-aeeb, b/l basal rales present   -cvs-s1s2 normal   -p/a-soft,bs+  -extremity- no asymmetrical swelling noted   -cns- non focal         A/P    #Acute decompensated diastolic heart failure due to fluid overload due to ESRDD   -resolved       #discharge today, cut down lantus to 20 unit qhs as his morning blood sugar noted to be low. Further monitoring and management as an outpt Pt has been seen and examined with FP resident, resident supervised agree with a/p       Patient is a 88y old  Male who presents with a chief complaint of Shortness of Breath (18 Jun 2018 02:39)          PHYSICAL EXAM:    general- comfortable   -rs-aeeb, b/l basal rales present   -cvs-s1s2 normal   -p/a-soft,bs+  -extremity- no asymmetrical swelling noted   -cns- non focal         A/P    #Acute decompensated diastolic heart failure due to fluid overload due to ESRDD   -resolved       #discharge today, cut down lantus to 20 unit qhs as his morning blood sugar noted to be low. Further monitoring and management as an outpt    #time spent 65 minutes

## 2018-06-21 NOTE — DISCHARGE NOTE ADULT - HOSPITAL COURSE
88M w/PMH of HFpEF (LVEF 60%) S/P AICD, ESRD on HD MWF, HTN, HLD presented w/ a two day history of SOB and was admitted to [facility] admitted on 6/18/2018 for acute decompensated diastolic heart failure likely secondary to fluid overload state. Patient was monitored on Telemetry unit without occurrence of significant events. In addition to primary medical team, patient was followed by Nephrology, Cardiology, Dietary and Physical Therapy consultants. Pt continued to receive HD according to schedule, diuresed, and improved clinically. Physical Therapy recommended discharge to rehab facility. Pt remained hemodynamically stable and is cleared for discharge to rehab facility on 6/21/18. 88M w/PMH of HFpEF (LVEF 60%) S/P AICD, ESRD on HD MWF, HTN, HLD presented w/ a two day history of SOB and was admitted to [facility] admitted on 6/18/2018 for acute decompensated diastolic heart failure likely secondary to fluid overload state. Patient was monitored on Telemetry unit without occurrence of significant events. In addition to primary medical team, patient was followed by Nephrology, Cardiology, Dietary and Physical Therapy consultants. Pt continued to receive HD according to schedule, diuresed, and improved clinically. Physical Therapy recommended discharge to rehab facility. Pt remained hemodynamically stable and is cleared for discharge to rehab facility on 6/21/18.    6/21: Pt seen and examined at bedside. he has no acute complaints and is cleared for discharge to rehab facility today.    Vital Signs Last 24 Hrs  T(C): 36.5 (21 Jun 2018 10:44), Max: 36.9 (21 Jun 2018 04:38)  T(F): 97.7 (21 Jun 2018 10:44), Max: 98.4 (21 Jun 2018 04:38)  HR: 60 (21 Jun 2018 10:44) (60 - 62)  BP: 159/45 (21 Jun 2018 10:44) (159/45 - 167/51)  RR: 18 (21 Jun 2018 10:44) (18 - 18)  SpO2: 100% (21 Jun 2018 10:44) (96% - 100%)    DISCHARGE PHYSICAL EXAM  Constitutional: elderly man lying in bed breathing via NC, obese body habitus  HEENT: PERR, EOMI, Normal Hearing, MMM  Neck: Soft and supple, No LAD, No JVD  Respiratory: Rales appreciated at bilateral lung bases  Cardiovascular: S1 and S2, regular rate and rhythm, no Murmurs, gallops or rubs  Gastrointestinal: Bowel Sounds present, soft, nontender, nondistended, no guarding, no rebound  Extremities: No peripheral edema  Vascular: 2+ peripheral pulses  Neurological: A/O x 3, no focal deficits  Musculoskeletal: 5/5 strength b/l upper and lower extremities  Skin: No rashes

## 2018-06-26 DIAGNOSIS — I13.2 HYPERTENSIVE HEART AND CHRONIC KIDNEY DISEASE WITH HEART FAILURE AND WITH STAGE 5 CHRONIC KIDNEY DISEASE, OR END STAGE RENAL DISEASE: ICD-10-CM

## 2018-06-26 DIAGNOSIS — I25.10 ATHEROSCLEROTIC HEART DISEASE OF NATIVE CORONARY ARTERY WITHOUT ANGINA PECTORIS: ICD-10-CM

## 2018-06-26 DIAGNOSIS — I50.33 ACUTE ON CHRONIC DIASTOLIC (CONGESTIVE) HEART FAILURE: ICD-10-CM

## 2018-06-26 DIAGNOSIS — E11.22 TYPE 2 DIABETES MELLITUS WITH DIABETIC CHRONIC KIDNEY DISEASE: ICD-10-CM

## 2018-06-26 DIAGNOSIS — N18.6 END STAGE RENAL DISEASE: ICD-10-CM

## 2018-06-26 DIAGNOSIS — I24.8 OTHER FORMS OF ACUTE ISCHEMIC HEART DISEASE: ICD-10-CM

## 2018-06-26 DIAGNOSIS — Z79.4 LONG TERM (CURRENT) USE OF INSULIN: ICD-10-CM

## 2018-06-26 DIAGNOSIS — E78.5 HYPERLIPIDEMIA, UNSPECIFIED: ICD-10-CM

## 2018-06-26 DIAGNOSIS — Z99.2 DEPENDENCE ON RENAL DIALYSIS: ICD-10-CM

## 2018-06-26 DIAGNOSIS — J96.20 ACUTE AND CHRONIC RESPIRATORY FAILURE, UNSPECIFIED WHETHER WITH HYPOXIA OR HYPERCAPNIA: ICD-10-CM

## 2018-06-26 DIAGNOSIS — Z95.810 PRESENCE OF AUTOMATIC (IMPLANTABLE) CARDIAC DEFIBRILLATOR: ICD-10-CM

## 2018-06-26 DIAGNOSIS — E87.70 FLUID OVERLOAD, UNSPECIFIED: ICD-10-CM

## 2018-08-21 PROBLEM — K85.90 ACUTE PANCREATITIS WITHOUT NECROSIS OR INFECTION, UNSPECIFIED: Chronic | Status: ACTIVE | Noted: 2017-02-01

## 2018-08-21 PROBLEM — J81.1 CHRONIC PULMONARY EDEMA: Chronic | Status: ACTIVE | Noted: 2017-02-01

## 2018-08-21 PROBLEM — Z95.810 PRESENCE OF AUTOMATIC (IMPLANTABLE) CARDIAC DEFIBRILLATOR: Chronic | Status: ACTIVE | Noted: 2017-02-01

## 2018-08-28 ENCOUNTER — APPOINTMENT (OUTPATIENT)
Dept: ELECTROPHYSIOLOGY | Facility: CLINIC | Age: 83
End: 2018-08-28
Payer: MEDICARE

## 2018-08-28 VITALS
HEART RATE: 76 BPM | HEIGHT: 62 IN | DIASTOLIC BLOOD PRESSURE: 53 MMHG | BODY MASS INDEX: 30.91 KG/M2 | SYSTOLIC BLOOD PRESSURE: 135 MMHG | WEIGHT: 168 LBS

## 2018-08-28 PROCEDURE — 93284 PRGRMG EVAL IMPLANTABLE DFB: CPT

## 2018-08-28 PROCEDURE — 93290 INTERROG DEV EVAL ICPMS IP: CPT | Mod: 26

## 2018-10-08 ENCOUNTER — APPOINTMENT (OUTPATIENT)
Dept: CARDIOLOGY | Facility: CLINIC | Age: 83
End: 2018-10-08

## 2018-11-29 ENCOUNTER — APPOINTMENT (OUTPATIENT)
Dept: ELECTROPHYSIOLOGY | Facility: CLINIC | Age: 83
End: 2018-11-29
Payer: MEDICARE

## 2018-11-29 VITALS
WEIGHT: 168 LBS | SYSTOLIC BLOOD PRESSURE: 164 MMHG | HEIGHT: 62 IN | HEART RATE: 79 BPM | BODY MASS INDEX: 30.91 KG/M2 | DIASTOLIC BLOOD PRESSURE: 62 MMHG

## 2018-11-29 PROCEDURE — 93290 INTERROG DEV EVAL ICPMS IP: CPT | Mod: 26

## 2018-11-29 PROCEDURE — 93284 PRGRMG EVAL IMPLANTABLE DFB: CPT

## 2018-12-13 DIAGNOSIS — Z87.891 PERSONAL HISTORY OF NICOTINE DEPENDENCE: ICD-10-CM

## 2018-12-13 DIAGNOSIS — Z78.9 OTHER SPECIFIED HEALTH STATUS: ICD-10-CM

## 2018-12-13 DIAGNOSIS — E03.9 HYPOTHYROIDISM, UNSPECIFIED: ICD-10-CM

## 2018-12-13 DIAGNOSIS — Z79.4 TYPE 2 DIABETES MELLITUS W/OUT COMPLICATIONS: ICD-10-CM

## 2018-12-13 DIAGNOSIS — E11.9 TYPE 2 DIABETES MELLITUS W/OUT COMPLICATIONS: ICD-10-CM

## 2018-12-18 ENCOUNTER — APPOINTMENT (OUTPATIENT)
Dept: CARDIOLOGY | Facility: CLINIC | Age: 83
End: 2018-12-18
Payer: MEDICARE

## 2018-12-18 VITALS
DIASTOLIC BLOOD PRESSURE: 70 MMHG | RESPIRATION RATE: 14 BRPM | HEIGHT: 62 IN | WEIGHT: 179 LBS | HEART RATE: 66 BPM | BODY MASS INDEX: 32.94 KG/M2 | SYSTOLIC BLOOD PRESSURE: 169 MMHG

## 2018-12-18 DIAGNOSIS — N18.6 END STAGE RENAL DISEASE: ICD-10-CM

## 2018-12-18 DIAGNOSIS — Z99.2 DEPENDENCE ON RENAL DIALYSIS: ICD-10-CM

## 2018-12-18 DIAGNOSIS — I10 ESSENTIAL (PRIMARY) HYPERTENSION: ICD-10-CM

## 2018-12-18 DIAGNOSIS — E78.00 PURE HYPERCHOLESTEROLEMIA, UNSPECIFIED: ICD-10-CM

## 2018-12-18 PROCEDURE — 93000 ELECTROCARDIOGRAM COMPLETE: CPT

## 2018-12-18 PROCEDURE — 99214 OFFICE O/P EST MOD 30 MIN: CPT

## 2018-12-18 RX ORDER — CLOTRIMAZOLE 10 MG/G
1 CREAM TOPICAL
Refills: 0 | Status: COMPLETED | COMMUNITY

## 2018-12-18 RX ORDER — INSULIN LISPRO 100 [IU]/ML
(75-25) 100 INJECTION, SUSPENSION SUBCUTANEOUS
Refills: 0 | Status: COMPLETED | COMMUNITY

## 2018-12-19 NOTE — REASON FOR VISIT
[FreeTextEntry1] : Mr. Carr presents today for the evaluation and management of hypertension, hyperlipidemia, and ventricular tachycardia for which he is status-post AICD implantation.  \par   \par

## 2018-12-19 NOTE — ASSESSMENT
[FreeTextEntry1] : 1.  EKG today reveals sinus rhythm at 66 bpm.  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  Possible lateral wall MI. \par 2.  Cardiomyopathy:  Patient clinically stable at this time but with history of heart failure with admissions to [facility] in the past.  Advised on all current medications as well as a strict low-salt diet. \par 3.  End-stage renal disease status-post/hemodialysis:  Patient to follow up with nephrology.  \par 4.  From a cardiac standpoint, patient appears stable and is advised to continue all current medications.  A low-salt diet was discussed.  Echocardiography prior to his next office visit.  \par  DISPLAY PLAN FREE TEXT

## 2018-12-19 NOTE — PHYSICAL EXAM
[General Appearance - Well Developed] : well developed [General Appearance - In No Acute Distress] : no acute distress [Normal Conjunctiva] : the conjunctiva exhibited no abnormalities [Normal Oral Mucosa] : normal oral mucosa [Heart Rate And Rhythm] : heart rate and rhythm were normal [Heart Sounds] : normal S1 and S2 [Auscultation Breath Sounds / Voice Sounds] : lungs were clear to auscultation bilaterally [Bowel Sounds] : normal bowel sounds [Abnormal Walk] : normal gait [Skin Color & Pigmentation] : normal skin color and pigmentation [Skin Turgor] : normal skin turgor [Oriented To Time, Place, And Person] : oriented to person, place, and time [Affect] : the affect was normal [Mood] : the mood was normal [FreeTextEntry1] : trace edema

## 2018-12-19 NOTE — HISTORY OF PRESENT ILLNESS
[FreeTextEntry1] : Mr. Canales presents today without complaints of chest pain or shortness of breath.  He is known to have end-stage renal disease and is currently on hemodialysis.  The patient is also a diabetic and suffers from hypothyroidism.

## 2019-02-25 NOTE — ED ADULT NURSE NOTE - RN DISCHARGE SIGNATURE
09-Jan-2018

## 2019-03-05 ENCOUNTER — APPOINTMENT (OUTPATIENT)
Dept: ELECTROPHYSIOLOGY | Facility: CLINIC | Age: 84
End: 2019-03-05
Payer: MEDICARE

## 2019-03-05 VITALS
SYSTOLIC BLOOD PRESSURE: 135 MMHG | OXYGEN SATURATION: 92 % | DIASTOLIC BLOOD PRESSURE: 52 MMHG | HEART RATE: 78 BPM | TEMPERATURE: 97.5 F

## 2019-03-05 PROCEDURE — 93284 PRGRMG EVAL IMPLANTABLE DFB: CPT

## 2019-03-05 PROCEDURE — 93290 INTERROG DEV EVAL ICPMS IP: CPT | Mod: 26

## 2019-03-14 ENCOUNTER — APPOINTMENT (OUTPATIENT)
Dept: CARDIOLOGY | Facility: CLINIC | Age: 84
End: 2019-03-14

## 2019-03-14 ENCOUNTER — APPOINTMENT (OUTPATIENT)
Dept: CARDIOLOGY | Facility: CLINIC | Age: 84
End: 2019-03-14
Payer: MEDICARE

## 2019-03-14 PROCEDURE — 93306 TTE W/DOPPLER COMPLETE: CPT

## 2019-03-31 ENCOUNTER — INPATIENT (INPATIENT)
Facility: HOSPITAL | Age: 84
LOS: 2 days | Discharge: NURSING, REHAB OR LONG-TERM CARE | End: 2019-04-03
Attending: FAMILY MEDICINE | Admitting: FAMILY MEDICINE
Payer: MEDICARE

## 2019-03-31 VITALS
OXYGEN SATURATION: 100 % | DIASTOLIC BLOOD PRESSURE: 86 MMHG | HEART RATE: 104 BPM | SYSTOLIC BLOOD PRESSURE: 175 MMHG | RESPIRATION RATE: 19 BRPM | TEMPERATURE: 101 F

## 2019-03-31 DIAGNOSIS — Z95.810 PRESENCE OF AUTOMATIC (IMPLANTABLE) CARDIAC DEFIBRILLATOR: Chronic | ICD-10-CM

## 2019-03-31 DIAGNOSIS — Z96.89 PRESENCE OF OTHER SPECIFIED FUNCTIONAL IMPLANTS: Chronic | ICD-10-CM

## 2019-03-31 LAB
ALBUMIN SERPL ELPH-MCNC: 2.9 G/DL — LOW (ref 3.3–5)
ALP SERPL-CCNC: 99 U/L — SIGNIFICANT CHANGE UP (ref 40–120)
ALT FLD-CCNC: 20 U/L — SIGNIFICANT CHANGE UP (ref 12–78)
ANION GAP SERPL CALC-SCNC: 11 MMOL/L — SIGNIFICANT CHANGE UP (ref 5–17)
APTT BLD: 24.8 SEC — LOW (ref 27.5–36.3)
AST SERPL-CCNC: 18 U/L — SIGNIFICANT CHANGE UP (ref 15–37)
BASOPHILS # BLD AUTO: 0.08 K/UL — SIGNIFICANT CHANGE UP (ref 0–0.2)
BASOPHILS NFR BLD AUTO: 0.6 % — SIGNIFICANT CHANGE UP (ref 0–2)
BILIRUB SERPL-MCNC: 0.8 MG/DL — SIGNIFICANT CHANGE UP (ref 0.2–1.2)
BUN SERPL-MCNC: 76 MG/DL — HIGH (ref 7–23)
CALCIUM SERPL-MCNC: 7.3 MG/DL — LOW (ref 8.5–10.1)
CHLORIDE SERPL-SCNC: 92 MMOL/L — LOW (ref 96–108)
CO2 SERPL-SCNC: 22 MMOL/L — SIGNIFICANT CHANGE UP (ref 22–31)
CREAT SERPL-MCNC: 7.21 MG/DL — HIGH (ref 0.5–1.3)
EOSINOPHIL # BLD AUTO: 0.08 K/UL — SIGNIFICANT CHANGE UP (ref 0–0.5)
EOSINOPHIL NFR BLD AUTO: 0.6 % — SIGNIFICANT CHANGE UP (ref 0–6)
GLUCOSE SERPL-MCNC: 458 MG/DL — CRITICAL HIGH (ref 70–99)
HCT VFR BLD CALC: 41.8 % — SIGNIFICANT CHANGE UP (ref 39–50)
HGB BLD-MCNC: 13.5 G/DL — SIGNIFICANT CHANGE UP (ref 13–17)
IMM GRANULOCYTES NFR BLD AUTO: 0.2 % — SIGNIFICANT CHANGE UP (ref 0–1.5)
INR BLD: 0.97 RATIO — SIGNIFICANT CHANGE UP (ref 0.88–1.16)
LACTATE SERPL-SCNC: 1.6 MMOL/L — SIGNIFICANT CHANGE UP (ref 0.7–2)
LYMPHOCYTES # BLD AUTO: 0.28 K/UL — LOW (ref 1–3.3)
LYMPHOCYTES # BLD AUTO: 2.2 % — LOW (ref 13–44)
MANUAL SMEAR VERIFICATION: SIGNIFICANT CHANGE UP
MCHC RBC-ENTMCNC: 29.5 PG — SIGNIFICANT CHANGE UP (ref 27–34)
MCHC RBC-ENTMCNC: 32.3 GM/DL — SIGNIFICANT CHANGE UP (ref 32–36)
MCV RBC AUTO: 91.5 FL — SIGNIFICANT CHANGE UP (ref 80–100)
MONOCYTES # BLD AUTO: 0.92 K/UL — HIGH (ref 0–0.9)
MONOCYTES NFR BLD AUTO: 7.1 % — SIGNIFICANT CHANGE UP (ref 2–14)
NEUTROPHILS # BLD AUTO: 11.51 K/UL — HIGH (ref 1.8–7.4)
NEUTROPHILS NFR BLD AUTO: 89.3 % — HIGH (ref 43–77)
NRBC # BLD: 0 /100 WBCS — SIGNIFICANT CHANGE UP (ref 0–0)
PLAT MORPH BLD: NORMAL — SIGNIFICANT CHANGE UP
PLATELET # BLD AUTO: 179 K/UL — SIGNIFICANT CHANGE UP (ref 150–400)
POTASSIUM SERPL-MCNC: 5.2 MMOL/L — SIGNIFICANT CHANGE UP (ref 3.5–5.3)
POTASSIUM SERPL-SCNC: 5.2 MMOL/L — SIGNIFICANT CHANGE UP (ref 3.5–5.3)
PROT SERPL-MCNC: 6.7 GM/DL — SIGNIFICANT CHANGE UP (ref 6–8.3)
PROTHROM AB SERPL-ACNC: 10.7 SEC — SIGNIFICANT CHANGE UP (ref 10–12.9)
RAPID RVP RESULT: DETECTED
RBC # BLD: 4.57 M/UL — SIGNIFICANT CHANGE UP (ref 4.2–5.8)
RBC # FLD: 14.2 % — SIGNIFICANT CHANGE UP (ref 10.3–14.5)
RBC BLD AUTO: NORMAL — SIGNIFICANT CHANGE UP
RV+EV RNA SPEC QL NAA+PROBE: DETECTED
SODIUM SERPL-SCNC: 125 MMOL/L — LOW (ref 135–145)
WBC # BLD: 12.9 K/UL — HIGH (ref 3.8–10.5)
WBC # FLD AUTO: 12.9 K/UL — HIGH (ref 3.8–10.5)

## 2019-03-31 PROCEDURE — 71250 CT THORAX DX C-: CPT | Mod: 26

## 2019-03-31 PROCEDURE — 74176 CT ABD & PELVIS W/O CONTRAST: CPT | Mod: 26

## 2019-03-31 PROCEDURE — 99285 EMERGENCY DEPT VISIT HI MDM: CPT | Mod: 25

## 2019-03-31 PROCEDURE — 71045 X-RAY EXAM CHEST 1 VIEW: CPT | Mod: 26

## 2019-03-31 PROCEDURE — 93010 ELECTROCARDIOGRAM REPORT: CPT

## 2019-03-31 RX ORDER — ACETAMINOPHEN 500 MG
650 TABLET ORAL ONCE
Qty: 0 | Refills: 0 | Status: COMPLETED | OUTPATIENT
Start: 2019-03-31 | End: 2019-03-31

## 2019-03-31 RX ORDER — SODIUM CHLORIDE 9 MG/ML
500 INJECTION INTRAMUSCULAR; INTRAVENOUS; SUBCUTANEOUS ONCE
Qty: 0 | Refills: 0 | Status: COMPLETED | OUTPATIENT
Start: 2019-03-31 | End: 2019-03-31

## 2019-03-31 RX ORDER — CEFEPIME 1 G/1
1000 INJECTION, POWDER, FOR SOLUTION INTRAMUSCULAR; INTRAVENOUS ONCE
Qty: 0 | Refills: 0 | Status: COMPLETED | OUTPATIENT
Start: 2019-03-31 | End: 2019-03-31

## 2019-03-31 RX ORDER — ONDANSETRON 8 MG/1
4 TABLET, FILM COATED ORAL ONCE
Qty: 0 | Refills: 0 | Status: COMPLETED | OUTPATIENT
Start: 2019-03-31 | End: 2019-03-31

## 2019-03-31 RX ORDER — INSULIN HUMAN 100 [IU]/ML
10 INJECTION, SOLUTION SUBCUTANEOUS ONCE
Qty: 0 | Refills: 0 | Status: COMPLETED | OUTPATIENT
Start: 2019-03-31 | End: 2019-03-31

## 2019-03-31 RX ORDER — VANCOMYCIN HCL 1 G
1250 VIAL (EA) INTRAVENOUS ONCE
Qty: 0 | Refills: 0 | Status: COMPLETED | OUTPATIENT
Start: 2019-03-31 | End: 2019-03-31

## 2019-03-31 RX ORDER — CEFEPIME 1 G/1
1000 INJECTION, POWDER, FOR SOLUTION INTRAMUSCULAR; INTRAVENOUS ONCE
Qty: 0 | Refills: 0 | Status: DISCONTINUED | OUTPATIENT
Start: 2019-03-31 | End: 2019-03-31

## 2019-03-31 RX ORDER — ACETAMINOPHEN 500 MG
650 TABLET ORAL EVERY 6 HOURS
Qty: 0 | Refills: 0 | Status: DISCONTINUED | OUTPATIENT
Start: 2019-03-31 | End: 2019-04-03

## 2019-03-31 RX ADMIN — INSULIN HUMAN 10 UNIT(S): 100 INJECTION, SOLUTION SUBCUTANEOUS at 21:45

## 2019-03-31 RX ADMIN — ONDANSETRON 4 MILLIGRAM(S): 8 TABLET, FILM COATED ORAL at 20:58

## 2019-03-31 RX ADMIN — CEFEPIME 1000 MILLIGRAM(S): 1 INJECTION, POWDER, FOR SOLUTION INTRAMUSCULAR; INTRAVENOUS at 19:20

## 2019-03-31 RX ADMIN — SODIUM CHLORIDE 500 MILLILITER(S): 9 INJECTION INTRAMUSCULAR; INTRAVENOUS; SUBCUTANEOUS at 19:15

## 2019-03-31 RX ADMIN — Medication 166.67 MILLIGRAM(S): at 20:00

## 2019-03-31 RX ADMIN — Medication 650 MILLIGRAM(S): at 21:00

## 2019-03-31 RX ADMIN — Medication 650 MILLIGRAM(S): at 19:20

## 2019-03-31 RX ADMIN — Medication 1250 MILLIGRAM(S): at 21:30

## 2019-03-31 RX ADMIN — SODIUM CHLORIDE 500 MILLILITER(S): 9 INJECTION INTRAMUSCULAR; INTRAVENOUS; SUBCUTANEOUS at 20:15

## 2019-03-31 NOTE — ED ADULT NURSE NOTE - CHIEF COMPLAINT QUOTE
Patient brought in by EMS for cold and flu symptoms, lightheadedness and weakness. 1 episode of vomiting hx ESRD on dialysis. code sepsis called in triage.

## 2019-03-31 NOTE — H&P ADULT - ATTENDING COMMENTS
Patient seen and examined after initial evaluation above by family medicine resident. Case discussed and reviewed in detail. Please note my plan below.    89 y/o M with PMH of HFpEF, ESRD on HD, AICD implantation, HTN, dyslipidemia, DM2, hypothyroidism, p/w cough + fever    *Sepsis 2/2 enterovirus / rhinovirus infection  -Vanco / cefepime had been given in ED for possible PNA -> will f/u CT chest to evaluate for PNA and continue antibiotics if patient has PNA  -Blood cultures  -Supportive care for viral infection   -Monitor pulse ox  -Lactate 1.6    *DM2 - uncontrolled   -Repeat Fingerstick is 169  -Humalog ISS  -Diabetic diet  -C/w Lantus 20 units     *ESRD on HD  -Nephro consult     *Borderline potassium  -Hold ARB/ACEi    *H/o AICD implantation / HTN / dyslipidemia / CHF  -If conditions remain stable during hospitalization, f/u outpatient for further management     *DVT ppx  -Heparin SubQ

## 2019-03-31 NOTE — ED ADULT NURSE REASSESSMENT NOTE - NS ED NURSE REASSESS COMMENT FT1
Spoke with Dr. Huerta. NO IV fluids or tylenol to be ordered at this time with rectal temp of 102. Pt taken to CT scan. Will continue to monitor

## 2019-03-31 NOTE — ED PROVIDER NOTE - OBJECTIVE STATEMENT
89 y/o male with a PMHx of AICD, DM, CHF, ESRD, MI, HTN, HLD presents to the ED c/o flu like sx today. +fever. +chills. +cough. +1 episode of vomiting today. +generalized weakness. Daughter at bedside notes that family at home all has similar flu-like sx. Pt is on dialysis, last dialyzed Friday (2 days ago). Took Tylenol at 2pm today. 89 y/o male with a PMHx of AICD, DM, CHF, ESRD, MI, HTN, HLD presents to the ED c/o flu like sx today. +fever. +chills. +cough. +1 episode of vomiting today. +generalized weakness. Daughter at bedside notes that family at home all has similar flu-like sx. Pt is on dialysis, last dialyzed Friday (2 days ago). Took Tylenol at 2pm today. NKDA.

## 2019-03-31 NOTE — H&P ADULT - MUSCULOSKELETAL
detailed exam no joint swelling/no joint erythema/ROM intact/no joint warmth/normal/no calf tenderness

## 2019-03-31 NOTE — H&P ADULT - NSHPPHYSICALEXAM_GEN_ALL_CORE
Vital Signs Last 24 Hrs  T(C): 37.6 (31 Mar 2019 23:10), Max: 38.9 (31 Mar 2019 19:15)  T(F): 99.6 (31 Mar 2019 23:10), Max: 102.1 (31 Mar 2019 19:15)  HR: 86 (31 Mar 2019 23:10) (86 - 104)  BP: 101/50 (31 Mar 2019 23:10) (101/50 - 175/86)  BP(mean): --  RR: 20 (31 Mar 2019 23:10) (19 - 22)  SpO2: 95% (31 Mar 2019 23:10) (94% - 100%)

## 2019-03-31 NOTE — ED ADULT TRIAGE NOTE - TEMPERATURE IN CELSIUS (DEGREES C)
Problem: Daily Care:  Goal: Daily care needs are met  Daily care needs are met   Outcome: Ongoing  Patient is able to verbalize needs and notify staff    Problem: Pain:  Goal: Patient's pain/discomfort is manageable  Patient's pain/discomfort is manageable   Outcome: Ongoing  Patient denies pain so far this shift. Reminded patient to report any pain, pressure, or shortness of breath to the nurse. Will continue to monitor. Problem: Skin Integrity:  Goal: Skin integrity will stabilize  Skin integrity will stabilize   Outcome: Ongoing  Ongoing assessment & interventions provided throughout shift. Skin assessments provided. Encouraging/assisting patient to turn as needed. Problem: Discharge Planning:  Goal: Patients continuum of care needs are met  Patients continuum of care needs are met   Outcome: Ongoing  Patient plans to return home soon    Problem: Impaired respiratory status  Goal: Clear lung sounds  Outcome: Ongoing  Patient states comfortable with 2L per nasal cannula    Problem: Serum Glucose Level - Abnormal:  Goal: Ability to maintain appropriate glucose levels will improve to within specified parameters  Ability to maintain appropriate glucose levels will improve to within specified parameters  Outcome: Ongoing  Glucose checked & covered per physician's orders. Patient has inconsistent blood sugar levels. Currently on D5/0.45saline solution continous at 100ml/hr    Comments: Care plan reviewed with patient. Patient verbalizes understanding of the care plan and contributed to goal setting. 38.1

## 2019-03-31 NOTE — ED ADULT NURSE NOTE - OBJECTIVE STATEMENT
Pt biba c/o flu like symptoms and fever for 1 day. Respirations regular, oxygen saturation 99% on room air. As per pt's daughter, pt is coughing up yellow sputum. Pt oriented to self and place. Daughter at bedside. Left fistula noted. Dialysis MWF. Pt is able to move all extremities. Cardiac monitoring in progress, EKG done, will continue to monitor

## 2019-03-31 NOTE — ED ADULT NURSE REASSESSMENT NOTE - NS ED NURSE REASSESS COMMENT FT1
Pt vomited small amount of green food. MD Huerta made aware of vomit and of pt's rectal temp of 102. Will administer medications as ordered

## 2019-03-31 NOTE — H&P ADULT - HISTORY OF PRESENT ILLNESS
88M w/PMH of HFpEF (LVEF 60%) S/P AICD, ESRD on HD MWF, HTN, HLD bought to the ED by the CC of the Flu like symptoms x 1 day . 88M w/PMH of HFpEF (LVEF 60%) S/P AICD, ESRD on HD MWF, HTN, HLD bought to the ED by the CC of the Flu like symptoms x 1 day .  Patient is a poor informant attempted to reach family unsuccessful .   Sick contacts family with similar complaints            In the ED he +Entero/Rhino virus and received Vancomycin ,cefepime and 10 U regular insulin . 88M w/PMH of HFpEF (LVEF 60%-5/2018 ) S/P AICD, ESRD on HD MWF, HTN , HLD bought to the ED by the CC of the Flu like symptoms x 1 day .  Patient is a poor informant attempted to reach family multiple times unsuccessful .However patient complaints of weakness and lightheadedness . Right now in the ER he is feeling fine .As per chart note Symptoms started today with fever ,chills ,cough and 1 episode of vomiting . Associated with generalized vomiting ,lightheadedness .  Sick contacts family with similar complaints .      In the ED he +Entero/Rhino virus and received Vancomycin ,cefepime and 10 U regular insulin .    Family h/o :Unable to obtain h/o secondary to underlying condition 88M w/PMH of HFpEF (LVEF 60%-5/2018 ) S/P AICD, ESRD on HD MWF, HTN , HLD  ,DM-2 ,Hypothyroid bought to the ED by the CC of the Flu like symptoms x 1 day .  Patient is a poor informant attempted to reach family multiple times unsuccessful .However patient complaints of weakness and lightheadedness . Right now in the ER he is feeling fine .As per chart note Symptoms started today with fever ,chills ,cough and 1 episode of vomiting . Associated with generalized vomiting ,lightheadedness .  Sick contacts family with similar complaints .      In the ED he +Entero/Rhino virus and received Vancomycin ,cefepime and 10 U regular insulin .    Family h/o :Unable to obtain h/o secondary to underlying condition

## 2019-03-31 NOTE — ED ADULT NURSE REASSESSMENT NOTE - NS ED NURSE REASSESS COMMENT FT1
Pt's oxygen saturation decreased to 92% on 3 liters oxygen. Pt has no complaints, no shortness of breath or difficulty breathing. Pt encouraged to take deep breaths, oxygen increased to 4 liters nasal cannula as per MD Huerta. Oxygen saturation currently 94%. Will continue to monitor

## 2019-03-31 NOTE — H&P ADULT - ASSESSMENT
88M w/PMH of HFpEF (LVEF 60%) S/P AICD, ESRD on HD MWF, HTN, HLD bought to the ED by the CC of the Flu like symptoms x 1 day .      #Sepsis likely secondary to Rhino/Entero virus in Immunocompromised host .  -Admit to Medsurg  -RVP positive for Rhino and Entero  -Lactate -1.6  -ID consult  -ED received 500 ml bolus ,Vancomycin and cefepime   -CT chest and abdomen :  -check blood and urine cultures     #Heart failure with preserved ejection fraction.    -stable  -s/p 500 ml bolus in the ED     #CAD (coronary artery disease)  -stable   -Continue home medication    #ESRD (end stage renal disease) on dialysis.  - HD on MWF  - Renal consult      #Hyponatremia  -Julieta secondary to the hyperglycemia  -corrected NA -134   -continue to monitor      # T2DM (type 2 diabetes mellitus)  -Elevated Bgms in the ED  -s/p 10 U regular insulin repeat Bgms -  - Diabetic diet  - Lantus 25U qhs  - ISS.     # HTN (Hypertension)  - DASH diet  - Continue Lisinopril 5mg qd  - Continue Carvedilol 25mg bid  - Continue Hydralazine 50mg bid.    #Hyperlipidemia.   -Continue Atorvastatin 40mg qd.       # Prophylactic measure.  -DVT ppx: Heparin. 88M w/PMH of HFpEF (LVEF 60%) S/P AICD, ESRD on HD MWF, HTN, HLD bought to the ED by the CC of the Flu like symptoms x 1 day .      #Sepsis likely secondary to Rhino/Entero virus in Immunocompromised host .  -Admit to [facility]  -RVP positive for Rhino and Entero  -Lactate -1.6  -ID consult  -ED received 500 ml bolus ,Vancomycin and cefepime   -CT chest and abdomen :  -check blood and urine cultures     #Heart failure with preserved ejection fraction.    -stable  -s/p 500 ml bolus in the ED     #CAD (coronary artery disease)  -stable   -Continue home medication    #ESRD (end stage renal disease) on dialysis.  - HD on MWF  - Renal consult      #Hyponatremia  -Likely secondary to the hyperglycemia  -corrected NA -134   -continue to monitor    #Hypothyroidism  -stable   -Continue home medication      # T2DM (type 2 diabetes mellitus)  -Elevated Bgms in the ED  -s/p 10 U regular insulin   - Diabetic diet  - Lantus 25U qhs  - ISS.     # HTN (Hypertension)  - DASH diet  - Continue Lisinopril 5mg qd  - Continue Carvedilol  ,hydralazine and Imdur   - patient taking losartan and lisinopril -will hold both for now revaluate in the AM     #Hyperlipidemia.   -Continue Atorvastatin 40mg qd.       # Prophylactic measure.  -DVT ppx: Heparin.  IMPROVE VTE Individual Risk Assessment    RISK                                                                Points    [  ] Previous VTE                                                  3    [  ] Thrombophilia                                               2    [  ] Lower limb paralysis                                      2        (unable to hold up >15 seconds)      [  ] Current Cancer                                              2         (within 6 months)    [1  ] Immobilization > 24 hrs                                1    [  ] ICU/CCU stay > 24 hours                              1    [ 1 ] Age > 60                                                      1    IMPROVE VTE Score _____2____    IMPROVE Score 0-1: Low Risk, No VTE prophylaxis required for most patients, encourage ambulation.   IMPROVE Score 2-3: At risk, pharmacologic VTE prophylaxis is indicated for most patients (in the absence of a contraindication)  IMPROVE Score > or = 4: High Risk, pharmacologic VTE prophylaxis is indicated for most patients (in the absence of a contraindication)      #Advance directive  - 88M w/PMH of HFpEF (LVEF 60%) S/P AICD, ESRD on HD MWF, HTN, HLD bought to the ED by the CC of the Flu like symptoms x 1 day .      #Sepsis likely secondary to Rhino/Entero virus in Immunocompromised host .  -Admit to [facility]  -RVP positive for Rhino and Entero  -Lactate -1.6  -ID consult  -ED received 500 ml bolus ,Vancomycin and cefepime   -will continue Cefepime and vancomycin   -CT chest and abdomen :  -check blood and urine cultures     #Heart failure with preserved ejection fraction.    -stable  -s/p 500 ml bolus in the ED  -Ins and Outs  -Daily weights       #CAD (coronary artery disease)  -stable   -Continue home medication    #ESRD (end stage renal disease) on dialysis.  - HD on MWF  - Renal consult      #Hyponatremia  -Likely secondary to the hyperglycemia  -corrected NA -134   -continue to monitor    #Hypothyroidism  -stable   -Continue home medication      # T2DM (type 2 diabetes mellitus)  -Elevated Bgms in the ED  -s/p 10 U regular insulin   - Diabetic diet  - Lantus 25U qhs  - ISS.     # HTN (Hypertension)  - Initially elevated now wnl   - DASH diet  - Continue Carvedilol  ,hydralazine and Imdur   - patient taking losartan and lisinopril -will hold both for now revaluate in the AM worsening creatinine and K imbalance     #Hyperlipidemia.   -Continue Atorvastatin 40mg qd.       # Prophylactic measure.  -DVT ppx: Heparin.  IMPROVE VTE Individual Risk Assessment    RISK                                                                Points    [  ] Previous VTE                                                  3    [  ] Thrombophilia                                               2    [  ] Lower limb paralysis                                      2        (unable to hold up >15 seconds)      [  ] Current Cancer                                              2         (within 6 months)    [1  ] Immobilization > 24 hrs                                1    [  ] ICU/CCU stay > 24 hours                              1    [ 1 ] Age > 60                                                      1    IMPROVE VTE Score _____2____    IMPROVE Score 0-1: Low Risk, No VTE prophylaxis required for most patients, encourage ambulation.   IMPROVE Score 2-3: At risk, pharmacologic VTE prophylaxis is indicated for most patients (in the absence of a contraindication)  IMPROVE Score > or = 4: High Risk, pharmacologic VTE prophylaxis is indicated for most patients (in the absence of a contraindication)      #Advance directive  -Attempted to reach Family no answer   -Full code for now -please readdress in the AM 88M w/PMH of HFpEF (LVEF 60%) S/P AICD, ESRD on HD MWF, HTN, HLD bought to the ED by the CC of the Flu like symptoms x 1 day .      #Sepsis likely secondary to Rhino/Entero virus in Immunocompromised host  .  -Admit to [facility]  -RVP positive for Rhino and Entero  -Lactate -1.6  -ID consult  -ED received 500 ml bolus ,Vancomycin and cefepime   -will continue Cefepime and vancomycin to cover GNR and MRSA  -CT chest and abdomen :  -check blood and urine cultures     #Heart failure with preserved ejection fraction.    -stable  -s/p 500 ml bolus in the ED  -Ins and Outs  -Daily weights       #CAD (coronary artery disease)  -stable   -Continue home medication    #ESRD (end stage renal disease) on dialysis.  - HD on MWF  - Renal consult      #Hyponatremia  -Likely secondary to the hyperglycemia  -corrected NA -134   -continue to monitor    #Hypothyroidism  -stable   -Continue home medication      # T2DM (type 2 diabetes mellitus)  -Elevated Bgms in the ED  -s/p 10 U regular insulin   - Diabetic diet  - Lantus 25U qhs  - ISS.     # HTN (Hypertension)  - Initially elevated now wnl   - DASH diet  - Continue Carvedilol  ,hydralazine and Imdur   - patient taking losartan and lisinopril -will hold both for now revaluate in the AM worsening creatinine and K imbalance     #Hyperlipidemia.   -Continue Atorvastatin 40mg qd.       # Prophylactic measure.  -DVT ppx: Heparin.  IMPROVE VTE Individual Risk Assessment    RISK                                                                Points    [  ] Previous VTE                                                  3    [  ] Thrombophilia                                               2    [  ] Lower limb paralysis                                      2        (unable to hold up >15 seconds)      [  ] Current Cancer                                              2         (within 6 months)    [1  ] Immobilization > 24 hrs                                1    [  ] ICU/CCU stay > 24 hours                              1    [ 1 ] Age > 60                                                      1    IMPROVE VTE Score _____2____    IMPROVE Score 0-1: Low Risk, No VTE prophylaxis required for most patients, encourage ambulation.   IMPROVE Score 2-3: At risk, pharmacologic VTE prophylaxis is indicated for most patients (in the absence of a contraindication)  IMPROVE Score > or = 4: High Risk, pharmacologic VTE prophylaxis is indicated for most patients (in the absence of a contraindication)      #Advance directive  -Attempted to reach Family no answer   -Full code for now -please readdress in the AM 88M w/PMH of HFpEF (LVEF 60%) S/P AICD, ESRD on HD MWF, HTN, HLD bought to the ED by the CC of the Flu like symptoms x 1 day .      #Sepsis likely secondary to Rhino/Entero virus   -Admit to [facility]  -RVP positive for Rhino and Entero  -Lactate -1.6  -ID consult  -ED received 500 ml bolus ,Vancomycin and cefepime   -CT chest and abdomen - F/u  -check blood and urine cultures     #Heart failure with preserved ejection fraction.    -stable  -s/p 500 ml bolus in the ED  -Ins and Outs  -Daily weights       #CAD (coronary artery disease)  -stable   -Continue home medication    #ESRD (end stage renal disease) on dialysis.  - HD on MWF  - Renal consult      #Hyponatremia  -Likely secondary to the hyperglycemia  -corrected NA -134   -continue to monitor    #Hypothyroidism  -stable   -Continue home medication      # T2DM (type 2 diabetes mellitus)  -Elevated Bgms in the ED  -s/p 10 U regular insulin   - Diabetic diet  - Lantus 25U qhs  - ISS.     # HTN (Hypertension)  - Initially elevated now wnl   - DASH diet  - Continue Carvedilol  ,hydralazine and Imdur   - patient taking losartan and lisinopril -will hold both for now revaluate in the AM worsening creatinine and K imbalance     #Hyperlipidemia.   -Continue Atorvastatin 40mg qd.       # Prophylactic measure.  -DVT ppx: Heparin.  IMPROVE VTE Individual Risk Assessment    RISK                                                                Points    [  ] Previous VTE                                                  3    [  ] Thrombophilia                                               2    [  ] Lower limb paralysis                                      2        (unable to hold up >15 seconds)      [  ] Current Cancer                                              2         (within 6 months)    [1  ] Immobilization > 24 hrs                                1    [  ] ICU/CCU stay > 24 hours                              1    [ 1 ] Age > 60                                                      1    IMPROVE VTE Score _____2____    IMPROVE Score 0-1: Low Risk, No VTE prophylaxis required for most patients, encourage ambulation.   IMPROVE Score 2-3: At risk, pharmacologic VTE prophylaxis is indicated for most patients (in the absence of a contraindication)  IMPROVE Score > or = 4: High Risk, pharmacologic VTE prophylaxis is indicated for most patients (in the absence of a contraindication)      #Advance directive  -Attempted to reach Family no answer   -Full code for now -please readdress in the AM 88M w/PMH of HFpEF (LVEF 60%) S/P AICD, ESRD on HD MWF, HTN, HLD bought to the ED by the CC of the Flu like symptoms x 1 day .      #Sepsis likely secondary to Rhino/Entero virus   -Admit to [facility]  -RVP positive for Rhino and Entero  -Lactate -1.6  -ID consult  -ED received 500 ml bolus ,Vancomycin and cefepime   -CT chest and abdomen - F/u- give antibiotics based on results  -check blood and urine cultures     #Heart failure with preserved ejection fraction.    -stable  -s/p 500 ml bolus in the ED  -Ins and Outs  -Daily weights       #CAD (coronary artery disease)  -stable   -Continue home medication    #ESRD (end stage renal disease) on dialysis.  - HD on MWF  - Renal consult      #Hyponatremia  -Likely secondary to the hyperglycemia  -corrected NA -134   -continue to monitor    #Hypothyroidism  -stable   -Continue home medication      # T2DM (type 2 diabetes mellitus)  -Elevated Bgms in the ED  -s/p 10 U regular insulin   - Diabetic diet  - Lantus 25U qhs  - ISS.     # HTN (Hypertension)  - Initially elevated now wnl   - DASH diet  - Continue Carvedilol  ,hydralazine and Imdur   - patient taking losartan and lisinopril -will hold both for now revaluate in the AM worsening creatinine and K imbalance     #Hyperlipidemia.   -Continue Atorvastatin 40mg qd.       # Prophylactic measure.  -DVT ppx: Heparin.  IMPROVE VTE Individual Risk Assessment    RISK                                                                Points    [  ] Previous VTE                                                  3    [  ] Thrombophilia                                               2    [  ] Lower limb paralysis                                      2        (unable to hold up >15 seconds)      [  ] Current Cancer                                              2         (within 6 months)    [1  ] Immobilization > 24 hrs                                1    [  ] ICU/CCU stay > 24 hours                              1    [ 1 ] Age > 60                                                      1    IMPROVE VTE Score _____2____    IMPROVE Score 0-1: Low Risk, No VTE prophylaxis required for most patients, encourage ambulation.   IMPROVE Score 2-3: At risk, pharmacologic VTE prophylaxis is indicated for most patients (in the absence of a contraindication)  IMPROVE Score > or = 4: High Risk, pharmacologic VTE prophylaxis is indicated for most patients (in the absence of a contraindication)      #Advance directive  -Attempted to reach Family no answer   -Full code for now -please readdress in the AM

## 2019-03-31 NOTE — H&P ADULT - NSICDXPASTSURGICALHX_GEN_ALL_CORE_FT
PAST SURGICAL HISTORY:  AICD (automatic cardioverter/defibrillator) present     History of Hernia Repair     History of penile implant

## 2019-03-31 NOTE — ED PROVIDER NOTE - PROGRESS NOTE DETAILS
Scribe CD for ED attending, Doctor Neftaly. Pt with temp of 102, heart rate 95, systolic blood pressure 150s-160s, EF of 25%. Will start with 500ml saline bolus. Also due for dialysis tomorrow. pt with hyperglycemia and conitnued fever with n/v will ct chest abd and pelvis, give insulin and admit spoke with Dr. Wesley Higginbotham DO pt with hyperglycemia and conitnued fever with n/v will ct chest abd and pelvis, give insulin so dr georeg tba and ct and urine results ANOOP Higginbotham DO spoke with Dr. Paez covering for dr jay for dialysis myles Higginbotham DO

## 2019-03-31 NOTE — H&P ADULT - NSICDXPASTMEDICALHX_GEN_ALL_CORE_FT
PAST MEDICAL HISTORY:  AICD (automatic cardioverter/defibrillator) present     CHF (Congestive Heart Failure)     Diabetes     Dialysis Patient     Heart Attack     High Cholesterol     HTN (Hypertension)     Pancreatitis     Pulmonary edema

## 2019-03-31 NOTE — H&P ADULT - NEUROLOGICAL DETAILS
deep reflexes intact/alert and oriented x 3/responds to verbal commands/sensation intact/responds to pain

## 2019-04-01 LAB
ALBUMIN SERPL ELPH-MCNC: 3.3 G/DL — SIGNIFICANT CHANGE UP (ref 3.3–5)
ALP SERPL-CCNC: 112 U/L — SIGNIFICANT CHANGE UP (ref 40–120)
ALT FLD-CCNC: 22 U/L — SIGNIFICANT CHANGE UP (ref 12–78)
ANION GAP SERPL CALC-SCNC: 14 MMOL/L — SIGNIFICANT CHANGE UP (ref 5–17)
AST SERPL-CCNC: 20 U/L — SIGNIFICANT CHANGE UP (ref 15–37)
BASOPHILS # BLD AUTO: 0.06 K/UL — SIGNIFICANT CHANGE UP (ref 0–0.2)
BASOPHILS NFR BLD AUTO: 0.7 % — SIGNIFICANT CHANGE UP (ref 0–2)
BILIRUB SERPL-MCNC: 0.4 MG/DL — SIGNIFICANT CHANGE UP (ref 0.2–1.2)
BUN SERPL-MCNC: 93 MG/DL — HIGH (ref 7–23)
CALCIUM SERPL-MCNC: 7.7 MG/DL — LOW (ref 8.5–10.1)
CHLORIDE SERPL-SCNC: 98 MMOL/L — SIGNIFICANT CHANGE UP (ref 96–108)
CO2 SERPL-SCNC: 24 MMOL/L — SIGNIFICANT CHANGE UP (ref 22–31)
CREAT SERPL-MCNC: 8.43 MG/DL — HIGH (ref 0.5–1.3)
EOSINOPHIL # BLD AUTO: 0.03 K/UL — SIGNIFICANT CHANGE UP (ref 0–0.5)
EOSINOPHIL NFR BLD AUTO: 0.4 % — SIGNIFICANT CHANGE UP (ref 0–6)
GLUCOSE SERPL-MCNC: 132 MG/DL — HIGH (ref 70–99)
HAV IGM SER-ACNC: SIGNIFICANT CHANGE UP
HBA1C BLD-MCNC: 7.5 % — HIGH (ref 4–5.6)
HBV CORE IGM SER-ACNC: SIGNIFICANT CHANGE UP
HBV SURFACE AG SER-ACNC: SIGNIFICANT CHANGE UP
HCT VFR BLD CALC: 38.3 % — LOW (ref 39–50)
HCV AB S/CO SERPL IA: 0.08 S/CO — SIGNIFICANT CHANGE UP (ref 0–0.79)
HCV AB SERPL-IMP: SIGNIFICANT CHANGE UP
HGB BLD-MCNC: 12.2 G/DL — LOW (ref 13–17)
IMM GRANULOCYTES NFR BLD AUTO: 0.5 % — SIGNIFICANT CHANGE UP (ref 0–1.5)
LYMPHOCYTES # BLD AUTO: 0.35 K/UL — LOW (ref 1–3.3)
LYMPHOCYTES # BLD AUTO: 4.2 % — LOW (ref 13–44)
MAGNESIUM SERPL-MCNC: 2.7 MG/DL — HIGH (ref 1.6–2.6)
MCHC RBC-ENTMCNC: 29.1 PG — SIGNIFICANT CHANGE UP (ref 27–34)
MCHC RBC-ENTMCNC: 31.9 GM/DL — LOW (ref 32–36)
MCV RBC AUTO: 91.4 FL — SIGNIFICANT CHANGE UP (ref 80–100)
MONOCYTES # BLD AUTO: 1.23 K/UL — HIGH (ref 0–0.9)
MONOCYTES NFR BLD AUTO: 14.7 % — HIGH (ref 2–14)
NEUTROPHILS # BLD AUTO: 6.67 K/UL — SIGNIFICANT CHANGE UP (ref 1.8–7.4)
NEUTROPHILS NFR BLD AUTO: 79.5 % — HIGH (ref 43–77)
NRBC # BLD: 0 /100 WBCS — SIGNIFICANT CHANGE UP (ref 0–0)
PHOSPHATE SERPL-MCNC: 6.5 MG/DL — HIGH (ref 2.5–4.5)
PLATELET # BLD AUTO: 161 K/UL — SIGNIFICANT CHANGE UP (ref 150–400)
POTASSIUM SERPL-MCNC: 5.2 MMOL/L — SIGNIFICANT CHANGE UP (ref 3.5–5.3)
POTASSIUM SERPL-SCNC: 5.2 MMOL/L — SIGNIFICANT CHANGE UP (ref 3.5–5.3)
PROT SERPL-MCNC: 7.2 GM/DL — SIGNIFICANT CHANGE UP (ref 6–8.3)
RBC # BLD: 4.19 M/UL — LOW (ref 4.2–5.8)
RBC # FLD: 14.5 % — SIGNIFICANT CHANGE UP (ref 10.3–14.5)
SODIUM SERPL-SCNC: 136 MMOL/L — SIGNIFICANT CHANGE UP (ref 135–145)
WBC # BLD: 8.38 K/UL — SIGNIFICANT CHANGE UP (ref 3.8–10.5)
WBC # FLD AUTO: 8.38 K/UL — SIGNIFICANT CHANGE UP (ref 3.8–10.5)

## 2019-04-01 RX ORDER — CARVEDILOL PHOSPHATE 80 MG/1
25 CAPSULE, EXTENDED RELEASE ORAL EVERY 12 HOURS
Qty: 0 | Refills: 0 | Status: DISCONTINUED | OUTPATIENT
Start: 2019-04-01 | End: 2019-04-03

## 2019-04-01 RX ORDER — GLUCAGON INJECTION, SOLUTION 0.5 MG/.1ML
1 INJECTION, SOLUTION SUBCUTANEOUS ONCE
Qty: 0 | Refills: 0 | Status: DISCONTINUED | OUTPATIENT
Start: 2019-04-01 | End: 2019-04-03

## 2019-04-01 RX ORDER — ISOSORBIDE MONONITRATE 60 MG/1
60 TABLET, EXTENDED RELEASE ORAL DAILY
Qty: 0 | Refills: 0 | Status: DISCONTINUED | OUTPATIENT
Start: 2019-04-01 | End: 2019-04-03

## 2019-04-01 RX ORDER — ROBINUL 0.2 MG/ML
1 INJECTION INTRAMUSCULAR; INTRAVENOUS ONCE
Qty: 0 | Refills: 0 | Status: DISCONTINUED | OUTPATIENT
Start: 2019-04-01 | End: 2019-04-03

## 2019-04-01 RX ORDER — CEFEPIME 1 G/1
1000 INJECTION, POWDER, FOR SOLUTION INTRAMUSCULAR; INTRAVENOUS DAILY
Qty: 0 | Refills: 0 | Status: DISCONTINUED | OUTPATIENT
Start: 2019-04-02 | End: 2019-04-03

## 2019-04-01 RX ORDER — CEFEPIME 1 G/1
1000 INJECTION, POWDER, FOR SOLUTION INTRAMUSCULAR; INTRAVENOUS ONCE
Qty: 0 | Refills: 0 | Status: COMPLETED | OUTPATIENT
Start: 2019-04-01 | End: 2019-04-01

## 2019-04-01 RX ORDER — TAMSULOSIN HYDROCHLORIDE 0.4 MG/1
0.4 CAPSULE ORAL AT BEDTIME
Qty: 0 | Refills: 0 | Status: DISCONTINUED | OUTPATIENT
Start: 2019-04-01 | End: 2019-04-03

## 2019-04-01 RX ORDER — INSULIN LISPRO 100/ML
VIAL (ML) SUBCUTANEOUS
Qty: 0 | Refills: 0 | Status: DISCONTINUED | OUTPATIENT
Start: 2019-04-01 | End: 2019-04-03

## 2019-04-01 RX ORDER — VANCOMYCIN HCL 1 G
1000 VIAL (EA) INTRAVENOUS
Qty: 0 | Refills: 0 | Status: DISCONTINUED | OUTPATIENT
Start: 2019-04-01 | End: 2019-04-03

## 2019-04-01 RX ORDER — ACETAMINOPHEN 500 MG
650 TABLET ORAL EVERY 6 HOURS
Qty: 0 | Refills: 0 | Status: DISCONTINUED | OUTPATIENT
Start: 2019-04-01 | End: 2019-04-03

## 2019-04-01 RX ORDER — DEXTROSE 50 % IN WATER 50 %
25 SYRINGE (ML) INTRAVENOUS ONCE
Qty: 0 | Refills: 0 | Status: DISCONTINUED | OUTPATIENT
Start: 2019-04-01 | End: 2019-04-03

## 2019-04-01 RX ORDER — INSULIN GLARGINE 100 [IU]/ML
20 INJECTION, SOLUTION SUBCUTANEOUS AT BEDTIME
Qty: 0 | Refills: 0 | Status: DISCONTINUED | OUTPATIENT
Start: 2019-04-01 | End: 2019-04-03

## 2019-04-01 RX ORDER — HYDRALAZINE HCL 50 MG
100 TABLET ORAL
Qty: 0 | Refills: 0 | Status: DISCONTINUED | OUTPATIENT
Start: 2019-04-01 | End: 2019-04-03

## 2019-04-01 RX ORDER — LEVOTHYROXINE SODIUM 125 MCG
75 TABLET ORAL DAILY
Qty: 0 | Refills: 0 | Status: DISCONTINUED | OUTPATIENT
Start: 2019-04-01 | End: 2019-04-03

## 2019-04-01 RX ORDER — HEPARIN SODIUM 5000 [USP'U]/ML
5000 INJECTION INTRAVENOUS; SUBCUTANEOUS EVERY 12 HOURS
Qty: 0 | Refills: 0 | Status: DISCONTINUED | OUTPATIENT
Start: 2019-04-01 | End: 2019-04-03

## 2019-04-01 RX ORDER — SODIUM CHLORIDE 9 MG/ML
1000 INJECTION, SOLUTION INTRAVENOUS
Qty: 0 | Refills: 0 | Status: DISCONTINUED | OUTPATIENT
Start: 2019-04-01 | End: 2019-04-03

## 2019-04-01 RX ORDER — CEFEPIME 1 G/1
INJECTION, POWDER, FOR SOLUTION INTRAMUSCULAR; INTRAVENOUS
Qty: 0 | Refills: 0 | Status: DISCONTINUED | OUTPATIENT
Start: 2019-04-01 | End: 2019-04-03

## 2019-04-01 RX ORDER — DEXTROSE 50 % IN WATER 50 %
12.5 SYRINGE (ML) INTRAVENOUS ONCE
Qty: 0 | Refills: 0 | Status: DISCONTINUED | OUTPATIENT
Start: 2019-04-01 | End: 2019-04-03

## 2019-04-01 RX ORDER — ASPIRIN/CALCIUM CARB/MAGNESIUM 324 MG
81 TABLET ORAL DAILY
Qty: 0 | Refills: 0 | Status: DISCONTINUED | OUTPATIENT
Start: 2019-04-01 | End: 2019-04-03

## 2019-04-01 RX ORDER — SEVELAMER CARBONATE 2400 MG/1
800 POWDER, FOR SUSPENSION ORAL
Qty: 0 | Refills: 0 | Status: DISCONTINUED | OUTPATIENT
Start: 2019-04-01 | End: 2019-04-03

## 2019-04-01 RX ORDER — ATORVASTATIN CALCIUM 80 MG/1
40 TABLET, FILM COATED ORAL DAILY
Qty: 0 | Refills: 0 | Status: DISCONTINUED | OUTPATIENT
Start: 2019-04-01 | End: 2019-04-03

## 2019-04-01 RX ORDER — INSULIN LISPRO 100/ML
VIAL (ML) SUBCUTANEOUS AT BEDTIME
Qty: 0 | Refills: 0 | Status: DISCONTINUED | OUTPATIENT
Start: 2019-04-01 | End: 2019-04-03

## 2019-04-01 RX ORDER — DEXTROSE 50 % IN WATER 50 %
15 SYRINGE (ML) INTRAVENOUS ONCE
Qty: 0 | Refills: 0 | Status: DISCONTINUED | OUTPATIENT
Start: 2019-04-01 | End: 2019-04-03

## 2019-04-01 RX ORDER — DOCUSATE SODIUM 100 MG
100 CAPSULE ORAL THREE TIMES A DAY
Qty: 0 | Refills: 0 | Status: DISCONTINUED | OUTPATIENT
Start: 2019-04-01 | End: 2019-04-03

## 2019-04-01 RX ADMIN — SEVELAMER CARBONATE 800 MILLIGRAM(S): 2400 POWDER, FOR SUSPENSION ORAL at 08:05

## 2019-04-01 RX ADMIN — TAMSULOSIN HYDROCHLORIDE 0.4 MILLIGRAM(S): 0.4 CAPSULE ORAL at 21:55

## 2019-04-01 RX ADMIN — Medication 81 MILLIGRAM(S): at 17:25

## 2019-04-01 RX ADMIN — CARVEDILOL PHOSPHATE 25 MILLIGRAM(S): 80 CAPSULE, EXTENDED RELEASE ORAL at 17:27

## 2019-04-01 RX ADMIN — ISOSORBIDE MONONITRATE 60 MILLIGRAM(S): 60 TABLET, EXTENDED RELEASE ORAL at 17:26

## 2019-04-01 RX ADMIN — Medication 100 MILLIGRAM(S): at 17:28

## 2019-04-01 RX ADMIN — Medication 2: at 12:26

## 2019-04-01 RX ADMIN — Medication 250 MILLIGRAM(S): at 17:29

## 2019-04-01 RX ADMIN — Medication 1 TABLET(S): at 17:26

## 2019-04-01 RX ADMIN — CARVEDILOL PHOSPHATE 25 MILLIGRAM(S): 80 CAPSULE, EXTENDED RELEASE ORAL at 06:15

## 2019-04-01 RX ADMIN — HEPARIN SODIUM 5000 UNIT(S): 5000 INJECTION INTRAVENOUS; SUBCUTANEOUS at 17:27

## 2019-04-01 RX ADMIN — Medication 650 MILLIGRAM(S): at 11:13

## 2019-04-01 RX ADMIN — ATORVASTATIN CALCIUM 40 MILLIGRAM(S): 80 TABLET, FILM COATED ORAL at 21:57

## 2019-04-01 RX ADMIN — Medication 75 MICROGRAM(S): at 06:15

## 2019-04-01 RX ADMIN — INSULIN GLARGINE 20 UNIT(S): 100 INJECTION, SOLUTION SUBCUTANEOUS at 21:54

## 2019-04-01 RX ADMIN — CEFEPIME 1000 MILLIGRAM(S): 1 INJECTION, POWDER, FOR SOLUTION INTRAMUSCULAR; INTRAVENOUS at 17:29

## 2019-04-01 RX ADMIN — Medication 100 MILLIGRAM(S): at 06:15

## 2019-04-01 RX ADMIN — SEVELAMER CARBONATE 800 MILLIGRAM(S): 2400 POWDER, FOR SUSPENSION ORAL at 17:27

## 2019-04-01 RX ADMIN — HEPARIN SODIUM 5000 UNIT(S): 5000 INJECTION INTRAVENOUS; SUBCUTANEOUS at 06:14

## 2019-04-01 NOTE — PHYSICAL THERAPY INITIAL EVALUATION ADULT - MANUAL MUSCLE TESTING RESULTS, REHAB EVAL
Pt having difficulty following commands to assess strength vis MMT. At least 3/5 strength bilateral UE's and LE's.

## 2019-04-01 NOTE — CONSULT NOTE ADULT - ASSESSMENT
Assessment and Recommendation:   · Assessment		  88 y.o. male with PMHx of HFpEF (LVEF 60%) S/P AICD, ESRD on HD MWF, HTN, HLD bought to the ED by the CC of the Flu like symptoms since 3/30/19. Patient currently complains of active cough and recent days of feeling symptoms of the flu. + Rhino and Enterovirus, was given vanco and cefipime.     #Sepsis secondary to Rhino/Entero virus   - Patient evaluated. Chart was reviewed.   -RVP positive for Rhino and Entero  - Labs and imaging reviewed.   -ED received 500 ml bolus ,Vancomycin and cefepime.  - ID will continue to follow patient while in house. 88 y.o. male with PMHx of HFpEF (LVEF 60%) S/P AICD, ESRD on HD MWF, HTN, HLD bought to the ED by the CC of the Flu like symptoms since 3/30/19. Patient currently complains of active cough and recent days of feeling symptoms of the flu. + Rhino and Enterovirus, was given vanco and cefipime.     1. sepsis. viral syndrome. multifocal pna. ESRD  - imaging reviewed  - agree with cefepime 1gm daily for gnr resistant coverage  - continue with vancomycin post HD 1gm mwf mrsa coverage  - f/u cultures check sputum cx  - monitor temps  - tolerating abx well so far; no side effects noted  - reason for abx use and side effects reviewed with patient  - supportive care  - fu cbc    2. other issues - care per medicine

## 2019-04-01 NOTE — CONSULT NOTE ADULT - SUBJECTIVE AND OBJECTIVE BOX
HPI:  88M w/PMH of HFpEF, S/P AICD, ESRD on HD MWF, HTN , HLD  ,DM-2 , and Hypothyroidism admitted to  for Flu like symptoms. According to patient he began to have a cough with the feeling of fever and nausea since 3/30/19. Patient states he has been feeling lightheaded since he began to feel ill on 3/30/19. Patient states family members who he has been in close contact with have also been ill with similar symptoms including fever and nausea and cough. At this present time patient appears in NAD and complains of an active cough. Patient states he feels congested at this time.     PAST MEDICAL & SURGICAL HISTORY:  Pulmonary edema  Pancreatitis  AICD (automatic cardioverter/defibrillator) present  CHF (Congestive Heart Failure)  HTN (Hypertension)  High Cholesterol  Heart Attack  Dialysis Patient  Diabetes  AICD (automatic cardioverter/defibrillator) present  History of penile implant  History of Hernia Repair    MEDICATIONS  (STANDING):  aspirin  chewable 81 milliGRAM(s) Oral daily  atorvastatin Oral Tab/Cap - Peds 40 milliGRAM(s) Oral daily  carvedilol 25 milliGRAM(s) Oral every 12 hours  dextrose 5%. 1000 milliLiter(s) (50 mL/Hr) IV Continuous <Continuous>  dextrose 50% Injectable 12.5 Gram(s) IV Push once  dextrose 50% Injectable 25 Gram(s) IV Push once  dextrose 50% Injectable 25 Gram(s) IV Push once  heparin  Injectable 5000 Unit(s) SubCutaneous every 12 hours  hydrALAZINE  Oral Tab/Cap - Peds 100 milliGRAM(s) Oral two times a day  insulin glargine Injectable (LANTUS) 20 Unit(s) SubCutaneous at bedtime  insulin lispro (HumaLOG) corrective regimen sliding scale   SubCutaneous three times a day before meals  insulin lispro (HumaLOG) corrective regimen sliding scale   SubCutaneous at bedtime  isosorbide   mononitrate ER Tablet (IMDUR) 60 milliGRAM(s) Oral daily  levothyroxine  Oral Tab/Cap - Peds 75 MICROGram(s) Oral daily  multivitamin Oral Tab/Cap - Peds 1 Tablet(s) Oral daily  sevelamer hydrochloride Oral Tab/Cap - Peds 800 milliGRAM(s) Oral three times a day with meals  tamsulosin Oral Tab/Cap - Peds 0.4 milliGRAM(s) Oral at bedtime    MEDICATIONS  (PRN):  acetaminophen   Tablet .. 650 milliGRAM(s) Oral every 6 hours PRN Mild Pain (1 - 3)  acetaminophen   Tablet .. 650 milliGRAM(s) Oral every 6 hours PRN Temp greater or equal to 38.5C (101.3F), Mild Pain (1 - 3)  dextrose 40% Gel 15 Gram(s) Oral once PRN Blood Glucose LESS THAN 70 milliGRAM(s)/deciliter  docusate sodium 100 milliGRAM(s) Oral three times a day PRN Constipation  glucagon  Injectable 1 milliGRAM(s) IntraMuscular once PRN Glucose LESS THAN 70 milligrams/deciliter    Allergies  No Known Allergies  Intolerances    Social Hx: Patient denies tobacco use, recreational drug use, and alcohol use.     REVIEW OF SYSTEMS:  CONSTITUTIONAL: NAD  EYES/ENT: No visual changes;  No vertigo or throat pain   NECK: No pain or stiffness  RESPIRATORY: Active cough, no shortness of breath  CARDIOVASCULAR: No chest pain or palpitations  GASTROINTESTINAL: No abdominal or epigastric pain. No nausea, vomiting, or hematemesis; No diarrhea or constipation. No melena or hematochezia.  GENITOURINARY: No dysuria, frequency or hematuria  NEUROLOGICAL: No numbness or weakness  SKIN: no rashes or dermatologic lesions  All other review of systems is negative unless indicated above    Vital signs:  T(C): 37 (01 Apr 2019 05:23), Max: 38.9 (31 Mar 2019 19:15)  T(F): 98.6 (01 Apr 2019 05:23), Max: 102.1 (31 Mar 2019 19:15)  HR: 83 (01 Apr 2019 05:23) (83 - 104)  BP: 145/54 (01 Apr 2019 05:23) (101/50 - 175/86)  BP(mean): --  ABP: --  ABP(mean): --  RR: 19 (01 Apr 2019 05:23) (19 - 22)  SpO2: 99% (01 Apr 2019 05:23) (94% - 100%)    PE:  Constitutional: NAD  HEENT: NC/AT, EOMI, PERRLA, conjunctivae clear; ears and nose atraumatic; pharynx benign  Neck: supple; thyroid not palpable  Back: no tenderness  Respiratory: congestion noted with lung auscultation  Cardiovascular: S1S2 regular, no murmurs  Abdomen: soft, not tender, not distended, positive BS; liver and spleen WNL  Genitourinary: no suprapubic tenderness  Lymphatic: no LN palpable  Musculoskeletal: no muscle tenderness, no joint swelling or tenderness  Extremities: no pedal edema  Neurological/ Psychiatric: AxOx3, Judgement and insight normal;  moving all extremities  Skin: no dermatologic lesions observed    Labs:  (04-01 @ 07:39)                      12.2  8.38 )-----------( 161                 38.3    Neutrophils = 6.67 (79.5%)  Lymphocytes = 0.35 (4.2%)  Eosinophils = 0.03 (0.4%)  Basophils = 0.06 (0.7%)  Monocytes = 1.23 (14.7%)  Bands = --%    04-01    136  |  98  |  93<H>  ----------------------------<  132<H>  5.2   |  24  |  8.43<H>    Ca    7.7<L>      01 Apr 2019 07:39  Phos  6.5     04-01  Mg     2.7     04-01    TPro  7.2  /  Alb  3.3  /  TBili  0.4  /  DBili  x   /  AST  20  /  ALT  22  /  AlkPhos  112  04-01    ( 31 Mar 2019 19:00 )   PT: 10.7 sec;   INR: 0.97 ratio;       PTT:24.8 sec      RVP:(03-31 @ 19:00)  Detected    < from: CT Abdomen and Pelvis No Cont (03.31.19 @ 21:43) >    IMPRESSION:   There are stones in the gallbladder for which clinical correlation is   recommended. Ultrasound would be of value for further evaluation if   clinically indicated.  The appendix is air-filled but is mildly dilated. No evidence of   periappendiceal fat stranding.  Other findings as above.    < end of copied text >    < from: CT Chest No Cont (03.31.19 @ 21:42) >  IMPRESSION:   There are stones in the gallbladder for which clinical correlation is   recommended. Ultrasound would be of value for further evaluation if   clinically indicated.  The appendix is air-filled but is mildly dilated. No evidence of   periappendiceal fat stranding.  Other findings as above.    < end of copied text >    < from: Xray Chest 1 View-PORTABLE IMMEDIATE (03.31.19 @ 20:07) >  FINDINGS/  IMPRESSION:      Evaluation the lungs is limited by suboptimal inspiration. Prominence of   the interstitium is present. Tiny right pleural effusion. Left chest wall   pacemaker in place.  Cardiomegaly present.      < end of copied text > HPI:  88M w/PMH of HFpEF, S/P AICD, ESRD on HD MWF, HTN , HLD  ,DM-2 , and Hypothyroidism admitted to  for Flu like symptoms. According to patient he began to have a cough with the feeling of fever and nausea since 3/30/19. Patient states he has been feeling lightheaded since he began to feel ill on 3/30/19. Patient states family members who he has been in close contact with have also been ill with similar symptoms including fever and nausea and cough. At this present time patient appears in NAD and complains of an active cough. Patient states he feels congested at this time.     PAST MEDICAL & SURGICAL HISTORY:  Pulmonary edema  Pancreatitis  AICD (automatic cardioverter/defibrillator) present  CHF (Congestive Heart Failure)  HTN (Hypertension)  High Cholesterol  Heart Attack  Dialysis Patient  Diabetes  AICD (automatic cardioverter/defibrillator) present  History of penile implant  History of Hernia Repair    MEDICATIONS  (STANDING):  aspirin  chewable 81 milliGRAM(s) Oral daily  atorvastatin Oral Tab/Cap - Peds 40 milliGRAM(s) Oral daily  carvedilol 25 milliGRAM(s) Oral every 12 hours  dextrose 5%. 1000 milliLiter(s) (50 mL/Hr) IV Continuous <Continuous>  dextrose 50% Injectable 12.5 Gram(s) IV Push once  dextrose 50% Injectable 25 Gram(s) IV Push once  dextrose 50% Injectable 25 Gram(s) IV Push once  heparin  Injectable 5000 Unit(s) SubCutaneous every 12 hours  hydrALAZINE  Oral Tab/Cap - Peds 100 milliGRAM(s) Oral two times a day  insulin glargine Injectable (LANTUS) 20 Unit(s) SubCutaneous at bedtime  insulin lispro (HumaLOG) corrective regimen sliding scale   SubCutaneous three times a day before meals  insulin lispro (HumaLOG) corrective regimen sliding scale   SubCutaneous at bedtime  isosorbide   mononitrate ER Tablet (IMDUR) 60 milliGRAM(s) Oral daily  levothyroxine  Oral Tab/Cap - Peds 75 MICROGram(s) Oral daily  multivitamin Oral Tab/Cap - Peds 1 Tablet(s) Oral daily  sevelamer hydrochloride Oral Tab/Cap - Peds 800 milliGRAM(s) Oral three times a day with meals  tamsulosin Oral Tab/Cap - Peds 0.4 milliGRAM(s) Oral at bedtime    MEDICATIONS  (PRN):  acetaminophen   Tablet .. 650 milliGRAM(s) Oral every 6 hours PRN Mild Pain (1 - 3)  acetaminophen   Tablet .. 650 milliGRAM(s) Oral every 6 hours PRN Temp greater or equal to 38.5C (101.3F), Mild Pain (1 - 3)  dextrose 40% Gel 15 Gram(s) Oral once PRN Blood Glucose LESS THAN 70 milliGRAM(s)/deciliter  docusate sodium 100 milliGRAM(s) Oral three times a day PRN Constipation  glucagon  Injectable 1 milliGRAM(s) IntraMuscular once PRN Glucose LESS THAN 70 milligrams/deciliter    Allergies  No Known Allergies  Intolerances    Social Hx: Patient denies tobacco use, recreational drug use, and alcohol use.     REVIEW OF SYSTEMS:  CONSTITUTIONAL: NAD  EYES/ENT: No visual changes;  No vertigo or throat pain   NECK: No pain or stiffness  RESPIRATORY: Active cough, no shortness of breath  CARDIOVASCULAR: No chest pain or palpitations  GASTROINTESTINAL: No abdominal or epigastric pain. No nausea, vomiting, or hematemesis; No diarrhea or constipation. No melena or hematochezia.  GENITOURINARY: No dysuria, frequency or hematuria  NEUROLOGICAL: No numbness or weakness  SKIN: no rashes or dermatologic lesions  All other review of systems is negative unless indicated above    Vital signs:  T(C): 37 (01 Apr 2019 05:23), Max: 38.9 (31 Mar 2019 19:15)  T(F): 98.6 (01 Apr 2019 05:23), Max: 102.1 (31 Mar 2019 19:15)  HR: 83 (01 Apr 2019 05:23) (83 - 104)  BP: 145/54 (01 Apr 2019 05:23) (101/50 - 175/86)  BP(mean): --  ABP: --  ABP(mean): --  RR: 19 (01 Apr 2019 05:23) (19 - 22)  SpO2: 99% (01 Apr 2019 05:23) (94% - 100%)    PE:  Constitutional: NAD  HEENT: NC/AT, EOMI, PERRLA, conjunctivae clear; ears and nose atraumatic; pharynx benign  Neck: supple; thyroid not palpable  Back: no tenderness  Respiratory: congestion noted with lung auscultation  Cardiovascular: S1S2 regular, no murmurs  Abdomen: soft, not tender, not distended, positive BS; liver and spleen WNL  Genitourinary: no suprapubic tenderness  Lymphatic: no LN palpable  Musculoskeletal: no muscle tenderness, no joint swelling or tenderness  Extremities: no pedal edema  Neurological/ Psychiatric: AxOx3, Judgement and insight normal;  moving all extremities  Skin: no dermatologic lesions observed    Labs:  (04-01 @ 07:39)                      12.2  8.38 )-----------( 161                 38.3    Neutrophils = 6.67 (79.5%)  Lymphocytes = 0.35 (4.2%)  Eosinophils = 0.03 (0.4%)  Basophils = 0.06 (0.7%)  Monocytes = 1.23 (14.7%)  Bands = --%    04-01    136  |  98  |  93<H>  ----------------------------<  132<H>  5.2   |  24  |  8.43<H>    Ca    7.7<L>      01 Apr 2019 07:39  Phos  6.5     04-01  Mg     2.7     04-01    TPro  7.2  /  Alb  3.3  /  TBili  0.4  /  DBili  x   /  AST  20  /  ALT  22  /  AlkPhos  112  04-01    ( 31 Mar 2019 19:00 )   PT: 10.7 sec;   INR: 0.97 ratio;       PTT:24.8 sec      RVP:(03-31 @ 19:00)  Detected    < from: CT Abdomen and Pelvis No Cont (03.31.19 @ 21:43) >    IMPRESSION:   There are stones in the gallbladder for which clinical correlation is   recommended. Ultrasound would be of value for further evaluation if   clinically indicated.  The appendix is air-filled but is mildly dilated. No evidence of   periappendiceal fat stranding.  Other findings as above.    < end of copied text >    < from: CT Chest No Cont (03.31.19 @ 21:42) >  IMPRESSION: Only mild atelectatic changes and/or infiltrates in the lower   lobes, greater in the right lower lobe, which may be indicative of mild   multifocal pneumonia. Clinical correlation is recommended.    < end of copied text >      < from: Xray Chest 1 View-PORTABLE IMMEDIATE (03.31.19 @ 20:07) >  FINDINGS/  IMPRESSION:      Evaluation the lungs is limited by suboptimal inspiration. Prominence of   the interstitium is present. Tiny right pleural effusion. Left chest wall   pacemaker in place.  Cardiomegaly present.      < end of copied text > HPI:  88M w/PMH of HFpEF, S/P AICD, ESRD on HD MWF, HTN , HLD  ,DM-2 , and Hypothyroidism admitted to  for Flu like symptoms. According to patient he began to have a cough with the feeling of fever and nausea since 3/30/19. Patient states he has been feeling lightheaded since he began to feel ill on 3/30/19. Patient states family members who he has been in close contact with have also been ill with similar symptoms including fever and nausea and cough. At this present time patient appears in NAD and complains of an active cough. Patient states he feels congested at this time.     PAST MEDICAL & SURGICAL HISTORY:  Pulmonary edema  Pancreatitis  AICD (automatic cardioverter/defibrillator) present  CHF (Congestive Heart Failure)  HTN (Hypertension)  High Cholesterol  Heart Attack  Dialysis Patient  Diabetes  AICD (automatic cardioverter/defibrillator) present  History of penile implant  History of Hernia Repair    MEDICATIONS  (STANDING):  aspirin  chewable 81 milliGRAM(s) Oral daily  atorvastatin Oral Tab/Cap - Peds 40 milliGRAM(s) Oral daily  carvedilol 25 milliGRAM(s) Oral every 12 hours  dextrose 5%. 1000 milliLiter(s) (50 mL/Hr) IV Continuous <Continuous>  dextrose 50% Injectable 12.5 Gram(s) IV Push once  dextrose 50% Injectable 25 Gram(s) IV Push once  dextrose 50% Injectable 25 Gram(s) IV Push once  heparin  Injectable 5000 Unit(s) SubCutaneous every 12 hours  hydrALAZINE  Oral Tab/Cap - Peds 100 milliGRAM(s) Oral two times a day  insulin glargine Injectable (LANTUS) 20 Unit(s) SubCutaneous at bedtime  insulin lispro (HumaLOG) corrective regimen sliding scale   SubCutaneous three times a day before meals  insulin lispro (HumaLOG) corrective regimen sliding scale   SubCutaneous at bedtime  isosorbide   mononitrate ER Tablet (IMDUR) 60 milliGRAM(s) Oral daily  levothyroxine  Oral Tab/Cap - Peds 75 MICROGram(s) Oral daily  multivitamin Oral Tab/Cap - Peds 1 Tablet(s) Oral daily  sevelamer hydrochloride Oral Tab/Cap - Peds 800 milliGRAM(s) Oral three times a day with meals  tamsulosin Oral Tab/Cap - Peds 0.4 milliGRAM(s) Oral at bedtime      Allergies  No Known Allergies  Intolerances    Social Hx: Patient denies tobacco use, recreational drug use, and alcohol use.     REVIEW OF SYSTEMS:  CONSTITUTIONAL: NAD  EYES/ENT: No visual changes;  No vertigo or throat pain   NECK: No pain or stiffness  RESPIRATORY: Active cough, no shortness of breath  CARDIOVASCULAR: No chest pain or palpitations  GASTROINTESTINAL: No abdominal or epigastric pain. No nausea, vomiting, or hematemesis; No diarrhea or constipation. No melena or hematochezia.  GENITOURINARY: No dysuria, frequency or hematuria  NEUROLOGICAL: No numbness or weakness  SKIN: no rashes or dermatologic lesions  All other review of systems is negative unless indicated above    Vital signs:  T(C): 37 (01 Apr 2019 05:23), Max: 38.9 (31 Mar 2019 19:15)  T(F): 98.6 (01 Apr 2019 05:23), Max: 102.1 (31 Mar 2019 19:15)  HR: 83 (01 Apr 2019 05:23) (83 - 104)  BP: 145/54 (01 Apr 2019 05:23) (101/50 - 175/86)  BP(mean): --  ABP: --  ABP(mean): --  RR: 19 (01 Apr 2019 05:23) (19 - 22)  SpO2: 99% (01 Apr 2019 05:23) (94% - 100%)    PE:  Constitutional: NAD  HEENT: NC/AT, EOMI, PERRLA, conjunctivae clear; ears and nose atraumatic; pharynx benign  Neck: supple; thyroid not palpable  Back: no tenderness  Respiratory: congestion noted with lung auscultation  Cardiovascular: S1S2 regular, no murmurs  Abdomen: soft, not tender, not distended, positive BS; liver and spleen WNL  Genitourinary: no suprapubic tenderness  Lymphatic: no LN palpable  Musculoskeletal: no muscle tenderness, no joint swelling or tenderness  Extremities: no pedal edema  Neurological/ Psychiatric:  moving all extremities  Skin: no dermatologic lesions observed    Labs:  (04-01 @ 07:39)                      12.2  8.38 )-----------( 161                 38.3    Neutrophils = 6.67 (79.5%)  Lymphocytes = 0.35 (4.2%)  Eosinophils = 0.03 (0.4%)  Basophils = 0.06 (0.7%)  Monocytes = 1.23 (14.7%)  Bands = --%    04-01    136  |  98  |  93<H>  ----------------------------<  132<H>  5.2   |  24  |  8.43<H>    Ca    7.7<L>      01 Apr 2019 07:39  Phos  6.5     04-01  Mg     2.7     04-01    TPro  7.2  /  Alb  3.3  /  TBili  0.4  /  DBili  x   /  AST  20  /  ALT  22  /  AlkPhos  112  04-01    ( 31 Mar 2019 19:00 )   PT: 10.7 sec;   INR: 0.97 ratio;       PTT:24.8 sec      RVP:(03-31 @ 19:00)  Detected    < from: CT Abdomen and Pelvis No Cont (03.31.19 @ 21:43) >    IMPRESSION:   There are stones in the gallbladder for which clinical correlation is   recommended. Ultrasound would be of value for further evaluation if   clinically indicated.  The appendix is air-filled but is mildly dilated. No evidence of   periappendiceal fat stranding.  Other findings as above.    < end of copied text >    < from: CT Chest No Cont (03.31.19 @ 21:42) >  IMPRESSION: Only mild atelectatic changes and/or infiltrates in the lower   lobes, greater in the right lower lobe, which may be indicative of mild   multifocal pneumonia. Clinical correlation is recommended.    < end of copied text >      < from: Xray Chest 1 View-PORTABLE IMMEDIATE (03.31.19 @ 20:07) >  FINDINGS/  IMPRESSION:      Evaluation the lungs is limited by suboptimal inspiration. Prominence of   the interstitium is present. Tiny right pleural effusion. Left chest wall   pacemaker in place.  Cardiomegaly present.      < end of copied text >

## 2019-04-01 NOTE — PROGRESS NOTE ADULT - SUBJECTIVE AND OBJECTIVE BOX
CHIEF COMPLAINT:    SUBJECTIVE:     REVIEW OF SYSTEMS:  CONSTITUTIONAL: No weakness, fevers or chills  EYES/ENT: No visual changes;  No vertigo or throat pain   NECK: No pain or stiffness  RESPIRATORY: No cough, wheezing, hemoptysis; No shortness of breath  CARDIOVASCULAR: No chest pain or palpitations  GASTROINTESTINAL: No abdominal or epigastric pain. No nausea, vomiting, or hematemesis; No diarrhea or constipation. No melena or hematochezia.  GENITOURINARY: No dysuria, frequency or hematuria  NEUROLOGICAL: No numbness or weakness  SKIN: No itching, burning, rashes, or lesions   All other review of systems is negative unless indicated above    Vital Signs Last 24 Hrs  T(C): 36.8 (01 Apr 2019 17:24), Max: 38.9 (31 Mar 2019 19:15)  T(F): 98.2 (01 Apr 2019 17:24), Max: 102.1 (31 Mar 2019 19:15)  HR: 92 (01 Apr 2019 17:24) (83 - 104)  BP: 135/50 (01 Apr 2019 17:24) (101/50 - 175/86)  BP(mean): --  RR: 18 (01 Apr 2019 17:24) (16 - 22)  SpO2: 98% (01 Apr 2019 17:24) (94% - 100%)    I&O's Summary      CAPILLARY BLOOD GLUCOSE      POCT Blood Glucose.: 125 mg/dL (01 Apr 2019 17:24)  POCT Blood Glucose.: 191 mg/dL (01 Apr 2019 12:23)  POCT Blood Glucose.: 127 mg/dL (01 Apr 2019 07:39)  POCT Blood Glucose.: 169 mg/dL (01 Apr 2019 00:15)      PHYSICAL EXAM:  Constitutional: NAD, awake and alert, well-developed  HEENT: PERR, EOMI, Normal Hearing, MMM  Neck: Soft and supple, No LAD, No JVD  Respiratory: Breath sounds are clear bilaterally, No wheezing, rales or rhonchi  Cardiovascular: S1 and S2, regular rate and rhythm, no Murmurs, gallops or rubs  Gastrointestinal: Bowel Sounds present, soft, nontender, nondistended, no guarding, no rebound  Extremities: No peripheral edema  Vascular: 2+ peripheral pulses  Neurological: A/O x 3, no focal deficits  Musculoskeletal: 5/5 strength b/l upper and lower extremities  Skin: No rashes    MEDICATIONS:  MEDICATIONS  (STANDING):  aspirin  chewable 81 milliGRAM(s) Oral daily  atorvastatin Oral Tab/Cap - Peds 40 milliGRAM(s) Oral daily  carvedilol 25 milliGRAM(s) Oral every 12 hours  cefepime  Injectable.      dextrose 5%. 1000 milliLiter(s) (50 mL/Hr) IV Continuous <Continuous>  dextrose 50% Injectable 12.5 Gram(s) IV Push once  dextrose 50% Injectable 25 Gram(s) IV Push once  dextrose 50% Injectable 25 Gram(s) IV Push once  heparin  Injectable 5000 Unit(s) SubCutaneous every 12 hours  hydrALAZINE  Oral Tab/Cap - Peds 100 milliGRAM(s) Oral two times a day  insulin glargine Injectable (LANTUS) 20 Unit(s) SubCutaneous at bedtime  insulin lispro (HumaLOG) corrective regimen sliding scale   SubCutaneous three times a day before meals  insulin lispro (HumaLOG) corrective regimen sliding scale   SubCutaneous at bedtime  isosorbide   mononitrate ER Tablet (IMDUR) 60 milliGRAM(s) Oral daily  levothyroxine  Oral Tab/Cap - Peds 75 MICROGram(s) Oral daily  multivitamin Oral Tab/Cap - Peds 1 Tablet(s) Oral daily  sevelamer hydrochloride Oral Tab/Cap - Peds 800 milliGRAM(s) Oral three times a day with meals  tamsulosin Oral Tab/Cap - Peds 0.4 milliGRAM(s) Oral at bedtime  vancomycin  IVPB 1000 milliGRAM(s) IV Intermittent <User Schedule>      LABS: All Labs Reviewed:                        12.2   8.38  )-----------( 161      ( 01 Apr 2019 07:39 )             38.3     04-01    136  |  98  |  93<H>  ----------------------------<  132<H>  5.2   |  24  |  8.43<H>    Ca    7.7<L>      01 Apr 2019 07:39  Phos  6.5     04-01  Mg     2.7     04-01    TPro  7.2  /  Alb  3.3  /  TBili  0.4  /  DBili  x   /  AST  20  /  ALT  22  /  AlkPhos  112  04-01    PT/INR - ( 31 Mar 2019 19:00 )   PT: 10.7 sec;   INR: 0.97 ratio         PTT - ( 31 Mar 2019 19:00 )  PTT:24.8 sec      Blood Culture:     RADIOLOGY/EKG:    DVT PPX:    ADVANCED DIRECTIVE:    DISPOSITION: SUBJECTIVE: 88M w/PMH of HFpEF (LVEF 60%-5/2018 ) S/P AICD, ESRD on HD MWF, HTN , HLD  ,DM-2 ,Hypothyroid bought to the ED by the CC of the Flu like symptoms x 1 day .  Patient is a poor informant attempted to reach family multiple times unsuccessful .However patient complaints of weakness and lightheadedness . Right now in the ER he is feeling fine .As per chart note Symptoms started today with fever ,chills ,cough and 1 episode of vomiting . Associated with generalized vomiting ,lightheadedness .  Sick contacts family with similar complaints .      In the ED he +Entero/Rhino virus and received Vancomycin ,cefepime and 10 U regular insulin .    4/1/19: This AM patient notes that he continues to feel cold occasionally. He continues to have a wet cough. He also feels very tired.     REVIEW OF SYSTEMS:  CONSTITUTIONAL: + weakness&  chills; no fevers  EYES/ENT: No visual changes;  No vertigo or throat pain   NECK: No pain or stiffness  RESPIRATORY: + cough No shortness of breath  CARDIOVASCULAR: No chest pain or palpitations  GASTROINTESTINAL: No abdominal or epigastric pain. No nausea, vomiting, or hematemesis; No diarrhea or constipation. No melena or hematochezia.  GENITOURINARY: does not make urine  NEUROLOGICAL: No numbness or weakness  SKIN: No itching, burning, rashes, or lesions   All other review of systems is negative unless indicated above    Vital Signs Last 24 Hrs  T(C): 36.8 (01 Apr 2019 17:24), Max: 38.9 (31 Mar 2019 19:15)  T(F): 98.2 (01 Apr 2019 17:24), Max: 102.1 (31 Mar 2019 19:15)  HR: 92 (01 Apr 2019 17:24) (83 - 104)  BP: 135/50 (01 Apr 2019 17:24) (101/50 - 175/86)  BP(mean): --  RR: 18 (01 Apr 2019 17:24) (16 - 22)  SpO2: 98% (01 Apr 2019 17:24) (94% - 100%)    CAPILLARY BLOOD GLUCOSE      POCT Blood Glucose.: 125 mg/dL (01 Apr 2019 17:24)  POCT Blood Glucose.: 191 mg/dL (01 Apr 2019 12:23)  POCT Blood Glucose.: 127 mg/dL (01 Apr 2019 07:39)  POCT Blood Glucose.: 169 mg/dL (01 Apr 2019 00:15)      PHYSICAL EXAM:  Constitutional: tired, but arousable to name, overweight; frail appearing  HEENT: PERR, EOMI, Normal Hearing, MMM  Neck: Soft and supple, No LAD, No JVD  Respiratory: decreased breath sounds; + rhonchi  Cardiovascular: S1 and S2, regular rate and rhythm, no Murmurs, gallops or rubs  Gastrointestinal: Bowel Sounds present, soft, nontender, nondistended, no guarding, no rebound  Vascular: 2+ peripheral pulses  Neurological: A/O x to name; unable to complete rest of exam due to fatigue  Musculoskeletal: unable to assess  Skin: No rashes    MEDICATIONS:  MEDICATIONS  (STANDING):  aspirin  chewable 81 milliGRAM(s) Oral daily  atorvastatin Oral Tab/Cap - Peds 40 milliGRAM(s) Oral daily  carvedilol 25 milliGRAM(s) Oral every 12 hours  cefepime  Injectable.      dextrose 5%. 1000 milliLiter(s) (50 mL/Hr) IV Continuous <Continuous>  dextrose 50% Injectable 12.5 Gram(s) IV Push once  dextrose 50% Injectable 25 Gram(s) IV Push once  dextrose 50% Injectable 25 Gram(s) IV Push once  heparin  Injectable 5000 Unit(s) SubCutaneous every 12 hours  hydrALAZINE  Oral Tab/Cap - Peds 100 milliGRAM(s) Oral two times a day  insulin glargine Injectable (LANTUS) 20 Unit(s) SubCutaneous at bedtime  insulin lispro (HumaLOG) corrective regimen sliding scale   SubCutaneous three times a day before meals  insulin lispro (HumaLOG) corrective regimen sliding scale   SubCutaneous at bedtime  isosorbide   mononitrate ER Tablet (IMDUR) 60 milliGRAM(s) Oral daily  levothyroxine  Oral Tab/Cap - Peds 75 MICROGram(s) Oral daily  multivitamin Oral Tab/Cap - Peds 1 Tablet(s) Oral daily  sevelamer hydrochloride Oral Tab/Cap - Peds 800 milliGRAM(s) Oral three times a day with meals  tamsulosin Oral Tab/Cap - Peds 0.4 milliGRAM(s) Oral at bedtime  vancomycin  IVPB 1000 milliGRAM(s) IV Intermittent <User Schedule>      LABS: All Labs Reviewed:                        12.2   8.38  )-----------( 161      ( 01 Apr 2019 07:39 )             38.3     04-01    136  |  98  |  93<H>  ----------------------------<  132<H>  5.2   |  24  |  8.43<H>    Ca    7.7<L>      01 Apr 2019 07:39  Phos  6.5     04-01  Mg     2.7     04-01    TPro  7.2  /  Alb  3.3  /  TBili  0.4  /  DBili  x   /  AST  20  /  ALT  22  /  AlkPhos  112  04-01    PT/INR - ( 31 Mar 2019 19:00 )   PT: 10.7 sec;   INR: 0.97 ratio         PTT - ( 31 Mar 2019 19:00 )  PTT:24.8 sec      < from: CT Abdomen and Pelvis No Cont (03.31.19 @ 21:43) >  FINDINGS:    The airway shows normal caliber and contour with patent lumen.    There are mild infiltrates and/or atelectatic changes in bilateral lower   lobes, greater in the right lower lobe. Some mild atelectatic changes in   the inferolateral right middle lobe. A 2.3 mm nodule in the lateral   segment of the right middle lobe on image 2-33.    The upper esophagus is dilated with air. The the distal esophagus is   collapsed.    There is no pleural effusion or pneumothorax.    There are no mediastinal lymphadenopathy or masses.    The mediastinum great vessels are normal.     Cardiomegaly and continued application of the left subclavian vein   pacemaker, leads are in the right heart. There is no pericardial   effusion. There is a tiny pericardial effusion.    The bones , there are hypertrophic degenerative changes in the thoracic   spine.     IMPRESSION: Only mild atelectatic changes and/or infiltrates in the lower   lobes, greater in the right lower lobe, which may be indicative of mild   multifocal pneumonia. Clinical correlation is recommended.    Non contrast CT of the abdomen and pelvis dated 3/31/2019.     COMPARISON: None  available.    CLINICAL HISTORY: Abdominal pain.    Technique: contiguous axial images were obtained with 2.5 mm slice   thickness without intravenous or oral contrast administration, which   limits the visualization of the intra-abdominal structures.   Coronal and sagittal reformats were also submitted for interpretation.      FINDINGS:     There is no free intra-abdominal air or ascites.     The unopacified liver, spleen, pancreas,and the adrenal glands are   normal. Multiple stones in the gallbladder.. Clinical correlation is   recommended. Ultrasound would be of value for further evaluation if   clinically indicated.    There is no intra or extrahepatic biliary ductal dilatation.    The unopacified stomach, duodenum, small, and large bowel are normal. The   appendix is air-filled but is mildly dilated. Wall thickening is   visualized at the tip of the appendix. The cross-sectional diameter of   the tip of the appendix is 8.3 mm. No evidence of periappendiceal fat   stranding.   Clinical correlation is recommended.    The kidneys , cortical atrophy of bilateral kidneys. There are multiple   nonobstructing 2 to 3 mm stones in the right kidney. Multiple small   hypodensities in the left kidney likely , cannot be characterized due to   the noncontrast technique.   The urinary bladder shows normal morphology and contour.      The reproductive organs , thel reservoir of the penal prosthesis is   visualized in the right pelvis. Prostate is 3.4 x 4.5 cm in diameters.     There are no retroperitoneal masses or lymphadenopathy.    The retroperitoneal vascular structures , calcified abdominal aorta and   iliac arteries but no aneurysms..     The bones and soft tissues , hypertrophic degenerative changes and   multilevel degenerative disc disease in the lumbosacral spine.    IMPRESSION:   There are stones in the gallbladder for which clinical correlation is   recommended. Ultrasound would be of value for further evaluation if   clinically indicated.  The appendix is air-filled but is mildly dilated. No evidence of   periappendiceal fat stranding.  Other findings as above.    < end of copied text >

## 2019-04-01 NOTE — CONSULT NOTE ADULT - ASSESSMENT
87 y/o m with hx of esrd on hd with + RVP ( adeno and rhinovirus). r/p PNA.  Diastolic CHF with aicd    PLAN  - ESRD: tolerating hd and will attempt inc uf with hd  - anemia: epo  on hold  - MBD; continue with sevelamer   - + RVP r/p pna supportive care, abx as per ID     fu cx send  - HTN; fu on current regimen of bp and will continue with coreg

## 2019-04-01 NOTE — PHYSICAL THERAPY INITIAL EVALUATION ADULT - GENERAL OBSERVATIONS, REHAB EVAL
Pt found supine in bed sleeping with O2 NC and bed alarm activated. Pt having difficulty keeping his eyes open with verbal and manual cues. Pt with no c/o pain, PAINAD-0/10.

## 2019-04-01 NOTE — CONSULT NOTE ADULT - SUBJECTIVE AND OBJECTIVE BOX
NEPHROLOGY INTERVAL HPI/OVERNIGHT EVENTS:  LESTER [id number]    87y/o m with hx of esrd on hd mwf presents with weakness and lightheadedness with flu like symptoms for one day  RVP + . Due for HD today  no n/v/d, mild anorexia.      In the ED he +Entero/Rhino virus and received Vancomycin ,cefepime and 10 U regular insulin .    PAST MEDICAL & SURGICAL HISTORY:  - esrd on hd mwf  - disatolic chf with ef of 60%  - AICD  - T2DM  - HLD  - HTN  - HYPOTHYROID     FAMILY HISTORY: NC  soc Hx; non smoker, no etoh lives with daughter    MEDICATIONS  (STANDING):  aspirin  chewable 81 milliGRAM(s) Oral daily  atorvastatin Oral Tab/Cap - Peds 40 milliGRAM(s) Oral daily  carvedilol 25 milliGRAM(s) Oral every 12 hours  cefepime  Injectable.      dextrose 5%. 1000 milliLiter(s) (50 mL/Hr) IV Continuous <Continuous>  dextrose 50% Injectable 12.5 Gram(s) IV Push once  dextrose 50% Injectable 25 Gram(s) IV Push once  dextrose 50% Injectable 25 Gram(s) IV Push once  heparin  Injectable 5000 Unit(s) SubCutaneous every 12 hours  hydrALAZINE  Oral Tab/Cap - Peds 100 milliGRAM(s) Oral two times a day  insulin glargine Injectable (LANTUS) 20 Unit(s) SubCutaneous at bedtime  insulin lispro (HumaLOG) corrective regimen sliding scale   SubCutaneous three times a day before meals  insulin lispro (HumaLOG) corrective regimen sliding scale   SubCutaneous at bedtime  isosorbide   mononitrate ER Tablet (IMDUR) 60 milliGRAM(s) Oral daily  levothyroxine  Oral Tab/Cap - Peds 75 MICROGram(s) Oral daily  multivitamin Oral Tab/Cap - Peds 1 Tablet(s) Oral daily  sevelamer hydrochloride Oral Tab/Cap - Peds 800 milliGRAM(s) Oral three times a day with meals  tamsulosin Oral Tab/Cap - Peds 0.4 milliGRAM(s) Oral at bedtime  vancomycin  IVPB 1000 milliGRAM(s) IV Intermittent <User Schedule>    MEDICATIONS  (PRN):  acetaminophen   Tablet .. 650 milliGRAM(s) Oral every 6 hours PRN Mild Pain (1 - 3)  acetaminophen   Tablet .. 650 milliGRAM(s) Oral every 6 hours PRN Temp greater or equal to 38.5C (101.3F), Mild Pain (1 - 3)  dextrose 40% Gel 15 Gram(s) Oral once PRN Blood Glucose LESS THAN 70 milliGRAM(s)/deciliter  docusate sodium 100 milliGRAM(s) Oral three times a day PRN Constipation  glucagon  Injectable 1 milliGRAM(s) IntraMuscular once PRN Glucose LESS THAN 70 milligrams/deciliter      Allergies    No Known Allergies    Intolerances        I&O's Summary      Home Medications:  aspirin 81 mg oral tablet, chewable: 1 tab(s) orally once a day (2019 00:08)  atorvastatin 40 mg oral tablet: 1 tab(s) orally once a day (2019 00:08)  carvedilol 25 mg oral tablet: 1 tab(s) orally every 12 hours (2019 00:08)  docusate sodium 100 mg oral capsule: 1 cap(s) orally 3 times a day, As Needed for constipathion (2019 00:08)  hydrALAZINE 100 mg oral tablet: 1 tab(s) orally 2 times a day (2019 00:08)  insulin glargine: 20 unit(s) subcutaneous once a day (at bedtime) (2019 00:08)  insulin lispro 100 units/mL subcutaneous solution: unit(s) subcutaneous prn ; 1 Unit(s) if Glucose 151 - 200  2 Unit(s) if Glucose 201 - 250  3 Unit(s) if Glucose 251 - 300  4 Unit(s) if Glucose 301 - 350  5 Unit(s) if Glucose 351 - 400  6 Unit(s) if Glucose Greater Than 400 (2019 00:08)  isosorbide mononitrate 60 mg oral tablet, extended release:  orally  (2019 00:08)  levothyroxine 75 mcg (0.075 mg) oral capsule: 1 cap(s) orally once a day (2019 00:08)  lisinopril 5 mg oral tablet: 1 tab(s) orally once a day (2019 00:08)  losartan 25 mg oral tablet: 1 tab(s) orally once a day (2019 00:08)  multivitamin: 1 tab(s) orally once a day (2019 00:08)  sevelamer hydrochloride 800 mg oral tablet: 2 tab(s) orally 3 times a day (2019 00:08)      Patient was seen and evaluated on dialysis.   Patient is tolerating the procedure well.   Continue dialysis:   Dialyzer:revaclear 300 on 2k with uf goal of 3 kg via avf    Vital Signs Last 24 Hrs  T(C): 36.8 (2019 17:24), Max: 38.9 (31 Mar 2019 19:15)  T(F): 98.2 (2019 17:24), Max: 102.1 (31 Mar 2019 19:15)  HR: 92 (2019 17:24) (83 - 104)  BP: 135/50 (2019 17:24) (101/50 - 175/86)  BP(mean): --  RR: 18 (2019 17:24) (16 - 22)  SpO2: 98% (2019 17:24) (94% - 100%)  Daily Height in cm: 165.1 (31 Mar 2019 19:30)    Daily Weight in k.8 (2019 05:23)  I&O's Summary      PHYSICAL EXAM:  GEN: alert awake   HEENT: MMM  NECK supple no jvd  CV: RRR s1s2  LUNGS: b/l ronchi  ABD: +bs  soft,   EXT: no edema    LABS:                        12.2   8.38  )-----------( 161      ( 2019 07:39 )             38.3     04-    136  |  98  |  93<H>  ----------------------------<  132<H>  5.2   |  24  |  8.43<H>    Ca    7.7<L>      2019 07:39  Phos  6.5     -  Mg     2.7         TPro  7.2  /  Alb  3.3  /  TBili  0.4  /  DBili  x   /  AST  20  /  ALT  22  /  AlkPhos  112      PT/INR - ( 31 Mar 2019 19:00 )   PT: 10.7 sec;   INR: 0.97 ratio         PTT - ( 31 Mar 2019 19:00 )  PTT:24.8 sec    Magnesium, Serum: 2.7 mg/dL ( @ 07:39)  Phosphorus Level, Serum: 6.5 mg/dL ( @ 07:39)

## 2019-04-02 LAB
ALBUMIN SERPL ELPH-MCNC: 3.1 G/DL — LOW (ref 3.3–5)
ALP SERPL-CCNC: 125 U/L — HIGH (ref 40–120)
ALT FLD-CCNC: 27 U/L — SIGNIFICANT CHANGE UP (ref 12–78)
ANION GAP SERPL CALC-SCNC: 10 MMOL/L — SIGNIFICANT CHANGE UP (ref 5–17)
AST SERPL-CCNC: 49 U/L — HIGH (ref 15–37)
BILIRUB SERPL-MCNC: 0.4 MG/DL — SIGNIFICANT CHANGE UP (ref 0.2–1.2)
BUN SERPL-MCNC: 60 MG/DL — HIGH (ref 7–23)
CALCIUM SERPL-MCNC: 7.9 MG/DL — LOW (ref 8.5–10.1)
CHLORIDE SERPL-SCNC: 93 MMOL/L — LOW (ref 96–108)
CO2 SERPL-SCNC: 27 MMOL/L — SIGNIFICANT CHANGE UP (ref 22–31)
CREAT SERPL-MCNC: 6.16 MG/DL — HIGH (ref 0.5–1.3)
GLUCOSE SERPL-MCNC: 207 MG/DL — HIGH (ref 70–99)
HCT VFR BLD CALC: 37.7 % — LOW (ref 39–50)
HGB BLD-MCNC: 12 G/DL — LOW (ref 13–17)
MCHC RBC-ENTMCNC: 28.9 PG — SIGNIFICANT CHANGE UP (ref 27–34)
MCHC RBC-ENTMCNC: 31.8 GM/DL — LOW (ref 32–36)
MCV RBC AUTO: 90.8 FL — SIGNIFICANT CHANGE UP (ref 80–100)
NRBC # BLD: 0 /100 WBCS — SIGNIFICANT CHANGE UP (ref 0–0)
PLATELET # BLD AUTO: 135 K/UL — LOW (ref 150–400)
POTASSIUM SERPL-MCNC: 4.5 MMOL/L — SIGNIFICANT CHANGE UP (ref 3.5–5.3)
POTASSIUM SERPL-SCNC: 4.5 MMOL/L — SIGNIFICANT CHANGE UP (ref 3.5–5.3)
PROT SERPL-MCNC: 7.2 GM/DL — SIGNIFICANT CHANGE UP (ref 6–8.3)
RBC # BLD: 4.15 M/UL — LOW (ref 4.2–5.8)
RBC # FLD: 14.5 % — SIGNIFICANT CHANGE UP (ref 10.3–14.5)
SODIUM SERPL-SCNC: 130 MMOL/L — LOW (ref 135–145)
WBC # BLD: 9.02 K/UL — SIGNIFICANT CHANGE UP (ref 3.8–10.5)
WBC # FLD AUTO: 9.02 K/UL — SIGNIFICANT CHANGE UP (ref 3.8–10.5)

## 2019-04-02 RX ADMIN — SEVELAMER CARBONATE 800 MILLIGRAM(S): 2400 POWDER, FOR SUSPENSION ORAL at 08:24

## 2019-04-02 RX ADMIN — INSULIN GLARGINE 20 UNIT(S): 100 INJECTION, SOLUTION SUBCUTANEOUS at 22:27

## 2019-04-02 RX ADMIN — CARVEDILOL PHOSPHATE 25 MILLIGRAM(S): 80 CAPSULE, EXTENDED RELEASE ORAL at 05:38

## 2019-04-02 RX ADMIN — Medication 100 MILLIGRAM(S): at 05:38

## 2019-04-02 RX ADMIN — ISOSORBIDE MONONITRATE 60 MILLIGRAM(S): 60 TABLET, EXTENDED RELEASE ORAL at 11:25

## 2019-04-02 RX ADMIN — Medication 2: at 08:23

## 2019-04-02 RX ADMIN — CARVEDILOL PHOSPHATE 25 MILLIGRAM(S): 80 CAPSULE, EXTENDED RELEASE ORAL at 17:42

## 2019-04-02 RX ADMIN — Medication 1 TABLET(S): at 11:25

## 2019-04-02 RX ADMIN — TAMSULOSIN HYDROCHLORIDE 0.4 MILLIGRAM(S): 0.4 CAPSULE ORAL at 22:27

## 2019-04-02 RX ADMIN — SEVELAMER CARBONATE 800 MILLIGRAM(S): 2400 POWDER, FOR SUSPENSION ORAL at 11:26

## 2019-04-02 RX ADMIN — Medication 4: at 12:39

## 2019-04-02 RX ADMIN — HEPARIN SODIUM 5000 UNIT(S): 5000 INJECTION INTRAVENOUS; SUBCUTANEOUS at 17:41

## 2019-04-02 RX ADMIN — Medication 75 MICROGRAM(S): at 05:38

## 2019-04-02 RX ADMIN — CEFEPIME 1000 MILLIGRAM(S): 1 INJECTION, POWDER, FOR SOLUTION INTRAMUSCULAR; INTRAVENOUS at 11:25

## 2019-04-02 RX ADMIN — ATORVASTATIN CALCIUM 40 MILLIGRAM(S): 80 TABLET, FILM COATED ORAL at 22:27

## 2019-04-02 RX ADMIN — SEVELAMER CARBONATE 800 MILLIGRAM(S): 2400 POWDER, FOR SUSPENSION ORAL at 17:42

## 2019-04-02 RX ADMIN — Medication 2: at 17:40

## 2019-04-02 RX ADMIN — HEPARIN SODIUM 5000 UNIT(S): 5000 INJECTION INTRAVENOUS; SUBCUTANEOUS at 05:37

## 2019-04-02 RX ADMIN — Medication 100 MILLIGRAM(S): at 17:42

## 2019-04-02 RX ADMIN — Medication 81 MILLIGRAM(S): at 11:24

## 2019-04-02 NOTE — PROGRESS NOTE ADULT - SUBJECTIVE AND OBJECTIVE BOX
SUBJECTIVE: 88M w/PMH of HFpEF (LVEF 60%-5/2018 ) S/P AICD, ESRD on HD MWF, HTN , HLD  ,DM-2 ,Hypothyroid bought to the ED by the CC of the Flu like symptoms x 1 day .  Patient is a poor informant attempted to reach family multiple times unsuccessful .However patient complaints of weakness and lightheadedness . Right now in the ER he is feeling fine .As per chart note Symptoms started today with fever ,chills ,cough and 1 episode of vomiting . Associated with generalized vomiting ,lightheadedness .  Sick contacts family with similar complaints .      In the ED he +Entero/Rhino virus and received Vancomycin ,cefepime and 10 U regular insulin .    4/2/19: Patient notes that he feels a little better today but still has the cough. He continues to deny fever, chills or SOB. Today patient is not as lethargic and able to hold conversation without drifting asleep. Overnight, patient had low grade fever that resolved without Tylenol.     REVIEW OF SYSTEMS:  CONSTITUTIONAL: No weakness, fevers or chills  EYES/ENT: No visual changes;  No vertigo or throat pain   NECK: No pain or stiffness  RESPIRATORY: + cough  CARDIOVASCULAR: No chest pain or palpitations  GASTROINTESTINAL: No abdominal or epigastric pain. No nausea, vomiting, or hematemesis; No diarrhea or constipation. No melena or hematochezia.  GENITOURINARY: No dysuria, frequency or hematuria  NEUROLOGICAL: No numbness or weakness  SKIN: No itching, burning, rashes, or lesions   All other review of systems is negative unless indicated above    Vital Signs Last 24 Hrs  T(C): 37 (02 Apr 2019 17:34), Max: 37.6 (01 Apr 2019 21:47)  T(F): 98.6 (02 Apr 2019 17:34), Max: 99.7 (01 Apr 2019 21:47)  HR: 63 (02 Apr 2019 17:34) (63 - 100)  BP: 131/42 (02 Apr 2019 17:34) (106/43 - 131/42)  BP(mean): --  RR: 18 (02 Apr 2019 17:34) (18 - 18)  SpO2: 99% (02 Apr 2019 17:34) (96% - 100%)    I&O's Summary    02 Apr 2019 07:01  -  02 Apr 2019 18:15  --------------------------------------------------------  IN: 480 mL / OUT: 0 mL / NET: 480 mL        CAPILLARY BLOOD GLUCOSE      POCT Blood Glucose.: 180 mg/dL (02 Apr 2019 17:39)  POCT Blood Glucose.: 227 mg/dL (02 Apr 2019 12:35)  POCT Blood Glucose.: 196 mg/dL (02 Apr 2019 08:01)  POCT Blood Glucose.: 213 mg/dL (01 Apr 2019 21:18)      PHYSICAL EXAM:  Constitutional: NAD, awake and alert, well-developed, Yi peaking gentleman lying down in bed  HEENT: PERR, EOMI, Normal Hearing, MMM  Neck: Soft and supple, No LAD, No JVD  Respiratory: diffuse rhonchi, good inspiratory effort  Cardiovascular: S1 and S2, regular rate and rhythm, no Murmurs, gallops or rubs  Gastrointestinal: Bowel Sounds present, soft, nontender, nondistended, no guarding, no rebound  Extremities: No peripheral edema  Vascular: 2+ peripheral pulses  Neurological: A/O x 3, no focal deficits  Musculoskeletal: 4/5 strength b/l upper and lower extremities  Skin: No rashes    MEDICATIONS:  MEDICATIONS  (STANDING):  aspirin  chewable 81 milliGRAM(s) Oral daily  atorvastatin Oral Tab/Cap - Peds 40 milliGRAM(s) Oral daily  carvedilol 25 milliGRAM(s) Oral every 12 hours  cefepime  Injectable.      cefepime  Injectable. 1000 milliGRAM(s) IV Push daily  dextrose 5%. 1000 milliLiter(s) (50 mL/Hr) IV Continuous <Continuous>  dextrose 50% Injectable 12.5 Gram(s) IV Push once  dextrose 50% Injectable 25 Gram(s) IV Push once  dextrose 50% Injectable 25 Gram(s) IV Push once  heparin  Injectable 5000 Unit(s) SubCutaneous every 12 hours  hydrALAZINE  Oral Tab/Cap - Peds 100 milliGRAM(s) Oral two times a day  insulin glargine Injectable (LANTUS) 20 Unit(s) SubCutaneous at bedtime  insulin lispro (HumaLOG) corrective regimen sliding scale   SubCutaneous three times a day before meals  insulin lispro (HumaLOG) corrective regimen sliding scale   SubCutaneous at bedtime  isosorbide   mononitrate ER Tablet (IMDUR) 60 milliGRAM(s) Oral daily  levothyroxine  Oral Tab/Cap - Peds 75 MICROGram(s) Oral daily  multivitamin Oral Tab/Cap - Peds 1 Tablet(s) Oral daily  sevelamer hydrochloride Oral Tab/Cap - Peds 800 milliGRAM(s) Oral three times a day with meals  tamsulosin Oral Tab/Cap - Peds 0.4 milliGRAM(s) Oral at bedtime  vancomycin  IVPB 1000 milliGRAM(s) IV Intermittent <User Schedule>      LABS: All Labs Reviewed:                        12.0   9.02  )-----------( 135      ( 02 Apr 2019 06:41 )             37.7     04-02    130<L>  |  93<L>  |  60<H>  ----------------------------<  207<H>  4.5   |  27  |  6.16<H>    Ca    7.9<L>      02 Apr 2019 06:41  Phos  6.5     04-01  Mg     2.7     04-01    TPro  7.2  /  Alb  3.1<L>  /  TBili  0.4  /  DBili  x   /  AST  49<H>  /  ALT  27  /  AlkPhos  125<H>  04-02    Culture - Blood (03.31.19 @ 19:00)    Specimen Source: .Blood Blood-Peripheral    Culture Results:   No growth to date.

## 2019-04-02 NOTE — PROGRESS NOTE ADULT - ATTENDING COMMENTS
Patient seen and examined with Family Medicine Residents Hannah Shaffer, Yanet Haney and Linda Best on the Family Medicine Teaching Service.  Agree with history, physical, labs and plan which were reviewed in detail after a face to face encounter with the patient.
Patient seen and examined with Family Medicine Residents Hannah Shaffer on the Family Medicine Teaching Service.  Agree with history, physical, labs and plan which were reviewed in detail after a face to face encounter with the patient.

## 2019-04-02 NOTE — PROGRESS NOTE ADULT - SUBJECTIVE AND OBJECTIVE BOX
Date of service: 04-02-19 @ 12:26    pt seen and examined   feels better  less congested   temps down     ROS: no fever or chills; denies dizziness, no HA, no abdominal pain, no diarrhea or constipation; no dysuria, no urinary frequency, no legs pain, no rashes    MEDICATIONS  (STANDING):  aspirin  chewable 81 milliGRAM(s) Oral daily  atorvastatin Oral Tab/Cap - Peds 40 milliGRAM(s) Oral daily  carvedilol 25 milliGRAM(s) Oral every 12 hours  cefepime  Injectable.      cefepime  Injectable. 1000 milliGRAM(s) IV Push daily  dextrose 5%. 1000 milliLiter(s) (50 mL/Hr) IV Continuous <Continuous>  dextrose 50% Injectable 12.5 Gram(s) IV Push once  dextrose 50% Injectable 25 Gram(s) IV Push once  dextrose 50% Injectable 25 Gram(s) IV Push once  heparin  Injectable 5000 Unit(s) SubCutaneous every 12 hours  hydrALAZINE  Oral Tab/Cap - Peds 100 milliGRAM(s) Oral two times a day  insulin glargine Injectable (LANTUS) 20 Unit(s) SubCutaneous at bedtime  insulin lispro (HumaLOG) corrective regimen sliding scale   SubCutaneous three times a day before meals  insulin lispro (HumaLOG) corrective regimen sliding scale   SubCutaneous at bedtime  isosorbide   mononitrate ER Tablet (IMDUR) 60 milliGRAM(s) Oral daily  levothyroxine  Oral Tab/Cap - Peds 75 MICROGram(s) Oral daily  multivitamin Oral Tab/Cap - Peds 1 Tablet(s) Oral daily  sevelamer hydrochloride Oral Tab/Cap - Peds 800 milliGRAM(s) Oral three times a day with meals  tamsulosin Oral Tab/Cap - Peds 0.4 milliGRAM(s) Oral at bedtime  vancomycin  IVPB 1000 milliGRAM(s) IV Intermittent <User Schedule>      Vital Signs Last 24 Hrs  T(C): 36.3 (02 Apr 2019 12:06), Max: 37.8 (01 Apr 2019 12:58)  T(F): 97.4 (02 Apr 2019 12:06), Max: 100 (01 Apr 2019 12:58)  HR: 68 (02 Apr 2019 12:06) (68 - 100)  BP: 122/48 (02 Apr 2019 12:06) (102/50 - 146/69)  BP(mean): --  RR: 18 (02 Apr 2019 12:06) (16 - 18)  SpO2: 100% (02 Apr 2019 12:06) (96% - 100%)      PE:  Constitutional: NAD  HEENT: NC/AT, EOMI, PERRLA, conjunctivae clear; ears and nose atraumatic; pharynx benign  Neck: supple; thyroid not palpable  Back: no tenderness  Respiratory: congestion noted with lung auscultation  Cardiovascular: S1S2 regular, no murmurs  Abdomen: soft, not tender, not distended, positive BS; liver and spleen WNL  Genitourinary: no suprapubic tenderness  Lymphatic: no LN palpable  Musculoskeletal: no muscle tenderness, no joint swelling or tenderness  Extremities: no pedal edema  Neurological/ Psychiatric:  moving all extremities  Skin: no dermatologic lesions observed    Labs:                            12.0   9.02  )-----------( 135      ( 02 Apr 2019 06:41 )             37.7     04-02    130<L>  |  93<L>  |  60<H>  ----------------------------<  207<H>  4.5   |  27  |  6.16<H>    Ca    7.9<L>      02 Apr 2019 06:41  Phos  6.5     04-01  Mg     2.7     04-01    TPro  7.2  /  Alb  3.1<L>  /  TBili  0.4  /  DBili  x   /  AST  49<H>  /  ALT  27  /  AlkPhos  125<H>  04-02          RVP:(03-31 @ 19:00)  Detected      Culture - Blood (03.31.19 @ 19:00)    Specimen Source: .Blood Blood-Peripheral    Culture Results:   No growth to date.    Culture - Blood (03.31.19 @ 19:00)    Specimen Source: .Blood Blood-Peripheral    Culture Results:   No growth to date.      < from: CT Abdomen and Pelvis No Cont (03.31.19 @ 21:43) >    IMPRESSION:   There are stones in the gallbladder for which clinical correlation is   recommended. Ultrasound would be of value for further evaluation if   clinically indicated.  The appendix is air-filled but is mildly dilated. No evidence of   periappendiceal fat stranding.  Other findings as above.    < end of copied text >    < from: CT Chest No Cont (03.31.19 @ 21:42) >  IMPRESSION: Only mild atelectatic changes and/or infiltrates in the lower   lobes, greater in the right lower lobe, which may be indicative of mild   multifocal pneumonia. Clinical correlation is recommended.    < end of copied text >      < from: Xray Chest 1 View-PORTABLE IMMEDIATE (03.31.19 @ 20:07) >  FINDINGS/  IMPRESSION:      Evaluation the lungs is limited by suboptimal inspiration. Prominence of   the interstitium is present. Tiny right pleural effusion. Left chest wall   pacemaker in place.  Cardiomegaly present.

## 2019-04-02 NOTE — PROGRESS NOTE ADULT - SUBJECTIVE AND OBJECTIVE BOX
NEPHROLOGY INTERVAL HPI/OVERNIGHT EVENTS:    Date of Service: 19 @ 15:58  4/ SY  No acute events overnight.  No acute distress.  Responding appropriately.    HPI:  89y/o m with hx of esrd on hd mwf presents with weakness and lightheadedness with flu like symptoms for one day  RVP + . Due for HD today  no n/v/d, mild anorexia.      In the ED he +Entero/Rhino virus and received Vancomycin ,cefepime and 10 U regular insulin .    PAST MEDICAL & SURGICAL HISTORY:  - esrd on hd mwf  - disatolic chf with ef of 60%  - AICD  - T2DM  - HLD  - HTN  - HYPOTHYROID     MEDICATIONS  (STANDING):  aspirin  chewable 81 milliGRAM(s) Oral daily  atorvastatin Oral Tab/Cap - Peds 40 milliGRAM(s) Oral daily  carvedilol 25 milliGRAM(s) Oral every 12 hours  cefepime  Injectable.      cefepime  Injectable. 1000 milliGRAM(s) IV Push daily  dextrose 5%. 1000 milliLiter(s) (50 mL/Hr) IV Continuous <Continuous>  dextrose 50% Injectable 12.5 Gram(s) IV Push once  dextrose 50% Injectable 25 Gram(s) IV Push once  dextrose 50% Injectable 25 Gram(s) IV Push once  heparin  Injectable 5000 Unit(s) SubCutaneous every 12 hours  hydrALAZINE  Oral Tab/Cap - Peds 100 milliGRAM(s) Oral two times a day  insulin glargine Injectable (LANTUS) 20 Unit(s) SubCutaneous at bedtime  insulin lispro (HumaLOG) corrective regimen sliding scale   SubCutaneous three times a day before meals  insulin lispro (HumaLOG) corrective regimen sliding scale   SubCutaneous at bedtime  isosorbide   mononitrate ER Tablet (IMDUR) 60 milliGRAM(s) Oral daily  levothyroxine  Oral Tab/Cap - Peds 75 MICROGram(s) Oral daily  multivitamin Oral Tab/Cap - Peds 1 Tablet(s) Oral daily  sevelamer hydrochloride Oral Tab/Cap - Peds 800 milliGRAM(s) Oral three times a day with meals  tamsulosin Oral Tab/Cap - Peds 0.4 milliGRAM(s) Oral at bedtime  vancomycin  IVPB 1000 milliGRAM(s) IV Intermittent <User Schedule>    MEDICATIONS  (PRN):  acetaminophen   Tablet .. 650 milliGRAM(s) Oral every 6 hours PRN Mild Pain (1 - 3)  acetaminophen   Tablet .. 650 milliGRAM(s) Oral every 6 hours PRN Temp greater or equal to 38.5C (101.3F), Mild Pain (1 - 3)  dextrose 40% Gel 15 Gram(s) Oral once PRN Blood Glucose LESS THAN 70 milliGRAM(s)/deciliter  docusate sodium 100 milliGRAM(s) Oral three times a day PRN Constipation  glucagon  Injectable 1 milliGRAM(s) IntraMuscular once PRN Glucose LESS THAN 70 milligrams/deciliter  glycopyrrolate 1 milliGRAM(s) Oral once PRN Increased oral secretions    Vital Signs Last 24 Hrs  T(C): 36.3 (2019 12:06), Max: 37.6 (2019 21:47)  T(F): 97.4 (2019 12:06), Max: 99.7 (2019 21:47)  HR: 68 (2019 12:06) (68 - 100)  BP: 122/48 (2019 12:06) (102/50 - 140/66)  BP(mean): --  RR: 18 (2019 12:06) (16 - 18)  SpO2: 100% (2019 12:06) (96% - 100%)  Daily     Daily Weight in k.1 (2019 05:39)     @ 07:01  -  02 @ 15:58  --------------------------------------------------------  IN: 480 mL / OUT: 0 mL / NET: 480 mL    PHYSICAL EXAM:  Alert and responsive  GENERAL: No distress  CHEST/LUNG: Fair air entry  HEART: S1S2 RRR  ABDOMEN: soft  EXTREMITIES: no edema  SKIN:     LABS:                        12.0   9.02  )-----------( 135      ( 2019 06:41 )             37.7         130<L>  |  93<L>  |  60<H>  ----------------------------<  207<H>  4.5   |  27  |  6.16<H>    Ca    7.9<L>      2019 06:41  Phos  6.5     -  Mg     2.7     -    TPro  7.2  /  Alb  3.1<L>  /  TBili  0.4  /  DBili  x   /  AST  49<H>  /  ALT  27  /  AlkPhos  125<H>      PT/INR - ( 31 Mar 2019 19:00 )   PT: 10.7 sec;   INR: 0.97 ratio         PTT - ( 31 Mar 2019 19:00 )  PTT:24.8 sec            RADIOLOGY & ADDITIONAL TESTS:

## 2019-04-03 ENCOUNTER — TRANSCRIPTION ENCOUNTER (OUTPATIENT)
Age: 84
End: 2019-04-03

## 2019-04-03 VITALS
SYSTOLIC BLOOD PRESSURE: 113 MMHG | DIASTOLIC BLOOD PRESSURE: 48 MMHG | RESPIRATION RATE: 18 BRPM | OXYGEN SATURATION: 94 % | HEART RATE: 78 BPM

## 2019-04-03 LAB
ALBUMIN SERPL ELPH-MCNC: 3 G/DL — LOW (ref 3.3–5)
ANION GAP SERPL CALC-SCNC: 18 MMOL/L — HIGH (ref 5–17)
BUN SERPL-MCNC: 94 MG/DL — HIGH (ref 7–23)
CALCIUM SERPL-MCNC: 7.2 MG/DL — LOW (ref 8.5–10.1)
CHLORIDE SERPL-SCNC: 95 MMOL/L — LOW (ref 96–108)
CO2 SERPL-SCNC: 19 MMOL/L — LOW (ref 22–31)
CREAT SERPL-MCNC: 7.48 MG/DL — HIGH (ref 0.5–1.3)
GLUCOSE SERPL-MCNC: 192 MG/DL — HIGH (ref 70–99)
HCT VFR BLD CALC: 36.5 % — LOW (ref 39–50)
HGB BLD-MCNC: 12 G/DL — LOW (ref 13–17)
MCHC RBC-ENTMCNC: 29.4 PG — SIGNIFICANT CHANGE UP (ref 27–34)
MCHC RBC-ENTMCNC: 32.9 GM/DL — SIGNIFICANT CHANGE UP (ref 32–36)
MCV RBC AUTO: 89.5 FL — SIGNIFICANT CHANGE UP (ref 80–100)
NRBC # BLD: 0 /100 WBCS — SIGNIFICANT CHANGE UP (ref 0–0)
PHOSPHATE SERPL-MCNC: 6.6 MG/DL — HIGH (ref 2.5–4.5)
PLATELET # BLD AUTO: 146 K/UL — LOW (ref 150–400)
POTASSIUM SERPL-MCNC: 4.9 MMOL/L — SIGNIFICANT CHANGE UP (ref 3.5–5.3)
POTASSIUM SERPL-SCNC: 4.9 MMOL/L — SIGNIFICANT CHANGE UP (ref 3.5–5.3)
RBC # BLD: 4.08 M/UL — LOW (ref 4.2–5.8)
RBC # FLD: 14 % — SIGNIFICANT CHANGE UP (ref 10.3–14.5)
SODIUM SERPL-SCNC: 132 MMOL/L — LOW (ref 135–145)
WBC # BLD: 10.15 K/UL — SIGNIFICANT CHANGE UP (ref 3.8–10.5)
WBC # FLD AUTO: 10.15 K/UL — SIGNIFICANT CHANGE UP (ref 3.8–10.5)

## 2019-04-03 RX ORDER — VANCOMYCIN HCL 1 G
1 VIAL (EA) INTRAVENOUS
Qty: 0 | Refills: 0 | COMMUNITY
Start: 2019-04-03

## 2019-04-03 RX ORDER — CEFEPIME 1 G/1
1 INJECTION, POWDER, FOR SOLUTION INTRAMUSCULAR; INTRAVENOUS
Qty: 0 | Refills: 0 | COMMUNITY
Start: 2019-04-03

## 2019-04-03 RX ORDER — LOSARTAN POTASSIUM 100 MG/1
1 TABLET, FILM COATED ORAL
Qty: 0 | Refills: 0 | COMMUNITY

## 2019-04-03 RX ORDER — CEFUROXIME AXETIL 250 MG
1 TABLET ORAL
Qty: 4 | Refills: 0 | OUTPATIENT
Start: 2019-04-03 | End: 2019-04-06

## 2019-04-03 RX ORDER — CEFUROXIME AXETIL 250 MG
1 TABLET ORAL
Qty: 7 | Refills: 0
Start: 2019-04-03 | End: 2019-04-09

## 2019-04-03 RX ADMIN — HEPARIN SODIUM 5000 UNIT(S): 5000 INJECTION INTRAVENOUS; SUBCUTANEOUS at 04:40

## 2019-04-03 RX ADMIN — CARVEDILOL PHOSPHATE 25 MILLIGRAM(S): 80 CAPSULE, EXTENDED RELEASE ORAL at 04:40

## 2019-04-03 RX ADMIN — Medication 1 TABLET(S): at 11:15

## 2019-04-03 RX ADMIN — SEVELAMER CARBONATE 800 MILLIGRAM(S): 2400 POWDER, FOR SUSPENSION ORAL at 18:05

## 2019-04-03 RX ADMIN — Medication 81 MILLIGRAM(S): at 11:15

## 2019-04-03 RX ADMIN — Medication 250 MILLIGRAM(S): at 18:11

## 2019-04-03 RX ADMIN — Medication 2: at 08:22

## 2019-04-03 RX ADMIN — Medication 650 MILLIGRAM(S): at 18:43

## 2019-04-03 RX ADMIN — ATORVASTATIN CALCIUM 40 MILLIGRAM(S): 80 TABLET, FILM COATED ORAL at 11:15

## 2019-04-03 RX ADMIN — HEPARIN SODIUM 5000 UNIT(S): 5000 INJECTION INTRAVENOUS; SUBCUTANEOUS at 18:12

## 2019-04-03 RX ADMIN — ISOSORBIDE MONONITRATE 60 MILLIGRAM(S): 60 TABLET, EXTENDED RELEASE ORAL at 18:05

## 2019-04-03 RX ADMIN — CEFEPIME 1000 MILLIGRAM(S): 1 INJECTION, POWDER, FOR SOLUTION INTRAMUSCULAR; INTRAVENOUS at 18:05

## 2019-04-03 RX ADMIN — Medication 100 MILLIGRAM(S): at 04:39

## 2019-04-03 RX ADMIN — Medication 100 MILLIGRAM(S): at 18:06

## 2019-04-03 RX ADMIN — Medication 2: at 11:16

## 2019-04-03 RX ADMIN — SEVELAMER CARBONATE 800 MILLIGRAM(S): 2400 POWDER, FOR SUSPENSION ORAL at 08:21

## 2019-04-03 RX ADMIN — SEVELAMER CARBONATE 800 MILLIGRAM(S): 2400 POWDER, FOR SUSPENSION ORAL at 11:15

## 2019-04-03 RX ADMIN — Medication 75 MICROGRAM(S): at 04:40

## 2019-04-03 RX ADMIN — CARVEDILOL PHOSPHATE 25 MILLIGRAM(S): 80 CAPSULE, EXTENDED RELEASE ORAL at 18:05

## 2019-04-03 NOTE — DISCHARGE NOTE NURSING/CASE MANAGEMENT/SOCIAL WORK - NSDCDPATPORTLINK_GEN_ALL_CORE
You can access the [facility] Patient Portal, offered by [facility], by registering with the following website: [email or web address]

## 2019-04-03 NOTE — DISCHARGE NOTE PROVIDER - NSDCCPCAREPLAN_GEN_ALL_CORE_FT
PRINCIPAL DISCHARGE DIAGNOSIS  Diagnosis: Multifocal pneumonia  Assessment and Plan of Treatment: Your fever and fatigue were caused by a pneumonia. You were started on IV Vancomycin and Cefepime to treat the pneumonia. You will need to continue IV antibiotics with hemodialysis.      SECONDARY DISCHARGE DIAGNOSES  Diagnosis: ESRD on dialysis  Assessment and Plan of Treatment: Please continue Hemodialysis as scheduled on Monday, wednesday and Friday.    Diagnosis: DM (diabetes mellitus)  Assessment and Plan of Treatment: Your glucose readings were elevated in the ED but normalized with insulin. Please continue to take your insulin as prescribed and follow-up with your primary doctor to further management. PRINCIPAL DISCHARGE DIAGNOSIS  Diagnosis: Multifocal pneumonia  Assessment and Plan of Treatment: Your fever and fatigue were caused by a pneumonia. You were started on IV Vancomycin and Cefepime to treat the pneumonia. You will need to continue Ceftin (oral antibiotics) for 4 more days. You will take the antibiotics after dialysis.      SECONDARY DISCHARGE DIAGNOSES  Diagnosis: ESRD on dialysis  Assessment and Plan of Treatment: Please continue Hemodialysis as scheduled on Monday, wednesday and Friday.    Diagnosis: DM (diabetes mellitus)  Assessment and Plan of Treatment: Your glucose readings were elevated in the ED but normalized with insulin. Please continue to take your insulin as prescribed and follow-up with your primary doctor to further management. PRINCIPAL DISCHARGE DIAGNOSIS  Diagnosis: Multifocal pneumonia  Assessment and Plan of Treatment: Your fever and fatigue were caused by a pneumonia. You were started on IV Vancomycin and Cefepime to treat the pneumonia. You will need to continue Ceftin (oral antibiotics) for 7 more days. You will take the antibiotics after dialysis.      SECONDARY DISCHARGE DIAGNOSES  Diagnosis: ESRD on dialysis  Assessment and Plan of Treatment: Please continue Hemodialysis as scheduled on Monday, wednesday and Friday.    Diagnosis: DM (diabetes mellitus)  Assessment and Plan of Treatment: Your glucose readings were elevated in the ED but normalized with insulin. Please continue to take your insulin as prescribed and follow-up with your primary doctor to further management.

## 2019-04-03 NOTE — PROGRESS NOTE ADULT - ASSESSMENT
87 y/o m with hx of esrd on hd with + RVP ( adeno and rhinovirus). r/p PNA.  Diastolic CHF with aicd    PLAN  - ESRD: tolerating hd and will attempt inc uf with hd  - anemia: epo  on hold  - MBD; continue with sevelamer   - + RVP r/p pna supportive care, abx as per ID     fu cx send  - HTN; fu on current regimen of bp and will continue with coreg     4/2 SY  --ESRD :: continue TIW HD  --Resp : + RVP : improved status.  --Anemia : monitor.  --Fluid and electrolytes stable.    4/3 MK   - esrd: tolerating hd with goal uf of 3kg on 2k   - RVP+; improved symotoms  - anemia: epo on hold
88 y.o. male with PMHx of HFpEF (LVEF 60%) S/P AICD, ESRD on HD MWF, HTN, HLD bought to the ED by the CC of the Flu like symptoms since 3/30/19. Patient currently complains of active cough and recent days of feeling symptoms of the flu. + Rhino and Enterovirus, was given vanco and cefipime.     1. sepsis. viral syndrome. multifocal pna. ESRD  - imaging reviewed slowly improving   - on cefepime 1gm daily for gnr resistant coverage #3  - on vancomycin post HD 1gm mwf mrsa coverage #3  - continue with abx coverage   - cx no growth, check sputum cx  - monitor temps  - tolerating abx well so far; no side effects noted  - reason for abx use and side effects reviewed with patient  - supportive care  - fu cbc    2. other issues - care per medicine
88 y.o. male with PMHx of HFpEF (LVEF 60%) S/P AICD, ESRD on HD MWF, HTN, HLD bought to the ED by the CC of the Flu like symptoms since 3/30/19. Patient currently complains of active cough and recent days of feeling symptoms of the flu. + Rhino and Enterovirus, was given vanco and cefipime.     1. sepsis. viral syndrome. multifocal pna. ESRD  - imaging reviewed slowly improving   - on cefepime 1gm daily for gnr resistant coverage #4  - on vancomycin post HD 1gm mwf mrsa coverage #4  - continue with abx coverage   - cx no growth, check sputum cx  - on dc plan for po ceftin 500mg daily (post HD on dialysis days) to complete 7-10 days   - monitor temps  - tolerating abx well so far; no side effects noted  - reason for abx use and side effects reviewed with patient  - supportive care  - fu cbc    2. other issues - care per medicine
88M w/PMH of HFpEF (LVEF 60%) S/P AICD, ESRD on HD MWF, HTN, HLD bought to the ED by the CC of the Flu like symptoms x 1 day  found to Rhino/Entero Virus +    #Sepsis likely secondary to Rhino/Entero virus & Multifocal PNA  -RVP positive for Rhino and Entero  -Patient afebrile in past 24 hours  -CT Chest/Abd: consistent with Multifocal PNA  - Blood cultures 3/31/19  NGTD  - continue Cefepime and Vancomycin (with dialysis in AM)  - ID recommendations appreciated    #ESRD (end stage renal disease) on dialysis.  - HD on MWF  - continue sevelamer, tamsulosin  - Renal recommendations appreciated- continue dialysis, hold Epo     # T2DM (type 2 diabetes mellitus)  - BGM elevated today (as patient has increased oral intake today)  - received additional 8 units of humalog today  - continue Lantus 20units QHS with ISS  - Continue Diabetic Diet     # HTN (Hypertension)  - WNL  - DASH diet  - Continue Carvedilol ,hydralazine and Imdur     #Heart failure with preserved ejection fraction.   - no issues at this time  - continue Imdur  - continue daily weights    #CAD (coronary artery disease)  -no issues at this time  - continue Plavix, ASA    #Hypothyroidism  -no issues at this time   - continue Levothyroxine    #Hyperlipidemia.   -Continue Atorvastatin 40mg qd.     #Advanced Directives: DNR/DNI; MOLST completed and in hospital chart    #dispo: continue Hospitalization;  will begin d/c planning
89 y/o m with hx of esrd on hd with + RVP ( adeno and rhinovirus). r/p PNA.  Diastolic CHF with aicd    PLAN  - ESRD: tolerating hd and will attempt inc uf with hd  - anemia: epo  on hold  - MBD; continue with sevelamer   - + RVP r/p pna supportive care, abx as per ID     fu cx send  - HTN; fu on current regimen of bp and will continue with coreg     4/2 SY  --ESRD :: continue TIW HD  --Resp : + RVP : improved status.  --Anemia : monitor.  --Fluid and electrolytes stable.
88M w/PMH of HFpEF (LVEF 60%) S/P AICD, ESRD on HD MWF, HTN, HLD bought to the ED by the CC of the Flu like symptoms x 1 day  found to Rhino/Entero Virus +    #Sepsis likely secondary to Rhino/Entero virus & Multifocal PNA  -RVP positive for Rhino and Entero  -Patient febrile to 100.6F today  -s/p 500 ml bolus ,Vancomycin and cefepime in ED  -CT Chest/Abd: consistent with Multifocal PNA  - will f/u Blood cultures  - will continue Cefepime and Vancomycin  - ID recommendations appreciated    #ESRD (end stage renal disease) on dialysis.  - HD on MWF  - continue sevelamer, tamsulosin  - Renal recommendations appreciated- continue dialysis, hold Epo     # T2DM (type 2 diabetes mellitus)  - BGM WNL today  - continue Lantus 20units QHS with ISS  - Continue Diabetic Diet     # HTN (Hypertension)  - WNL  - DASH diet  - Continue Carvedilol  ,hydralazine and Imdur     #Heart failure with preserved ejection fraction.   -s/p 500 ml bolus in the ED  -will monitor I&O's  - Daily weights    #CAD (coronary artery disease)  -no issues at this time  - continue Plavix, ASA    #Hyponatremia  - resolved    #Hypothyroidism  -no issues at this time   - continue Levothyroxine    #Hyperlipidemia.   -Continue Atorvastatin 40mg qd.     #Advanced Directives: HCP notes that patient wants to be DNR/DNI; MOLST to be signed by attending     #dispo: continue Hospitalization

## 2019-04-03 NOTE — PROGRESS NOTE ADULT - SUBJECTIVE AND OBJECTIVE BOX
Date of service: 04-03-19 @ 11:05    pt seen and examined   feels better  less cough/congestion  afebrile      ROS: no fever or chills; denies dizziness, no HA, no abdominal pain, no diarrhea or constipation; no dysuria, no urinary frequency, no legs pain, no rashes    MEDICATIONS  (STANDING):  aspirin  chewable 81 milliGRAM(s) Oral daily  atorvastatin Oral Tab/Cap - Peds 40 milliGRAM(s) Oral daily  carvedilol 25 milliGRAM(s) Oral every 12 hours  cefepime  Injectable.      cefepime  Injectable. 1000 milliGRAM(s) IV Push daily  dextrose 5%. 1000 milliLiter(s) (50 mL/Hr) IV Continuous <Continuous>  dextrose 50% Injectable 12.5 Gram(s) IV Push once  dextrose 50% Injectable 25 Gram(s) IV Push once  dextrose 50% Injectable 25 Gram(s) IV Push once  heparin  Injectable 5000 Unit(s) SubCutaneous every 12 hours  hydrALAZINE  Oral Tab/Cap - Peds 100 milliGRAM(s) Oral two times a day  insulin glargine Injectable (LANTUS) 20 Unit(s) SubCutaneous at bedtime  insulin lispro (HumaLOG) corrective regimen sliding scale   SubCutaneous three times a day before meals  insulin lispro (HumaLOG) corrective regimen sliding scale   SubCutaneous at bedtime  isosorbide   mononitrate ER Tablet (IMDUR) 60 milliGRAM(s) Oral daily  levothyroxine  Oral Tab/Cap - Peds 75 MICROGram(s) Oral daily  multivitamin Oral Tab/Cap - Peds 1 Tablet(s) Oral daily  sevelamer hydrochloride Oral Tab/Cap - Peds 800 milliGRAM(s) Oral three times a day with meals  tamsulosin Oral Tab/Cap - Peds 0.4 milliGRAM(s) Oral at bedtime  vancomycin  IVPB 1000 milliGRAM(s) IV Intermittent <User Schedule>      Vital Signs Last 24 Hrs  T(C): 36.7 (03 Apr 2019 04:29), Max: 37 (02 Apr 2019 17:34)  T(F): 98 (03 Apr 2019 04:29), Max: 98.6 (02 Apr 2019 17:34)  HR: 71 (03 Apr 2019 04:29) (63 - 71)  BP: 161/58 (03 Apr 2019 04:29) (122/48 - 161/58)  BP(mean): --  RR: 17 (03 Apr 2019 04:29) (17 - 18)  SpO2: 100% (03 Apr 2019 04:29) (99% - 100%)    PE:  Constitutional: NAD  HEENT: NC/AT, EOMI, PERRLA, conjunctivae clear; ears and nose atraumatic; pharynx benign  Neck: supple; thyroid not palpable  Back: no tenderness  Respiratory: congestion noted with lung auscultation  Cardiovascular: S1S2 regular, no murmurs  Abdomen: soft, not tender, not distended, positive BS; liver and spleen WNL  Genitourinary: no suprapubic tenderness  Lymphatic: no LN palpable  Musculoskeletal: no muscle tenderness, no joint swelling or tenderness  Extremities: no pedal edema  Neurological/ Psychiatric:  moving all extremities  Skin: no dermatologic lesions observed    Labs:                            12.0   9.02  )-----------( 135      ( 02 Apr 2019 06:41 )             37.7     04-02    130<L>  |  93<L>  |  60<H>  ----------------------------<  207<H>  4.5   |  27  |  6.16<H>    Ca    7.9<L>      02 Apr 2019 06:41    TPro  7.2  /  Alb  3.1<L>  /  TBili  0.4  /  DBili  x   /  AST  49<H>  /  ALT  27  /  AlkPhos  125<H>  04-02      RVP:(03-31 @ 19:00)  Detected      Culture - Blood (03.31.19 @ 19:00)    Specimen Source: .Blood Blood-Peripheral    Culture Results:   No growth to date.    Culture - Blood (03.31.19 @ 19:00)    Specimen Source: .Blood Blood-Peripheral    Culture Results:   No growth to date.      < from: CT Abdomen and Pelvis No Cont (03.31.19 @ 21:43) >    IMPRESSION:   There are stones in the gallbladder for which clinical correlation is   recommended. Ultrasound would be of value for further evaluation if   clinically indicated.  The appendix is air-filled but is mildly dilated. No evidence of   periappendiceal fat stranding.  Other findings as above.    < end of copied text >    < from: CT Chest No Cont (03.31.19 @ 21:42) >  IMPRESSION: Only mild atelectatic changes and/or infiltrates in the lower   lobes, greater in the right lower lobe, which may be indicative of mild   multifocal pneumonia. Clinical correlation is recommended.    < end of copied text >      < from: Xray Chest 1 View-PORTABLE IMMEDIATE (03.31.19 @ 20:07) >  FINDINGS/  IMPRESSION:      Evaluation the lungs is limited by suboptimal inspiration. Prominence of   the interstitium is present. Tiny right pleural effusion. Left chest wall   pacemaker in place.  Cardiomegaly present.

## 2019-04-03 NOTE — DISCHARGE NOTE PROVIDER - CARE PROVIDER_API CALL
Maliha Perales)  Medicine  [address] [facility] [address] [place] [address]  Phone: [phone]  Fax: [phone]  Follow Up Time:

## 2019-04-03 NOTE — PROGRESS NOTE ADULT - REASON FOR ADMISSION
Flu like symptoms x 1 day

## 2019-04-03 NOTE — DISCHARGE NOTE PROVIDER - HOSPITAL COURSE
88M w/PMH of HFpEF (LVEF 60%-5/2018 ) S/P AICD, ESRD on HD MWF, HTN , HLD  ,DM-2 ,Hypothyroid brought to  on 3/31/19 due to 1 day of fever an malaise. Patient was found to be Rhino/Entero virus positive. CT chest showed a multifocal pneumonia. Patient was started on IV Vancomycin and IV Cefepime with MWF hemodialysis. Patient's fevers resolved and mental status improved after IV antibiotics were started. At this time patient has been afebrile for more than 24 hours and is stable for discharge with continued antibiotics with HD. Hospital course was complicated by elevated blood glucose measurements in ED (458) that resolved with 10 units of insulin. Patient was evaluated by Infectious Disease and Nephrology during hospitalization.         Vital Signs Last 24 Hrs    T(C): 36.7 (03 Apr 2019 04:29), Max: 37 (02 Apr 2019 17:34)    T(F): 98 (03 Apr 2019 04:29), Max: 98.6 (02 Apr 2019 17:34)    HR: 71 (03 Apr 2019 04:29) (63 - 71)    BP: 161/58 (03 Apr 2019 04:29) (122/48 - 161/58)    RR: 17 (03 Apr 2019 04:29) (17 - 18)    SpO2: 100% (03 Apr 2019 04:29) (99% - 100%)        PHYSICAL EXAM:    Constitutional: NAD, awake and alert, well-developed, Slovak peaking gentleman lying down in bed    HEENT: PERR, EOMI, Normal Hearing, MMM    Neck: Soft and supple, No LAD, No JVD    Respiratory: diffuse rhonchi, good inspiratory effort    Cardiovascular: S1 and S2, regular rate and rhythm, no Murmurs, gallops or rubs    Gastrointestinal: Bowel Sounds present, soft, nontender, nondistended, no guarding, no rebound    Extremities: No peripheral edema    Vascular: 2+ peripheral pulses    Neurological: A/O x 3, no focal deficits    Musculoskeletal: 4/5 strength b/l upper and lower extremities    Skin: No rashes 88M w/PMH of HFpEF (LVEF 60%-5/2018 ) S/P AICD, ESRD on HD MWF, HTN , HLD  ,DM-2 ,Hypothyroid brought to  on 3/31/19 due to 1 day of fever an malaise. Patient was found to be Rhino/Entero virus positive. CT chest showed a multifocal pneumonia. Patient was started on IV Vancomycin and IV Cefepime with MWF hemodialysis. Patient's fevers resolved and mental status improved after IV antibiotics were started. At this time patient has been afebrile for more than 24 hours and is stable for discharge. On discharge he will need to take Ceftin 500mg daily for 4 more days. On HD he will need to take Ceftin after hemodialysis. Hospital course was complicated by elevated blood glucose measurements in ED (458) that resolved with 10 units of insulin. Patient was evaluated by Infectious Disease and Nephrology during hospitalization.         Vital Signs Last 24 Hrs    T(C): 36.7 (03 Apr 2019 04:29), Max: 37 (02 Apr 2019 17:34)    T(F): 98 (03 Apr 2019 04:29), Max: 98.6 (02 Apr 2019 17:34)    HR: 71 (03 Apr 2019 04:29) (63 - 71)    BP: 161/58 (03 Apr 2019 04:29) (122/48 - 161/58)    RR: 17 (03 Apr 2019 04:29) (17 - 18)    SpO2: 100% (03 Apr 2019 04:29) (99% - 100%)        PHYSICAL EXAM:    Constitutional: NAD, awake and alert, well-developed, Yi speaking gentleman sitting up on chair     HEENT: PERR, EOMI, Normal Hearing, MMM    Neck: Soft and supple, No LAD, No JVD    Respiratory: diffuse rhonchi, good inspiratory effort    Cardiovascular: S1 and S2, regular rate and rhythm, no Murmurs, gallops or rubs    Gastrointestinal: Bowel Sounds present, soft, nontender, nondistended, no guarding, no rebound    Extremities: No peripheral edema    Vascular: 2+ peripheral pulses    Neurological: A/O x 3, no focal deficits    Musculoskeletal: 4/5 strength b/l upper and lower extremities    Skin: No rashes 88M w/PMH of HFpEF (LVEF 60%-5/2018 ) S/P AICD, ESRD on HD MWF, HTN , HLD  ,DM-2 ,Hypothyroid brought to  on 3/31/19 due to 1 day of fever and malaise. Patient was found to be Rhino/Entero virus positive. CT chest showed a multifocal pneumonia. Patient was started on IV Vancomycin and IV Cefepime with MWF hemodialysis. Patient's fevers resolved and mental status improved after IV antibiotics were started. At this time patient has been afebrile for more than 24 hours and is stable for discharge. On discharge he will need to take Ceftin 500mg daily for 7more days. On HD he will need to take Ceftin after hemodialysis. Hospital course was complicated by elevated blood glucose measurements in ED (458) that resolved with 10 units of insulin. Patient was evaluated by Infectious Disease and Nephrology during hospitalization.         Vital Signs Last 24 Hrs    T(C): 36.7 (03 Apr 2019 04:29), Max: 37 (02 Apr 2019 17:34)    T(F): 98 (03 Apr 2019 04:29), Max: 98.6 (02 Apr 2019 17:34)    HR: 71 (03 Apr 2019 04:29) (63 - 71)    BP: 161/58 (03 Apr 2019 04:29) (122/48 - 161/58)    RR: 17 (03 Apr 2019 04:29) (17 - 18)    SpO2: 100% (03 Apr 2019 04:29) (99% - 100%)        PHYSICAL EXAM:    Constitutional: NAD, awake and alert, well-developed, Estonian speaking gentleman sitting up on chair     HEENT: PERR, EOMI, Normal Hearing, MMM    Neck: Soft and supple, No LAD, No JVD    Respiratory: diffuse rhonchi, good inspiratory effort    Cardiovascular: S1 and S2, regular rate and rhythm, no Murmurs, gallops or rubs    Gastrointestinal: Bowel Sounds present, soft, nontender, nondistended, no guarding, no rebound    Extremities: No peripheral edema    Vascular: 2+ peripheral pulses    Neurological: A/O x 3, no focal deficits    Musculoskeletal: 4/5 strength b/l upper and lower extremities    Skin: No rashes

## 2019-04-03 NOTE — PROGRESS NOTE ADULT - SUBJECTIVE AND OBJECTIVE BOX
NEPHROLOGY INTERVAL HPI/OVERNIGHT EVENTS:  19 @ 13:49  doing well, for dc to rehab    MEDICATIONS  (STANDING):  aspirin  chewable 81 milliGRAM(s) Oral daily  atorvastatin Oral Tab/Cap - Peds 40 milliGRAM(s) Oral daily  carvedilol 25 milliGRAM(s) Oral every 12 hours  cefepime  Injectable.      cefepime  Injectable. 1000 milliGRAM(s) IV Push daily  dextrose 5%. 1000 milliLiter(s) (50 mL/Hr) IV Continuous <Continuous>  dextrose 50% Injectable 12.5 Gram(s) IV Push once  dextrose 50% Injectable 25 Gram(s) IV Push once  dextrose 50% Injectable 25 Gram(s) IV Push once  heparin  Injectable 5000 Unit(s) SubCutaneous every 12 hours  hydrALAZINE  Oral Tab/Cap - Peds 100 milliGRAM(s) Oral two times a day  insulin glargine Injectable (LANTUS) 20 Unit(s) SubCutaneous at bedtime  insulin lispro (HumaLOG) corrective regimen sliding scale   SubCutaneous three times a day before meals  insulin lispro (HumaLOG) corrective regimen sliding scale   SubCutaneous at bedtime  isosorbide   mononitrate ER Tablet (IMDUR) 60 milliGRAM(s) Oral daily  levothyroxine  Oral Tab/Cap - Peds 75 MICROGram(s) Oral daily  multivitamin Oral Tab/Cap - Peds 1 Tablet(s) Oral daily  sevelamer hydrochloride Oral Tab/Cap - Peds 800 milliGRAM(s) Oral three times a day with meals  tamsulosin Oral Tab/Cap - Peds 0.4 milliGRAM(s) Oral at bedtime  vancomycin  IVPB 1000 milliGRAM(s) IV Intermittent <User Schedule>    MEDICATIONS  (PRN):  acetaminophen   Tablet .. 650 milliGRAM(s) Oral every 6 hours PRN Mild Pain (1 - 3)  acetaminophen   Tablet .. 650 milliGRAM(s) Oral every 6 hours PRN Temp greater or equal to 38.5C (101.3F), Mild Pain (1 - 3)  dextrose 40% Gel 15 Gram(s) Oral once PRN Blood Glucose LESS THAN 70 milliGRAM(s)/deciliter  docusate sodium 100 milliGRAM(s) Oral three times a day PRN Constipation  glucagon  Injectable 1 milliGRAM(s) IntraMuscular once PRN Glucose LESS THAN 70 milligrams/deciliter  glycopyrrolate 1 milliGRAM(s) Oral once PRN Increased oral secretions      Allergies    No Known Allergies    Intolerances        I&O's Detail    2019 07:01  -  2019 07:00  --------------------------------------------------------  IN:    Oral Fluid: 480 mL  Total IN: 480 mL    OUT:  Total OUT: 0 mL    Total NET: 480 mL      2019 07:01  -  2019 13:49  --------------------------------------------------------  IN:    Oral Fluid: 480 mL  Total IN: 480 mL    OUT:  Total OUT: 0 mL    Total NET: 480 mL        Vital Signs Last 24 Hrs  T(C): 36.2 (2019 12:52), Max: 37 (2019 17:34)  T(F): 97.2 (2019 12:52), Max: 98.6 (2019 17:34)  HR: 66 (2019 13:35) (63 - 101)  BP: 123/58 (2019 13:35) (123/58 - 161/58)  BP(mean): --  RR: 16 (2019 13:35) (16 - 18)  SpO2: 97% (2019 11:29) (97% - 100%)  Daily     Daily Weight in k.2 (2019 04:29)    PHYSICAL EXAM:  General: alert. awake Ox3  HEENT: MMM  CV: s1s2 rrr  LUNGS: B/L CTA  EXT: no edema    LABS:                        12.0   10.15 )-----------( 146      ( 2019 12:45 )             36.5     04-03    132<L>  |  95<L>  |  94<H>  ----------------------------<  192<H>  4.9   |  19<L>  |  7.48<H>    Ca    7.2<L>      2019 12:45  Phos  6.6     04-03    TPro  x   /  Alb  3.0<L>  /  TBili  x   /  DBili  x   /  AST  x   /  ALT  x   /  AlkPhos  x   -        Phosphorus Level, Serum: 6.6 mg/dL ( @ 12:45)    Seen at HD  tolerating hd with goal uf of 3KG on 2k   via AVF revclear 300

## 2019-04-06 LAB
CULTURE RESULTS: SIGNIFICANT CHANGE UP
CULTURE RESULTS: SIGNIFICANT CHANGE UP
SPECIMEN SOURCE: SIGNIFICANT CHANGE UP
SPECIMEN SOURCE: SIGNIFICANT CHANGE UP

## 2019-04-09 ENCOUNTER — APPOINTMENT (OUTPATIENT)
Dept: CARDIOLOGY | Facility: CLINIC | Age: 84
End: 2019-04-09

## 2019-04-10 DIAGNOSIS — B97.10 UNSPECIFIED ENTEROVIRUS AS THE CAUSE OF DISEASES CLASSIFIED ELSEWHERE: ICD-10-CM

## 2019-04-10 DIAGNOSIS — E11.22 TYPE 2 DIABETES MELLITUS WITH DIABETIC CHRONIC KIDNEY DISEASE: ICD-10-CM

## 2019-04-10 DIAGNOSIS — E87.1 HYPO-OSMOLALITY AND HYPONATREMIA: ICD-10-CM

## 2019-04-10 DIAGNOSIS — A41.9 SEPSIS, UNSPECIFIED ORGANISM: ICD-10-CM

## 2019-04-10 DIAGNOSIS — A41.89 OTHER SPECIFIED SEPSIS: ICD-10-CM

## 2019-04-10 DIAGNOSIS — J18.9 PNEUMONIA, UNSPECIFIED ORGANISM: ICD-10-CM

## 2019-04-10 DIAGNOSIS — Z79.82 LONG TERM (CURRENT) USE OF ASPIRIN: ICD-10-CM

## 2019-04-10 DIAGNOSIS — I25.10 ATHEROSCLEROTIC HEART DISEASE OF NATIVE CORONARY ARTERY WITHOUT ANGINA PECTORIS: ICD-10-CM

## 2019-04-10 DIAGNOSIS — B97.89 OTHER VIRAL AGENTS AS THE CAUSE OF DISEASES CLASSIFIED ELSEWHERE: ICD-10-CM

## 2019-04-10 DIAGNOSIS — I50.32 CHRONIC DIASTOLIC (CONGESTIVE) HEART FAILURE: ICD-10-CM

## 2019-04-10 DIAGNOSIS — Z95.810 PRESENCE OF AUTOMATIC (IMPLANTABLE) CARDIAC DEFIBRILLATOR: ICD-10-CM

## 2019-04-10 DIAGNOSIS — I25.2 OLD MYOCARDIAL INFARCTION: ICD-10-CM

## 2019-04-10 DIAGNOSIS — Z99.2 DEPENDENCE ON RENAL DIALYSIS: ICD-10-CM

## 2019-04-10 DIAGNOSIS — N18.6 END STAGE RENAL DISEASE: ICD-10-CM

## 2019-04-10 DIAGNOSIS — D64.9 ANEMIA, UNSPECIFIED: ICD-10-CM

## 2019-04-10 DIAGNOSIS — E11.65 TYPE 2 DIABETES MELLITUS WITH HYPERGLYCEMIA: ICD-10-CM

## 2019-04-10 DIAGNOSIS — Z79.4 LONG TERM (CURRENT) USE OF INSULIN: ICD-10-CM

## 2019-04-10 DIAGNOSIS — E03.9 HYPOTHYROIDISM, UNSPECIFIED: ICD-10-CM

## 2019-04-10 DIAGNOSIS — I13.2 HYPERTENSIVE HEART AND CHRONIC KIDNEY DISEASE WITH HEART FAILURE AND WITH STAGE 5 CHRONIC KIDNEY DISEASE, OR END STAGE RENAL DISEASE: ICD-10-CM

## 2019-04-21 NOTE — INPATIENT CERTIFICATION FOR MEDICARE PATIENTS - RISKS OF ADVERSE EVENTS
Concern for delay in diagnosis and treatment/Concern for neurologic deterioration/Concern for worsening infectious process/Concern for renal deterioration Medications

## 2019-05-15 ENCOUNTER — INPATIENT (INPATIENT)
Facility: HOSPITAL | Age: 84
LOS: 8 days | Discharge: NURSING, REHAB OR LONG-TERM CARE | End: 2019-05-24
Attending: FAMILY MEDICINE | Admitting: FAMILY MEDICINE
Payer: MEDICARE

## 2019-05-15 VITALS
RESPIRATION RATE: 20 BRPM | HEART RATE: 79 BPM | DIASTOLIC BLOOD PRESSURE: 40 MMHG | WEIGHT: 179.9 LBS | HEIGHT: 66 IN | TEMPERATURE: 98 F | OXYGEN SATURATION: 94 % | SYSTOLIC BLOOD PRESSURE: 88 MMHG

## 2019-05-15 DIAGNOSIS — Z95.810 PRESENCE OF AUTOMATIC (IMPLANTABLE) CARDIAC DEFIBRILLATOR: Chronic | ICD-10-CM

## 2019-05-15 DIAGNOSIS — Z96.89 PRESENCE OF OTHER SPECIFIED FUNCTIONAL IMPLANTS: Chronic | ICD-10-CM

## 2019-05-15 PROCEDURE — 99285 EMERGENCY DEPT VISIT HI MDM: CPT

## 2019-05-16 LAB
ADD ON TEST-SPECIMEN IN LAB: SIGNIFICANT CHANGE UP
ALBUMIN SERPL ELPH-MCNC: 2.9 G/DL — LOW (ref 3.3–5)
ALP SERPL-CCNC: 148 U/L — HIGH (ref 40–120)
ALT FLD-CCNC: 44 U/L — SIGNIFICANT CHANGE UP (ref 12–78)
ANION GAP SERPL CALC-SCNC: 4 MMOL/L — LOW (ref 5–17)
ANION GAP SERPL CALC-SCNC: 6 MMOL/L — SIGNIFICANT CHANGE UP (ref 5–17)
APTT BLD: 27.7 SEC — SIGNIFICANT CHANGE UP (ref 27.5–36.3)
AST SERPL-CCNC: 36 U/L — SIGNIFICANT CHANGE UP (ref 15–37)
BASO STIPL BLD QL SMEAR: PRESENT — SIGNIFICANT CHANGE UP
BASOPHILS # BLD AUTO: 0.05 K/UL — SIGNIFICANT CHANGE UP (ref 0–0.2)
BASOPHILS # BLD AUTO: 0.05 K/UL — SIGNIFICANT CHANGE UP (ref 0–0.2)
BASOPHILS NFR BLD AUTO: 0.3 % — SIGNIFICANT CHANGE UP (ref 0–2)
BASOPHILS NFR BLD AUTO: 0.3 % — SIGNIFICANT CHANGE UP (ref 0–2)
BILIRUB SERPL-MCNC: 0.4 MG/DL — SIGNIFICANT CHANGE UP (ref 0.2–1.2)
BLD GP AB SCN SERPL QL: SIGNIFICANT CHANGE UP
BUN SERPL-MCNC: 36 MG/DL — HIGH (ref 7–23)
BUN SERPL-MCNC: 38 MG/DL — HIGH (ref 7–23)
CALCIUM SERPL-MCNC: 8.3 MG/DL — LOW (ref 8.5–10.1)
CALCIUM SERPL-MCNC: 8.5 MG/DL — SIGNIFICANT CHANGE UP (ref 8.5–10.1)
CHLORIDE SERPL-SCNC: 98 MMOL/L — SIGNIFICANT CHANGE UP (ref 96–108)
CHLORIDE SERPL-SCNC: 98 MMOL/L — SIGNIFICANT CHANGE UP (ref 96–108)
CK SERPL-CCNC: 531 U/L — HIGH (ref 26–308)
CO2 SERPL-SCNC: 34 MMOL/L — HIGH (ref 22–31)
CO2 SERPL-SCNC: 34 MMOL/L — HIGH (ref 22–31)
CREAT SERPL-MCNC: 4.51 MG/DL — HIGH (ref 0.5–1.3)
CREAT SERPL-MCNC: 4.57 MG/DL — HIGH (ref 0.5–1.3)
EOSINOPHIL # BLD AUTO: 0.05 K/UL — SIGNIFICANT CHANGE UP (ref 0–0.5)
EOSINOPHIL # BLD AUTO: 0.09 K/UL — SIGNIFICANT CHANGE UP (ref 0–0.5)
EOSINOPHIL NFR BLD AUTO: 0.3 % — SIGNIFICANT CHANGE UP (ref 0–6)
EOSINOPHIL NFR BLD AUTO: 0.6 % — SIGNIFICANT CHANGE UP (ref 0–6)
GLUCOSE BLDC GLUCOMTR-MCNC: 173 MG/DL — HIGH (ref 70–99)
GLUCOSE BLDC GLUCOMTR-MCNC: 174 MG/DL — HIGH (ref 70–99)
GLUCOSE BLDC GLUCOMTR-MCNC: 188 MG/DL — HIGH (ref 70–99)
GLUCOSE BLDC GLUCOMTR-MCNC: 189 MG/DL — HIGH (ref 70–99)
GLUCOSE SERPL-MCNC: 168 MG/DL — HIGH (ref 70–99)
GLUCOSE SERPL-MCNC: 199 MG/DL — HIGH (ref 70–99)
HAV IGM SER-ACNC: SIGNIFICANT CHANGE UP
HBV CORE IGM SER-ACNC: SIGNIFICANT CHANGE UP
HBV SURFACE AG SER-ACNC: SIGNIFICANT CHANGE UP
HCT VFR BLD CALC: 23.9 % — LOW (ref 39–50)
HCT VFR BLD CALC: 25.3 % — LOW (ref 39–50)
HCV AB S/CO SERPL IA: 0.08 S/CO — SIGNIFICANT CHANGE UP (ref 0–0.99)
HCV AB SERPL-IMP: SIGNIFICANT CHANGE UP
HGB BLD-MCNC: 7.8 G/DL — LOW (ref 13–17)
HGB BLD-MCNC: 8.1 G/DL — LOW (ref 13–17)
IMM GRANULOCYTES NFR BLD AUTO: 0.6 % — SIGNIFICANT CHANGE UP (ref 0–1.5)
IMM GRANULOCYTES NFR BLD AUTO: 0.7 % — SIGNIFICANT CHANGE UP (ref 0–1.5)
INR BLD: 0.99 RATIO — SIGNIFICANT CHANGE UP (ref 0.88–1.16)
LACTATE SERPL-SCNC: 1.9 MMOL/L — SIGNIFICANT CHANGE UP (ref 0.7–2)
LIDOCAIN IGE QN: 330 U/L — SIGNIFICANT CHANGE UP (ref 73–393)
LYMPHOCYTES # BLD AUTO: 0.78 K/UL — LOW (ref 1–3.3)
LYMPHOCYTES # BLD AUTO: 1.14 K/UL — SIGNIFICANT CHANGE UP (ref 1–3.3)
LYMPHOCYTES # BLD AUTO: 4.9 % — LOW (ref 13–44)
LYMPHOCYTES # BLD AUTO: 7.3 % — LOW (ref 13–44)
MAGNESIUM SERPL-MCNC: 2.2 MG/DL — SIGNIFICANT CHANGE UP (ref 1.6–2.6)
MANUAL SMEAR VERIFICATION: SIGNIFICANT CHANGE UP
MCHC RBC-ENTMCNC: 29.6 PG — SIGNIFICANT CHANGE UP (ref 27–34)
MCHC RBC-ENTMCNC: 29.8 PG — SIGNIFICANT CHANGE UP (ref 27–34)
MCHC RBC-ENTMCNC: 32 GM/DL — SIGNIFICANT CHANGE UP (ref 32–36)
MCHC RBC-ENTMCNC: 32.6 GM/DL — SIGNIFICANT CHANGE UP (ref 32–36)
MCV RBC AUTO: 91.2 FL — SIGNIFICANT CHANGE UP (ref 80–100)
MCV RBC AUTO: 92.3 FL — SIGNIFICANT CHANGE UP (ref 80–100)
MONOCYTES # BLD AUTO: 1.43 K/UL — HIGH (ref 0–0.9)
MONOCYTES # BLD AUTO: 1.52 K/UL — HIGH (ref 0–0.9)
MONOCYTES NFR BLD AUTO: 9.2 % — SIGNIFICANT CHANGE UP (ref 2–14)
MONOCYTES NFR BLD AUTO: 9.5 % — SIGNIFICANT CHANGE UP (ref 2–14)
NEUTROPHILS # BLD AUTO: 12.75 K/UL — HIGH (ref 1.8–7.4)
NEUTROPHILS # BLD AUTO: 13.52 K/UL — HIGH (ref 1.8–7.4)
NEUTROPHILS NFR BLD AUTO: 82 % — HIGH (ref 43–77)
NEUTROPHILS NFR BLD AUTO: 84.3 % — HIGH (ref 43–77)
PLAT MORPH BLD: NORMAL — SIGNIFICANT CHANGE UP
PLATELET # BLD AUTO: 168 K/UL — SIGNIFICANT CHANGE UP (ref 150–400)
PLATELET # BLD AUTO: 172 K/UL — SIGNIFICANT CHANGE UP (ref 150–400)
POLYCHROMASIA BLD QL SMEAR: SLIGHT — SIGNIFICANT CHANGE UP
POTASSIUM SERPL-MCNC: 3.8 MMOL/L — SIGNIFICANT CHANGE UP (ref 3.5–5.3)
POTASSIUM SERPL-MCNC: 4 MMOL/L — SIGNIFICANT CHANGE UP (ref 3.5–5.3)
POTASSIUM SERPL-SCNC: 3.8 MMOL/L — SIGNIFICANT CHANGE UP (ref 3.5–5.3)
POTASSIUM SERPL-SCNC: 4 MMOL/L — SIGNIFICANT CHANGE UP (ref 3.5–5.3)
PROT SERPL-MCNC: 6.8 GM/DL — SIGNIFICANT CHANGE UP (ref 6–8.3)
PROTHROM AB SERPL-ACNC: 11 SEC — SIGNIFICANT CHANGE UP (ref 10–12.9)
RBC # BLD: 2.62 M/UL — LOW (ref 4.2–5.8)
RBC # BLD: 2.74 M/UL — LOW (ref 4.2–5.8)
RBC # FLD: 15.2 % — HIGH (ref 10.3–14.5)
RBC # FLD: 15.5 % — HIGH (ref 10.3–14.5)
RBC BLD AUTO: ABNORMAL
SODIUM SERPL-SCNC: 136 MMOL/L — SIGNIFICANT CHANGE UP (ref 135–145)
SODIUM SERPL-SCNC: 138 MMOL/L — SIGNIFICANT CHANGE UP (ref 135–145)
TYPE + AB SCN PNL BLD: SIGNIFICANT CHANGE UP
WBC # BLD: 15.55 K/UL — HIGH (ref 3.8–10.5)
WBC # BLD: 16.04 K/UL — HIGH (ref 3.8–10.5)
WBC # FLD AUTO: 15.55 K/UL — HIGH (ref 3.8–10.5)
WBC # FLD AUTO: 16.04 K/UL — HIGH (ref 3.8–10.5)

## 2019-05-16 PROCEDURE — 93010 ELECTROCARDIOGRAM REPORT: CPT

## 2019-05-16 PROCEDURE — 71045 X-RAY EXAM CHEST 1 VIEW: CPT | Mod: 26

## 2019-05-16 PROCEDURE — 71250 CT THORAX DX C-: CPT | Mod: 26

## 2019-05-16 RX ORDER — CARVEDILOL PHOSPHATE 80 MG/1
25 CAPSULE, EXTENDED RELEASE ORAL EVERY 12 HOURS
Refills: 0 | Status: DISCONTINUED | OUTPATIENT
Start: 2019-05-16 | End: 2019-05-24

## 2019-05-16 RX ORDER — HYDRALAZINE HCL 50 MG
100 TABLET ORAL
Refills: 0 | Status: DISCONTINUED | OUTPATIENT
Start: 2019-05-16 | End: 2019-05-20

## 2019-05-16 RX ORDER — ISOSORBIDE MONONITRATE 60 MG/1
60 TABLET, EXTENDED RELEASE ORAL DAILY
Refills: 0 | Status: DISCONTINUED | OUTPATIENT
Start: 2019-05-16 | End: 2019-05-24

## 2019-05-16 RX ORDER — CEFEPIME 1 G/1
250 INJECTION, POWDER, FOR SOLUTION INTRAMUSCULAR; INTRAVENOUS EVERY 24 HOURS
Refills: 0 | Status: DISCONTINUED | OUTPATIENT
Start: 2019-05-17 | End: 2019-05-17

## 2019-05-16 RX ORDER — PIPERACILLIN AND TAZOBACTAM 4; .5 G/20ML; G/20ML
2.25 INJECTION, POWDER, LYOPHILIZED, FOR SOLUTION INTRAVENOUS ONCE
Refills: 0 | Status: DISCONTINUED | OUTPATIENT
Start: 2019-05-16 | End: 2019-05-16

## 2019-05-16 RX ORDER — ASPIRIN/CALCIUM CARB/MAGNESIUM 324 MG
81 TABLET ORAL DAILY
Refills: 0 | Status: DISCONTINUED | OUTPATIENT
Start: 2019-05-16 | End: 2019-05-20

## 2019-05-16 RX ORDER — PIPERACILLIN AND TAZOBACTAM 4; .5 G/20ML; G/20ML
3.38 INJECTION, POWDER, LYOPHILIZED, FOR SOLUTION INTRAVENOUS ONCE
Refills: 0 | Status: DISCONTINUED | OUTPATIENT
Start: 2019-05-16 | End: 2019-05-16

## 2019-05-16 RX ORDER — LISINOPRIL 2.5 MG/1
5 TABLET ORAL DAILY
Refills: 0 | Status: DISCONTINUED | OUTPATIENT
Start: 2019-05-16 | End: 2019-05-20

## 2019-05-16 RX ORDER — DEXTROSE 50 % IN WATER 50 %
25 SYRINGE (ML) INTRAVENOUS ONCE
Refills: 0 | Status: DISCONTINUED | OUTPATIENT
Start: 2019-05-16 | End: 2019-05-24

## 2019-05-16 RX ORDER — SEVELAMER CARBONATE 2400 MG/1
1600 POWDER, FOR SUSPENSION ORAL THREE TIMES A DAY
Refills: 0 | Status: DISCONTINUED | OUTPATIENT
Start: 2019-05-16 | End: 2019-05-20

## 2019-05-16 RX ORDER — CEFEPIME 1 G/1
250 INJECTION, POWDER, FOR SOLUTION INTRAMUSCULAR; INTRAVENOUS ONCE
Refills: 0 | Status: COMPLETED | OUTPATIENT
Start: 2019-05-16 | End: 2019-05-16

## 2019-05-16 RX ORDER — INSULIN GLARGINE 100 [IU]/ML
10 INJECTION, SOLUTION SUBCUTANEOUS AT BEDTIME
Refills: 0 | Status: DISCONTINUED | OUTPATIENT
Start: 2019-05-16 | End: 2019-05-21

## 2019-05-16 RX ORDER — CEFEPIME 1 G/1
INJECTION, POWDER, FOR SOLUTION INTRAMUSCULAR; INTRAVENOUS
Refills: 0 | Status: DISCONTINUED | OUTPATIENT
Start: 2019-05-16 | End: 2019-05-17

## 2019-05-16 RX ORDER — PIPERACILLIN AND TAZOBACTAM 4; .5 G/20ML; G/20ML
3.38 INJECTION, POWDER, LYOPHILIZED, FOR SOLUTION INTRAVENOUS EVERY 12 HOURS
Refills: 0 | Status: DISCONTINUED | OUTPATIENT
Start: 2019-05-16 | End: 2019-05-16

## 2019-05-16 RX ORDER — VANCOMYCIN HCL 1 G
1250 VIAL (EA) INTRAVENOUS ONCE
Refills: 0 | Status: DISCONTINUED | OUTPATIENT
Start: 2019-05-16 | End: 2019-05-16

## 2019-05-16 RX ORDER — ATORVASTATIN CALCIUM 80 MG/1
40 TABLET, FILM COATED ORAL AT BEDTIME
Refills: 0 | Status: DISCONTINUED | OUTPATIENT
Start: 2019-05-16 | End: 2019-05-16

## 2019-05-16 RX ORDER — DOCUSATE SODIUM 100 MG
100 CAPSULE ORAL THREE TIMES A DAY
Refills: 0 | Status: DISCONTINUED | OUTPATIENT
Start: 2019-05-16 | End: 2019-05-24

## 2019-05-16 RX ORDER — VANCOMYCIN HCL 1 G
1000 VIAL (EA) INTRAVENOUS ONCE
Refills: 0 | Status: COMPLETED | OUTPATIENT
Start: 2019-05-16 | End: 2019-05-16

## 2019-05-16 RX ORDER — TAMSULOSIN HYDROCHLORIDE 0.4 MG/1
0.4 CAPSULE ORAL AT BEDTIME
Refills: 0 | Status: DISCONTINUED | OUTPATIENT
Start: 2019-05-16 | End: 2019-05-24

## 2019-05-16 RX ORDER — ACETAMINOPHEN 500 MG
650 TABLET ORAL EVERY 6 HOURS
Refills: 0 | Status: DISCONTINUED | OUTPATIENT
Start: 2019-05-16 | End: 2019-05-24

## 2019-05-16 RX ORDER — VANCOMYCIN HCL 1 G
VIAL (EA) INTRAVENOUS
Refills: 0 | Status: DISCONTINUED | OUTPATIENT
Start: 2019-05-16 | End: 2019-05-16

## 2019-05-16 RX ORDER — HEPARIN SODIUM 5000 [USP'U]/ML
5000 INJECTION INTRAVENOUS; SUBCUTANEOUS EVERY 8 HOURS
Refills: 0 | Status: DISCONTINUED | OUTPATIENT
Start: 2019-05-16 | End: 2019-05-21

## 2019-05-16 RX ORDER — LEVOTHYROXINE SODIUM 125 MCG
1 TABLET ORAL
Qty: 0 | Refills: 0 | DISCHARGE

## 2019-05-16 RX ORDER — DEXTROSE 50 % IN WATER 50 %
12.5 SYRINGE (ML) INTRAVENOUS ONCE
Refills: 0 | Status: DISCONTINUED | OUTPATIENT
Start: 2019-05-16 | End: 2019-05-24

## 2019-05-16 RX ORDER — SODIUM CHLORIDE 9 MG/ML
1000 INJECTION, SOLUTION INTRAVENOUS
Refills: 0 | Status: DISCONTINUED | OUTPATIENT
Start: 2019-05-16 | End: 2019-05-24

## 2019-05-16 RX ORDER — DEXTROSE 50 % IN WATER 50 %
15 SYRINGE (ML) INTRAVENOUS ONCE
Refills: 0 | Status: DISCONTINUED | OUTPATIENT
Start: 2019-05-16 | End: 2019-05-24

## 2019-05-16 RX ORDER — VANCOMYCIN HCL 1 G
1000 VIAL (EA) INTRAVENOUS EVERY 24 HOURS
Refills: 0 | Status: DISCONTINUED | OUTPATIENT
Start: 2019-05-17 | End: 2019-05-17

## 2019-05-16 RX ORDER — INSULIN LISPRO 100/ML
VIAL (ML) SUBCUTANEOUS
Refills: 0 | Status: DISCONTINUED | OUTPATIENT
Start: 2019-05-16 | End: 2019-05-24

## 2019-05-16 RX ORDER — LEVOTHYROXINE SODIUM 125 MCG
75 TABLET ORAL DAILY
Refills: 0 | Status: DISCONTINUED | OUTPATIENT
Start: 2019-05-16 | End: 2019-05-24

## 2019-05-16 RX ORDER — GLUCAGON INJECTION, SOLUTION 0.5 MG/.1ML
1 INJECTION, SOLUTION SUBCUTANEOUS ONCE
Refills: 0 | Status: DISCONTINUED | OUTPATIENT
Start: 2019-05-16 | End: 2019-05-24

## 2019-05-16 RX ORDER — VANCOMYCIN HCL 1 G
VIAL (EA) INTRAVENOUS
Refills: 0 | Status: DISCONTINUED | OUTPATIENT
Start: 2019-05-16 | End: 2019-05-17

## 2019-05-16 RX ADMIN — HEPARIN SODIUM 5000 UNIT(S): 5000 INJECTION INTRAVENOUS; SUBCUTANEOUS at 12:15

## 2019-05-16 RX ADMIN — Medication 2: at 12:06

## 2019-05-16 RX ADMIN — Medication 81 MILLIGRAM(S): at 12:07

## 2019-05-16 RX ADMIN — Medication 100 MILLIGRAM(S): at 17:35

## 2019-05-16 RX ADMIN — Medication 1 TABLET(S): at 12:07

## 2019-05-16 RX ADMIN — TAMSULOSIN HYDROCHLORIDE 0.4 MILLIGRAM(S): 0.4 CAPSULE ORAL at 22:30

## 2019-05-16 RX ADMIN — CEFEPIME 100 MILLIGRAM(S): 1 INJECTION, POWDER, FOR SOLUTION INTRAMUSCULAR; INTRAVENOUS at 17:34

## 2019-05-16 RX ADMIN — PIPERACILLIN AND TAZOBACTAM 25 GRAM(S): 4; .5 INJECTION, POWDER, LYOPHILIZED, FOR SOLUTION INTRAVENOUS at 06:32

## 2019-05-16 RX ADMIN — Medication 2: at 17:35

## 2019-05-16 RX ADMIN — SEVELAMER CARBONATE 1600 MILLIGRAM(S): 2400 POWDER, FOR SUSPENSION ORAL at 06:35

## 2019-05-16 RX ADMIN — CARVEDILOL PHOSPHATE 25 MILLIGRAM(S): 80 CAPSULE, EXTENDED RELEASE ORAL at 06:35

## 2019-05-16 RX ADMIN — Medication 250 MILLIGRAM(S): at 18:18

## 2019-05-16 RX ADMIN — Medication 2: at 08:27

## 2019-05-16 RX ADMIN — SEVELAMER CARBONATE 1600 MILLIGRAM(S): 2400 POWDER, FOR SUSPENSION ORAL at 22:30

## 2019-05-16 RX ADMIN — LISINOPRIL 5 MILLIGRAM(S): 2.5 TABLET ORAL at 06:34

## 2019-05-16 RX ADMIN — Medication 100 MILLIGRAM(S): at 06:34

## 2019-05-16 RX ADMIN — HEPARIN SODIUM 5000 UNIT(S): 5000 INJECTION INTRAVENOUS; SUBCUTANEOUS at 06:34

## 2019-05-16 RX ADMIN — Medication 75 MICROGRAM(S): at 06:34

## 2019-05-16 RX ADMIN — INSULIN GLARGINE 10 UNIT(S): 100 INJECTION, SOLUTION SUBCUTANEOUS at 22:31

## 2019-05-16 RX ADMIN — HEPARIN SODIUM 5000 UNIT(S): 5000 INJECTION INTRAVENOUS; SUBCUTANEOUS at 22:30

## 2019-05-16 RX ADMIN — SEVELAMER CARBONATE 1600 MILLIGRAM(S): 2400 POWDER, FOR SUSPENSION ORAL at 12:12

## 2019-05-16 RX ADMIN — CARVEDILOL PHOSPHATE 25 MILLIGRAM(S): 80 CAPSULE, EXTENDED RELEASE ORAL at 17:34

## 2019-05-16 NOTE — H&P ADULT - NSHPPHYSICALEXAM_GEN_ALL_CORE
Vital Signs Last 24 Hrs  T(C): 37 (16 May 2019 01:49), Max: 37 (16 May 2019 01:49)  T(F): 98.6 (16 May 2019 01:49), Max: 98.6 (16 May 2019 01:49)  HR: 81 (16 May 2019 04:14) (78 - 81)  BP: 119/49 (16 May 2019 04:14) (88/40 - 119/49)  RR: 18 (16 May 2019 04:14) (18 - 20)  SpO2: 100% (16 May 2019 04:14) (94% - 100%)

## 2019-05-16 NOTE — PHYSICAL THERAPY INITIAL EVALUATION ADULT - DIAGNOSIS, PT EVAL
89 y.o. male with fever/ weakness, possible sepsis of unknown origin. 89 y.o. male with fever/ weakness, PNA.

## 2019-05-16 NOTE — PATIENT PROFILE ADULT - VISION (WITH CORRECTIVE LENSES IF THE PATIENT USUALLY WEARS THEM):
Normal vision: sees adequately in most situations; can see medication labels, newsprint/unable to assess, pt not responding to questions at this time

## 2019-05-16 NOTE — ED PROVIDER NOTE - CLINICAL SUMMARY MEDICAL DECISION MAKING FREE TEXT BOX
genralized wekaness unable to ambulate detiorating at home per daughter will cover zosyn no localizing source infection exam drastic increase in white count not a safe dispo home . will elly need HI PT OT evluation as well as admission rule out occult bacteremia pt and pts family agree to plan of care. do not suspect GBS orcentral nervous system etiology likely genralized weakness from chonic debilty in addition to possible acute infection

## 2019-05-16 NOTE — H&P ADULT - HISTORY OF PRESENT ILLNESS
63 yo Female with a PMHx of DM, HLD, HTN, CKD presents to ED with complain of generalized weakness and fever per daughter at the bedside . Patient is so weak he is having trouble ambulating. As per daughter had fever 102 F at home today. he denies HA or neck pain. No chest pain or sob. No abd pain. No n/v/d. No urinary f/u/d. No back pain. no gross motor or sensory deficits. he denies illicit drug use. he had no recent travel. He has no other acute issues symptoms or concerns.    Family hx:  patient is unable to provide any detail family history this time.

## 2019-05-16 NOTE — PHYSICAL THERAPY INITIAL EVALUATION ADULT - FOLLOWS COMMANDS/ANSWERS QUESTIONS, REHAB EVAL
able to follow single-step instructions/75% of the time/Malawian speaking, limited english communicator.

## 2019-05-16 NOTE — PROGRESS NOTE ADULT - SUBJECTIVE AND OBJECTIVE BOX
Pt has been seen and examined with FP resident, resident supervised agree with a/p       Patient is a 89y old  Male who presents with a chief complaint of complain of weakness (16 May 2019 09:25)        HPI:  61 yo Female with a PMHx of DM, HLD, HTN, CKD presents to ED with complain of generalized weakness and fever     PHYSICAL EXAM:  Vital Signs Last 24 Hrs  T(C): 36.8 (16 May 2019 10:55), Max: 37.2 (16 May 2019 05:30)  T(F): 98.3 (16 May 2019 10:55), Max: 99 (16 May 2019 05:30)  HR: 77 (16 May 2019 10:55) (77 - 82)  BP: 108/42 (16 May 2019 10:55) (88/40 - 124/47)  BP(mean): --  RR: 18 (16 May 2019 10:55) (15 - 20)  SpO2: 97% (16 May 2019 10:55) (94% - 100%)  general- comfortable, toxic appearance   -rs-aeeb,crackles present on right side more than left  -cvs-s1s2 normal   -p/a-soft,bs+  -extremity- no asymmetrical swelling noted           A/P    #HCAP- ct abx for possible gram negative pneumonia, supportive care  -CT chest noted- clinically pt has pneumonia and not heart failure     #Discussed with

## 2019-05-16 NOTE — ED ADULT NURSE NOTE - OBJECTIVE STATEMENT
Pt presents to ED for fall. According to EMS, pt was attempting to get out of bed, when he slipped and fell on b/l knees. Pt presents to ED for fall. According to EMS, pt was attempting to get out of bed, when he slipped and fell on b/l knees. Denies LOC, denies hitting head.

## 2019-05-16 NOTE — PHYSICAL THERAPY INITIAL EVALUATION ADULT - PERTINENT HX OF CURRENT PROBLEM, REHAB EVAL
61 yo Female with a PMHx of DM, HLD, HTN, CKD, ESRD on HD, presents to ED with complain of generalized weakness and fever per daughter at the bedside . Patient is so weak he is having trouble ambulating. As per daughter had fever 102 F at home today.

## 2019-05-16 NOTE — PHYSICAL THERAPY INITIAL EVALUATION ADULT - GENERAL OBSERVATIONS, REHAB EVAL
The pt was pleasant and cooperative and eager to ambulate with PT, the pt was received on 2SW, supine, with 2lt supplemental 02 via nc.

## 2019-05-16 NOTE — PHYSICAL THERAPY INITIAL EVALUATION ADULT - IMPAIRMENTS CONTRIBUTING TO GAIT DEVIATIONS, PT EVAL
impaired postural control/slow, cautious gait with limited endurance, the pt's 02 sat drops down to 89% when on room air after ambulation. The pt recovers quickly after being cued to take deep breaths on room air. The pt was left sitting OOB and in a the bathroom. RN made aware./decreased flexibility/impaired coordination

## 2019-05-16 NOTE — PROGRESS NOTE ADULT - SUBJECTIVE AND OBJECTIVE BOX
63 yo Female with a PMHx of DM, HLD, HTN, CKD presents to ED with complain of generalized weakness and fever per daughter at the bedside . Patient is so weak he is having trouble ambulating. As per daughter had fever 102 F at home today. he denies HA or neck pain. No chest pain or sob. No abd pain. No n/v/d. No urinary f/u/d. No back pain. no gross motor or sensory deficits. he denies illicit drug use. he had no recent travel. He has no other acute issues symptoms or concerns.    [place]  [id number]  Interval history: Patient evaluated at bedside. C/o intermittent dry cough x 1 month. Also feels b/l leg weakness. Denies chest pain, fever, chills, nausea, vomiting abdominal pain, diarrhea.     REVIEW OF SYSTEMS:    CONSTITUTIONAL: + weakness, No fevers or chills  EYES/ENT: No visual changes;  No vertigo or throat pain   NECK: No pain or stiffness  RESPIRATORY+ cough, No wheezing, hemoptysis; No shortness of breath  CARDIOVASCULAR: No chest pain or palpitations  GASTROINTESTINAL: No abdominal or epigastric pain. No nausea, vomiting, or hematemesis; No diarrhea or constipation. No melena or hematochezia.  GENITOURINARY: No dysuria, frequency or hematuria  NEUROLOGICAL: No numbness or weakness  SKIN: No itching, burning, rashes, or lesions   All other review of systems is negative unless indicated above.     MEDICATIONS  (STANDING):  aspirin  chewable 81 milliGRAM(s) Oral daily  carvedilol 25 milliGRAM(s) Oral every 12 hours  cefepime   IVPB      cefepime   IVPB 250 milliGRAM(s) IV Intermittent once  dextrose 5%. 1000 milliLiter(s) (50 mL/Hr) IV Continuous <Continuous>  dextrose 50% Injectable 12.5 Gram(s) IV Push once  dextrose 50% Injectable 25 Gram(s) IV Push once  dextrose 50% Injectable 25 Gram(s) IV Push once  heparin  Injectable 5000 Unit(s) SubCutaneous every 8 hours  hydrALAZINE 100 milliGRAM(s) Oral two times a day  insulin glargine Injectable (LANTUS) 10 Unit(s) SubCutaneous at bedtime  insulin lispro (HumaLOG) corrective regimen sliding scale   SubCutaneous three times a day before meals  isosorbide   mononitrate ER Tablet (IMDUR) 60 milliGRAM(s) Oral daily  levothyroxine 75 MICROGram(s) Oral daily  lisinopril 5 milliGRAM(s) Oral daily  multivitamin 1 Tablet(s) Oral daily  sevelamer carbonate 1600 milliGRAM(s) Oral three times a day  tamsulosin 0.4 milliGRAM(s) Oral at bedtime  vancomycin  IVPB      vancomycin  IVPB 1000 milliGRAM(s) IV Intermittent once    MEDICATIONS  (PRN):  acetaminophen   Tablet .. 650 milliGRAM(s) Oral every 6 hours PRN Temp greater or equal to 38C (100.4F), Mild Pain (1 - 3)  dextrose 40% Gel 15 Gram(s) Oral once PRN Blood Glucose LESS THAN 70 milliGRAM(s)/deciliter  docusate sodium 100 milliGRAM(s) Oral three times a day PRN Constipation  glucagon  Injectable 1 milliGRAM(s) IntraMuscular once PRN Glucose LESS THAN 70 milligrams/deciliter      Vital Signs (24 Hrs):  T(C): 36.8 (05-16-19 @ 10:55), Max: 37.2 (05-16-19 @ 05:30)  HR: 77 (05-16-19 @ 10:55) (77 - 82)  BP: 108/42 (05-16-19 @ 10:55) (88/40 - 124/47)  RR: 18 (05-16-19 @ 10:55) (15 - 20)  SpO2: 97% (05-16-19 @ 10:55) (94% - 100%)  Wt(kg): --  Daily Height in cm: 167.64 (15 May 2019 23:19)    Daily     I&O's Summary    PHYSICAL EXAM:  GENERAL: NAD, well-developed, comfortable  HEAD:  Atraumatic, Normocephalic  EYES: EOMI, conjunctiva and sclera clear  NECK: Supple  CHEST/LUNG: very faint bibasilar rales  HEART: Regular rate and rhythm; No murmurs, rubs, or gallops  ABDOMEN: Soft, Nontender, Nondistended; Bowel sounds present  EXTREMITIES:  2+ Peripheral Pulses, No clubbing, cyanosis, or edema  PSYCH: Alert and oriented  NEUROLOGY: non-focal  SKIN: No rashes or lesions    LABS:                        7.8    15.55 )-----------( 168      ( 16 May 2019 05:15 )             23.9     16 May 2019 05:15    136    |  98     |  38     ----------------------------<  168    3.8     |  34     |  4.57     Ca    8.3        16 May 2019 05:15  Mg     2.2       16 May 2019 01:23    TPro  6.8    /  Alb  2.9    /  TBili  0.4    /  DBili  x      /  AST  36     /  ALT  44     /  AlkPhos  148    16 May 2019 01:23    PT/INR - ( 16 May 2019 01:23 )   PT: 11.0 sec;   INR: 0.99 ratio         PTT - ( 16 May 2019 01:23 )  PTT:27.7 sec      < from: CT Chest No Cont (05.16.19 @ 12:50) >  IMPRESSION:    Findings suggesting mild pulmonary edema. No segmental consolidations. No   discrete pulmonary nodules.    < end of copied text >  < from: Xray Chest 1 View AP/PA. (05.16.19 @ 01:58) >  Findings/  IMPRESSION:       Evaluation the lungs is limited by suboptimal inspiration. Prominence of   the interstitium is present. Left chest wall pacemaker in place.    Cardiomegaly present. Bones are stable.    < end of copied text > 61 yo Female with a PMHx of DM, HLD, HTN, CKD presents to ED with complain of generalized weakness and fever per daughter at the bedside . Patient is so weak he is having trouble ambulating. As per daughter had fever 102 F at home today. he denies HA or neck pain. No chest pain or sob. No abd pain. No n/v/d. No urinary f/u/d. No back pain. no gross motor or sensory deficits. he denies illicit drug use. he had no recent travel. He has no other acute issues symptoms or concerns.    [place]  [id number]  Interval history: Patient evaluated at bedside. C/o intermittent dry cough x 1 month. Also feels b/l leg weakness. Denies chest pain, fever, chills, nausea, vomiting abdominal pain, diarrhea.     REVIEW OF SYSTEMS:    CONSTITUTIONAL: + weakness, No fevers or chills  EYES/ENT: No visual changes;  No vertigo or throat pain   NECK: No pain or stiffness  RESPIRATORY+ cough, No wheezing, hemoptysis; No shortness of breath  CARDIOVASCULAR: No chest pain or palpitations  GASTROINTESTINAL: No abdominal or epigastric pain. No nausea, vomiting, or hematemesis; No diarrhea or constipation. No melena or hematochezia.  GENITOURINARY: No dysuria, frequency or hematuria  NEUROLOGICAL: No numbness or weakness  SKIN: No itching, burning, rashes, or lesions   All other review of systems is negative unless indicated above.     MEDICATIONS  (STANDING):  aspirin  chewable 81 milliGRAM(s) Oral daily  carvedilol 25 milliGRAM(s) Oral every 12 hours  cefepime   IVPB      cefepime   IVPB 250 milliGRAM(s) IV Intermittent once  dextrose 5%. 1000 milliLiter(s) (50 mL/Hr) IV Continuous <Continuous>  dextrose 50% Injectable 12.5 Gram(s) IV Push once  dextrose 50% Injectable 25 Gram(s) IV Push once  dextrose 50% Injectable 25 Gram(s) IV Push once  heparin  Injectable 5000 Unit(s) SubCutaneous every 8 hours  hydrALAZINE 100 milliGRAM(s) Oral two times a day  insulin glargine Injectable (LANTUS) 10 Unit(s) SubCutaneous at bedtime  insulin lispro (HumaLOG) corrective regimen sliding scale   SubCutaneous three times a day before meals  isosorbide   mononitrate ER Tablet (IMDUR) 60 milliGRAM(s) Oral daily  levothyroxine 75 MICROGram(s) Oral daily  lisinopril 5 milliGRAM(s) Oral daily  multivitamin 1 Tablet(s) Oral daily  sevelamer carbonate 1600 milliGRAM(s) Oral three times a day  tamsulosin 0.4 milliGRAM(s) Oral at bedtime  vancomycin  IVPB      vancomycin  IVPB 1000 milliGRAM(s) IV Intermittent once    MEDICATIONS  (PRN):  acetaminophen   Tablet .. 650 milliGRAM(s) Oral every 6 hours PRN Temp greater or equal to 38C (100.4F), Mild Pain (1 - 3)  dextrose 40% Gel 15 Gram(s) Oral once PRN Blood Glucose LESS THAN 70 milliGRAM(s)/deciliter  docusate sodium 100 milliGRAM(s) Oral three times a day PRN Constipation  glucagon  Injectable 1 milliGRAM(s) IntraMuscular once PRN Glucose LESS THAN 70 milligrams/deciliter      Vital Signs (24 Hrs):  T(C): 36.8 (05-16-19 @ 10:55), Max: 37.2 (05-16-19 @ 05:30)  HR: 77 (05-16-19 @ 10:55) (77 - 82)  BP: 108/42 (05-16-19 @ 10:55) (88/40 - 124/47)  RR: 18 (05-16-19 @ 10:55) (15 - 20)  SpO2: 97% (05-16-19 @ 10:55) (94% - 100%)  Wt(kg): --  Daily Height in cm: 167.64 (15 May 2019 23:19)    Daily     I&O's Summary    PHYSICAL EXAM:  GENERAL: NAD, well-developed, comfortable  HEAD:  Atraumatic, Normocephalic  EYES: EOMI, conjunctiva and sclera clear  NECK: Supple  CHEST/LUNG: very faint bibasilar rales  HEART: Regular rate and rhythm; No murmurs, rubs, or gallops  ABDOMEN: Soft, Nontender, Nondistended; Bowel sounds present  EXTREMITIES:  2+ Peripheral Pulses, No clubbing, cyanosis, or edema  PSYCH: Alert and oriented  NEUROLOGY: non-focal  SKIN: No rashes or lesions    LABS:                        7.8    15.55 )-----------( 168      ( 16 May 2019 05:15 )             23.9     16 May 2019 05:15    136    |  98     |  38     ----------------------------<  168    3.8     |  34     |  4.57     Ca    8.3        16 May 2019 05:15  Mg     2.2       16 May 2019 01:23    TPro  6.8    /  Alb  2.9    /  TBili  0.4    /  DBili  x      /  AST  36     /  ALT  44     /  AlkPhos  148    16 May 2019 01:23    PT/INR - ( 16 May 2019 01:23 )   PT: 11.0 sec;   INR: 0.99 ratio         PTT - ( 16 May 2019 01:23 )  PTT:27.7 sec    Creatine Kinase, Serum: 531 U/L (05.16.19 @ 01:23)          < from: CT Chest No Cont (05.16.19 @ 12:50) >  IMPRESSION:    Findings suggesting mild pulmonary edema. No segmental consolidations. No   discrete pulmonary nodules.    < end of copied text >  < from: Xray Chest 1 View AP/PA. (05.16.19 @ 01:58) >  Findings/  IMPRESSION:       Evaluation the lungs is limited by suboptimal inspiration. Prominence of   the interstitium is present. Left chest wall pacemaker in place.    Cardiomegaly present. Bones are stable.    < end of copied text > 63 yo Female with a PMHx of DM, HLD, HTN, CKD presents to ED with complain of generalized weakness and fever per daughter at the bedside . Patient is so weak he is having trouble ambulating. As per daughter had fever 102 F at home today. he denies HA or neck pain. No chest pain or sob. No abd pain. No n/v/d. No urinary f/u/d. No back pain. no gross motor or sensory deficits. he denies illicit drug use. he had no recent travel. He has no other acute issues symptoms or concerns.    [place]  [id number]  Interval history: Patient evaluated at bedside. C/o intermittent dry cough x 1 month. Also feels b/l lower leg weakness and pain. Denies chest pain, fever, chills, nausea, vomiting abdominal pain, diarrhea, dysuria. Spoke with daughter who states that the patient recently experienced fever and weakness after dialysis, was then hospitalized and no infection was found.     REVIEW OF SYSTEMS:    CONSTITUTIONAL: + weakness, No fevers or chills  EYES/ENT: No visual changes;  No vertigo or throat pain   NECK: No pain or stiffness  RESPIRATORY+ cough, No wheezing, hemoptysis; No shortness of breath  CARDIOVASCULAR: No chest pain or palpitations  GASTROINTESTINAL: No abdominal or epigastric pain. No nausea, vomiting, or hematemesis; No diarrhea or constipation. No melena or hematochezia.  GENITOURINARY: No dysuria, frequency or hematuria  NEUROLOGICAL: No numbness or weakness  SKIN: No itching, burning, rashes, or lesions   All other review of systems is negative unless indicated above.     MEDICATIONS  (STANDING):  aspirin  chewable 81 milliGRAM(s) Oral daily  carvedilol 25 milliGRAM(s) Oral every 12 hours  cefepime   IVPB      cefepime   IVPB 250 milliGRAM(s) IV Intermittent once  dextrose 5%. 1000 milliLiter(s) (50 mL/Hr) IV Continuous <Continuous>  dextrose 50% Injectable 12.5 Gram(s) IV Push once  dextrose 50% Injectable 25 Gram(s) IV Push once  dextrose 50% Injectable 25 Gram(s) IV Push once  heparin  Injectable 5000 Unit(s) SubCutaneous every 8 hours  hydrALAZINE 100 milliGRAM(s) Oral two times a day  insulin glargine Injectable (LANTUS) 10 Unit(s) SubCutaneous at bedtime  insulin lispro (HumaLOG) corrective regimen sliding scale   SubCutaneous three times a day before meals  isosorbide   mononitrate ER Tablet (IMDUR) 60 milliGRAM(s) Oral daily  levothyroxine 75 MICROGram(s) Oral daily  lisinopril 5 milliGRAM(s) Oral daily  multivitamin 1 Tablet(s) Oral daily  sevelamer carbonate 1600 milliGRAM(s) Oral three times a day  tamsulosin 0.4 milliGRAM(s) Oral at bedtime  vancomycin  IVPB      vancomycin  IVPB 1000 milliGRAM(s) IV Intermittent once    MEDICATIONS  (PRN):  acetaminophen   Tablet .. 650 milliGRAM(s) Oral every 6 hours PRN Temp greater or equal to 38C (100.4F), Mild Pain (1 - 3)  dextrose 40% Gel 15 Gram(s) Oral once PRN Blood Glucose LESS THAN 70 milliGRAM(s)/deciliter  docusate sodium 100 milliGRAM(s) Oral three times a day PRN Constipation  glucagon  Injectable 1 milliGRAM(s) IntraMuscular once PRN Glucose LESS THAN 70 milligrams/deciliter      Vital Signs (24 Hrs):  T(C): 36.8 (05-16-19 @ 10:55), Max: 37.2 (05-16-19 @ 05:30)  HR: 77 (05-16-19 @ 10:55) (77 - 82)  BP: 108/42 (05-16-19 @ 10:55) (88/40 - 124/47)  RR: 18 (05-16-19 @ 10:55) (15 - 20)  SpO2: 97% (05-16-19 @ 10:55) (94% - 100%)  Wt(kg): --  Daily Height in cm: 167.64 (15 May 2019 23:19)    Daily     I&O's Summary    PHYSICAL EXAM:  GENERAL: NAD, well-developed, comfortable  HEAD:  Atraumatic, Normocephalic  EYES: EOMI, conjunctiva and sclera clear  NECK: Supple  CHEST/LUNG: very faint bibasilar rales  HEART: Regular rate and rhythm; No murmurs, rubs, or gallops  ABDOMEN: Soft, Nontender, Nondistended; Bowel sounds present  EXTREMITIES:  2+ Peripheral Pulses, No clubbing, cyanosis, or edema  PSYCH: Alert and oriented  NEUROLOGY: non-focal  SKIN: No rashes or lesions    LABS:                        7.8    15.55 )-----------( 168      ( 16 May 2019 05:15 )             23.9     16 May 2019 05:15    136    |  98     |  38     ----------------------------<  168    3.8     |  34     |  4.57     Ca    8.3        16 May 2019 05:15  Mg     2.2       16 May 2019 01:23    TPro  6.8    /  Alb  2.9    /  TBili  0.4    /  DBili  x      /  AST  36     /  ALT  44     /  AlkPhos  148    16 May 2019 01:23    PT/INR - ( 16 May 2019 01:23 )   PT: 11.0 sec;   INR: 0.99 ratio         PTT - ( 16 May 2019 01:23 )  PTT:27.7 sec    Creatine Kinase, Serum: 531 U/L (05.16.19 @ 01:23)        < from: CT Chest No Cont (05.16.19 @ 12:50) >  IMPRESSION:    Findings suggesting mild pulmonary edema. No segmental consolidations. No   discrete pulmonary nodules.    < end of copied text >  < from: Xray Chest 1 View AP/PA. (05.16.19 @ 01:58) >  Findings/  IMPRESSION:       Evaluation the lungs is limited by suboptimal inspiration. Prominence of   the interstitium is present. Left chest wall pacemaker in place.    Cardiomegaly present. Bones are stable.    < end of copied text > 61 yo Female with a PMHx of DM, HLD, HTN, CKD presents to ED with complain of generalized weakness and fever per daughter at the bedside . Patient is so weak he is having trouble ambulating. As per daughter had fever 102 F at home today. he denies HA or neck pain. No chest pain or sob. No abd pain. No n/v/d. No urinary f/u/d. No back pain. no gross motor or sensory deficits. he denies illicit drug use. he had no recent travel. He has no other acute issues symptoms or concerns. Patient discharged on 4/2  at that time was hospitalized with sepsis likely secondary to Rhino/Entero virus & Multifocal PNA.    [place]  [id number]  Interval history: Patient evaluated at bedside. Poor historian due to current status. C/o intermittent dry cough x 1 month. Also feels b/l lower leg weakness and pain. Denies chest pain, fever, chills, nausea, vomiting abdominal pain, diarrhea, dysuria. Spoke with daughter who states that patient only complained of weakness and fever.     REVIEW OF SYSTEMS:    CONSTITUTIONAL: + weakness, No fevers or chills  EYES/ENT: No visual changes;  No vertigo or throat pain   NECK: No pain or stiffness  RESPIRATORY+ cough, No wheezing, hemoptysis; No shortness of breath  CARDIOVASCULAR: No chest pain or palpitations  GASTROINTESTINAL: No abdominal or epigastric pain. No nausea, vomiting, or hematemesis; No diarrhea or constipation. No melena or hematochezia.  GENITOURINARY: No dysuria, frequency or hematuria  NEUROLOGICAL: No numbness or weakness  SKIN: No itching, burning, rashes, or lesions   All other review of systems is negative unless indicated above.     MEDICATIONS  (STANDING):  aspirin  chewable 81 milliGRAM(s) Oral daily  carvedilol 25 milliGRAM(s) Oral every 12 hours  cefepime   IVPB      cefepime   IVPB 250 milliGRAM(s) IV Intermittent once  dextrose 5%. 1000 milliLiter(s) (50 mL/Hr) IV Continuous <Continuous>  dextrose 50% Injectable 12.5 Gram(s) IV Push once  dextrose 50% Injectable 25 Gram(s) IV Push once  dextrose 50% Injectable 25 Gram(s) IV Push once  heparin  Injectable 5000 Unit(s) SubCutaneous every 8 hours  hydrALAZINE 100 milliGRAM(s) Oral two times a day  insulin glargine Injectable (LANTUS) 10 Unit(s) SubCutaneous at bedtime  insulin lispro (HumaLOG) corrective regimen sliding scale   SubCutaneous three times a day before meals  isosorbide   mononitrate ER Tablet (IMDUR) 60 milliGRAM(s) Oral daily  levothyroxine 75 MICROGram(s) Oral daily  lisinopril 5 milliGRAM(s) Oral daily  multivitamin 1 Tablet(s) Oral daily  sevelamer carbonate 1600 milliGRAM(s) Oral three times a day  tamsulosin 0.4 milliGRAM(s) Oral at bedtime  vancomycin  IVPB      vancomycin  IVPB 1000 milliGRAM(s) IV Intermittent once    MEDICATIONS  (PRN):  acetaminophen   Tablet .. 650 milliGRAM(s) Oral every 6 hours PRN Temp greater or equal to 38C (100.4F), Mild Pain (1 - 3)  dextrose 40% Gel 15 Gram(s) Oral once PRN Blood Glucose LESS THAN 70 milliGRAM(s)/deciliter  docusate sodium 100 milliGRAM(s) Oral three times a day PRN Constipation  glucagon  Injectable 1 milliGRAM(s) IntraMuscular once PRN Glucose LESS THAN 70 milligrams/deciliter      Vital Signs (24 Hrs):  T(C): 36.8 (05-16-19 @ 10:55), Max: 37.2 (05-16-19 @ 05:30)  HR: 77 (05-16-19 @ 10:55) (77 - 82)  BP: 108/42 (05-16-19 @ 10:55) (88/40 - 124/47)  RR: 18 (05-16-19 @ 10:55) (15 - 20)  SpO2: 97% (05-16-19 @ 10:55) (94% - 100%)  Wt(kg): --  Daily Height in cm: 167.64 (15 May 2019 23:19)    Daily     I&O's Summary    PHYSICAL EXAM:  GENERAL: NAD,  comfortable, lethargic  HEAD:  Atraumatic, Normocephalic  EYES: EOMI, conjunctiva and sclera clear  NECK: Supple  CHEST/LUNG: very faint bibasilar rales  HEART: Regular rate and rhythm; No murmurs, rubs, or gallops  ABDOMEN: Soft, Nontender, Nondistended; Bowel sounds present  EXTREMITIES:  2+ Peripheral Pulses, No clubbing, cyanosis, or edema  PSYCH: Alert and oriented  NEUROLOGY: non-focal  SKIN: No rashes or lesions    LABS:                        7.8    15.55 )-----------( 168      ( 16 May 2019 05:15 )             23.9     16 May 2019 05:15    136    |  98     |  38     ----------------------------<  168    3.8     |  34     |  4.57     Ca    8.3        16 May 2019 05:15  Mg     2.2       16 May 2019 01:23    TPro  6.8    /  Alb  2.9    /  TBili  0.4    /  DBili  x      /  AST  36     /  ALT  44     /  AlkPhos  148    16 May 2019 01:23    PT/INR - ( 16 May 2019 01:23 )   PT: 11.0 sec;   INR: 0.99 ratio         PTT - ( 16 May 2019 01:23 )  PTT:27.7 sec    Creatine Kinase, Serum: 531 U/L (05.16.19 @ 01:23)        < from: Transthoracic Echocardiogram (05.08.18 @ 14:14) >   Impression     Summary     The left ventricle is normal in size, wall thickness, wall motion and   contractility.   The estimated LVEF is 60%   The left atrium is normal.   The right atrium is normal.   The right ventricle is normal in size.   Mild calcific aortic stenosis   No aortic insufficiency is seen   Mild (1+) mitral regurgitation is present.   A pericardial effusion is not present.    < end of copied text >      < from: CT Chest No Cont (05.16.19 @ 12:50) >  IMPRESSION:    Findings suggesting mild pulmonary edema. No segmental consolidations. No   discrete pulmonary nodules.    < end of copied text >  < from: Xray Chest 1 View AP/PA. (05.16.19 @ 01:58) >  Findings/  IMPRESSION:       Evaluation the lungs is limited by suboptimal inspiration. Prominence of   the interstitium is present. Left chest wall pacemaker in place.    Cardiomegaly present. Bones are stable.    < end of copied text >

## 2019-05-16 NOTE — PHYSICAL THERAPY INITIAL EVALUATION ADULT - MODALITIES TREATMENT COMMENTS
The pt demonstrated good overall activity tolerance, responding well to therex review, transfer and ambulation tx. The pt was left sitting OOB and in the bathroom, having a BM, RN made aware and tending to the pt. The pt was in NAD at end of tx.

## 2019-05-16 NOTE — PATIENT PROFILE ADULT - PRO INTERPRETER NEED 2
[place] Likely due to hypovolemia. Resolved s/p IVF. Monitor serum sodium. Likely due to hypovolemia. IVF as above. Monitor serum sodium. Likely due to hypovolemia. Resolved s/p IVF. Monitor serum sodium. Likely due to hypovolemia. Resolved s/p IVF. Monitor serum sodium. Likely due to hypovolemia. IVF as above. Monitor serum sodium.

## 2019-05-16 NOTE — ED PROVIDER NOTE - OBJECTIVE STATEMENT
63 y/o Female with a PMHx of DM, HLD, HTN, CKD presents to ED c/o genralized weakness and fever per daughter and pt . so weak he is having trouble mabulating. per daughter had fever 102 F at home today. denies HA or neck pain. no chest pain or sob. no abd pain. no n/v/d. no urinary f/u/d. no back pain. no gross motor or sensory deficits. denies illicit drug use. no recent travel. no other acute issues symptoms or concerns

## 2019-05-16 NOTE — ED ADULT NURSE NOTE - NSIMPLEMENTINTERV_GEN_ALL_ED
Implemented All Fall Risk Interventions:  Superior to call system. Call bell, personal items and telephone within reach. Instruct patient to call for assistance. Room bathroom lighting operational. Non-slip footwear when patient is off stretcher. Physically safe environment: no spills, clutter or unnecessary equipment. Stretcher in lowest position, wheels locked, appropriate side rails in place. Provide visual cue, wrist band, yellow gown, etc. Monitor gait and stability. Monitor for mental status changes and reorient to person, place, and time. Review medications for side effects contributing to fall risk. Reinforce activity limits and safety measures with patient and family.

## 2019-05-16 NOTE — H&P ADULT - ASSESSMENT
61 yo male presented with weakness and fever.    A/P:    1.  Possible Sepsis of unknown source  Weakness  -started on IV antibiotics  -follow clinically and cultures   -follow PT eval and keep on fall precautions    2.  DM  -on lantus  -follow Dxt  -keep on ISS    3.  HTN  HLD  Hypothyroidism   -stable  -continue home medications    4.  ESRD  -on HD  -follow nephrology consult     5.  DVT ppx: heparin     6.  Code status: Full code

## 2019-05-16 NOTE — H&P ADULT - NEUROLOGICAL DETAILS
sensation intact/deep reflexes intact/cranial nerves intact/normal strength/responds to verbal commands/responds to pain/alert and oriented x 3

## 2019-05-16 NOTE — PHYSICAL THERAPY INITIAL EVALUATION ADULT - DISCHARGE DISPOSITION, PT EVAL
HI, based on today's assessment, further ambulation tx and assessment may change this recommendation if the pt improves./rehabilitation facility

## 2019-05-16 NOTE — PROGRESS NOTE ADULT - ASSESSMENT
61 yo Female with a PMHx of DM, HLD, HTN, CKD presents to ED with complain of generalized weakness and fever and cough.    Sepsis 2/2 unknown source  - continue vancomycin and cefepime   - X-ray as above - mild pulmonary edema  - CT as above - pulmonary edema  - follow PT eval and keep on fall precautions    Weakness  -follow PT eval and keep on fall precautions      DM  -on lantus  -follow Dxt  -keep on ISS    HTN      HLD      Hypothyroidism   -stable  -continue home medications    ESRD  -on HD  -follow nephrology consult     DVT ppx:  - subQ heparin     6.  Code status: Full code 63 yo Female with a PMHx of DM, HLD, HTN, CKD presents to ED with complain of generalized weakness with b/l leg pain, fever and cough.    #Sepsis 2/2 unknown source  - continue vancomycin and cefepime   - X-ray as above - mild pulmonary edema  - CT as above - pulmonary edema  - f/u UA and urine culture  - monitor leukocytosis  - ID consult pending    #Weakness  - likely due to infection   - PT eval pending    #DM  - FS AC/HS  - lantus 10 units at night  - ISS  - carb consistent diet    #Rhabdomyolysis   - elevated CK  - continue IV fluids  - will monitor    #HTN  - continue lisinopril     #HLD  - will hold statin for now due to elevated CK    #Hypothyroidism   -stable  -continue home medications    #ESRD  - on HD  - scheduled MWF  - nephrology consult appreciated     #DVT ppx  - subQ heparin     #Advance directives  - Full code 61 yo Female with a PMHx of DM, HLD, HTN, CKD presents to ED with complain of generalized weakness with b/l leg pain, fever and cough.    #Sepsis 2/2 unknown source  - continue vancomycin and cefepime   - X-ray as above - mild pulmonary edema  - CT as above - pulmonary edema  - f/u UA and urine culture  - monitor leukocytosis  - ID consult pending    #Weakness  - likely due to infection   - PT eval pending    #DM  - FS AC/HS  - lantus 10 units at night  - ISS  - carb consistent diet    #Rhabdomyolysis   - elevated CK  - continue IV fluids  - will monitor    #HFpEF  - compensated  - ECHO from 5/8: EF of 60%  - continue carvedilol   - continue imdur  - daily weights  - low salt diet  - monitor I/Os    #HTN  - continue lisinopril   - continue hydralazine     #HLD  - will hold statin for now due to elevated CK    #Hypothyroidism   -stable  -continue home medications    #ESRD  - on HD  - scheduled MWF  - continue sevelamer  - nephrology consult appreciated     #DVT ppx  - subQ heparin     #Advance directives  - Full code

## 2019-05-16 NOTE — PHYSICAL THERAPY INITIAL EVALUATION ADULT - ADDITIONAL COMMENTS
The pt reports that he lives with his dtr, he has several steps in the home and uses a RW occasionally to walk around.

## 2019-05-16 NOTE — CONSULT NOTE ADULT - ASSESSMENT
61 yo Female with a PMHx of DM, HLD, HTN, CKD presents to ED with complain of generalized weakness and fever per daughter at the bedside . Patient is so weak he is having trouble ambulating. As per daughter had fever 102 F at home today. he denies HA or neck pain. No chest pain or sob. No abd pain. No n/v/d. No urinary f/u/d. No back pain. no gross motor or sensory deficits. he denies illicit drug use. he had no recent travel. He has no other acute issues symptoms or concerns.  Pt sleeping unable to get history at this time  Will review with family and the pts dialysis unit events of yesterday  Addendum:  Pt had his full dialysis yesterday at the [facility] [address] unit and there were no issues  with dialysis and patient had no fever.  On zosyn  CXR some vascular prominence, no focal PNA reported

## 2019-05-16 NOTE — CONSULT NOTE ADULT - SUBJECTIVE AND OBJECTIVE BOX
NEPHROLOGY CONSULT  HPI:  61 yo Female with a PMHx of DM, HLD, HTN, CKD presents to ED with complain of generalized weakness and fever per daughter at the bedside . Patient is so weak he is having trouble ambulating. As per daughter had fever 102 F at home today. he denies HA or neck pain. No chest pain or sob. No abd pain. No n/v/d. No urinary f/u/d. No back pain. no gross motor or sensory deficits. he denies illicit drug use. he had no recent travel. He has no other acute issues symptoms or concerns.  Pt sleeping unable to get history at this time  Will review with family and the pts dialysis unit events of yesterday  Addendum:  Pt had his full dialysis yesterday at the [facility] unit and there were no issues  with dialysis and patient had no fever.    Family hx:  patient is unable to provide any detail family history this time. (16 May 2019 04:43)      PAST MEDICAL & SURGICAL HISTORY:  Pulmonary edema  Pancreatitis  AICD (automatic cardioverter/defibrillator) present  CHF (Congestive Heart Failure)  HTN (Hypertension)  High Cholesterol  Heart Attack  Dialysis Patient  Diabetes  AICD (automatic cardioverter/defibrillator) present  History of penile implant  History of Hernia Repair      FAMILY HISTORY:      MEDICATIONS  (STANDING):  aspirin  chewable 81 milliGRAM(s) Oral daily  atorvastatin 40 milliGRAM(s) Oral at bedtime  carvedilol 25 milliGRAM(s) Oral every 12 hours  dextrose 5%. 1000 milliLiter(s) (50 mL/Hr) IV Continuous <Continuous>  dextrose 50% Injectable 12.5 Gram(s) IV Push once  dextrose 50% Injectable 25 Gram(s) IV Push once  dextrose 50% Injectable 25 Gram(s) IV Push once  heparin  Injectable 5000 Unit(s) SubCutaneous every 8 hours  hydrALAZINE 100 milliGRAM(s) Oral two times a day  insulin glargine Injectable (LANTUS) 10 Unit(s) SubCutaneous at bedtime  insulin lispro (HumaLOG) corrective regimen sliding scale   SubCutaneous three times a day before meals  isosorbide   mononitrate ER Tablet (IMDUR) 60 milliGRAM(s) Oral daily  levothyroxine 75 MICROGram(s) Oral daily  lisinopril 5 milliGRAM(s) Oral daily  multivitamin 1 Tablet(s) Oral daily  piperacillin/tazobactam IVPB. 3.375 Gram(s) IV Intermittent every 12 hours  sevelamer carbonate 1600 milliGRAM(s) Oral three times a day  tamsulosin 0.4 milliGRAM(s) Oral at bedtime    MEDICATIONS  (PRN):  acetaminophen   Tablet .. 650 milliGRAM(s) Oral every 6 hours PRN Temp greater or equal to 38C (100.4F), Mild Pain (1 - 3)  dextrose 40% Gel 15 Gram(s) Oral once PRN Blood Glucose LESS THAN 70 milliGRAM(s)/deciliter  docusate sodium 100 milliGRAM(s) Oral three times a day PRN Constipation  glucagon  Injectable 1 milliGRAM(s) IntraMuscular once PRN Glucose LESS THAN 70 milligrams/deciliter      Allergies    No Known Allergies    Intolerances        I&O's Summary        REVIEW OF SYSTEMS:    sleeping lethargic when aroused  Answers some questions appropriately nods to sleep      Vital Signs Last 24 Hrs  T(C): 37.2 (16 May 2019 05:30), Max: 37.2 (16 May 2019 05:30)  T(F): 99 (16 May 2019 05:30), Max: 99 (16 May 2019 05:30)  HR: 82 (16 May 2019 05:30) (78 - 82)  BP: 124/47 (16 May 2019 05:30) (88/40 - 124/47)  BP(mean): --  RR: 15 (16 May 2019 05:30) (15 - 20)  SpO2: 100% (16 May 2019 05:30) (94% - 100%)  Daily Height in cm: 167.64 (15 May 2019 23:19)    Daily   I&O's Summary      PHYSICAL EXAM:    General:  sleeping, lethargic    Neuro:      HEENT:  No JVD,     Cardiovascular:  Regular rate and rhythm, with normal S1 and S2    Lungs:  good air entry .    Abdomen:  Normoactive bowel sounds.   non distended, non tender    Skin:  Warm, dry, well-perfused. No rashes or other lesions.     Extremities:  no edema    LABS:                        7.8    15.55 )-----------( 168      ( 16 May 2019 05:15 )             23.9     05-16    136  |  98  |  38<H>  ----------------------------<  168<H>  3.8   |  34<H>  |  4.57<H>    Ca    8.3<L>      16 May 2019 05:15  Mg     2.2     05-16    TPro  6.8  /  Alb  2.9<L>  /  TBili  0.4  /  DBili  x   /  AST  36  /  ALT  44  /  AlkPhos  148<H>  05-16    PT/INR - ( 16 May 2019 01:23 )   PT: 11.0 sec;   INR: 0.99 ratio         PTT - ( 16 May 2019 01:23 )  PTT:27.7 sec    Magnesium, Serum: 2.2 mg/dL (05-16 @ 01:23)

## 2019-05-17 LAB
ANION GAP SERPL CALC-SCNC: 10 MMOL/L — SIGNIFICANT CHANGE UP (ref 5–17)
BUN SERPL-MCNC: 55 MG/DL — HIGH (ref 7–23)
CALCIUM SERPL-MCNC: 8.3 MG/DL — LOW (ref 8.5–10.1)
CHLORIDE SERPL-SCNC: 91 MMOL/L — LOW (ref 96–108)
CO2 SERPL-SCNC: 31 MMOL/L — SIGNIFICANT CHANGE UP (ref 22–31)
CREAT SERPL-MCNC: 6.33 MG/DL — HIGH (ref 0.5–1.3)
GLUCOSE BLDC GLUCOMTR-MCNC: 128 MG/DL — HIGH (ref 70–99)
GLUCOSE BLDC GLUCOMTR-MCNC: 138 MG/DL — HIGH (ref 70–99)
GLUCOSE BLDC GLUCOMTR-MCNC: 178 MG/DL — HIGH (ref 70–99)
GLUCOSE BLDC GLUCOMTR-MCNC: 245 MG/DL — HIGH (ref 70–99)
GLUCOSE SERPL-MCNC: 174 MG/DL — HIGH (ref 70–99)
HCT VFR BLD CALC: 25.3 % — LOW (ref 39–50)
HGB BLD-MCNC: 8.5 G/DL — LOW (ref 13–17)
MAGNESIUM SERPL-MCNC: 2.5 MG/DL — SIGNIFICANT CHANGE UP (ref 1.6–2.6)
MCHC RBC-ENTMCNC: 30.4 PG — SIGNIFICANT CHANGE UP (ref 27–34)
MCHC RBC-ENTMCNC: 33.6 GM/DL — SIGNIFICANT CHANGE UP (ref 32–36)
MCV RBC AUTO: 90.4 FL — SIGNIFICANT CHANGE UP (ref 80–100)
PHOSPHATE SERPL-MCNC: 3.4 MG/DL — SIGNIFICANT CHANGE UP (ref 2.5–4.5)
PLATELET # BLD AUTO: 188 K/UL — SIGNIFICANT CHANGE UP (ref 150–400)
POTASSIUM SERPL-MCNC: 4 MMOL/L — SIGNIFICANT CHANGE UP (ref 3.5–5.3)
POTASSIUM SERPL-SCNC: 4 MMOL/L — SIGNIFICANT CHANGE UP (ref 3.5–5.3)
RBC # BLD: 2.8 M/UL — LOW (ref 4.2–5.8)
RBC # FLD: 15.3 % — HIGH (ref 10.3–14.5)
SODIUM SERPL-SCNC: 132 MMOL/L — LOW (ref 135–145)
WBC # BLD: 13.94 K/UL — HIGH (ref 3.8–10.5)
WBC # FLD AUTO: 13.94 K/UL — HIGH (ref 3.8–10.5)

## 2019-05-17 PROCEDURE — 74176 CT ABD & PELVIS W/O CONTRAST: CPT | Mod: 26

## 2019-05-17 RX ORDER — ERYTHROPOIETIN 10000 [IU]/ML
4000 INJECTION, SOLUTION INTRAVENOUS; SUBCUTANEOUS
Refills: 0 | Status: DISCONTINUED | OUTPATIENT
Start: 2019-05-17 | End: 2019-05-24

## 2019-05-17 RX ORDER — METRONIDAZOLE 500 MG
500 TABLET ORAL EVERY 8 HOURS
Refills: 0 | Status: DISCONTINUED | OUTPATIENT
Start: 2019-05-17 | End: 2019-05-24

## 2019-05-17 RX ORDER — SENNA PLUS 8.6 MG/1
1 TABLET ORAL AT BEDTIME
Refills: 0 | Status: DISCONTINUED | OUTPATIENT
Start: 2019-05-17 | End: 2019-05-24

## 2019-05-17 RX ORDER — CEFEPIME 1 G/1
1000 INJECTION, POWDER, FOR SOLUTION INTRAMUSCULAR; INTRAVENOUS DAILY
Refills: 0 | Status: DISCONTINUED | OUTPATIENT
Start: 2019-05-17 | End: 2019-05-24

## 2019-05-17 RX ORDER — POLYETHYLENE GLYCOL 3350 17 G/17G
17 POWDER, FOR SOLUTION ORAL ONCE
Refills: 0 | Status: COMPLETED | OUTPATIENT
Start: 2019-05-17 | End: 2019-05-17

## 2019-05-17 RX ORDER — PANTOPRAZOLE SODIUM 20 MG/1
40 TABLET, DELAYED RELEASE ORAL
Refills: 0 | Status: DISCONTINUED | OUTPATIENT
Start: 2019-05-17 | End: 2019-05-24

## 2019-05-17 RX ADMIN — Medication 100 MILLIGRAM(S): at 18:06

## 2019-05-17 RX ADMIN — ISOSORBIDE MONONITRATE 60 MILLIGRAM(S): 60 TABLET, EXTENDED RELEASE ORAL at 18:05

## 2019-05-17 RX ADMIN — TAMSULOSIN HYDROCHLORIDE 0.4 MILLIGRAM(S): 0.4 CAPSULE ORAL at 22:57

## 2019-05-17 RX ADMIN — Medication 100 MILLIGRAM(S): at 06:21

## 2019-05-17 RX ADMIN — Medication 100 MILLIGRAM(S): at 03:42

## 2019-05-17 RX ADMIN — HEPARIN SODIUM 5000 UNIT(S): 5000 INJECTION INTRAVENOUS; SUBCUTANEOUS at 06:21

## 2019-05-17 RX ADMIN — Medication 100 MILLIGRAM(S): at 22:58

## 2019-05-17 RX ADMIN — CEFEPIME 1000 MILLIGRAM(S): 1 INJECTION, POWDER, FOR SOLUTION INTRAMUSCULAR; INTRAVENOUS at 18:08

## 2019-05-17 RX ADMIN — ERYTHROPOIETIN 4000 UNIT(S): 10000 INJECTION, SOLUTION INTRAVENOUS; SUBCUTANEOUS at 15:58

## 2019-05-17 RX ADMIN — PANTOPRAZOLE SODIUM 40 MILLIGRAM(S): 20 TABLET, DELAYED RELEASE ORAL at 18:06

## 2019-05-17 RX ADMIN — Medication 75 MICROGRAM(S): at 06:21

## 2019-05-17 RX ADMIN — POLYETHYLENE GLYCOL 3350 17 GRAM(S): 17 POWDER, FOR SOLUTION ORAL at 06:22

## 2019-05-17 RX ADMIN — HEPARIN SODIUM 5000 UNIT(S): 5000 INJECTION INTRAVENOUS; SUBCUTANEOUS at 18:05

## 2019-05-17 RX ADMIN — INSULIN GLARGINE 10 UNIT(S): 100 INJECTION, SOLUTION SUBCUTANEOUS at 22:57

## 2019-05-17 RX ADMIN — Medication 1 TABLET(S): at 18:06

## 2019-05-17 RX ADMIN — Medication 81 MILLIGRAM(S): at 18:05

## 2019-05-17 RX ADMIN — CARVEDILOL PHOSPHATE 25 MILLIGRAM(S): 80 CAPSULE, EXTENDED RELEASE ORAL at 18:06

## 2019-05-17 RX ADMIN — HEPARIN SODIUM 5000 UNIT(S): 5000 INJECTION INTRAVENOUS; SUBCUTANEOUS at 22:57

## 2019-05-17 RX ADMIN — Medication 4: at 11:05

## 2019-05-17 RX ADMIN — Medication 2: at 08:38

## 2019-05-17 RX ADMIN — Medication 0.5 MILLIGRAM(S): at 06:20

## 2019-05-17 NOTE — PROGRESS NOTE ADULT - SUBJECTIVE AND OBJECTIVE BOX
NEPHROLOGY CONSULT  HPI:  63 yo Female with a PMHx of DM, HLD, HTN, CKD presents to ED with complain of generalized weakness and fever per daughter at the bedside . Patient is so weak he is having trouble ambulating. As per daughter had fever 102 F at home today. he denies HA or neck pain. No chest pain or sob. No abd pain. No n/v/d. No urinary f/u/d. No back pain. no gross motor or sensory deficits. he denies illicit drug use. he had no recent travel. He has no other acute issues symptoms or concerns.  Pt sleeping unable to get history at this time  Will review with family and the pts dialysis unit events of yesterday  Addendum:  Pt had his full dialysis yesterday at the [facility] unit and there were no issues  with dialysis and patient had no fever.    5/17  seen on HD   vvs  CT abd , duodenitis?  ID input noted      Family hx:  patient is unable to provide any detail family history this time. (16 May 2019 04:43)      PAST MEDICAL & SURGICAL HISTORY:  Pulmonary edema  Pancreatitis  AICD (automatic cardioverter/defibrillator) present  CHF (Congestive Heart Failure)  HTN (Hypertension)  High Cholesterol  Heart Attack  Dialysis Patient  Diabetes  AICD (automatic cardioverter/defibrillator) present  History of penile implant  History of Hernia Repair      FAMILY HISTORY:    MEDICATIONS  (STANDING):  aspirin  chewable 81 milliGRAM(s) Oral daily  carvedilol 25 milliGRAM(s) Oral every 12 hours  cefepime  Injectable. 1000 milliGRAM(s) IV Push daily  dextrose 5%. 1000 milliLiter(s) (50 mL/Hr) IV Continuous <Continuous>  dextrose 50% Injectable 12.5 Gram(s) IV Push once  dextrose 50% Injectable 25 Gram(s) IV Push once  dextrose 50% Injectable 25 Gram(s) IV Push once  epoetin indu Injectable 4000 Unit(s) IV Push <User Schedule>  heparin  Injectable 5000 Unit(s) SubCutaneous every 8 hours  hydrALAZINE 100 milliGRAM(s) Oral two times a day  insulin glargine Injectable (LANTUS) 10 Unit(s) SubCutaneous at bedtime  insulin lispro (HumaLOG) corrective regimen sliding scale   SubCutaneous three times a day before meals  isosorbide   mononitrate ER Tablet (IMDUR) 60 milliGRAM(s) Oral daily  levothyroxine 75 MICROGram(s) Oral daily  lisinopril 5 milliGRAM(s) Oral daily  multivitamin 1 Tablet(s) Oral daily  pantoprazole  Injectable 40 milliGRAM(s) IV Push two times a day  sevelamer carbonate 1600 milliGRAM(s) Oral three times a day  tamsulosin 0.4 milliGRAM(s) Oral at bedtime    MEDICATIONS  (PRN):  acetaminophen   Tablet .. 650 milliGRAM(s) Oral every 6 hours PRN Temp greater or equal to 38C (100.4F), Mild Pain (1 - 3)  dextrose 40% Gel 15 Gram(s) Oral once PRN Blood Glucose LESS THAN 70 milliGRAM(s)/deciliter  docusate sodium 100 milliGRAM(s) Oral three times a day PRN Constipation  glucagon  Injectable 1 milliGRAM(s) IntraMuscular once PRN Glucose LESS THAN 70 milligrams/deciliter      Allergies    No Known Allergies    Intolerances        I&O's Summary        REVIEW OF SYSTEMS:    Vital Signs Last 24 Hrs  T(C): 36.6 (17 May 2019 13:20), Max: 37.3 (16 May 2019 21:55)  T(F): 97.8 (17 May 2019 13:20), Max: 99.1 (16 May 2019 21:55)  HR: 76 (17 May 2019 15:26) (75 - 86)  BP: 147/64 (17 May 2019 15:26) (121/43 - 155/47)  BP(mean): --  RR: 16 (17 May 2019 15:26) (15 - 18)  SpO2: 100% (17 May 2019 11:17) (99% - 100%)    PHYSICAL EXAM:    General:  sleeping, lethargic    Neuro:      HEENT:  No JVD,     Cardiovascular:  Regular rate and rhythm, with normal S1 and S2    Lungs:  good air entry .  Abdomen:  Normoactive bowel sounds.  distended, non tender    Skin:  Warm, dry, well-perfused. No rashes or other lesions.     Extremities:  no edema    LABS:                 05-17    132<L>  |  91<L>  |  55<H>  ----------------------------<  174<H>  4.0   |  31  |  6.33<H>    Ca    8.3<L>      17 May 2019 07:49  Phos  3.4     05-17  Mg     2.5     05-17    TPro  6.8  /  Alb  2.9<L>  /  TBili  0.4  /  DBili  x   /  AST  36  /  ALT  44  /  AlkPhos  148<H>  05-16                                8.5    13.94 )-----------( 188      ( 17 May 2019 07:49 )             25.3 Pt has a thrombus of SVC.  Most recent anti-Xa therapeutic at 0.57 (5/5).   - Re start Lovenox  - Will check anti-Xa level on Monday (5/13)   - Transfusion Criteria 8/30 due to lovenox  - Arrange teaching to mother for Lovenox administration

## 2019-05-17 NOTE — CHART NOTE - NSCHARTNOTEFT_GEN_A_CORE
Called by nurse about pt with c/o nausea this AM, as per RN. Per RN, pt states that he feels like he wants to have a bowel movement. Pt currently on colace.  Most recent EKG shows QT prolongation    - Will give ativan 0.5mg PO x1 dose for nausea  - Miralax added to bowel regimen

## 2019-05-17 NOTE — PROGRESS NOTE ADULT - SUBJECTIVE AND OBJECTIVE BOX
63 yo Female with a PMHx of DM, HLD, HTN, CKD presents to ED with complain of generalized weakness and fever per daughter at the bedside . Patient is so weak he is having trouble ambulating. As per daughter had fever 102 F at home today. he denies HA or neck pain. No chest pain or sob. No abd pain. No n/v/d. No urinary f/u/d. No back pain. no gross motor or sensory deficits. he denies illicit drug use. he had no recent travel. He has no other acute issues symptoms or concerns. Patient discharged on 4/2  at that time was hospitalized with sepsis likely secondary to Rhino/Entero virus & Multifocal PNA.    [facility]  [id number]  Interval history: Overnight patient with nausea, vomiting and abdominal pain. Patient evaluated at bedside still with abdominal pain. Notified by RN that patient is again c/o nausea. CT ordered.      REVIEW OF SYSTEMS:    CONSTITUTIONAL: + weakness, No fevers or chills  EYES/ENT: No visual changes;  No vertigo or throat pain   NECK: No pain or stiffness  RESPIRATORY+ cough, No wheezing, hemoptysis; No shortness of breath  CARDIOVASCULAR: No chest pain or palpitations  GASTROINTESTINAL: +abdominal pain, epigastric pain. +nausea, +vomiting, No hematemesis; No diarrhea or constipation. No melena or hematochezia.  GENITOURINARY: No dysuria, frequency or hematuria  NEUROLOGICAL: No numbness or weakness  SKIN: No itching, burning, rashes, or lesions   All other review of systems is negative unless indicated above.     MEDICATIONS  (STANDING):  aspirin  chewable 81 milliGRAM(s) Oral daily  carvedilol 25 milliGRAM(s) Oral every 12 hours  cefepime  Injectable. 1000 milliGRAM(s) IV Push daily  dextrose 5%. 1000 milliLiter(s) (50 mL/Hr) IV Continuous <Continuous>  dextrose 50% Injectable 12.5 Gram(s) IV Push once  dextrose 50% Injectable 25 Gram(s) IV Push once  dextrose 50% Injectable 25 Gram(s) IV Push once  epoetin indu Injectable 4000 Unit(s) IV Push <User Schedule>  heparin  Injectable 5000 Unit(s) SubCutaneous every 8 hours  hydrALAZINE 100 milliGRAM(s) Oral two times a day  insulin glargine Injectable (LANTUS) 10 Unit(s) SubCutaneous at bedtime  insulin lispro (HumaLOG) corrective regimen sliding scale   SubCutaneous three times a day before meals  isosorbide   mononitrate ER Tablet (IMDUR) 60 milliGRAM(s) Oral daily  levothyroxine 75 MICROGram(s) Oral daily  lisinopril 5 milliGRAM(s) Oral daily  multivitamin 1 Tablet(s) Oral daily  pantoprazole  Injectable 40 milliGRAM(s) IV Push two times a day  sevelamer carbonate 1600 milliGRAM(s) Oral three times a day  tamsulosin 0.4 milliGRAM(s) Oral at bedtime    MEDICATIONS  (PRN):  acetaminophen   Tablet .. 650 milliGRAM(s) Oral every 6 hours PRN Temp greater or equal to 38C (100.4F), Mild Pain (1 - 3)  dextrose 40% Gel 15 Gram(s) Oral once PRN Blood Glucose LESS THAN 70 milliGRAM(s)/deciliter  docusate sodium 100 milliGRAM(s) Oral three times a day PRN Constipation  glucagon  Injectable 1 milliGRAM(s) IntraMuscular once PRN Glucose LESS THAN 70 milligrams/deciliter    Vital Signs (24 Hrs):  T(C): 36.6 (05-17-19 @ 13:20), Max: 37.3 (05-16-19 @ 21:55)  HR: 73 (05-17-19 @ 15:59) (73 - 86)  BP: 119/55 (05-17-19 @ 15:59) (119/55 - 155/47)  RR: 16 (05-17-19 @ 15:26) (15 - 18)  SpO2: 100% (05-17-19 @ 11:17) (99% - 100%)  Wt(kg): --  Daily     Daily     I&O's Summary      PHYSICAL EXAM:  GENERAL: NAD,  comfortable, lethargic  HEAD:  Atraumatic, Normocephalic  EYES: EOMI, conjunctiva and sclera clear  NECK: Supple  CHEST/LUNG: very faint bibasilar rales  HEART: Regular rate and rhythm; No murmurs, rubs, or gallops  ABDOMEN: Soft, Nontender, +distention; Bowel sounds present  EXTREMITIES:  2+ Peripheral Pulses, No clubbing, cyanosis, or edema  PSYCH: Alert and oriented  NEUROLOGY: non-focal  SKIN: No rashes or lesions    LABS:                        8.5    13.94 )-----------( 188      ( 17 May 2019 07:49 )             25.3     17 May 2019 07:49    132    |  91     |  55     ----------------------------<  174    4.0     |  31     |  6.33     Ca    8.3        17 May 2019 07:49  Phos  3.4       17 May 2019 07:49  Mg     2.5       17 May 2019 07:49      PT/INR - ( 16 May 2019 01:23 )   PT: 11.0 sec;   INR: 0.99 ratio         PTT - ( 16 May 2019 01:23 )  PTT:27.7 sec    Creatine Kinase, Serum: 531 U/L (05.16.19 @ 01:23)        < from: Transthoracic Echocardiogram (05.08.18 @ 14:14) >   Impression     Summary     The left ventricle is normal in size, wall thickness, wall motion and   contractility.   The estimated LVEF is 60%   The left atrium is normal.   The right atrium is normal.   The right ventricle is normal in size.   Mild calcific aortic stenosis   No aortic insufficiency is seen   Mild (1+) mitral regurgitation is present.   A pericardial effusion is not present.    < end of copied text >      < from: CT Chest No Cont (05.16.19 @ 12:50) >  IMPRESSION:    Findings suggesting mild pulmonary edema. No segmental consolidations. No   discrete pulmonary nodules.    < end of copied text >  < from: Xray Chest 1 View AP/PA. (05.16.19 @ 01:58) >  Findings/  IMPRESSION:       Evaluation the lungs is limited by suboptimal inspiration. Prominence of   the interstitium is present. Left chest wall pacemaker in place.    Cardiomegaly present. Bones are stable.    < end of copied text >      < from: CT Abdomen and Pelvis No Cont (05.17.19 @ 11:43) >  IMPRESSION:     Interval development of mild wall thickening in the duodenum with   surrounding inflammatory changes. Duodenitis, underlying ulcer, or other   pathology cannot be excluded. Please correlate clinically.    Gallstone without gross inflammation in a contracted gallbladder. Bladder   appears mildly thick-walledalthough this may be due to under distention.   Please correlate clinically. Penile prosthesis. Additional findings as   above.    < end of copied text > 61 yo Female with a PMHx of DM, HLD, HTN, CKD presents to ED with complain of generalized weakness and fever per daughter at the bedside . Patient is so weak he is having trouble ambulating. As per daughter had fever 102 F at home today. he denies HA or neck pain. No chest pain or sob. No abd pain. No n/v/d. No urinary f/u/d. No back pain. no gross motor or sensory deficits. he denies illicit drug use. he had no recent travel. He has no other acute issues symptoms or concerns. Patient discharged on 4/2  at that time was hospitalized with sepsis likely secondary to Rhino/Entero virus & Multifocal PNA.    [facility]    Interval history: Overnight patient with nausea, vomiting and abdominal pain. Patient evaluated at bedside still with abdominal pain. Notified by RN that patient is again with nausea. CT ordered.      REVIEW OF SYSTEMS:    CONSTITUTIONAL: + weakness, No fevers or chills  EYES/ENT: No visual changes;  No vertigo or throat pain   NECK: No pain or stiffness  RESPIRATORY+ cough, No wheezing, hemoptysis; No shortness of breath  CARDIOVASCULAR: No chest pain or palpitations  GASTROINTESTINAL: +abdominal pain, epigastric pain. +nausea, +vomiting, No hematemesis; No diarrhea or constipation. No melena or hematochezia.  GENITOURINARY: No dysuria, frequency or hematuria  NEUROLOGICAL: No numbness or weakness  SKIN: No itching, burning, rashes, or lesions   All other review of systems is negative unless indicated above.     MEDICATIONS  (STANDING):  aspirin  chewable 81 milliGRAM(s) Oral daily  carvedilol 25 milliGRAM(s) Oral every 12 hours  cefepime  Injectable. 1000 milliGRAM(s) IV Push daily  dextrose 5%. 1000 milliLiter(s) (50 mL/Hr) IV Continuous <Continuous>  dextrose 50% Injectable 12.5 Gram(s) IV Push once  dextrose 50% Injectable 25 Gram(s) IV Push once  dextrose 50% Injectable 25 Gram(s) IV Push once  epoetin indu Injectable 4000 Unit(s) IV Push <User Schedule>  heparin  Injectable 5000 Unit(s) SubCutaneous every 8 hours  hydrALAZINE 100 milliGRAM(s) Oral two times a day  insulin glargine Injectable (LANTUS) 10 Unit(s) SubCutaneous at bedtime  insulin lispro (HumaLOG) corrective regimen sliding scale   SubCutaneous three times a day before meals  isosorbide   mononitrate ER Tablet (IMDUR) 60 milliGRAM(s) Oral daily  levothyroxine 75 MICROGram(s) Oral daily  lisinopril 5 milliGRAM(s) Oral daily  multivitamin 1 Tablet(s) Oral daily  pantoprazole  Injectable 40 milliGRAM(s) IV Push two times a day  sevelamer carbonate 1600 milliGRAM(s) Oral three times a day  tamsulosin 0.4 milliGRAM(s) Oral at bedtime    MEDICATIONS  (PRN):  acetaminophen   Tablet .. 650 milliGRAM(s) Oral every 6 hours PRN Temp greater or equal to 38C (100.4F), Mild Pain (1 - 3)  dextrose 40% Gel 15 Gram(s) Oral once PRN Blood Glucose LESS THAN 70 milliGRAM(s)/deciliter  docusate sodium 100 milliGRAM(s) Oral three times a day PRN Constipation  glucagon  Injectable 1 milliGRAM(s) IntraMuscular once PRN Glucose LESS THAN 70 milligrams/deciliter    Vital Signs (24 Hrs):  T(C): 36.6 (05-17-19 @ 13:20), Max: 37.3 (05-16-19 @ 21:55)  HR: 73 (05-17-19 @ 15:59) (73 - 86)  BP: 119/55 (05-17-19 @ 15:59) (119/55 - 155/47)  RR: 16 (05-17-19 @ 15:26) (15 - 18)  SpO2: 100% (05-17-19 @ 11:17) (99% - 100%)  Wt(kg): --  Daily     Daily     I&O's Summary      PHYSICAL EXAM:  GENERAL: NAD,  comfortable, lethargic  HEAD:  Atraumatic, Normocephalic  EYES: EOMI, conjunctiva and sclera clear  NECK: Supple  CHEST/LUNG: very faint bibasilar rales  HEART: Regular rate and rhythm; No murmurs, rubs, or gallops  ABDOMEN: Soft, Nontender, +distention; Bowel sounds present  EXTREMITIES:  2+ Peripheral Pulses, No clubbing, cyanosis, or edema  PSYCH: Alert and oriented  NEUROLOGY: non-focal  SKIN: No rashes or lesions    LABS:                        8.5    13.94 )-----------( 188      ( 17 May 2019 07:49 )             25.3     17 May 2019 07:49    132    |  91     |  55     ----------------------------<  174    4.0     |  31     |  6.33     Ca    8.3        17 May 2019 07:49  Phos  3.4       17 May 2019 07:49  Mg     2.5       17 May 2019 07:49      PT/INR - ( 16 May 2019 01:23 )   PT: 11.0 sec;   INR: 0.99 ratio         PTT - ( 16 May 2019 01:23 )  PTT:27.7 sec    Creatine Kinase, Serum: 531 U/L (05.16.19 @ 01:23)        < from: Transthoracic Echocardiogram (05.08.18 @ 14:14) >   Impression     Summary     The left ventricle is normal in size, wall thickness, wall motion and   contractility.   The estimated LVEF is 60%   The left atrium is normal.   The right atrium is normal.   The right ventricle is normal in size.   Mild calcific aortic stenosis   No aortic insufficiency is seen   Mild (1+) mitral regurgitation is present.   A pericardial effusion is not present.    < end of copied text >      < from: CT Chest No Cont (05.16.19 @ 12:50) >  IMPRESSION:    Findings suggesting mild pulmonary edema. No segmental consolidations. No   discrete pulmonary nodules.    < end of copied text >  < from: Xray Chest 1 View AP/PA. (05.16.19 @ 01:58) >  Findings/  IMPRESSION:       Evaluation the lungs is limited by suboptimal inspiration. Prominence of   the interstitium is present. Left chest wall pacemaker in place.    Cardiomegaly present. Bones are stable.    < end of copied text >      < from: CT Abdomen and Pelvis No Cont (05.17.19 @ 11:43) >  IMPRESSION:     Interval development of mild wall thickening in the duodenum with   surrounding inflammatory changes. Duodenitis, underlying ulcer, or other   pathology cannot be excluded. Please correlate clinically.    Gallstone without gross inflammation in a contracted gallbladder. Bladder   appears mildly thick-walledalthough this may be due to under distention.   Please correlate clinically. Penile prosthesis. Additional findings as   above.    < end of copied text > 63 yo Female with a PMHx of DM, HLD, HTN, CKD presents to ED with complain of generalized weakness and fever per daughter at the bedside . Patient is so weak he is having trouble ambulating. As per daughter had fever 102 F at home today. he denies HA or neck pain. No chest pain or sob. No abd pain. No n/v/d. No urinary f/u/d. No back pain. no gross motor or sensory deficits. he denies illicit drug use. he had no recent travel. He has no other acute issues symptoms or concerns. Patient discharged on 4/2  at that time was hospitalized with sepsis likely secondary to Rhino/Entero virus & Multifocal PNA.    [facility]  [id number]  Interval history: Overnight patient with nausea, vomiting and abdominal pain. Patient evaluated at bedside still with abdominal pain and nausea. Patient states that whenever he eats he vomits. Explained hospital course and CT results.     REVIEW OF SYSTEMS:    CONSTITUTIONAL: + weakness, No fevers or chills  EYES/ENT: No visual changes;  No vertigo or throat pain   NECK: No pain or stiffness  RESPIRATORY+ cough, No wheezing, hemoptysis; No shortness of breath  CARDIOVASCULAR: No chest pain or palpitations  GASTROINTESTINAL: +abdominal pain, epigastric pain. +nausea, +vomiting, No hematemesis; No diarrhea or constipation. No melena or hematochezia.  GENITOURINARY: No dysuria, frequency or hematuria  NEUROLOGICAL: No numbness or weakness  SKIN: No itching, burning, rashes, or lesions   All other review of systems is negative unless indicated above.     MEDICATIONS  (STANDING):  aspirin  chewable 81 milliGRAM(s) Oral daily  carvedilol 25 milliGRAM(s) Oral every 12 hours  cefepime  Injectable. 1000 milliGRAM(s) IV Push daily  dextrose 5%. 1000 milliLiter(s) (50 mL/Hr) IV Continuous <Continuous>  dextrose 50% Injectable 12.5 Gram(s) IV Push once  dextrose 50% Injectable 25 Gram(s) IV Push once  dextrose 50% Injectable 25 Gram(s) IV Push once  epoetin indu Injectable 4000 Unit(s) IV Push <User Schedule>  heparin  Injectable 5000 Unit(s) SubCutaneous every 8 hours  hydrALAZINE 100 milliGRAM(s) Oral two times a day  insulin glargine Injectable (LANTUS) 10 Unit(s) SubCutaneous at bedtime  insulin lispro (HumaLOG) corrective regimen sliding scale   SubCutaneous three times a day before meals  isosorbide   mononitrate ER Tablet (IMDUR) 60 milliGRAM(s) Oral daily  levothyroxine 75 MICROGram(s) Oral daily  lisinopril 5 milliGRAM(s) Oral daily  multivitamin 1 Tablet(s) Oral daily  pantoprazole  Injectable 40 milliGRAM(s) IV Push two times a day  sevelamer carbonate 1600 milliGRAM(s) Oral three times a day  tamsulosin 0.4 milliGRAM(s) Oral at bedtime    MEDICATIONS  (PRN):  acetaminophen   Tablet .. 650 milliGRAM(s) Oral every 6 hours PRN Temp greater or equal to 38C (100.4F), Mild Pain (1 - 3)  dextrose 40% Gel 15 Gram(s) Oral once PRN Blood Glucose LESS THAN 70 milliGRAM(s)/deciliter  docusate sodium 100 milliGRAM(s) Oral three times a day PRN Constipation  glucagon  Injectable 1 milliGRAM(s) IntraMuscular once PRN Glucose LESS THAN 70 milligrams/deciliter    Vital Signs (24 Hrs):  T(C): 36.6 (05-17-19 @ 13:20), Max: 37.3 (05-16-19 @ 21:55)  HR: 73 (05-17-19 @ 15:59) (73 - 86)  BP: 119/55 (05-17-19 @ 15:59) (119/55 - 155/47)  RR: 16 (05-17-19 @ 15:26) (15 - 18)  SpO2: 100% (05-17-19 @ 11:17) (99% - 100%)  Wt(kg): --  Daily     Daily     I&O's Summary      PHYSICAL EXAM:  GENERAL: NAD,  comfortable, lethargic  HEAD:  Atraumatic, Normocephalic  EYES: EOMI, conjunctiva and sclera clear  NECK: Supple  CHEST/LUNG: very faint bibasilar rales  HEART: Regular rate and rhythm; No murmurs, rubs, or gallops  ABDOMEN: Soft, Nontender, +distention; Bowel sounds present  EXTREMITIES:  2+ Peripheral Pulses, No clubbing, cyanosis, or edema  PSYCH: Alert and oriented  NEUROLOGY: non-focal  SKIN: No rashes or lesions    LABS:                        8.5    13.94 )-----------( 188      ( 17 May 2019 07:49 )             25.3     17 May 2019 07:49    132    |  91     |  55     ----------------------------<  174    4.0     |  31     |  6.33     Ca    8.3        17 May 2019 07:49  Phos  3.4       17 May 2019 07:49  Mg     2.5       17 May 2019 07:49      PT/INR - ( 16 May 2019 01:23 )   PT: 11.0 sec;   INR: 0.99 ratio         PTT - ( 16 May 2019 01:23 )  PTT:27.7 sec    Creatine Kinase, Serum: 531 U/L (05.16.19 @ 01:23)        < from: Transthoracic Echocardiogram (05.08.18 @ 14:14) >   Impression     Summary     The left ventricle is normal in size, wall thickness, wall motion and   contractility.   The estimated LVEF is 60%   The left atrium is normal.   The right atrium is normal.   The right ventricle is normal in size.   Mild calcific aortic stenosis   No aortic insufficiency is seen   Mild (1+) mitral regurgitation is present.   A pericardial effusion is not present.    < end of copied text >      < from: CT Chest No Cont (05.16.19 @ 12:50) >  IMPRESSION:    Findings suggesting mild pulmonary edema. No segmental consolidations. No   discrete pulmonary nodules.    < end of copied text >  < from: Xray Chest 1 View AP/PA. (05.16.19 @ 01:58) >  Findings/  IMPRESSION:       Evaluation the lungs is limited by suboptimal inspiration. Prominence of   the interstitium is present. Left chest wall pacemaker in place.    Cardiomegaly present. Bones are stable.    < end of copied text >      < from: CT Abdomen and Pelvis No Cont (05.17.19 @ 11:43) >  IMPRESSION:     Interval development of mild wall thickening in the duodenum with   surrounding inflammatory changes. Duodenitis, underlying ulcer, or other   pathology cannot be excluded. Please correlate clinically.    Gallstone without gross inflammation in a contracted gallbladder. Bladder   appears mildly thick-walledalthough this may be due to under distention.   Please correlate clinically. Penile prosthesis. Additional findings as   above.    < end of copied text >

## 2019-05-17 NOTE — PROGRESS NOTE ADULT - ASSESSMENT
61 yo Female with a PMHx of DM, HLD, HTN, CKD presents to ED with complain of generalized weakness and fever per daughter at the bedside . Patient is so weak he is having trouble ambulating. As per daughter had fever 102 F at home today. he denies HA or neck pain. No chest pain or sob. No abd pain. No n/v/d. No urinary f/u/d. No back pain. no gross motor or sensory deficits. he denies illicit drug use. he had no recent travel. He has no other acute issues symptoms or concerns.  Pt sleeping unable to get history at this time  Will review with family and the pts dialysis unit events of yesterday  Addendum:  Pt had his full dialysis yesterday at the [facility] unit and there were no issues  with dialysis and patient had no fever.  On zosyn  CXR some vascular prominence, no focal PNA reported        5/17  seen on HD   vvs  CT abd , duodenitis?  ID input noted  on cefepime and Flagyll

## 2019-05-17 NOTE — CONSULT NOTE ADULT - ASSESSMENT
61 yo male with a PMHx of DM, HLD, HTN, ESRD on HD MWF presents to ED with complain of generalized weakness and fever per daughter at the bedside. Patient is so weak he is having trouble ambulating. As per daughter had fever 102 F at home 5/16 he denies HA or neck pain. No chest pain or sob. No abd pain. No urinary f/u/d. No back pain. No gross motor or sensory deficits. He denies illicit drug use. He had no recent travel. He has no other acute issues symptoms or concerns. Here afebrile, wbc ct 15, CT chest with minimal bronchiectasis, scattered alveolar opacities ? mild pulmonary edema no consolidations or nodules.    1. febrile syndrome. nausea. vomiting. abd distension. leukocytosis  - imaging reviewed, CT abd/pelvis with probable duodenitis   - low suspicion for pna based on imaging and lack of resp sxs   - afebrile here, cx no growth  - s/p vancomycin, hold further vancomycin  - on cefepime increase dose to 1gm daily #2  - start flagyl [id number] for anaroebic coverage   - monitor temps  - tolerating abx well so far; no side effects noted  - reason for abx use and side effects reviewed with patient  - supportive care  - f/u cbc    2. other issues - care per medicine

## 2019-05-17 NOTE — PROGRESS NOTE ADULT - SUBJECTIVE AND OBJECTIVE BOX
Pt has been seen and examined with FP resident, resident supervised agree with a/p       Patient is a 89y old  Male who presents with a chief complaint of complain of weakness (16 May 2019 16:13)        PHYSICAL EXAM:  Vital Signs Last 24 Hrs  T(C): 36.6 (17 May 2019 13:20), Max: 37.3 (16 May 2019 21:55)  T(F): 97.8 (17 May 2019 13:20), Max: 99.1 (16 May 2019 21:55)  HR: 77 (17 May 2019 14:59) (75 - 86)  BP: 149/69 (17 May 2019 14:59) (121/43 - 155/47)  BP(mean): --  RR: 15 (17 May 2019 14:59) (15 - 18)  SpO2: 100% (17 May 2019 11:17) (99% - 100%)  general- comfortable   -[id number]-[id number],cta  -cvs-s1s2 normal   -p/a-soft,bs+        A/P    #ct abx, supporitve care     #possible duodenitis

## 2019-05-17 NOTE — PROGRESS NOTE ADULT - ASSESSMENT
63 yo Female with a PMHx of DM, HLD, HTN, CKD presents to ED with complain of generalized weakness with b/l leg pain, fever and cough.    #Sepsis 2/2 duodenitis   - s/p vancomycin   - continue cefepime   - CT abd/pelvis as above  - UA and urine culture pending collection  - blood cultures NGTD  - monitor leukocytosis  - ID consult appreciated    #Weakness  - likely due to infection   - PT eval appreciated: rehab facility / HI    #DM  - FS AC/HS  - lantus 10 units at night  - ISS  - carb consistent diet    #Rhabdomyolysis   - elevated CK on admission  - will discontinue statin  - PO hydration encouraged  - will monitor    #HFpEF  - compensated  - ECHO from 5/8: EF of 60%  - continue carvedilol   - continue imdur  - daily weights  - low salt diet  - monitor I/Os    #HTN  - continue lisinopril   - continue hydralazine     #HLD  - will hold statin for now due to elevated CK    #Constipation  - continue Miralax, colace, senna    #Hypothyroidism   - continue levothyroxine    #ESRD  - on HD  - scheduled MWF  - continue sevelamer  - nephrology consult appreciated     #DVT ppx  - subQ heparin     #Advance directives  - Full code 63 yo Female with a PMHx of DM, HLD, HTN, CKD presents to ED with complain of generalized weakness with b/l leg pain, fever and cough.    #Sepsis 2/2 duodenitis   - s/p vancomycin   - continue cefepime   - will start metronidazole   - CT abd/pelvis as above  - UA and urine culture pending collection  - blood cultures NGTD  - monitor leukocytosis  - ID consult appreciated    #Weakness  - likely due to infection   - PT eval appreciated: rehab facility / HI    #DM  - FS AC/HS  - lantus 10 units at night  - ISS  - carb consistent diet    #Rhabdomyolysis   - elevated CK on admission  - will discontinue statin  - PO hydration encouraged  - will monitor    #HFpEF  - ECHO from 5/8: EF of 60%  - continue carvedilol   - continue imdur  - daily weights  - low salt diet  - monitor I/Os    #Hypervolemic hyponatremia  - fluid restriction  - continue dialysis   - will monitor    #HTN  - continue lisinopril   - continue hydralazine     #HLD  - will hold statin for now due to elevated CK    #Constipation  - continue Miralax, colace, senna    #Hypothyroidism   - continue levothyroxine    #ESRD  - on HD  - scheduled MWF  - continue sevelamer  - nephrology consult appreciated     #DVT ppx  - subQ heparin     #Advance directives  - Full code 63 yo Female with a PMHx of DM, HLD, HTN, CKD presents to ED with complain of generalized weakness with b/l leg pain, fever and cough.    #Sepsis 2/2 likely duodenitis   - CT abd/pelvis as above  - s/p vancomycin   - continue cefepime   - will start metronidazole   - UA and urine culture pending collection  - blood cultures NGTD  - monitor leukocytosis  - ID consult appreciated    #Weakness  - likely due to infection   - PT eval appreciated: rehab facility / HI    #DM  - FS AC/HS  - lantus 10 units at night  - ISS  - carb consistent diet    #Rhabdomyolysis   - elevated CK on admission  - will discontinue statin  - PO hydration encouraged  - will monitor    #HFpEF  - ECHO from 5/8: EF of 60%  - continue carvedilol   - continue imdur  - daily weights  - low salt diet  - monitor I/Os    #Hypervolemic hyponatremia  - fluid restriction  - continue dialysis   - will monitor    #HTN  - continue lisinopril   - continue hydralazine     #HLD  - will hold statin for now due to elevated CK    #Constipation  - continue Miralax, colace, senna    #Hypothyroidism   - continue levothyroxine    #ESRD  - on HD  - scheduled MWF  - continue sevelamer  - nephrology consult appreciated     #DVT ppx  - subQ heparin     #Advance directives  - Full code 63 yo Female with a PMHx of DM, HLD, HTN, CKD presents to ED with complain of generalized weakness with b/l leg pain, fever and cough.    #Sepsis 2/2 likely duodenitis   - CT abd/pelvis as above  - s/p vancomycin   - continue cefepime   - will start metronidazole   - UA and urine culture pending collection  - blood cultures NGTD  - monitor leukocytosis  - ID consult appreciated    #Weakness  - likely due to infection   - PT eval appreciated: rehab facility / HI    #DM  - FS AC/HS  - lantus 10 units at night  - ISS  - carb consistent diet    #Rhabdomyolysis   - elevated CK on admission  - statin held  - PO hydration encouraged  - will monitor    #HFpEF  - ECHO from 5/8: EF of 60%  - continue carvedilol   - continue imdur  - daily weights  - low salt diet  - monitor I/Os    #Hypervolemic hyponatremia  - fluid restriction  - continue dialysis   - will monitor    #HTN  - continue lisinopril   - continue hydralazine     #HLD  - will hold statin for now due to elevated CK    #Constipation  - continue Miralax, colace, senna    #Hypothyroidism   - continue levothyroxine    #ESRD  - on HD  - scheduled MWF  - continue sevelamer  - nephrology consult appreciated     #DVT ppx  - subQ heparin     #Advance directives  - Full code 63 yo Female with a PMHx of DM, HLD, HTN, CKD presents to ED with complain of generalized weakness with b/l leg pain, fever and cough.    #Sepsis 2/2 likely duodenitis   - CT abd/pelvis as above  - s/p vancomycin   - continue cefepime   - will start metronidazole   - UA and urine culture pending collection  - blood cultures NGTD  - monitor leukocytosis  - ID consult appreciated    #Weakness  - likely due to infection   - PT eval appreciated: rehab facility / HI    #DM  - FS AC/HS  - lantus 10 units at night  - ISS  - carb consistent diet    #Rhabdomyolysis   - elevated CK on admission  - statin held  - PO hydration encouraged  - will monitor    #HFpEF  - ECHO from 5/8: EF of 60%  - continue carvedilol   - continue imdur  - daily weights  - low salt diet  - monitor I/Os    #Hypervolemic hyponatremia  - fluid restriction  - continue dialysis   - will monitor    #HTN  - continue lisinopril   - continue hydralazine     #HLD  - will hold statin for now due to elevated CK    #Constipation  - continue Miralax, colace, senna    #Hypothyroidism   - continue levothyroxine    #ESRD  - on HD  - scheduled MWF  - continue sevelamer  - nephrology consult appreciated     #DVT ppx  - subQ heparin

## 2019-05-18 LAB
ALBUMIN SERPL ELPH-MCNC: 2.5 G/DL — LOW (ref 3.3–5)
ALP SERPL-CCNC: 172 U/L — HIGH (ref 40–120)
ALT FLD-CCNC: 29 U/L — SIGNIFICANT CHANGE UP (ref 12–78)
ANION GAP SERPL CALC-SCNC: 8 MMOL/L — SIGNIFICANT CHANGE UP (ref 5–17)
AST SERPL-CCNC: 23 U/L — SIGNIFICANT CHANGE UP (ref 15–37)
BILIRUB SERPL-MCNC: 0.4 MG/DL — SIGNIFICANT CHANGE UP (ref 0.2–1.2)
BUN SERPL-MCNC: 28 MG/DL — HIGH (ref 7–23)
CALCIUM SERPL-MCNC: 8.3 MG/DL — LOW (ref 8.5–10.1)
CHLORIDE SERPL-SCNC: 100 MMOL/L — SIGNIFICANT CHANGE UP (ref 96–108)
CK SERPL-CCNC: 409 U/L — HIGH (ref 26–308)
CO2 SERPL-SCNC: 26 MMOL/L — SIGNIFICANT CHANGE UP (ref 22–31)
CREAT SERPL-MCNC: 4.3 MG/DL — HIGH (ref 0.5–1.3)
GLUCOSE BLDC GLUCOMTR-MCNC: 123 MG/DL — HIGH (ref 70–99)
GLUCOSE BLDC GLUCOMTR-MCNC: 144 MG/DL — HIGH (ref 70–99)
GLUCOSE BLDC GLUCOMTR-MCNC: 169 MG/DL — HIGH (ref 70–99)
GLUCOSE BLDC GLUCOMTR-MCNC: 204 MG/DL — HIGH (ref 70–99)
GLUCOSE SERPL-MCNC: 122 MG/DL — HIGH (ref 70–99)
HCT VFR BLD CALC: 23.2 % — LOW (ref 39–50)
HGB BLD-MCNC: 7.7 G/DL — LOW (ref 13–17)
MCHC RBC-ENTMCNC: 30.4 PG — SIGNIFICANT CHANGE UP (ref 27–34)
MCHC RBC-ENTMCNC: 33.2 GM/DL — SIGNIFICANT CHANGE UP (ref 32–36)
MCV RBC AUTO: 91.7 FL — SIGNIFICANT CHANGE UP (ref 80–100)
PLATELET # BLD AUTO: 186 K/UL — SIGNIFICANT CHANGE UP (ref 150–400)
POTASSIUM SERPL-MCNC: 4.3 MMOL/L — SIGNIFICANT CHANGE UP (ref 3.5–5.3)
POTASSIUM SERPL-SCNC: 4.3 MMOL/L — SIGNIFICANT CHANGE UP (ref 3.5–5.3)
PROT SERPL-MCNC: 6.2 GM/DL — SIGNIFICANT CHANGE UP (ref 6–8.3)
RBC # BLD: 2.53 M/UL — LOW (ref 4.2–5.8)
RBC # FLD: 15.4 % — HIGH (ref 10.3–14.5)
SODIUM SERPL-SCNC: 134 MMOL/L — LOW (ref 135–145)
TSH SERPL-MCNC: 2.72 UU/ML — SIGNIFICANT CHANGE UP (ref 0.34–4.82)
WBC # BLD: 10.53 K/UL — HIGH (ref 3.8–10.5)
WBC # FLD AUTO: 10.53 K/UL — HIGH (ref 3.8–10.5)

## 2019-05-18 RX ORDER — SIMETHICONE 80 MG/1
80 TABLET, CHEWABLE ORAL THREE TIMES A DAY
Refills: 0 | Status: DISCONTINUED | OUTPATIENT
Start: 2019-05-18 | End: 2019-05-24

## 2019-05-18 RX ADMIN — Medication 81 MILLIGRAM(S): at 12:08

## 2019-05-18 RX ADMIN — HEPARIN SODIUM 5000 UNIT(S): 5000 INJECTION INTRAVENOUS; SUBCUTANEOUS at 21:38

## 2019-05-18 RX ADMIN — PANTOPRAZOLE SODIUM 40 MILLIGRAM(S): 20 TABLET, DELAYED RELEASE ORAL at 05:23

## 2019-05-18 RX ADMIN — LISINOPRIL 5 MILLIGRAM(S): 2.5 TABLET ORAL at 05:24

## 2019-05-18 RX ADMIN — CARVEDILOL PHOSPHATE 25 MILLIGRAM(S): 80 CAPSULE, EXTENDED RELEASE ORAL at 17:33

## 2019-05-18 RX ADMIN — INSULIN GLARGINE 10 UNIT(S): 100 INJECTION, SOLUTION SUBCUTANEOUS at 22:27

## 2019-05-18 RX ADMIN — HEPARIN SODIUM 5000 UNIT(S): 5000 INJECTION INTRAVENOUS; SUBCUTANEOUS at 13:49

## 2019-05-18 RX ADMIN — Medication 4: at 11:55

## 2019-05-18 RX ADMIN — SENNA PLUS 1 TABLET(S): 8.6 TABLET ORAL at 21:38

## 2019-05-18 RX ADMIN — SEVELAMER CARBONATE 1600 MILLIGRAM(S): 2400 POWDER, FOR SUSPENSION ORAL at 17:33

## 2019-05-18 RX ADMIN — Medication 100 MILLIGRAM(S): at 05:23

## 2019-05-18 RX ADMIN — HEPARIN SODIUM 5000 UNIT(S): 5000 INJECTION INTRAVENOUS; SUBCUTANEOUS at 05:24

## 2019-05-18 RX ADMIN — Medication 100 MILLIGRAM(S): at 21:39

## 2019-05-18 RX ADMIN — TAMSULOSIN HYDROCHLORIDE 0.4 MILLIGRAM(S): 0.4 CAPSULE ORAL at 21:38

## 2019-05-18 RX ADMIN — Medication 2: at 16:41

## 2019-05-18 RX ADMIN — Medication 100 MILLIGRAM(S): at 13:49

## 2019-05-18 RX ADMIN — CEFEPIME 1000 MILLIGRAM(S): 1 INJECTION, POWDER, FOR SOLUTION INTRAMUSCULAR; INTRAVENOUS at 12:08

## 2019-05-18 RX ADMIN — SEVELAMER CARBONATE 1600 MILLIGRAM(S): 2400 POWDER, FOR SUSPENSION ORAL at 08:17

## 2019-05-18 RX ADMIN — CARVEDILOL PHOSPHATE 25 MILLIGRAM(S): 80 CAPSULE, EXTENDED RELEASE ORAL at 05:24

## 2019-05-18 RX ADMIN — Medication 1 TABLET(S): at 12:08

## 2019-05-18 RX ADMIN — Medication 100 MILLIGRAM(S): at 05:24

## 2019-05-18 RX ADMIN — PANTOPRAZOLE SODIUM 40 MILLIGRAM(S): 20 TABLET, DELAYED RELEASE ORAL at 17:33

## 2019-05-18 RX ADMIN — SIMETHICONE 80 MILLIGRAM(S): 80 TABLET, CHEWABLE ORAL at 21:38

## 2019-05-18 RX ADMIN — Medication 100 MILLIGRAM(S): at 17:33

## 2019-05-18 RX ADMIN — SEVELAMER CARBONATE 1600 MILLIGRAM(S): 2400 POWDER, FOR SUSPENSION ORAL at 12:08

## 2019-05-18 RX ADMIN — ISOSORBIDE MONONITRATE 60 MILLIGRAM(S): 60 TABLET, EXTENDED RELEASE ORAL at 12:08

## 2019-05-18 RX ADMIN — Medication 75 MICROGRAM(S): at 05:24

## 2019-05-18 NOTE — PROGRESS NOTE ADULT - ASSESSMENT
63 yo male with a PMHx of DM, HLD, HTN, ESRD on HD   presents to ED with complain of generalized weakness and fever per daughter at the bedside w Ct findings of duodenitis  and now clear liquid diet    Consitpation   today c.o abd pains and no BM x few days  bowel regimen - RN aware    Duodenitis?   ID input noted  on cefepime and Flagyll  on clear liquids diet - limit free water      Anemia - Hb trended down    - dilutional vs true loss   - trend  Hb    - limit free water intake     ESRD on HD MWF     HD monday as ordered     Dr Lozano to resume care Monday

## 2019-05-18 NOTE — PROGRESS NOTE ADULT - SUBJECTIVE AND OBJECTIVE BOX
63 yo Female with a PMHx of DM, HLD, HTN, CKD presents to ED with complain of generalized weakness and fever per daughter at the bedside . Patient is so weak he is having trouble ambulating. As per daughter had fever 102 F at home today. he denies HA or neck pain. No chest pain or sob. No abd pain. No n/v/d. No urinary f/u/d. No back pain. no gross motor or sensory deficits. he denies illicit drug use. he had no recent travel. He has no other acute issues symptoms or concerns. Patient discharged on 4/2  at that time was hospitalized with sepsis likely secondary to Rhino/Entero virus & Multifocal PNA.    [facility]  [id number]  Interval history: Patient evaluated at bedside. No overnight events. No acute complaints. More alert then previous exams. States that he feels better. Denies abdominal pain, nausea, vomiting, fever.    REVIEW OF SYSTEMS:    CONSTITUTIONAL: + weakness, No fevers or chills  EYES/ENT: No visual changes;  No vertigo or throat pain   NECK: No pain or stiffness  RESPIRATORY No cough, No wheezing, hemoptysis; No shortness of breath  CARDIOVASCULAR: No chest pain or palpitations  GASTROINTESTINAL: No abdominal pain, epigastric pain. No nausea, No vomiting, No hematemesis; No diarrhea or constipation. No melena or hematochezia.  GENITOURINARY: No dysuria, frequency or hematuria  NEUROLOGICAL: No numbness or weakness  SKIN: No itching, burning, rashes, or lesions   All other review of systems is negative unless indicated above.     MEDICATIONS  (STANDING):  aspirin  chewable 81 milliGRAM(s) Oral daily  carvedilol 25 milliGRAM(s) Oral every 12 hours  cefepime  Injectable. 1000 milliGRAM(s) IV Push daily  dextrose 5%. 1000 milliLiter(s) (50 mL/Hr) IV Continuous <Continuous>  dextrose 50% Injectable 12.5 Gram(s) IV Push once  dextrose 50% Injectable 25 Gram(s) IV Push once  dextrose 50% Injectable 25 Gram(s) IV Push once  epoetin indu Injectable 4000 Unit(s) IV Push <User Schedule>  heparin  Injectable 5000 Unit(s) SubCutaneous every 8 hours  hydrALAZINE 100 milliGRAM(s) Oral two times a day  insulin glargine Injectable (LANTUS) 10 Unit(s) SubCutaneous at bedtime  insulin lispro (HumaLOG) corrective regimen sliding scale   SubCutaneous three times a day before meals  isosorbide   mononitrate ER Tablet (IMDUR) 60 milliGRAM(s) Oral daily  levothyroxine 75 MICROGram(s) Oral daily  lisinopril 5 milliGRAM(s) Oral daily  metroNIDAZOLE  IVPB 500 milliGRAM(s) IV Intermittent every 8 hours  multivitamin 1 Tablet(s) Oral daily  pantoprazole  Injectable 40 milliGRAM(s) IV Push two times a day  sevelamer carbonate 1600 milliGRAM(s) Oral three times a day  tamsulosin 0.4 milliGRAM(s) Oral at bedtime    MEDICATIONS  (PRN):  acetaminophen   Tablet .. 650 milliGRAM(s) Oral every 6 hours PRN Temp greater or equal to 38C (100.4F), Mild Pain (1 - 3)  artificial  tears Solution 1 Drop(s) Both EYES four times a day PRN Dry Eyes  dextrose 40% Gel 15 Gram(s) Oral once PRN Blood Glucose LESS THAN 70 milliGRAM(s)/deciliter  docusate sodium 100 milliGRAM(s) Oral three times a day PRN Constipation  glucagon  Injectable 1 milliGRAM(s) IntraMuscular once PRN Glucose LESS THAN 70 milligrams/deciliter  senna 1 Tablet(s) Oral at bedtime PRN Constipation refractory to docusate    Vital Signs (24 Hrs):  T(C): 37.2 (05-18-19 @ 10:31), Max: 37.4 (05-17-19 @ 22:43)  HR: 74 (05-18-19 @ 11:56) (72 - 85)  BP: 135/65 (05-18-19 @ 11:56) (115/40 - 149/69)  RR: 18 (05-18-19 @ 10:31) (15 - 18)  SpO2: 100% (05-18-19 @ 10:31) (92% - 100%)  Wt(kg): --  Daily     Daily     I&O's Summary    18 May 2019 07:01  -  18 May 2019 14:34  --------------------------------------------------------  IN: 600 mL / OUT: 0 mL / NET: 600 mL        PHYSICAL EXAM:  GENERAL: NAD,  comfortable, alert  HEAD:  Atraumatic, Normocephalic  EYES: EOMI, conjunctiva and sclera clear  NECK: Supple  CHEST/LUNG: very faint bibasilar rales  HEART: Regular rate and rhythm; No murmurs, rubs, or gallops  ABDOMEN: Soft, Nontender, +distention; Bowel sounds present  EXTREMITIES:  2+ Peripheral Pulses, No clubbing, cyanosis, or edema  PSYCH: Alert and oriented  NEUROLOGY: non-focal  SKIN: No rashes or lesions    LABS:                        7.7    10.53 )-----------( 186      ( 18 May 2019 07:22 )             23.2     18 May 2019 07:22    134    |  100    |  28     ----------------------------<  122    4.3     |  26     |  4.30     Ca    8.3        18 May 2019 07:22    TPro  6.2    /  Alb  2.5    /  TBili  0.4    /  DBili  x      /  AST  23     /  ALT  29     /  AlkPhos  172    18 May 2019 07:22    Creatine Kinase, Serum in AM (05.18.19 @ 07:22)    Creatine Kinase, Serum: 409 U/L            < from: Transthoracic Echocardiogram (05.08.18 @ 14:14) >   Impression     Summary     The left ventricle is normal in size, wall thickness, wall motion and   contractility.   The estimated LVEF is 60%   The left atrium is normal.   The right atrium is normal.   The right ventricle is normal in size.     Mild calcific aortic stenosis   No aortic insufficiency is seen   Mild (1+) mitral regurgitation is present.   A pericardial effusion is not present.    < end of copied text >      < from: CT Chest No Cont (05.16.19 @ 12:50) >  IMPRESSION:    Findings suggesting mild pulmonary edema. No segmental consolidations. No   discrete pulmonary nodules.    < end of copied text >  < from: Xray Chest 1 View AP/PA. (05.16.19 @ 01:58) >  Findings/  IMPRESSION:       Evaluation the lungs is limited by suboptimal inspiration. Prominence of   the interstitium is present. Left chest wall pacemaker in place.    Cardiomegaly present. Bones are stable.    < end of copied text >      < from: CT Abdomen and Pelvis No Cont (05.17.19 @ 11:43) >  IMPRESSION:     Interval development of mild wall thickening in the duodenum with   surrounding inflammatory changes. Duodenitis, underlying ulcer, or other   pathology cannot be excluded. Please correlate clinically.    Gallstone without gross inflammation in a contracted gallbladder. Bladder   appears mildly thick-walledalthough this may be due to under distention.   Please correlate clinically. Penile prosthesis. Additional findings as   above.    < end of copied text >

## 2019-05-18 NOTE — PROGRESS NOTE ADULT - SUBJECTIVE AND OBJECTIVE BOX
COVERING FOR DR MAC     61 yo Female with a PMHx of DM, HLD, HTN, CKD presents to ED with complain of generalized weakness and fever found to have CT abd , duodenitis?      today   c/o abd distension and states he has not move his bowel x 4- 5 days   no n, v     Family hx:  patient is unable to provide any detail family history this time. (16 May 2019 04:43)      PAST MEDICAL & SURGICAL HISTORY:  Pulmonary edema  Pancreatitis  AICD (automatic cardioverter/defibrillator) present  CHF (Congestive Heart Failure)  HTN (Hypertension)  High Cholesterol  Heart Attack  Dialysis Patient  Diabetes  AICD (automatic cardioverter/defibrillator) present  History of penile implant  History of Hernia Repair      FAMILY HISTORY:      MEDICATIONS  (STANDING):  aspirin  chewable 81 milliGRAM(s) Oral daily  carvedilol 25 milliGRAM(s) Oral every 12 hours  cefepime  Injectable. 1000 milliGRAM(s) IV Push daily  dextrose 5%. 1000 milliLiter(s) (50 mL/Hr) IV Continuous <Continuous>  dextrose 50% Injectable 12.5 Gram(s) IV Push once  dextrose 50% Injectable 25 Gram(s) IV Push once  dextrose 50% Injectable 25 Gram(s) IV Push once  epoetin indu Injectable 4000 Unit(s) IV Push <User Schedule>  heparin  Injectable 5000 Unit(s) SubCutaneous every 8 hours  hydrALAZINE 100 milliGRAM(s) Oral two times a day  insulin glargine Injectable (LANTUS) 10 Unit(s) SubCutaneous at bedtime  insulin lispro (HumaLOG) corrective regimen sliding scale   SubCutaneous three times a day before meals  isosorbide   mononitrate ER Tablet (IMDUR) 60 milliGRAM(s) Oral daily  levothyroxine 75 MICROGram(s) Oral daily  lisinopril 5 milliGRAM(s) Oral daily  metroNIDAZOLE  IVPB 500 milliGRAM(s) IV Intermittent every 8 hours  multivitamin 1 Tablet(s) Oral daily  pantoprazole  Injectable 40 milliGRAM(s) IV Push two times a day  sevelamer carbonate 1600 milliGRAM(s) Oral three times a day  tamsulosin 0.4 milliGRAM(s) Oral at bedtime    MEDICATIONS  (PRN):  acetaminophen   Tablet .. 650 milliGRAM(s) Oral every 6 hours PRN Temp greater or equal to 38C (100.4F), Mild Pain (1 - 3)  artificial  tears Solution 1 Drop(s) Both EYES four times a day PRN Dry Eyes  dextrose 40% Gel 15 Gram(s) Oral once PRN Blood Glucose LESS THAN 70 milliGRAM(s)/deciliter  docusate sodium 100 milliGRAM(s) Oral three times a day PRN Constipation  glucagon  Injectable 1 milliGRAM(s) IntraMuscular once PRN Glucose LESS THAN 70 milligrams/deciliter  senna 1 Tablet(s) Oral at bedtime PRN Constipation refractory to docusate  simethicone 80 milliGRAM(s) Chew three times a day PRN Gas      Allergies    No Known Allergies    Intolerances        I&O's Summary        REVIEW OF SYSTEMS:    Vital Signs Last 24 Hrs  T(C): 37 (18 May 2019 20:39), Max: 37.4 (17 May 2019 22:43)  T(F): 98.6 (18 May 2019 20:39), Max: 99.3 (17 May 2019 22:43)  HR: 72 (18 May 2019 20:39) (70 - 80)  BP: 145/54 (18 May 2019 20:39) (115/40 - 145/54)  BP(mean): --  RR: 18 (18 May 2019 20:39) (16 - 18)  SpO2: 96% (18 May 2019 20:39) (92% - 100%)      I&O's Detail    18 May 2019 07:01  -  18 May 2019 21:07  --------------------------------------------------------  IN:    Oral Fluid: 600 mL  Total IN: 600 mL    OUT:  Total OUT: 0 mL    Total NET: 600 mL        PHYSICAL EXAM:    General: sleeping, lethargic  Neuro: no focal deficits  HEENT:  No JVD,   Cardiovascular: S1 S2   Lungs: good air entry .  Abdomen:  distended, non tender , BS +   Skin: Warm, dry, well-perfused. No rashes or other lesions.   Extremities: no edema  AVF  +    LABS:                                       7.7    10.53 )-----------( 186      ( 18 May 2019 07:22 )             23.2                         8.5    13.94 )-----------( 188      ( 17 May 2019 07:49 )             25.3       134    |  100    |  28     ----------------------------<  122       18 May 2019 07:22  4.3     |  26     |  4.30     132    |  91     |  55     ----------------------------<  174       17 May 2019 07:49  4.0     |  31     |  6.33     136    |  98     |  38     ----------------------------<  168       16 May 2019 05:15  3.8     |  34     |  4.57     Ca    8.3        18 May 2019 07:22  Ca    8.3        17 May 2019 07:49    Phos  3.4       17 May 2019 07:49    Mg     2.5       17 May 2019 07:49  Mg     2.2       16 May 2019 01:23

## 2019-05-18 NOTE — PROGRESS NOTE ADULT - ASSESSMENT
61 yo Female with a PMHx of DM, HLD, HTN, CKD presents to ED with complain of generalized weakness with b/l leg pain, fever and cough.    #Sepsis 2/2 likely duodenitis   - improving  - CT abd/pelvis as above  - s/p vancomycin   - continue cefepime   - continue metronidazole   - blood cultures NGTD  - monitor leukocytosis  - ID consult appreciated    #Weakness  - likely due to infection   - PT eval appreciated: rehab facility / HI    #DM  - FS AC/HS  - lantus 10 units at night  - ISS  - carb consistent diet    #Rhabdomyolysis   - trending down  - elevated CK on admission  - statin held  - PO hydration encouraged  - will monitor    #HFpEF  - ECHO from 5/8: EF of 60%  - continue carvedilol   - continue imdur  - daily weights  - low salt diet  - monitor I/Os    #Hypervolemic hyponatremia  - fluid restriction  - continue dialysis   - will monitor    #HTN  - continue lisinopril   - continue hydralazine     #HLD  - will hold statin for now due to elevated CK    #Constipation  - continue miralax, colace, senna    #Hypothyroidism   - continue levothyroxine    #ESRD  - on HD  - scheduled MWF  - continue sevelamer  - nephrology consult appreciated     #DVT ppx  - subQ heparin

## 2019-05-19 ENCOUNTER — TRANSCRIPTION ENCOUNTER (OUTPATIENT)
Age: 84
End: 2019-05-19

## 2019-05-19 LAB
ANION GAP SERPL CALC-SCNC: 11 MMOL/L — SIGNIFICANT CHANGE UP (ref 5–17)
BUN SERPL-MCNC: 39 MG/DL — HIGH (ref 7–23)
CALCIUM SERPL-MCNC: 8.6 MG/DL — SIGNIFICANT CHANGE UP (ref 8.5–10.1)
CHLORIDE SERPL-SCNC: 96 MMOL/L — SIGNIFICANT CHANGE UP (ref 96–108)
CO2 SERPL-SCNC: 24 MMOL/L — SIGNIFICANT CHANGE UP (ref 22–31)
CREAT SERPL-MCNC: 5.74 MG/DL — HIGH (ref 0.5–1.3)
GLUCOSE BLDC GLUCOMTR-MCNC: 130 MG/DL — HIGH (ref 70–99)
GLUCOSE BLDC GLUCOMTR-MCNC: 144 MG/DL — HIGH (ref 70–99)
GLUCOSE BLDC GLUCOMTR-MCNC: 192 MG/DL — HIGH (ref 70–99)
GLUCOSE BLDC GLUCOMTR-MCNC: 194 MG/DL — HIGH (ref 70–99)
GLUCOSE SERPL-MCNC: 136 MG/DL — HIGH (ref 70–99)
HCT VFR BLD CALC: 23.1 % — LOW (ref 39–50)
HGB BLD-MCNC: 7.7 G/DL — LOW (ref 13–17)
MCHC RBC-ENTMCNC: 29.8 PG — SIGNIFICANT CHANGE UP (ref 27–34)
MCHC RBC-ENTMCNC: 33.3 GM/DL — SIGNIFICANT CHANGE UP (ref 32–36)
MCV RBC AUTO: 89.5 FL — SIGNIFICANT CHANGE UP (ref 80–100)
OB PNL STL: POSITIVE
PHOSPHATE SERPL-MCNC: 2.3 MG/DL — LOW (ref 2.5–4.5)
PLATELET # BLD AUTO: 213 K/UL — SIGNIFICANT CHANGE UP (ref 150–400)
POTASSIUM SERPL-MCNC: 4.4 MMOL/L — SIGNIFICANT CHANGE UP (ref 3.5–5.3)
POTASSIUM SERPL-SCNC: 4.4 MMOL/L — SIGNIFICANT CHANGE UP (ref 3.5–5.3)
RBC # BLD: 2.58 M/UL — LOW (ref 4.2–5.8)
RBC # FLD: 15 % — HIGH (ref 10.3–14.5)
SODIUM SERPL-SCNC: 131 MMOL/L — LOW (ref 135–145)
WBC # BLD: 8.61 K/UL — SIGNIFICANT CHANGE UP (ref 3.8–10.5)
WBC # FLD AUTO: 8.61 K/UL — SIGNIFICANT CHANGE UP (ref 3.8–10.5)

## 2019-05-19 RX ADMIN — HEPARIN SODIUM 5000 UNIT(S): 5000 INJECTION INTRAVENOUS; SUBCUTANEOUS at 05:59

## 2019-05-19 RX ADMIN — Medication 100 MILLIGRAM(S): at 06:01

## 2019-05-19 RX ADMIN — CARVEDILOL PHOSPHATE 25 MILLIGRAM(S): 80 CAPSULE, EXTENDED RELEASE ORAL at 05:59

## 2019-05-19 RX ADMIN — Medication 100 MILLIGRAM(S): at 17:55

## 2019-05-19 RX ADMIN — Medication 100 MILLIGRAM(S): at 13:43

## 2019-05-19 RX ADMIN — HEPARIN SODIUM 5000 UNIT(S): 5000 INJECTION INTRAVENOUS; SUBCUTANEOUS at 13:43

## 2019-05-19 RX ADMIN — Medication 100 MILLIGRAM(S): at 06:00

## 2019-05-19 RX ADMIN — SEVELAMER CARBONATE 1600 MILLIGRAM(S): 2400 POWDER, FOR SUSPENSION ORAL at 13:09

## 2019-05-19 RX ADMIN — Medication 75 MICROGRAM(S): at 06:00

## 2019-05-19 RX ADMIN — TAMSULOSIN HYDROCHLORIDE 0.4 MILLIGRAM(S): 0.4 CAPSULE ORAL at 20:41

## 2019-05-19 RX ADMIN — SEVELAMER CARBONATE 1600 MILLIGRAM(S): 2400 POWDER, FOR SUSPENSION ORAL at 10:33

## 2019-05-19 RX ADMIN — Medication 81 MILLIGRAM(S): at 13:09

## 2019-05-19 RX ADMIN — HEPARIN SODIUM 5000 UNIT(S): 5000 INJECTION INTRAVENOUS; SUBCUTANEOUS at 20:41

## 2019-05-19 RX ADMIN — CEFEPIME 1000 MILLIGRAM(S): 1 INJECTION, POWDER, FOR SOLUTION INTRAMUSCULAR; INTRAVENOUS at 11:35

## 2019-05-19 RX ADMIN — INSULIN GLARGINE 10 UNIT(S): 100 INJECTION, SOLUTION SUBCUTANEOUS at 20:41

## 2019-05-19 RX ADMIN — PANTOPRAZOLE SODIUM 40 MILLIGRAM(S): 20 TABLET, DELAYED RELEASE ORAL at 17:55

## 2019-05-19 RX ADMIN — ISOSORBIDE MONONITRATE 60 MILLIGRAM(S): 60 TABLET, EXTENDED RELEASE ORAL at 11:35

## 2019-05-19 RX ADMIN — SIMETHICONE 80 MILLIGRAM(S): 80 TABLET, CHEWABLE ORAL at 20:41

## 2019-05-19 RX ADMIN — Medication 2: at 12:08

## 2019-05-19 RX ADMIN — PANTOPRAZOLE SODIUM 40 MILLIGRAM(S): 20 TABLET, DELAYED RELEASE ORAL at 06:04

## 2019-05-19 RX ADMIN — LISINOPRIL 5 MILLIGRAM(S): 2.5 TABLET ORAL at 06:01

## 2019-05-19 RX ADMIN — Medication 100 MILLIGRAM(S): at 20:40

## 2019-05-19 RX ADMIN — SEVELAMER CARBONATE 1600 MILLIGRAM(S): 2400 POWDER, FOR SUSPENSION ORAL at 17:55

## 2019-05-19 RX ADMIN — CARVEDILOL PHOSPHATE 25 MILLIGRAM(S): 80 CAPSULE, EXTENDED RELEASE ORAL at 17:55

## 2019-05-19 RX ADMIN — SIMETHICONE 80 MILLIGRAM(S): 80 TABLET, CHEWABLE ORAL at 11:30

## 2019-05-19 RX ADMIN — Medication 1 TABLET(S): at 11:35

## 2019-05-19 NOTE — DISCHARGE NOTE PROVIDER - HOSPITAL COURSE
63 yo Female with a PMHx of DM, HLD, HTN, CKD presents to ED with complain of generalized weakness and fever per daughter at the bedside . Patient is so weak he is having trouble ambulating. As per daughter had fever 102 F at home today. he denies HA or neck pain. No chest pain or sob. No abd pain. No n/v/d. No urinary f/u/d. No back pain. no gross motor or sensory deficits. he denies illicit drug use. he had no recent travel. He has no other acute issues symptoms or concerns. Patient discharged on 4/2  at that time was hospitalized with sepsis likely secondary to Rhino/Entero virus & Multifocal PNA. Pt admitted for sepsis 2/2 to duodenitis. Labs revealed leukocytosis. CT abd remarkable for duodenitis. Pt received the appropriate IV antibiotics for symptomatic improvement. In addition, patient is ESRD and received dialysis on MWF. On day of discharge patient is afebrile and hemodynamically stable. 88 yo male with a PMHx of DM, HLD, HTN, CKD presents to ED with complain of generalized weakness and fever per daughter at the bedside . Patient is so weak he is having trouble ambulating. As per daughter had fever 102 F at home today. he denies HA or neck pain. No chest pain or sob. No abd pain. No n/v/d. No urinary f/u/d. No back pain. no gross motor or sensory deficits. he denies illicit drug use. he had no recent travel. He has no other acute issues symptoms or concerns. Patient discharged on 4/2  at that time was hospitalized with sepsis likely secondary to Rhino/Entero virus & Multifocal PNA. Pt admitted for sepsis 2/2 to duodenitis. Labs revealed leukocytosis. CT abd remarkable for duodenitis. Pt received the appropriate IV antibiotics for symptomatic improvement. Hospital course was complicated by acute on chronic anemia due to suspected duodenal ulcer. EGD was deferred as patient's symptoms resolved with PPI. Patient received  1 unit PRBC on 5/21. In addition, patient is ESRD and received dialysis on [facility]. On day of discharge patient is afebrile and hemodynamically stable. Mr Carr is a 89 yo male with a PMHx of DM, HLD, HTN, CKD on HD MWF who was brought to ED due to weakness and trouble ambulating. Patient was found to be septic due to duodenitis. Pt received the IV Flagyl and Cefepime for 8 days with symptomatic improvement. Hospital course was complicated by acute on chronic anemia due to suspected duodenal ulcer. EGD was deferred as patient's symptoms resolved with PPI. Patient received  1 unit PRBC on 5/21. Hospital course was also complicated by severely elevated blood pressures which improved with titration of anti-hypertensive regimen. Upon discharge patient will need to take Ceftin (post dialysis on HD days) and Flagyl to complete a 14 day course of antibiotics. Patient was evaluated by Infectious Disease, Nephrology and Gastroenterology while hospitalized.         Vital Signs Last 24 Hrs    T(C): 36.6 (24 May 2019 12:07), Max: 37.1 (23 May 2019 16:53)    T(F): 97.9 (24 May 2019 12:07), Max: 98.7 (23 May 2019 16:53)    HR: 85 (24 May 2019 12:07) (63 - 86)    BP: 180/57 (24 May 2019 12:07) (140/65 - 180/57)    RR: 18 (24 May 2019 12:07) (16 - 18)    SpO2: 95% (24 May 2019 12:07) (95% - 98%)                                8.9      10.02 )-----------( 297      ( 24 May 2019 07:10 )               26.6             05-24        131<L>  |  95<L>  |  35<H>    ----------------------------<  150<H>    4.1   |  25  |  5.90<H>        Ca    7.7<L>      24 May 2019 07:10    Phos  3.7     05-24        TPro  x   /  Alb  2.8<L>  /  TBili  x   /  DBili  x   /  AST  x   /  ALT  x   /  AlkPhos  x   05-24 Mr Carr is a 88 yo male with a PMHx of DM, HLD, HTN, CKD on HD MWF who was brought to ED due to weakness and trouble ambulating. Patient was found to be septic due to duodenitis and suspected HCAP. Pt received the IV Flagyl and Cefepime for 8 days with symptomatic improvement. Hospital course was complicated by acute on chronic anemia due to suspected duodenal ulcer. EGD was deferred as patient's symptoms resolved with PPI and patient  BP was elevated. . Patient received  1 unit PRBC on 5/21. Hospital course was also complicated by severely elevated blood pressures which improved with titration of anti-hypertensive regimen. Upon discharge patient will need to take Ceftin (post dialysis on HD days) and Flagyl to complete a 14 day course of antibiotics. Patient was evaluated by Infectious Disease, Nephrology and Gastroenterology while hospitalized. Patient is to be discharged to [facility] for rehabilitation and dialysis. At the time of discharge patient is tolerated regular diet and Hg is stable.             Vital Signs Last 24 Hrs    T(C): 36.6 (24 May 2019 12:07), Max: 37.1 (23 May 2019 16:53)    T(F): 97.9 (24 May 2019 12:07), Max: 98.7 (23 May 2019 16:53)    HR: 85 (24 May 2019 12:07) (63 - 86)    BP: 180/57 (24 May 2019 12:07) (140/65 - 180/57)    RR: 18 (24 May 2019 12:07) (16 - 18)    SpO2: 95% (24 May 2019 12:07) (95% - 98%)                                8.9      10.02 )-----------( 297      ( 24 May 2019 07:10 )               26.6             05-24        131<L>  |  95<L>  |  35<H>    ----------------------------<  150<H>    4.1   |  25  |  5.90<H>        Ca    7.7<L>      24 May 2019 07:10    Phos  3.7     05-24        TPro  x   /  Alb  2.8<L>  /  TBili  x   /  DBili  x   /  AST  x   /  ALT  x   /  AlkPhos  x   05-24 Mr Carr is a 88 yo male with a PMHx of DM, HLD, HTN, CKD on HD MWF who was brought to ED due to weakness and trouble ambulating. Patient was found to be septic due to duodenitis and suspected HCAP. Pt received the IV Flagyl and Cefepime for 8 days with symptomatic improvement. Hospital course was complicated by acute on chronic anemia due to suspected duodenal ulcer. EGD was deferred as patient's symptoms resolved with PPI and patient  BP was elevated. . Patient received  1 unit PRBC on 5/21. Hospital course was also complicated by severely elevated blood pressures which improved with titration of anti-hypertensive regimen. Upon discharge patient will need to take Ceftin (to be given post dialysis on HD days) and Flagyl to complete a 14 day course of antibiotics. Patient was evaluated by Infectious Disease, Nephrology and Gastroenterology while hospitalized. Patient is to be discharged to [facility] for rehabilitation and dialysis. At the time of discharge patient is tolerated regular diet and Hg is stable.             Vital Signs Last 24 Hrs    T(C): 36.6 (24 May 2019 12:07), Max: 37.1 (23 May 2019 16:53)    T(F): 97.9 (24 May 2019 12:07), Max: 98.7 (23 May 2019 16:53)    HR: 85 (24 May 2019 12:07) (63 - 86)    BP: 180/57 (24 May 2019 12:07) (140/65 - 180/57)    RR: 18 (24 May 2019 12:07) (16 - 18)    SpO2: 95% (24 May 2019 12:07) (95% - 98%)                                8.9      10.02 )-----------( 297      ( 24 May 2019 07:10 )               26.6             05-24        131<L>  |  95<L>  |  35<H>    ----------------------------<  150<H>    4.1   |  25  |  5.90<H>        Ca    7.7<L>      24 May 2019 07:10    Phos  3.7     05-24        TPro  x   /  Alb  2.8<L>  /  TBili  x   /  DBili  x   /  AST  x   /  ALT  x   /  AlkPhos  x   05-24

## 2019-05-19 NOTE — DISCHARGE NOTE PROVIDER - NSDCCPCAREPLAN_GEN_ALL_CORE_FT
PRINCIPAL DISCHARGE DIAGNOSIS  Diagnosis: Duodenitis  Assessment and Plan of Treatment: - CT abd/pelvis revealed duodenitis   - You received IV antibiotics during your hospital stay   - blood cultures were negative during your hospital stay  - follow up with your outpatient provider for further management        SECONDARY DISCHARGE DIAGNOSES  Diagnosis: Weakness  Assessment and Plan of Treatment: - Multifactorial: deconditiong from multiple hospitilization and infection  - will need rehab  - follow up with outpatient providers    Diagnosis: ESRD on dialysis  Assessment and Plan of Treatment: - on scheduled MWF  - continue sevelamer  - follow up with your nephrologist    Diagnosis: Hyperlipidemia  Assessment and Plan of Treatment: - atorvastatin discontinued due to elevated CK  - follow up with your outpatient provider for further management    Diagnosis: Hypertension  Assessment and Plan of Treatment: - DASH diet  - continue lisinopril   - continue hydralazine   - follow up with your outpatient provider for further management    Diagnosis: Congestive heart failure (CHF)  Assessment and Plan of Treatment: - ECHO from 5/8: EF of 60%  - continue carvedilol   - continue imdur  - monitor your weight  - low salt diet  - 1.5 L daily fluid restriction  - follow up with your cardiologist      Diagnosis: Diabetes  Assessment and Plan of Treatment: - continue home medications  - follow up with your outpatient provider for further management PRINCIPAL DISCHARGE DIAGNOSIS  Diagnosis: Duodenitis  Assessment and Plan of Treatment: Imaging showed you had inflammation of your small intestines. Yo recieved 6 days of IV antibiotics.You will need to take oral antibiotics after discharge to complete a 14 day course        SECONDARY DISCHARGE DIAGNOSES  Diagnosis: Duodenal ulcer  Assessment and Plan of Treatment: Your blood count decreased during hospitalization and you recieved one unit of blood. You were unable to undergo endoscopy because of your elevated blood pressure. You were started on Pantoprazole to treat a suspected ulcer causing you anemia.   Please continue taking Pantoprazole twice a day until July 2nd (6 weeks treatment) then switch to taking pantoprazole daily indefinetly.    Diagnosis: Hypertension  Assessment and Plan of Treatment: Your blood pressure was elevated while hospitalized. You blood preassure medications were increased to better control your pressure. Please continue to follow with primary doctor and nephrologist to further manage blood pressure.    Diagnosis: ESRD on dialysis  Assessment and Plan of Treatment: Please follow-up with your nephrologist and continue hemodialysis on Monday Wednesday and Friday    Diagnosis: Hyperlipidemia  Assessment and Plan of Treatment: - atorvastatin discontinued due to elevated CK  - follow up with your outpatient provider for further management    Diagnosis: Congestive heart failure (CHF)  Assessment and Plan of Treatment: please continue our home medications and follow up with primary MD as previously scheduled.       Diagnosis: Diabetes  Assessment and Plan of Treatment: - continue home medications  - follow up with your outpatient provider for further management PRINCIPAL DISCHARGE DIAGNOSIS  Diagnosis: Duodenitis  Assessment and Plan of Treatment: Imaging showed you had inflammation of your small intestines. You recieved 8 days of IV antibiotics. You will need to take oral antibiotics after discharge to complete a 14 day course.        SECONDARY DISCHARGE DIAGNOSES  Diagnosis: Duodenal ulcer  Assessment and Plan of Treatment: Your blood count decreased during hospitalization and you recieved one unit of blood. You were unable to undergo endoscopy because of your elevated blood pressure. You were started on Pantoprazole to treat a suspected ulcer causing you anemia.   Please continue taking Pantoprazole twice a day until July 2nd (6 weeks treatment) then switch to taking pantoprazole daily indefinetly.    Diagnosis: Hypertension  Assessment and Plan of Treatment: Your blood pressure was elevated while hospitalized. You blood preassure medications were increased to better control your pressure. Please continue to follow with primary doctor and nephrologist to further manage blood pressure.    Diagnosis: ESRD on dialysis  Assessment and Plan of Treatment: Please follow-up with your nephrologist and continue hemodialysis on Monday Wednesday and Friday    Diagnosis: Hyperlipidemia  Assessment and Plan of Treatment: - atorvastatin discontinued due to elevated CK  - follow up with your outpatient provider for further management    Diagnosis: Congestive heart failure (CHF)  Assessment and Plan of Treatment: please continue our home medications and follow up with primary MD as previously scheduled.       Diagnosis: Diabetes  Assessment and Plan of Treatment: - continue home medications  - follow up with your outpatient provider for further management PRINCIPAL DISCHARGE DIAGNOSIS  Diagnosis: Duodenitis  Assessment and Plan of Treatment: Imaging showed you had inflammation of your small intestines. You recieved 8 days of IV antibiotics. You will need to take oral antibiotics after discharge to complete a 14 day course. On Hemodialysis days you will need to take Ceftin after dialysis. You will finish your antibiotics course on Wednesday May 29th, 2019.         SECONDARY DISCHARGE DIAGNOSES  Diagnosis: Duodenal ulcer  Assessment and Plan of Treatment: Your blood count decreased during hospitalization and you recieved one unit of blood. You were unable to undergo endoscopy because of your elevated blood pressure. You were started on Pantoprazole to treat a suspected ulcer causing you anemia.   Please continue taking Pantoprazole twice a day until July 2nd (6 weeks treatment) then switch to taking pantoprazole daily indefinetly.    Diagnosis: Hypertension  Assessment and Plan of Treatment: Your blood pressure was elevated while hospitalized. You blood preassure medications were increased to better control your pressure. Please continue to follow with primary doctor and nephrologist to further manage blood pressure.    Diagnosis: ESRD on dialysis  Assessment and Plan of Treatment: Please follow-up with your nephrologist and continue hemodialysis on Monday Wednesday and Friday    Diagnosis: Hyperlipidemia  Assessment and Plan of Treatment: - atorvastatin discontinued due to elevated CK  - follow up with your outpatient provider for further management    Diagnosis: Congestive heart failure (CHF)  Assessment and Plan of Treatment: please continue our home medications and follow up with primary MD as previously scheduled.       Diagnosis: Diabetes  Assessment and Plan of Treatment: - continue home medications  - follow up with your outpatient provider for further management

## 2019-05-19 NOTE — PROGRESS NOTE ADULT - SUBJECTIVE AND OBJECTIVE BOX
63 yo Female with a PMHx of DM, HLD, HTN, CKD presents to ED with complain of generalized weakness and fever per daughter at the bedside . Patient is so weak he is having trouble ambulating. As per daughter had fever 102 F at home today. he denies HA or neck pain. No chest pain or sob. No abd pain. No n/v/d. No urinary f/u/d. No back pain. no gross motor or sensory deficits. He denies illicit drug use. he had no recent travel. He has no other acute issues symptoms or concerns. Patient discharged on 4/2  at that time was hospitalized with sepsis likely secondary to Rhino/Entero virus & Multifocal PNA.    5/19 :Patient seen and examined at the bedside. ID number :[id number] patient states he is doing better .No abdominal pain is better and he feeling better .Normally uses the walker to walk at home and he unwitnessed  fell day of the admission and fever 100.7 .Was in Rehab for 6 weeks and was in 2 weeks home .      REVIEW OF SYSTEMS:    CONSTITUTIONAL: No weakness, fevers or chills  EYES/ENT: No visual changes;  No vertigo or throat pain   NECK: No pain or stiffness  RESPIRATORY: No cough, wheezing, hemoptysis; No shortness of breath  CARDIOVASCULAR: No chest pain or palpitations  GASTROINTESTINAL: Bloating  ,No abdominal or epigastric pain. No nausea, vomiting, or hematemesis; No diarrhea or constipation. No melena or hematochezia.  GENITOURINARY: No dysuria, frequency or hematuria  NEUROLOGICAL: No numbness or weakness  SKIN: No itching, burning, rashes, or lesions   All other review of systems is negative unless indicated above    Vital Signs Last 24 Hrs  T(C): 37.1 (19 May 2019 11:06), Max: 37.1 (19 May 2019 11:06)  T(F): 98.8 (19 May 2019 11:06), Max: 98.8 (19 May 2019 11:06)  HR: 72 (19 May 2019 11:06) (70 - 77)  BP: 156/61 (19 May 2019 11:06) (132/51 - 180/53)  BP(mean): --  RR: 18 (19 May 2019 11:06) (16 - 18)  SpO2: 98% (19 May 2019 11:06) (96% - 98%)    I&O's Summary    18 May 2019 07:01  -  19 May 2019 07:00  --------------------------------------------------------  IN: 900 mL / OUT: 0 mL / NET: 900 mL    19 May 2019 07:01  -  19 May 2019 16:28  --------------------------------------------------------  IN: 100 mL / OUT: 0 mL / NET: 100 mL        CAPILLARY BLOOD GLUCOSE      POCT Blood Glucose.: 192 mg/dL (19 May 2019 12:01)  POCT Blood Glucose.: 130 mg/dL (19 May 2019 07:51)  POCT Blood Glucose.: 144 mg/dL (18 May 2019 22:26)  POCT Blood Glucose.: 169 mg/dL (18 May 2019 16:32)      PHYSICAL EXAM:    Constitutional: Obese   HEENT: PERR, EOMI, Normal Hearing, MMM  Neck: Soft and supple, No LAD, No JVD  Respiratory: Breath sounds are clear bilaterally, No wheezing, rales or rhonchi  Cardiovascular: S1 and S2, regular rate and rhythm, no Murmurs, gallops or rubs  Gastrointestinal: Bowel Sounds present, soft, nontender, distended, no guarding, no rebound  Extremities: No peripheral edema  Vascular: 2+ peripheral pulses  Neurological: A/O x 2-3, no focal deficits  Musculoskeletal: 5/5 strength b/l upper and lower extremities  Skin: No rashes    MEDICATIONS:  MEDICATIONS  (STANDING):  aspirin  chewable 81 milliGRAM(s) Oral daily  carvedilol 25 milliGRAM(s) Oral every 12 hours  cefepime  Injectable. 1000 milliGRAM(s) IV Push daily  dextrose 5%. 1000 milliLiter(s) (50 mL/Hr) IV Continuous <Continuous>  dextrose 50% Injectable 12.5 Gram(s) IV Push once  dextrose 50% Injectable 25 Gram(s) IV Push once  dextrose 50% Injectable 25 Gram(s) IV Push once  epoetin indu Injectable 4000 Unit(s) IV Push <User Schedule>  heparin  Injectable 5000 Unit(s) SubCutaneous every 8 hours  hydrALAZINE 100 milliGRAM(s) Oral two times a day  insulin glargine Injectable (LANTUS) 10 Unit(s) SubCutaneous at bedtime  insulin lispro (HumaLOG) corrective regimen sliding scale   SubCutaneous three times a day before meals  isosorbide   mononitrate ER Tablet (IMDUR) 60 milliGRAM(s) Oral daily  levothyroxine 75 MICROGram(s) Oral daily  lisinopril 5 milliGRAM(s) Oral daily  metroNIDAZOLE  IVPB 500 milliGRAM(s) IV Intermittent every 8 hours  multivitamin 1 Tablet(s) Oral daily  pantoprazole  Injectable 40 milliGRAM(s) IV Push two times a day  sevelamer carbonate 1600 milliGRAM(s) Oral three times a day  tamsulosin 0.4 milliGRAM(s) Oral at bedtime      LABS: All Labs Reviewed:                        7.7    8.61  )-----------( 213      ( 19 May 2019 08:09 )             23.1     05-19    131<L>  |  96  |  39<H>  ----------------------------<  136<H>  4.4   |  24  |  5.74<H>    Ca    8.6      19 May 2019 08:09  Phos  2.3     05-19    TPro  6.2  /  Alb  2.5<L>  /  TBili  0.4  /  DBili  x   /  AST  23  /  ALT  29  /  AlkPhos  172<H>  05-18      CARDIAC MARKERS ( 18 May 2019 07:22 )  x     / x     / 409 U/L / x     / x          Blood Culture: 05-16 @ 01:23  Organism --  Gram Stain Blood -- Gram Stain --  Specimen Source .Blood Blood  Culture-Blood --        RADIOLOGY/EKG:    DVT PPX:    ADVANCED DIRECTIVE:    DISPOSITION:

## 2019-05-19 NOTE — DISCHARGE NOTE PROVIDER - CARE PROVIDER_API CALL
Veronica Perales)  Medicine  [address] [facility], [address]  [place] [address]  Phone: [phone]  Fax: [phone]  Follow Up Time: Veronica Perales (MD)  Medicine  [address] [facility] [address] [facility], [address]  [place] [address]  Phone: [phone]  Fax: [phone]  Follow Up Time:     Silas Lozano (DO)  Nephrology  [address] [facility] [address], [address]  [place] [address]  Phone: [phone]  Fax: [phone]  Follow Up Time:

## 2019-05-19 NOTE — DISCHARGE NOTE PROVIDER - NSDCADMDATE_GEN_ALL_CORE_FT
16-May-2019 04:20 Complete prescribed course of antibiotics. Follow up for new symptoms, worsening symptoms or failure to improve. Urinary Tract Infection in Women: Care Instructions  Your Care Instructions    A urinary tract infection, or UTI, is a general term for an infection anywhere between the kidneys and the urethra (where urine comes out). Most UTIs are bladder infections. They often cause pain or burning when you urinate. UTIs are caused by bacteria and can be cured with antibiotics. Be sure to complete your treatment so that the infection goes away. Follow-up care is a key part of your treatment and safety. Be sure to make and go to all appointments, and call your doctor if you are having problems. It's also a good idea to know your test results and keep a list of the medicines you take. How can you care for yourself at home? · Take your antibiotics as directed. Do not stop taking them just because you feel better. You need to take the full course of antibiotics. · Drink extra water and other fluids for the next day or two. This may help wash out the bacteria that are causing the infection. (If you have kidney, heart, or liver disease and have to limit fluids, talk with your doctor before you increase your fluid intake.)  · Avoid drinks that are carbonated or have caffeine. They can irritate the bladder. · Urinate often. Try to empty your bladder each time. · To relieve pain, take a hot bath or lay a heating pad set on low over your lower belly or genital area. Never go to sleep with a heating pad in place. To prevent UTIs  · Drink plenty of water each day. This helps you urinate often, which clears bacteria from your system. (If you have kidney, heart, or liver disease and have to limit fluids, talk with your doctor before you increase your fluid intake.)  · Urinate when you need to. · Urinate right after you have sex. · Change sanitary pads often.   · Avoid douches, bubble baths, feminine hygiene sprays, and other feminine hygiene products that have deodorants. · After going to the bathroom, wipe from front to back. When should you call for help? Call your doctor now or seek immediate medical care if:  ? · Symptoms such as fever, chills, nausea, or vomiting get worse or appear for the first time. ? · You have new pain in your back just below your rib cage. This is called flank pain. ? · There is new blood or pus in your urine. ? · You have any problems with your antibiotic medicine. ? Watch closely for changes in your health, and be sure to contact your doctor if:  ? · You are not getting better after taking an antibiotic for 2 days. ? · Your symptoms go away but then come back. Where can you learn more? Go to http://dany-hal.info/. Enter P834 in the search box to learn more about \"Urinary Tract Infection in Women: Care Instructions. \"  Current as of: May 12, 2017  Content Version: 11.4  © 4093-9083 [employer]. Care instructions adapted under license by [employer] (which disclaims liability or warranty for this information). If you have questions about a medical condition or this instruction, always ask your healthcare professional. [address] any warranty or liability for your use of this information.

## 2019-05-19 NOTE — PROGRESS NOTE ADULT - ASSESSMENT
61 yo Female with a PMHx of DM, HLD, HTN, CKD presents to ED with complain of generalized weakness with b/l leg pain, fever and cough.    #Sepsis 2/2 likely duodenitis   -Feeling better started on the regular diet today  - CT abd/pelvis   - s/p vancomycin   - continue cefepime  and metronidazole started on 5/17 follow up with the ID to transition to PO   - blood cultures NGTD   - monitor leukocytosis -resolved   - ID consult appreciated     #Weakness likely secondary to Deconditioning and Multiple Inpatient admissions     - likely due to infection and generalized deconditioning     - PT eval appreciated: rehab facility / HI-d/w with the daughter and they prefer to go to the [facility]     #DM-2  - FS AC/HS  - lantus 10 units at night  - ISS  - carb consistent diet    #Rhabdomyolysis   - elevated CK on admission  - statin held  -continue to monitor     #HFpEF  -Stable-no in excacerbation  - ECHO from 5/8: EF of 60%  - continue carvedilol   - continue imdur  - daily weights  - low salt diet  - monitor I/Os    #ESRD/Hypervolemic hyponatremia  - on HD  - scheduled MWF  - continue sevelamer  - nephrology consult appreciated   - fluid restriction  - continue dialysis   - will monitor    #HTN  -Labile   - continue lisinopril   - continue hydralazine     #HLD  - will hold statin for now due to elevated CK    #Constipation  - continue Miralax, colace, senna    #Hypothyroidism   - continue levothyroxine      #DVT ppx  - subQ heparin

## 2019-05-19 NOTE — DISCHARGE NOTE PROVIDER - PROVIDER TOKENS
PROVIDER:[TOKEN:[[id number]]] PROVIDER:[TOKEN:[[id number]]],PROVIDER:[TOKEN:[[id number]]]

## 2019-05-20 LAB
ALBUMIN SERPL ELPH-MCNC: 2.8 G/DL — LOW (ref 3.3–5)
ANION GAP SERPL CALC-SCNC: 10 MMOL/L — SIGNIFICANT CHANGE UP (ref 5–17)
BLD GP AB SCN SERPL QL: SIGNIFICANT CHANGE UP
BUN SERPL-MCNC: 49 MG/DL — HIGH (ref 7–23)
CALCIUM SERPL-MCNC: 8.3 MG/DL — LOW (ref 8.5–10.1)
CHLORIDE SERPL-SCNC: 96 MMOL/L — SIGNIFICANT CHANGE UP (ref 96–108)
CO2 SERPL-SCNC: 23 MMOL/L — SIGNIFICANT CHANGE UP (ref 22–31)
CREAT SERPL-MCNC: 6.64 MG/DL — HIGH (ref 0.5–1.3)
GLUCOSE BLDC GLUCOMTR-MCNC: 108 MG/DL — HIGH (ref 70–99)
GLUCOSE BLDC GLUCOMTR-MCNC: 108 MG/DL — HIGH (ref 70–99)
GLUCOSE BLDC GLUCOMTR-MCNC: 115 MG/DL — HIGH (ref 70–99)
GLUCOSE BLDC GLUCOMTR-MCNC: 166 MG/DL — HIGH (ref 70–99)
GLUCOSE SERPL-MCNC: 130 MG/DL — HIGH (ref 70–99)
HCT VFR BLD CALC: 23.6 % — LOW (ref 39–50)
HGB BLD-MCNC: 7.8 G/DL — LOW (ref 13–17)
MCHC RBC-ENTMCNC: 29.8 PG — SIGNIFICANT CHANGE UP (ref 27–34)
MCHC RBC-ENTMCNC: 33.1 GM/DL — SIGNIFICANT CHANGE UP (ref 32–36)
MCV RBC AUTO: 90.1 FL — SIGNIFICANT CHANGE UP (ref 80–100)
PHOSPHATE SERPL-MCNC: 2.6 MG/DL — SIGNIFICANT CHANGE UP (ref 2.5–4.5)
PLATELET # BLD AUTO: 233 K/UL — SIGNIFICANT CHANGE UP (ref 150–400)
POTASSIUM SERPL-MCNC: 4.4 MMOL/L — SIGNIFICANT CHANGE UP (ref 3.5–5.3)
POTASSIUM SERPL-SCNC: 4.4 MMOL/L — SIGNIFICANT CHANGE UP (ref 3.5–5.3)
RBC # BLD: 2.62 M/UL — LOW (ref 4.2–5.8)
RBC # FLD: 14.9 % — HIGH (ref 10.3–14.5)
SODIUM SERPL-SCNC: 129 MMOL/L — LOW (ref 135–145)
TYPE + AB SCN PNL BLD: SIGNIFICANT CHANGE UP
WBC # BLD: 9.39 K/UL — SIGNIFICANT CHANGE UP (ref 3.8–10.5)
WBC # FLD AUTO: 9.39 K/UL — SIGNIFICANT CHANGE UP (ref 3.8–10.5)

## 2019-05-20 RX ORDER — FUROSEMIDE 40 MG
1 TABLET ORAL
Qty: 0 | Refills: 0 | DISCHARGE

## 2019-05-20 RX ORDER — HYDRALAZINE HCL 50 MG
25 TABLET ORAL THREE TIMES A DAY
Refills: 0 | Status: DISCONTINUED | OUTPATIENT
Start: 2019-05-20 | End: 2019-05-24

## 2019-05-20 RX ORDER — FUROSEMIDE 40 MG
40 TABLET ORAL
Refills: 0 | Status: DISCONTINUED | OUTPATIENT
Start: 2019-05-21 | End: 2019-05-24

## 2019-05-20 RX ORDER — LISINOPRIL 2.5 MG/1
40 TABLET ORAL DAILY
Refills: 0 | Status: DISCONTINUED | OUTPATIENT
Start: 2019-05-20 | End: 2019-05-24

## 2019-05-20 RX ADMIN — HEPARIN SODIUM 5000 UNIT(S): 5000 INJECTION INTRAVENOUS; SUBCUTANEOUS at 15:09

## 2019-05-20 RX ADMIN — PANTOPRAZOLE SODIUM 40 MILLIGRAM(S): 20 TABLET, DELAYED RELEASE ORAL at 05:24

## 2019-05-20 RX ADMIN — Medication 100 MILLIGRAM(S): at 15:06

## 2019-05-20 RX ADMIN — ERYTHROPOIETIN 4000 UNIT(S): 10000 INJECTION, SOLUTION INTRAVENOUS; SUBCUTANEOUS at 09:43

## 2019-05-20 RX ADMIN — CARVEDILOL PHOSPHATE 25 MILLIGRAM(S): 80 CAPSULE, EXTENDED RELEASE ORAL at 17:51

## 2019-05-20 RX ADMIN — INSULIN GLARGINE 10 UNIT(S): 100 INJECTION, SOLUTION SUBCUTANEOUS at 21:13

## 2019-05-20 RX ADMIN — LISINOPRIL 5 MILLIGRAM(S): 2.5 TABLET ORAL at 05:24

## 2019-05-20 RX ADMIN — Medication 100 MILLIGRAM(S): at 05:23

## 2019-05-20 RX ADMIN — Medication 25 MILLIGRAM(S): at 21:12

## 2019-05-20 RX ADMIN — LISINOPRIL 40 MILLIGRAM(S): 2.5 TABLET ORAL at 17:50

## 2019-05-20 RX ADMIN — HEPARIN SODIUM 5000 UNIT(S): 5000 INJECTION INTRAVENOUS; SUBCUTANEOUS at 21:12

## 2019-05-20 RX ADMIN — SIMETHICONE 80 MILLIGRAM(S): 80 TABLET, CHEWABLE ORAL at 05:25

## 2019-05-20 RX ADMIN — Medication 100 MILLIGRAM(S): at 05:24

## 2019-05-20 RX ADMIN — Medication 81 MILLIGRAM(S): at 12:12

## 2019-05-20 RX ADMIN — Medication 75 MICROGRAM(S): at 05:24

## 2019-05-20 RX ADMIN — Medication 1 TABLET(S): at 12:12

## 2019-05-20 RX ADMIN — CEFEPIME 1000 MILLIGRAM(S): 1 INJECTION, POWDER, FOR SOLUTION INTRAMUSCULAR; INTRAVENOUS at 12:13

## 2019-05-20 RX ADMIN — Medication 25 MILLIGRAM(S): at 15:09

## 2019-05-20 RX ADMIN — TAMSULOSIN HYDROCHLORIDE 0.4 MILLIGRAM(S): 0.4 CAPSULE ORAL at 21:12

## 2019-05-20 RX ADMIN — HEPARIN SODIUM 5000 UNIT(S): 5000 INJECTION INTRAVENOUS; SUBCUTANEOUS at 05:23

## 2019-05-20 RX ADMIN — ISOSORBIDE MONONITRATE 60 MILLIGRAM(S): 60 TABLET, EXTENDED RELEASE ORAL at 12:12

## 2019-05-20 RX ADMIN — CARVEDILOL PHOSPHATE 25 MILLIGRAM(S): 80 CAPSULE, EXTENDED RELEASE ORAL at 05:23

## 2019-05-20 RX ADMIN — PANTOPRAZOLE SODIUM 40 MILLIGRAM(S): 20 TABLET, DELAYED RELEASE ORAL at 17:50

## 2019-05-20 RX ADMIN — Medication 100 MILLIGRAM(S): at 21:11

## 2019-05-20 NOTE — PROGRESS NOTE ADULT - SUBJECTIVE AND OBJECTIVE BOX
NEPHROLOGY INTERVAL HPI/OVERNIGHT EVENTS:  05-20-19 @ 09:12  states some medication yesterday made him dizzy  no new c/o  given bp meds this am     MEDICATIONS  (STANDING):  aspirin  chewable 81 milliGRAM(s) Oral daily  carvedilol 25 milliGRAM(s) Oral every 12 hours  cefepime  Injectable. 1000 milliGRAM(s) IV Push daily  dextrose 5%. 1000 milliLiter(s) (50 mL/Hr) IV Continuous <Continuous>  dextrose 50% Injectable 12.5 Gram(s) IV Push once  dextrose 50% Injectable 25 Gram(s) IV Push once  dextrose 50% Injectable 25 Gram(s) IV Push once  epoetin indu Injectable 4000 Unit(s) IV Push <User Schedule>  heparin  Injectable 5000 Unit(s) SubCutaneous every 8 hours  hydrALAZINE 100 milliGRAM(s) Oral two times a day  insulin glargine Injectable (LANTUS) 10 Unit(s) SubCutaneous at bedtime  insulin lispro (HumaLOG) corrective regimen sliding scale   SubCutaneous three times a day before meals  isosorbide   mononitrate ER Tablet (IMDUR) 60 milliGRAM(s) Oral daily  levothyroxine 75 MICROGram(s) Oral daily  lisinopril 5 milliGRAM(s) Oral daily  metroNIDAZOLE  IVPB 500 milliGRAM(s) IV Intermittent every 8 hours  multivitamin 1 Tablet(s) Oral daily  pantoprazole  Injectable 40 milliGRAM(s) IV Push two times a day  sevelamer carbonate 1600 milliGRAM(s) Oral three times a day  tamsulosin 0.4 milliGRAM(s) Oral at bedtime    MEDICATIONS  (PRN):  acetaminophen   Tablet .. 650 milliGRAM(s) Oral every 6 hours PRN Temp greater or equal to 38C (100.4F), Mild Pain (1 - 3)  artificial  tears Solution 1 Drop(s) Both EYES four times a day PRN Dry Eyes  dextrose 40% Gel 15 Gram(s) Oral once PRN Blood Glucose LESS THAN 70 milliGRAM(s)/deciliter  docusate sodium 100 milliGRAM(s) Oral three times a day PRN Constipation  glucagon  Injectable 1 milliGRAM(s) IntraMuscular once PRN Glucose LESS THAN 70 milligrams/deciliter  senna 1 Tablet(s) Oral at bedtime PRN Constipation refractory to docusate  simethicone 80 milliGRAM(s) Chew three times a day PRN Gas      Allergies    No Known Allergies    Intolerances        I&O's Detail    19 May 2019 07:01  -  20 May 2019 07:00  --------------------------------------------------------  IN:    IV PiggyBack: 100 mL  Total IN: 100 mL    OUT:  Total OUT: 0 mL    Total NET: 100 mL    Patient was seen and evaluated on dialysis.   Patient is tolerating the procedure well.   Continue dialysis:   Dialyzer: revaclear 300 on 2k uf goal of 2.5 kg     Vital Signs Last 24 Hrs  T(C): 37.1 (20 May 2019 08:00), Max: 37.1 (19 May 2019 11:06)  T(F): 98.8 (20 May 2019 08:00), Max: 98.8 (19 May 2019 11:06)  HR: 79 (20 May 2019 09:00) (69 - 86)  BP: 166/69 (20 May 2019 09:00) (146/67 - 196/71)  BP(mean): --  RR: 17 (20 May 2019 09:00) (17 - 18)  SpO2: 100% (20 May 2019 06:42) (96% - 100%)  Daily     Daily     PHYSICAL EXAM:  General: alert. awake Ox3  HEENT: MMM  CV: s1s2 rrr  LUNGS: B/L CTA  EXT: no edema    LABS:                        7.8    9.39  )-----------( 233      ( 20 May 2019 06:12 )             23.6     05-20    129<L>  |  96  |  49<H>  ----------------------------<  130<H>  4.4   |  23  |  6.64<H>    Ca    8.3<L>      20 May 2019 08:00  Phos  2.6     05-20    TPro  x   /  Alb  2.8<L>  /  TBili  x   /  DBili  x   /  AST  x   /  ALT  x   /  AlkPhos  x   05-20        Phosphorus Level, Serum: 2.6 mg/dL (05-20 @ 08:00)

## 2019-05-20 NOTE — PROGRESS NOTE ADULT - ASSESSMENT
63 yo male with a PMHx of DM, HLD, HTN, CKD presents to ED with complain of generalized weakness with b/l leg pain, fever and cough.    #Duodenitis   - CT abd/pelvis as above  - s/p vancomycin   - continue cefepime  and metronidazole started on 5/17   - on d/c will switch abx to po ceftin 500mgdaily (after HD on dialysis days) and po flagyl complete 10-14 day course   - blood cultures NGTD   - ID consult appreciated     #GI bleed  - FOBT + in setting of recent drop in H&H  - continue Protonix 40mg IV BID  - CLD as patient has little appetite with some nausea  - will recheck Hg in AM  - GI consult pending    #HTN  -Labile; elevated today   -  lisinopril increased to 40mg daily  - continue hydralazine 25mg TID  - will continue to monitor    #Weakness likely secondary to Deconditioning and Multiple Inpatient admissions     - likely due to infection and generalized deconditioning     - PT eval appreciated: rehab facility / HI-d/w with the daughter and they prefer to go to the [facility]     #DM-2  - BGM WNL today  - FS AC/HS  - lantus 10 units at night with ISS  - carb consistent CLD diet    #Rhabdomyolysis   - elevated CK on admission  - statin held  -continue to monitor     #HFpEF  -Stable-no in excacerbation  - ECHO from 5/8: EF of 60%  - continue carvedilol   - continue imdur  - daily weights  - low salt diet  - monitor I/Os    #ESRD/Hypervolemic hyponatremia  - on HD MWF  - continue sevelamer  - Lasix 40mg daily on non-HD days added today per outpatient med rec  - continue fluid restriction  - nephrology consult appreciated     #HLD  - statin held on admission due to elevated CK  - will recheck CK in AM    #Constipation  - continue Miralax, colace, senna    #Hypothyroidism   - continue levothyroxine    #DVT ppx  - subQ heparin 88 yo male with a PMHx of DM, HLD, HTN, CKD presents to ED with complain of generalized weakness with b/l leg pain, fever and cough.    #Duodenitis   - CT abd/pelvis as above  - s/p vancomycin   - continue cefepime  and metronidazole started on 5/17   - on d/c will switch abx to po ceftin 500mgdaily (after HD on dialysis days) and po flagyl complete 10-14 day course   - blood cultures NGTD   - ID consult appreciated     #GI bleed  - FOBT + in setting of recent drop in H&H  - continue Protonix 40mg IV BID  - CLD as patient has little appetite with some nausea  - will recheck Hg in AM  - GI consult pending    #HTN  -Labile; elevated today   -  lisinopril increased to 40mg daily  - continue hydralazine 25mg TID  - will continue to monitor    #Weakness likely secondary to Deconditioning and Multiple Inpatient admissions     - likely due to infection and generalized deconditioning     - PT eval appreciated: rehab facility / HI-d/w with the daughter and they prefer to go to the [facility]     #DM-2  - BGM WNL today  - FS AC/HS  - lantus 10 units at night with ISS  - carb consistent CLD diet    #Rhabdomyolysis   - elevated CK on admission  - statin held  -continue to monitor     #HFpEF  -Stable-no in excacerbation  - ECHO from 5/8: EF of 60%  - continue carvedilol   - continue imdur  - daily weights  - low salt diet  - monitor I/Os    #ESRD/Hypervolemic hyponatremia  - on HD MWF  - continue sevelamer  - Lasix 40mg daily on non-HD days added today per outpatient med rec  - continue fluid restriction  - nephrology consult appreciated     #HLD  - statin held on admission due to elevated CK  - will recheck CK in AM    #Constipation  - continue Miralax, colace, senna    #Hypothyroidism   - continue levothyroxine    #DVT ppx  - subQ heparin

## 2019-05-20 NOTE — PROGRESS NOTE ADULT - ASSESSMENT
63 yo Female with a PMHx of DM, HLD, HTN, CKD presents to ED with complain of generalized weakness and fever per daughter at the bedside . Patient is so weak he is having trouble ambulating. As per daughter had fever 102 F at home today. he denies HA or neck pain. No chest pain or sob. No abd pain. No n/v/d. No urinary f/u/d. No back pain. no gross motor or sensory deficits. he denies illicit drug use. he had no recent travel. He has no other acute issues symptoms or concerns.  Pt sleeping unable to get history at this time  Will review with family and the pts dialysis unit events of yesterday  Addendum:  Pt had his full dialysis yesterday at the [facility] unit and there were no issues  with dialysis and patient had no fever.  On zosyn  CXR some vascular prominence, no focal PNA reported    5/17  seen on HD   vvs  CT abd , duodenitis?  ID input noted  on cefepime and Flagyll    dr franklin covering weekend    5/20 MK   - ESRD on hd: tolerating hd, will hold bp meds pre hd         will try to target the UF with HD         fluid restrict with hyponatremia related to the excess fluid intake  - HTN; will inc the lisinopril and dec the hydralazine  - ABRAHAN; dc sevelamer during episode of duodenitis   - duodenitis  cefepimine and flagyll: monitor fluid intake

## 2019-05-20 NOTE — CONSULT NOTE ADULT - ASSESSMENT
89M with abdominal pain, acute on chronic anemia, duodenitis on CT.   Likely duodenal ulcer or duodenitis.  No active GI bleeding reported (melena, hematochezia).    Recommend:    -PPI BID  -continue to monitor CBC  -if overt bleeding or declining hgb would do EGD next  -will attempt to interview again when pt more cooperative  -d/w RN

## 2019-05-20 NOTE — PROGRESS NOTE ADULT - SUBJECTIVE AND OBJECTIVE BOX
SUBJECTIVE: 61 yo Female with a PMHx of DM, HLD, HTN, CKD presents to ED with complain of generalized weakness and fever per daughter at the bedside . Patient is so weak he is having trouble ambulating. As per daughter had fever 102 F at home today. he denies HA or neck pain. No chest pain or sob. No abd pain. No n/v/d. No urinary f/u/d. No back pain. no gross motor or sensory deficits. he denies illicit drug use. he had no recent travel. He has no other acute issues symptoms or concerns.    5/21/19: This AM patient    REVIEW OF SYSTEMS:  CONSTITUTIONAL: No weakness, fevers or chills  EYES/ENT: No visual changes;  No vertigo or throat pain   NECK: No pain or stiffness  RESPIRATORY: No cough, wheezing, hemoptysis; No shortness of breath  CARDIOVASCULAR: No chest pain or palpitations  GASTROINTESTINAL: No abdominal or epigastric pain. No nausea, vomiting, or hematemesis; No diarrhea or constipation. No melena or hematochezia.  GENITOURINARY: No dysuria, frequency or hematuria  NEUROLOGICAL: No numbness or weakness  SKIN: No itching, burning, rashes, or lesions   All other review of systems is negative unless indicated above    Vital Signs Last 24 Hrs  T(C): 37.3 (20 May 2019 17:00), Max: 37.3 (20 May 2019 17:00)  T(F): 99.2 (20 May 2019 17:00), Max: 99.2 (20 May 2019 17:00)  HR: 79 (20 May 2019 17:00) (72 - 86)  BP: 174/48 (20 May 2019 17:00) (117/68 - 196/71)  BP(mean): --  RR: 16 (20 May 2019 17:00) (16 - 18)  SpO2: 100% (20 May 2019 17:00) (96% - 100%)    I&O's Summary    19 May 2019 07:01  -  20 May 2019 07:00  --------------------------------------------------------  IN: 100 mL / OUT: 0 mL / NET: 100 mL    20 May 2019 07:01  -  20 May 2019 18:23  --------------------------------------------------------  IN: 120 mL / OUT: 0 mL / NET: 120 mL        CAPILLARY BLOOD GLUCOSE      POCT Blood Glucose.: 115 mg/dL (20 May 2019 16:50)  POCT Blood Glucose.: 108 mg/dL (20 May 2019 12:10)  POCT Blood Glucose.: 108 mg/dL (20 May 2019 06:55)  POCT Blood Glucose.: 194 mg/dL (19 May 2019 20:25)      PHYSICAL EXAM:  Constitutional: NAD, awake and alert, well-developed  HEENT: PERR, EOMI, Normal Hearing, MMM  Neck: Soft and supple, No LAD, No JVD  Respiratory: Breath sounds are clear bilaterally, No wheezing, rales or rhonchi  Cardiovascular: S1 and S2, regular rate and rhythm, no Murmurs, gallops or rubs  Gastrointestinal: Bowel Sounds present, soft, nontender, nondistended, no guarding, no rebound  Extremities: No peripheral edema  Vascular: 2+ peripheral pulses  Neurological: A/O x 3, no focal deficits  Musculoskeletal: 5/5 strength b/l upper and lower extremities  Skin: No rashes    MEDICATIONS:  MEDICATIONS  (STANDING):  carvedilol 25 milliGRAM(s) Oral every 12 hours  cefepime  Injectable. 1000 milliGRAM(s) IV Push daily  dextrose 5%. 1000 milliLiter(s) (50 mL/Hr) IV Continuous <Continuous>  dextrose 50% Injectable 12.5 Gram(s) IV Push once  dextrose 50% Injectable 25 Gram(s) IV Push once  dextrose 50% Injectable 25 Gram(s) IV Push once  epoetin indu Injectable 4000 Unit(s) IV Push <User Schedule>  heparin  Injectable 5000 Unit(s) SubCutaneous every 8 hours  hydrALAZINE 25 milliGRAM(s) Oral three times a day  insulin glargine Injectable (LANTUS) 10 Unit(s) SubCutaneous at bedtime  insulin lispro (HumaLOG) corrective regimen sliding scale   SubCutaneous three times a day before meals  isosorbide   mononitrate ER Tablet (IMDUR) 60 milliGRAM(s) Oral daily  levothyroxine 75 MICROGram(s) Oral daily  lisinopril 40 milliGRAM(s) Oral daily  metroNIDAZOLE  IVPB 500 milliGRAM(s) IV Intermittent every 8 hours  multivitamin 1 Tablet(s) Oral daily  pantoprazole  Injectable 40 milliGRAM(s) IV Push two times a day  tamsulosin 0.4 milliGRAM(s) Oral at bedtime      LABS: All Labs Reviewed:                        7.8    9.39  )-----------( 233      ( 20 May 2019 06:12 )             23.6     05-20    129<L>  |  96  |  49<H>  ----------------------------<  130<H>  4.4   |  23  |  6.64<H>    Ca    8.3<L>      20 May 2019 08:00  Phos  2.6     05-20    TPro  x   /  Alb  2.8<L>  /  TBili  x   /  DBili  x   /  AST  x   /  ALT  x   /  AlkPhos  x   05-20          Blood Culture: 05-16 @ 01:23  Organism --  Gram Stain Blood -- Gram Stain --  Specimen Source .Blood Blood  Culture-Blood --        RADIOLOGY/EKG:    DVT PPX:    ADVANCED DIRECTIVE:    DISPOSITION: SUBJECTIVE: 63 yo male with a PMHx of DM, HLD, HTN, CKD presents to ED with complain of generalized weakness and fever per daughter at the bedside . Patient is so weak he is having trouble ambulating. As per daughter had fever 102 F at home today. he denies HA or neck pain. No chest pain or sob. No abd pain. No n/v/d. No urinary f/u/d. No back pain. no gross motor or sensory deficits. he denies illicit drug use. he had no recent travel. He has no other acute issues symptoms or concerns.    5/21/19: This AM patient complaining of lightheadedness and nausea prior to HD. Per RN, patient described SOB. patient notes he takes Lasix 40mg on non-HD days which he hasn't recieved while hospitalized. Patient seen again after HD and notes that he does not have appetite and has minor pain in his stomach after eating.     REVIEW OF SYSTEMS:  CONSTITUTIONAL: mild weakness; no fevers or chills  EYES/ENT: No visual changes;  No vertigo or throat pain   NECK: No pain or stiffness  RESPIRATORY: No cough, wheezing, hemoptysis; No shortness of breath  CARDIOVASCULAR: No chest pain or palpitations  GASTROINTESTINAL: + abdominal pain. No nausea, vomiting, or hematemesis; No diarrhea or constipation. No melena or hematochezia.  GENITOURINARY: No dysuria, frequency or hematuria  NEUROLOGICAL: No numbness or weakness  SKIN: No itching, burning, rashes, or lesions   All other review of systems is negative unless indicated above    Vital Signs Last 24 Hrs  T(C): 37.3 (20 May 2019 17:00), Max: 37.3 (20 May 2019 17:00)  T(F): 99.2 (20 May 2019 17:00), Max: 99.2 (20 May 2019 17:00)  HR: 79 (20 May 2019 17:00) (72 - 86)  BP: 174/48 (20 May 2019 17:00) (117/68 - 196/71)  BP(mean): --  RR: 16 (20 May 2019 17:00) (16 - 18)  SpO2: 100% (20 May 2019 17:00) (96% - 100%)    I&O's Summary    19 May 2019 07:01  -  20 May 2019 07:00  --------------------------------------------------------  IN: 100 mL / OUT: 0 mL / NET: 100 mL    20 May 2019 07:01  -  20 May 2019 18:23  --------------------------------------------------------  IN: 120 mL / OUT: 0 mL / NET: 120 mL        CAPILLARY BLOOD GLUCOSE      POCT Blood Glucose.: 115 mg/dL (20 May 2019 16:50)  POCT Blood Glucose.: 108 mg/dL (20 May 2019 12:10)  POCT Blood Glucose.: 108 mg/dL (20 May 2019 06:55)  POCT Blood Glucose.: 194 mg/dL (19 May 2019 20:25)      Constitutional: Obese, mildly irritated  HEENT: PERR, EOMI, Normal Hearing, MMM  Neck: Soft and supple, No LAD, No JVD  Respiratory: Breath sounds are clear bilaterally, No wheezing, rales or rhonchi  Cardiovascular: S1 and S2, regular rate and rhythm, no Murmurs, gallops or rubs  Gastrointestinal: Bowel Sounds present, soft, mildly tender to deep palpation in epigastric area, distended, no guarding, no rebound  Extremities: No peripheral edema  Vascular: 2+ peripheral pulses  Neurological: A/O x 2, no focal deficits  Musculoskeletal: 4+/5 strength b/l upper and lower extremities  Skin: No rashes    MEDICATIONS:  MEDICATIONS  (STANDING):  carvedilol 25 milliGRAM(s) Oral every 12 hours  cefepime  Injectable. 1000 milliGRAM(s) IV Push daily  dextrose 5%. 1000 milliLiter(s) (50 mL/Hr) IV Continuous <Continuous>  dextrose 50% Injectable 12.5 Gram(s) IV Push once  dextrose 50% Injectable 25 Gram(s) IV Push once  dextrose 50% Injectable 25 Gram(s) IV Push once  epoetin indu Injectable 4000 Unit(s) IV Push <User Schedule>  heparin  Injectable 5000 Unit(s) SubCutaneous every 8 hours  hydrALAZINE 25 milliGRAM(s) Oral three times a day  insulin glargine Injectable (LANTUS) 10 Unit(s) SubCutaneous at bedtime  insulin lispro (HumaLOG) corrective regimen sliding scale   SubCutaneous three times a day before meals  isosorbide   mononitrate ER Tablet (IMDUR) 60 milliGRAM(s) Oral daily  levothyroxine 75 MICROGram(s) Oral daily  lisinopril 40 milliGRAM(s) Oral daily  metroNIDAZOLE  IVPB 500 milliGRAM(s) IV Intermittent every 8 hours  multivitamin 1 Tablet(s) Oral daily  pantoprazole  Injectable 40 milliGRAM(s) IV Push two times a day  tamsulosin 0.4 milliGRAM(s) Oral at bedtime      LABS: All Labs Reviewed:                        7.8    9.39  )-----------( 233      ( 20 May 2019 06:12 )             23.6     05-20    129<L>  |  96  |  49<H>  ----------------------------<  130<H>  4.4   |  23  |  6.64<H>    Ca    8.3<L>      20 May 2019 08:00  Phos  2.6     05-20    TPro  x   /  Alb  2.8<L>  /  TBili  x   /  DBili  x   /  AST  x   /  ALT  x   /  AlkPhos  x   05-20    < from: CT Abdomen and Pelvis No Cont (05.17.19 @ 11:43) >  LOWER CHEST: Motion artifact limits fine evaluation lung bases.   Atelectatic changes are present. There is electronic cardiac device with   wires in the region ventricle.    LIVER: Within normal limits.  BILE DUCTS: Normal caliber.  GALLBLADDER: The gallbladder is collapsed and contains a stone in the   neck without gross inflammation.  SPLEEN: Within normal limits.  PANCREAS: There is a punctate calcification in the pancreatic head which   is unchanged.  ADRENALS: Again noted is a 7 mm indeterminate left adrenal nodule.  KIDNEYS/URETERS: The kidneys are atrophic bilaterally. There is   perinephric stranding which may be chronic. There are less than 1 cm   low-attenuation lesions in the left kidney, most likely representing   small cysts.    BLADDER: The bladder appears mildly thick-walled although this may be due   to under distention. Please correlate clinically.  REPRODUCTIVE ORGANS: A penile prosthesis is present. The prostate gland   is normal in size and contains a few punctate calcifications.    BOWEL: There is mild wall thickening and soft tissue stranding in the   duodenum. This is limited in evaluation without oral or intravenous   contrast although this was not seen previously.. Appendix is normal in   appearance.  PERITONEUM: No ascites.  VESSELS:  Vascular calcifications are present.  RETROPERITONEUM: No lymphadenopathy.    ABDOMINAL WALL: Within normal limits.  BONES: Degenerative changes of bone are present.    IMPRESSION:     Interval development of mild wall thickening in the duodenum with   surrounding inflammatory changes. Duodenitis, underlying ulcer, or other   pathology cannot be excluded. Please correlate clinically.    Gallstone without gross inflammation in a contracted gallbladder. Bladder   appears mildly thick-walledalthough this may be due to under distention.   Please correlate clinically. Penile prosthesis. Additional findings as   above.    < end of copied text > SUBJECTIVE: 89 yo male with a PMHx of DM, HLD, HTN, CKD presents to ED with complain of generalized weakness and fever per daughter at the bedside . Patient is so weak he is having trouble ambulating. As per daughter had fever 102 F at home today. he denies HA or neck pain. No chest pain or sob. No abd pain. No n/v/d. No urinary f/u/d. No back pain. no gross motor or sensory deficits. he denies illicit drug use. he had no recent travel. He has no other acute issues symptoms or concerns.    5/21/19: This AM patient complaining of lightheadedness and nausea prior to HD. Per RN, patient described SOB. patient notes he takes Lasix 40mg on non-HD days which he hasn't recieved while hospitalized. Patient seen again after HD and notes that he does not have appetite and has minor pain in his stomach after eating.     REVIEW OF SYSTEMS:  CONSTITUTIONAL: mild weakness; no fevers or chills  EYES/ENT: No visual changes;  No vertigo or throat pain   NECK: No pain or stiffness  RESPIRATORY: No cough, wheezing, hemoptysis; No shortness of breath  CARDIOVASCULAR: No chest pain or palpitations  GASTROINTESTINAL: + abdominal pain. No nausea, vomiting, or hematemesis; No diarrhea or constipation. No melena or hematochezia.  GENITOURINARY: No dysuria, frequency or hematuria  NEUROLOGICAL: No numbness or weakness  SKIN: No itching, burning, rashes, or lesions   All other review of systems is negative unless indicated above    Vital Signs Last 24 Hrs  T(C): 37.3 (20 May 2019 17:00), Max: 37.3 (20 May 2019 17:00)  T(F): 99.2 (20 May 2019 17:00), Max: 99.2 (20 May 2019 17:00)  HR: 79 (20 May 2019 17:00) (72 - 86)  BP: 174/48 (20 May 2019 17:00) (117/68 - 196/71)  BP(mean): --  RR: 16 (20 May 2019 17:00) (16 - 18)  SpO2: 100% (20 May 2019 17:00) (96% - 100%)    I&O's Summary    19 May 2019 07:01  -  20 May 2019 07:00  --------------------------------------------------------  IN: 100 mL / OUT: 0 mL / NET: 100 mL    20 May 2019 07:01  -  20 May 2019 18:23  --------------------------------------------------------  IN: 120 mL / OUT: 0 mL / NET: 120 mL        CAPILLARY BLOOD GLUCOSE      POCT Blood Glucose.: 115 mg/dL (20 May 2019 16:50)  POCT Blood Glucose.: 108 mg/dL (20 May 2019 12:10)  POCT Blood Glucose.: 108 mg/dL (20 May 2019 06:55)  POCT Blood Glucose.: 194 mg/dL (19 May 2019 20:25)      Constitutional: Obese, mildly irritated  HEENT: PERR, EOMI, Normal Hearing, MMM  Neck: Soft and supple, No LAD, No JVD  Respiratory: Breath sounds are clear bilaterally, No wheezing, rales or rhonchi  Cardiovascular: S1 and S2, regular rate and rhythm, no Murmurs, gallops or rubs  Gastrointestinal: Bowel Sounds present, soft, mildly tender to deep palpation in epigastric area, distended, no guarding, no rebound  Extremities: No peripheral edema  Vascular: 2+ peripheral pulses  Neurological: A/O x 2, no focal deficits  Musculoskeletal: 4+/5 strength b/l upper and lower extremities  Skin: No rashes    MEDICATIONS:  MEDICATIONS  (STANDING):  carvedilol 25 milliGRAM(s) Oral every 12 hours  cefepime  Injectable. 1000 milliGRAM(s) IV Push daily  dextrose 5%. 1000 milliLiter(s) (50 mL/Hr) IV Continuous <Continuous>  dextrose 50% Injectable 12.5 Gram(s) IV Push once  dextrose 50% Injectable 25 Gram(s) IV Push once  dextrose 50% Injectable 25 Gram(s) IV Push once  epoetin indu Injectable 4000 Unit(s) IV Push <User Schedule>  heparin  Injectable 5000 Unit(s) SubCutaneous every 8 hours  hydrALAZINE 25 milliGRAM(s) Oral three times a day  insulin glargine Injectable (LANTUS) 10 Unit(s) SubCutaneous at bedtime  insulin lispro (HumaLOG) corrective regimen sliding scale   SubCutaneous three times a day before meals  isosorbide   mononitrate ER Tablet (IMDUR) 60 milliGRAM(s) Oral daily  levothyroxine 75 MICROGram(s) Oral daily  lisinopril 40 milliGRAM(s) Oral daily  metroNIDAZOLE  IVPB 500 milliGRAM(s) IV Intermittent every 8 hours  multivitamin 1 Tablet(s) Oral daily  pantoprazole  Injectable 40 milliGRAM(s) IV Push two times a day  tamsulosin 0.4 milliGRAM(s) Oral at bedtime      LABS: All Labs Reviewed:                        7.8    9.39  )-----------( 233      ( 20 May 2019 06:12 )             23.6     05-20    129<L>  |  96  |  49<H>  ----------------------------<  130<H>  4.4   |  23  |  6.64<H>    Ca    8.3<L>      20 May 2019 08:00  Phos  2.6     05-20    TPro  x   /  Alb  2.8<L>  /  TBili  x   /  DBili  x   /  AST  x   /  ALT  x   /  AlkPhos  x   05-20    < from: CT Abdomen and Pelvis No Cont (05.17.19 @ 11:43) >  LOWER CHEST: Motion artifact limits fine evaluation lung bases.   Atelectatic changes are present. There is electronic cardiac device with   wires in the region ventricle.    LIVER: Within normal limits.  BILE DUCTS: Normal caliber.  GALLBLADDER: The gallbladder is collapsed and contains a stone in the   neck without gross inflammation.  SPLEEN: Within normal limits.  PANCREAS: There is a punctate calcification in the pancreatic head which   is unchanged.  ADRENALS: Again noted is a 7 mm indeterminate left adrenal nodule.  KIDNEYS/URETERS: The kidneys are atrophic bilaterally. There is   perinephric stranding which may be chronic. There are less than 1 cm   low-attenuation lesions in the left kidney, most likely representing   small cysts.    BLADDER: The bladder appears mildly thick-walled although this may be due   to under distention. Please correlate clinically.  REPRODUCTIVE ORGANS: A penile prosthesis is present. The prostate gland   is normal in size and contains a few punctate calcifications.    BOWEL: There is mild wall thickening and soft tissue stranding in the   duodenum. This is limited in evaluation without oral or intravenous   contrast although this was not seen previously.. Appendix is normal in   appearance.  PERITONEUM: No ascites.  VESSELS:  Vascular calcifications are present.  RETROPERITONEUM: No lymphadenopathy.    ABDOMINAL WALL: Within normal limits.  BONES: Degenerative changes of bone are present.    IMPRESSION:     Interval development of mild wall thickening in the duodenum with   surrounding inflammatory changes. Duodenitis, underlying ulcer, or other   pathology cannot be excluded. Please correlate clinically.    Gallstone without gross inflammation in a contracted gallbladder. Bladder   appears mildly thick-walledalthough this may be due to under distention.   Please correlate clinically. Penile prosthesis. Additional findings as   above.    < end of copied text >

## 2019-05-20 NOTE — PROGRESS NOTE ADULT - ASSESSMENT
61 yo male with a PMHx of DM, HLD, HTN, ESRD on HD MWF presents to ED with complain of generalized weakness and fever per daughter at the bedside. Patient is so weak he is having trouble ambulating. As per daughter had fever 102 F at home 5/16 he denies HA or neck pain. No chest pain or sob. No abd pain. No urinary f/u/d. No back pain. No gross motor or sensory deficits. He denies illicit drug use. He had no recent travel. He has no other acute issues symptoms or concerns. Here afebrile, wbc ct 15, CT chest with minimal bronchiectasis, scattered alveolar opacities ? mild pulmonary edema no consolidations or nodules.    1. febrile syndrome. nausea. vomiting. abd distension. leukocytosis  - imaging reviewed, CT abd/pelvis with duodenitis, now improving, wbc ct normalized  - low suspicion for pna based on imaging and lack of resp sxs   - on cefepime 1gm daily #5  - on flagyl [id number] for [place] coverage #4  - continue with abx coverage  - on dc plan for po ceftin 500mgdaily (after HD on dialysis days) and po flagyl complete 10-14 day course   - monitor temps  - tolerating abx well so far; no side effects noted  - reason for abx use and side effects reviewed with patient  - supportive care  - f/u cbc    2. other issues - care per medicine

## 2019-05-20 NOTE — CONSULT NOTE ADULT - SUBJECTIVE AND OBJECTIVE BOX
89 yo male with a PMHx of DM, HLD, HTN, CKD on HD admitted with generalized weakness and fever. Being treated for febrile illness of unclear etiology, possibly PNA. Today c/o postprandial abd pain and nausea; CT scan shows duodenitis. Concern for ulcer and pt started on BID protonix. Called to evaluate. No active GI bleeding reported (melena, hematochezia). Pt not cooperative, and doesn't want to use [place] [employer] phone (interp # [id number] was on line and pt refuses to speak).    PAST MEDICAL & SURGICAL HISTORY:  Pulmonary edema  Pancreatitis  AICD (automatic cardioverter/defibrillator) present  CHF (Congestive Heart Failure)  HTN (Hypertension)  High Cholesterol  Heart Attack  Dialysis Patient  Diabetes  AICD (automatic cardioverter/defibrillator) present  History of penile implant  History of Hernia Repair    MEDICATIONS  (STANDING):  carvedilol 25 milliGRAM(s) Oral every 12 hours  cefepime  Injectable. 1000 milliGRAM(s) IV Push daily  dextrose 5%. 1000 milliLiter(s) (50 mL/Hr) IV Continuous <Continuous>  dextrose 50% Injectable 12.5 Gram(s) IV Push once  dextrose 50% Injectable 25 Gram(s) IV Push once  dextrose 50% Injectable 25 Gram(s) IV Push once  epoetin indu Injectable 4000 Unit(s) IV Push <User Schedule>  heparin  Injectable 5000 Unit(s) SubCutaneous every 8 hours  hydrALAZINE 25 milliGRAM(s) Oral three times a day  insulin glargine Injectable (LANTUS) 10 Unit(s) SubCutaneous at bedtime  insulin lispro (HumaLOG) corrective regimen sliding scale   SubCutaneous three times a day before meals  isosorbide   mononitrate ER Tablet (IMDUR) 60 milliGRAM(s) Oral daily  levothyroxine 75 MICROGram(s) Oral daily  lisinopril 40 milliGRAM(s) Oral daily  metroNIDAZOLE  IVPB 500 milliGRAM(s) IV Intermittent every 8 hours  multivitamin 1 Tablet(s) Oral daily  pantoprazole  Injectable 40 milliGRAM(s) IV Push two times a day  tamsulosin 0.4 milliGRAM(s) Oral at bedtime    Allergies    No Known Allergies    Intolerances    FAMILY HISTORY:  unable to obtain    Soc:   pt noncooperative    ROS: pt refused    PE:  Vital Signs Last 24 Hrs  T(C): 37.3 (20 May 2019 17:00), Max: 37.3 (20 May 2019 17:00)  T(F): 99.2 (20 May 2019 17:00), Max: 99.2 (20 May 2019 17:00)  HR: 79 (20 May 2019 17:00) (74 - 86)  BP: 174/48 (20 May 2019 17:00) (117/68 - 196/71)  BP(mean): --  RR: 16 (20 May 2019 17:00) (16 - 18)  SpO2: 100% (20 May 2019 17:00) (96% - 100%)    Constitutional: frail looking  HEENT: NC/AT, EOMI, PERRLA, conjunctivae clear   Neck: supple   Respiratory: decreased breath sounds, rales   Cardiovascular: S1S2 regular, no murmurs  Abdomen: soft, not tender, not distended  Lymphatic: no LN palpable  Musculoskeletal: no muscle tenderness, no joint swelling or tenderness  Extremities: no pedal edema  Neurological/ Psychiatric:  moving all extremities; agitated  Skin: no rashes; no palpable lesions                          7.8    9.39  )-----------( 233      ( 20 May 2019 06:12 )             23.6     05-20    129<L>  |  96  |  49<H>  ----------------------------<  130<H>  4.4   |  23  |  6.64<H>    Ca    8.3<L>      20 May 2019 08:00  Phos  2.6     05-20    TPro  x   /  Alb  2.8<L>  /  TBili  x   /  DBili  x   /  AST  x   /  ALT  x   /  AlkPhos  x   05-20    CT reviewed-see HPI

## 2019-05-20 NOTE — PROGRESS NOTE ADULT - SUBJECTIVE AND OBJECTIVE BOX
Date of service: 05-20-19 @ 13:14    pt seen and examined  feels better  no more nausea/vomiting    ROS: no fever or chills; denies dizziness, no HA, no SOB or cough,  no diarrhea or constipation; no dysuria, no urinary frequency, no legs pain, no rashes    MEDICATIONS  (STANDING):  aspirin  chewable 81 milliGRAM(s) Oral daily  carvedilol 25 milliGRAM(s) Oral every 12 hours  cefepime  Injectable. 1000 milliGRAM(s) IV Push daily  dextrose 5%. 1000 milliLiter(s) (50 mL/Hr) IV Continuous <Continuous>  dextrose 50% Injectable 12.5 Gram(s) IV Push once  dextrose 50% Injectable 25 Gram(s) IV Push once  dextrose 50% Injectable 25 Gram(s) IV Push once  epoetin indu Injectable 4000 Unit(s) IV Push <User Schedule>  heparin  Injectable 5000 Unit(s) SubCutaneous every 8 hours  hydrALAZINE 25 milliGRAM(s) Oral three times a day  insulin glargine Injectable (LANTUS) 10 Unit(s) SubCutaneous at bedtime  insulin lispro (HumaLOG) corrective regimen sliding scale   SubCutaneous three times a day before meals  isosorbide   mononitrate ER Tablet (IMDUR) 60 milliGRAM(s) Oral daily  levothyroxine 75 MICROGram(s) Oral daily  lisinopril 40 milliGRAM(s) Oral daily  metroNIDAZOLE  IVPB 500 milliGRAM(s) IV Intermittent every 8 hours  multivitamin 1 Tablet(s) Oral daily  pantoprazole  Injectable 40 milliGRAM(s) IV Push two times a day  tamsulosin 0.4 milliGRAM(s) Oral at bedtime      Vital Signs Last 24 Hrs  T(C): 36.6 (20 May 2019 12:19), Max: 37.1 (20 May 2019 08:00)  T(F): 97.9 (20 May 2019 12:19), Max: 98.8 (20 May 2019 08:00)  HR: 82 (20 May 2019 12:19) (69 - 86)  BP: 177/54 (20 May 2019 12:19) (117/68 - 196/71)  BP(mean): --  RR: 18 (20 May 2019 12:19) (17 - 18)  SpO2: 100% (20 May 2019 12:19) (96% - 100%)      PE:  Constitutional: frail looking  HEENT: NC/AT, EOMI, PERRLA, conjunctivae clear; ears and nose atraumatic; pharynx benign  Neck: supple; thyroid not palpable  Back: no tenderness  Respiratory: decreased breath sounds, rales   Cardiovascular: S1S2 regular, no murmurs  Abdomen: soft, not tender, not distended, positive BS; liver and spleen WNL  Genitourinary: no suprapubic tenderness  Lymphatic: no LN palpable  Musculoskeletal: no muscle tenderness, no joint swelling or tenderness  Extremities: no pedal edema  Neurological/ Psychiatric:  moving all extremities  Skin: no rashes; no palpable lesions    Labs: all available labs reviewed                        7.8    9.39  )-----------( 233      ( 20 May 2019 06:12 )             23.6     05-20    129<L>  |  96  |  49<H>  ----------------------------<  130<H>  4.4   |  23  |  6.64<H>    Ca    8.3<L>      20 May 2019 08:00  Phos  2.6     05-20    TPro  x   /  Alb  2.8<L>  /  TBili  x   /  DBili  x   /  AST  x   /  ALT  x   /  AlkPhos  x   05-20      Culture - Blood (05.16.19 @ 01:23)    Specimen Source: .Blood Blood    Culture Results:   No growth to date.    Culture - Blood (05.16.19 @ 01:23)    Specimen Source: .Blood Blood    Culture Results:   No growth to date.    Radiology: all available radiological tests reviewed    EXAM:  CT CHEST                            PROCEDURE DATE:  05/16/2019          INTERPRETATION:  Exam Date: 5/16/2019 12:50 PM  Clinical Information: Cough  Technique:  CT of the chest was performed with axial images obtained from   the thoracic tothe inlet to the bilateral adrenal glands without IV   contrast. Maximum intensity projection images were obtained.  Comparison:  Report of CT chest abdomen pelvis 3/31/2019, CT chest   abdomen pelvis 1/9/2018    FINDINGS:     Lungs, Airways and Pleura: Central tracheobronchial tree is grossly   patent. Minimal bronchiectasis. No pneumothorax. No pleural effusions.   Scattered alveolar opacities with intralobular septal thickening might   represent mild pulmonary edema. No segmental consolidations. No discrete   pulmonary nodules.    Heart and Great Vessels: Atherosclerotic changes of the aorta and   coronary vasculature. Heart is enlarged. Left-sided pacemaker. No   pericardial effusions.    Mediastinum and Yasmin: within normal limits    Neckand Chest Wall: Subcentimeter thyroid nodules. Mild bilateral   gynecomastia.    Bones: Degenerative changes and osteopenia.    Upper Abdomen:  Gallstones. Bilateral renal atrophy.    IMPRESSION:    Findings suggesting mild pulmonary edema. No segmental consolidations. No   discrete pulmonary nodules.          Advanced directives addressed: full resuscitation

## 2019-05-21 LAB
ANION GAP SERPL CALC-SCNC: 6 MMOL/L — SIGNIFICANT CHANGE UP (ref 5–17)
BUN SERPL-MCNC: 24 MG/DL — HIGH (ref 7–23)
CALCIUM SERPL-MCNC: 8 MG/DL — LOW (ref 8.5–10.1)
CHLORIDE SERPL-SCNC: 100 MMOL/L — SIGNIFICANT CHANGE UP (ref 96–108)
CK SERPL-CCNC: 499 U/L — HIGH (ref 26–308)
CO2 SERPL-SCNC: 28 MMOL/L — SIGNIFICANT CHANGE UP (ref 22–31)
CREAT SERPL-MCNC: 4.47 MG/DL — HIGH (ref 0.5–1.3)
CULTURE RESULTS: SIGNIFICANT CHANGE UP
CULTURE RESULTS: SIGNIFICANT CHANGE UP
GLUCOSE BLDC GLUCOMTR-MCNC: 116 MG/DL — HIGH (ref 70–99)
GLUCOSE BLDC GLUCOMTR-MCNC: 157 MG/DL — HIGH (ref 70–99)
GLUCOSE BLDC GLUCOMTR-MCNC: 191 MG/DL — HIGH (ref 70–99)
GLUCOSE BLDC GLUCOMTR-MCNC: 195 MG/DL — HIGH (ref 70–99)
GLUCOSE BLDC GLUCOMTR-MCNC: 61 MG/DL — LOW (ref 70–99)
GLUCOSE BLDC GLUCOMTR-MCNC: 64 MG/DL — LOW (ref 70–99)
GLUCOSE SERPL-MCNC: 66 MG/DL — LOW (ref 70–99)
HCT VFR BLD CALC: 22 % — LOW (ref 39–50)
HCT VFR BLD CALC: 25.6 % — LOW (ref 39–50)
HGB BLD-MCNC: 7.2 G/DL — LOW (ref 13–17)
HGB BLD-MCNC: 8.6 G/DL — LOW (ref 13–17)
MCHC RBC-ENTMCNC: 30 PG — SIGNIFICANT CHANGE UP (ref 27–34)
MCHC RBC-ENTMCNC: 31.2 PG — SIGNIFICANT CHANGE UP (ref 27–34)
MCHC RBC-ENTMCNC: 32.7 GM/DL — SIGNIFICANT CHANGE UP (ref 32–36)
MCHC RBC-ENTMCNC: 33.6 GM/DL — SIGNIFICANT CHANGE UP (ref 32–36)
MCV RBC AUTO: 91.7 FL — SIGNIFICANT CHANGE UP (ref 80–100)
MCV RBC AUTO: 92.8 FL — SIGNIFICANT CHANGE UP (ref 80–100)
PLATELET # BLD AUTO: 240 K/UL — SIGNIFICANT CHANGE UP (ref 150–400)
PLATELET # BLD AUTO: 243 K/UL — SIGNIFICANT CHANGE UP (ref 150–400)
POTASSIUM SERPL-MCNC: 3.6 MMOL/L — SIGNIFICANT CHANGE UP (ref 3.5–5.3)
POTASSIUM SERPL-SCNC: 3.6 MMOL/L — SIGNIFICANT CHANGE UP (ref 3.5–5.3)
RBC # BLD: 2.4 M/UL — LOW (ref 4.2–5.8)
RBC # BLD: 2.76 M/UL — LOW (ref 4.2–5.8)
RBC # FLD: 15.1 % — HIGH (ref 10.3–14.5)
RBC # FLD: 15.2 % — HIGH (ref 10.3–14.5)
SODIUM SERPL-SCNC: 134 MMOL/L — LOW (ref 135–145)
SPECIMEN SOURCE: SIGNIFICANT CHANGE UP
SPECIMEN SOURCE: SIGNIFICANT CHANGE UP
WBC # BLD: 8.35 K/UL — SIGNIFICANT CHANGE UP (ref 3.8–10.5)
WBC # BLD: 8.53 K/UL — SIGNIFICANT CHANGE UP (ref 3.8–10.5)
WBC # FLD AUTO: 8.35 K/UL — SIGNIFICANT CHANGE UP (ref 3.8–10.5)
WBC # FLD AUTO: 8.53 K/UL — SIGNIFICANT CHANGE UP (ref 3.8–10.5)

## 2019-05-21 RX ORDER — INSULIN GLARGINE 100 [IU]/ML
10 INJECTION, SOLUTION SUBCUTANEOUS AT BEDTIME
Refills: 0 | Status: DISCONTINUED | OUTPATIENT
Start: 2019-05-22 | End: 2019-05-24

## 2019-05-21 RX ORDER — FUROSEMIDE 40 MG
20 TABLET ORAL ONCE
Refills: 0 | Status: COMPLETED | OUTPATIENT
Start: 2019-05-21 | End: 2019-05-21

## 2019-05-21 RX ADMIN — Medication 25 MILLIGRAM(S): at 21:47

## 2019-05-21 RX ADMIN — PANTOPRAZOLE SODIUM 40 MILLIGRAM(S): 20 TABLET, DELAYED RELEASE ORAL at 17:49

## 2019-05-21 RX ADMIN — HEPARIN SODIUM 5000 UNIT(S): 5000 INJECTION INTRAVENOUS; SUBCUTANEOUS at 05:29

## 2019-05-21 RX ADMIN — CARVEDILOL PHOSPHATE 25 MILLIGRAM(S): 80 CAPSULE, EXTENDED RELEASE ORAL at 17:49

## 2019-05-21 RX ADMIN — Medication 20 MILLIGRAM(S): at 17:49

## 2019-05-21 RX ADMIN — Medication 25 MILLIGRAM(S): at 14:37

## 2019-05-21 RX ADMIN — Medication 1 DROP(S): at 11:57

## 2019-05-21 RX ADMIN — Medication 100 MILLIGRAM(S): at 05:29

## 2019-05-21 RX ADMIN — ISOSORBIDE MONONITRATE 60 MILLIGRAM(S): 60 TABLET, EXTENDED RELEASE ORAL at 11:34

## 2019-05-21 RX ADMIN — LISINOPRIL 40 MILLIGRAM(S): 2.5 TABLET ORAL at 05:29

## 2019-05-21 RX ADMIN — Medication 40 MILLIGRAM(S): at 05:29

## 2019-05-21 RX ADMIN — TAMSULOSIN HYDROCHLORIDE 0.4 MILLIGRAM(S): 0.4 CAPSULE ORAL at 21:47

## 2019-05-21 RX ADMIN — Medication 75 MICROGRAM(S): at 05:29

## 2019-05-21 RX ADMIN — CARVEDILOL PHOSPHATE 25 MILLIGRAM(S): 80 CAPSULE, EXTENDED RELEASE ORAL at 05:29

## 2019-05-21 RX ADMIN — Medication 25 MILLIGRAM(S): at 05:29

## 2019-05-21 RX ADMIN — Medication 2: at 12:00

## 2019-05-21 RX ADMIN — Medication 2: at 17:51

## 2019-05-21 RX ADMIN — CEFEPIME 1000 MILLIGRAM(S): 1 INJECTION, POWDER, FOR SOLUTION INTRAMUSCULAR; INTRAVENOUS at 11:34

## 2019-05-21 RX ADMIN — Medication 1 TABLET(S): at 11:34

## 2019-05-21 RX ADMIN — PANTOPRAZOLE SODIUM 40 MILLIGRAM(S): 20 TABLET, DELAYED RELEASE ORAL at 05:30

## 2019-05-21 RX ADMIN — Medication 100 MILLIGRAM(S): at 21:48

## 2019-05-21 RX ADMIN — Medication 100 MILLIGRAM(S): at 17:50

## 2019-05-21 NOTE — PROGRESS NOTE ADULT - ATTENDING COMMENTS
Patient seen and examined with Family Medicine Residents Hannah Gambino and Linda Best on the Family Medicine Teaching Service.  Agree with history, physical, labs and plan which were reviewed in detail after a face to face encounter with the patient.
Patient seen and examined with Family Medicine Residents Hannah Gambino on the Family Medicine Teaching Service.  Agree with history, physical, labs and plan which were reviewed in detail after a face to face encounter with the patient.
Patient seen and examined with Family Medicine Residents Hannah Hdz and Vlad Alanis on the Family Medicine Teaching Service.  Agree with history, physical, labs and plan which were reviewed in detail after a face to face encounter with the patient.
Patient seen and examined with Family Medicine Residents Hannah Gambino, Edwige Christy and Yanet Gold on the Family Medicine Teaching Service.  Agree with history, physical, labs and plan which were reviewed in detail after a face to face encounter with the patient.

## 2019-05-21 NOTE — PROGRESS NOTE ADULT - SUBJECTIVE AND OBJECTIVE BOX
CHIEF COMPLAINT: Weakness    SUBJECTIVE: 63 yo Female with a PMHx of DM, HLD, HTN, CKD presents to ED with complain of generalized weakness and fever per daughter at the bedside . Patient is so weak he is having trouble ambulating. As per daughter had fever 102 F at home today. he denies HA or neck pain. No chest pain or sob. No abd pain. No n/v/d. No urinary f/u/d. No back pain. no gross motor or sensory deficits. He denies illicit drug use. he had no recent travel. He has no other acute issues symptoms or concerns. Patient discharged on 4/2  at that time was hospitalized with sepsis likely secondary to Rhino/Entero virus & Multifocal PNA.    5/21:Patient seen and examined at the bedside .No overnight events .No constipation ,abdominal pain ,nausea ,vomiting and change in the color of the stool "states he is getting old forgetting lot of things ".d/w with the patient dropping h&h and need for transfusion .Attempted to reach daughter over phone no answer left voicemail to call back . d/w with the GI .    REVIEW OF SYSTEMS:    CONSTITUTIONAL: No weakness, fevers or chills  EYES/ENT: No visual changes;  No vertigo or throat pain   NECK: No pain or stiffness  RESPIRATORY: No cough, wheezing, hemoptysis; No shortness of breath  CARDIOVASCULAR: No chest pain or palpitations  GASTROINTESTINAL: No abdominal or epigastric pain. No nausea, vomiting, or hematemesis; No diarrhea or constipation. No melena or hematochezia.  GENITOURINARY: No dysuria, frequency or hematuria  NEUROLOGICAL: No numbness or weakness  SKIN: No itching, burning, rashes, or lesions   All other review of systems is negative unless indicated above    Vital Signs Last 24 Hrs  T(C): 36.5 (21 May 2019 14:35), Max: 36.5 (21 May 2019 05:15)  T(F): 97.7 (21 May 2019 14:35), Max: 97.7 (21 May 2019 05:15)  HR: 67 (21 May 2019 14:35) (66 - 67)  BP: 168/56 (21 May 2019 14:35) (160/58 - 183/55)  BP(mean): --  RR: 17 (21 May 2019 14:35) (16 - 18)  SpO2: 100% (21 May 2019 14:35) (100% - 100%)    I&O's Summary    20 May 2019 07:01  -  21 May 2019 07:00  --------------------------------------------------------  IN: 120 mL / OUT: 0 mL / NET: 120 mL        CAPILLARY BLOOD GLUCOSE      POCT Blood Glucose.: 191 mg/dL (21 May 2019 16:38)  POCT Blood Glucose.: 195 mg/dL (21 May 2019 11:54)  POCT Blood Glucose.: 116 mg/dL (21 May 2019 08:42)  POCT Blood Glucose.: 64 mg/dL (21 May 2019 08:19)  POCT Blood Glucose.: 61 mg/dL (21 May 2019 08:18)  POCT Blood Glucose.: 166 mg/dL (20 May 2019 20:48)      PHYSICAL EXAM:    Constitutional:Obsese  HEENT: PERR, EOMI, Normal Hearing, MMM  Neck: Soft and supple, No LAD, No JVD  Respiratory: Breath sounds are clear bilaterally, No wheezing, rales or rhonchi  Cardiovascular: S1 and S2, regular rate and rhythm, no Murmurs, gallops or rubs  Gastrointestinal: Bowel Sounds present, soft, nontender, + distended, no guarding, no rebound  Extremities: No peripheral edema  Vascular: 2+ peripheral pulses  Neurological: A/O x 3, no focal deficits  Musculoskeletal: 5/5 strength b/l upper and lower extremities  Skin: No rashes    MEDICATIONS:  MEDICATIONS  (STANDING):  carvedilol 25 milliGRAM(s) Oral every 12 hours  cefepime  Injectable. 1000 milliGRAM(s) IV Push daily  dextrose 5%. 1000 milliLiter(s) (50 mL/Hr) IV Continuous <Continuous>  dextrose 50% Injectable 12.5 Gram(s) IV Push once  dextrose 50% Injectable 25 Gram(s) IV Push once  dextrose 50% Injectable 25 Gram(s) IV Push once  epoetin indu Injectable 4000 Unit(s) IV Push <User Schedule>  furosemide    Tablet 40 milliGRAM(s) Oral <User Schedule>  furosemide    Tablet 20 milliGRAM(s) Oral once  heparin  Injectable 5000 Unit(s) SubCutaneous every 8 hours  hydrALAZINE 25 milliGRAM(s) Oral three times a day  insulin glargine Injectable (LANTUS) 10 Unit(s) SubCutaneous at bedtime  insulin lispro (HumaLOG) corrective regimen sliding scale   SubCutaneous three times a day before meals  isosorbide   mononitrate ER Tablet (IMDUR) 60 milliGRAM(s) Oral daily  levothyroxine 75 MICROGram(s) Oral daily  lisinopril 40 milliGRAM(s) Oral daily  metroNIDAZOLE  IVPB 500 milliGRAM(s) IV Intermittent every 8 hours  multivitamin 1 Tablet(s) Oral daily  pantoprazole  Injectable 40 milliGRAM(s) IV Push two times a day  tamsulosin 0.4 milliGRAM(s) Oral at bedtime      LABS: All Labs Reviewed:                        7.2    8.53  )-----------( 240      ( 21 May 2019 07:33 )             22.0     05-21    134<L>  |  100  |  24<H>  ----------------------------<  66<L>  3.6   |  28  |  4.47<H>    Ca    8.0<L>      21 May 2019 07:33  Phos  2.6     05-20    TPro  x   /  Alb  2.8<L>  /  TBili  x   /  DBili  x   /  AST  x   /  ALT  x   /  AlkPhos  x   05-20      CARDIAC MARKERS ( 21 May 2019 07:33 )  x     / x     / 499 U/L / x     / x          Blood Culture:     RADIOLOGY/EKG:    DVT PPX:    ADVANCED DIRECTIVE:    DISPOSITION:

## 2019-05-21 NOTE — PROGRESS NOTE ADULT - ASSESSMENT
89M with abdominal pain, acute on chronic anemia, duodenitis on CT.   Likely duodenal ulcer or duodenitis.  No active GI bleeding reported (melena, hematochezia) althouth hgb dropping.    Recommend:    -cont PPI BID  -continue to monitor CBC  -favor doing EGD to r/o ulcer and define disease process; scheduled for tomorrow at 10 am  -d/w pt  -d/w daughter on phone  -d/w RN at bedside

## 2019-05-21 NOTE — PROGRESS NOTE ADULT - SUBJECTIVE AND OBJECTIVE BOX
Date of service: 05-21-19 @ 12:22    pt seen and examined  unable to tolerate solids  afebrile  no further n/v    ROS: no fever or chills; denies dizziness, no HA, no SOB or cough,  no diarrhea or constipation; no dysuria, no urinary frequency, no legs pain, no rashes      MEDICATIONS  (STANDING):  carvedilol 25 milliGRAM(s) Oral every 12 hours  cefepime  Injectable. 1000 milliGRAM(s) IV Push daily  dextrose 5%. 1000 milliLiter(s) (50 mL/Hr) IV Continuous <Continuous>  dextrose 50% Injectable 12.5 Gram(s) IV Push once  dextrose 50% Injectable 25 Gram(s) IV Push once  dextrose 50% Injectable 25 Gram(s) IV Push once  epoetin indu Injectable 4000 Unit(s) IV Push <User Schedule>  furosemide    Tablet 40 milliGRAM(s) Oral <User Schedule>  heparin  Injectable 5000 Unit(s) SubCutaneous every 8 hours  hydrALAZINE 25 milliGRAM(s) Oral three times a day  insulin glargine Injectable (LANTUS) 10 Unit(s) SubCutaneous at bedtime  insulin lispro (HumaLOG) corrective regimen sliding scale   SubCutaneous three times a day before meals  isosorbide   mononitrate ER Tablet (IMDUR) 60 milliGRAM(s) Oral daily  levothyroxine 75 MICROGram(s) Oral daily  lisinopril 40 milliGRAM(s) Oral daily  metroNIDAZOLE  IVPB 500 milliGRAM(s) IV Intermittent every 8 hours  multivitamin 1 Tablet(s) Oral daily  pantoprazole  Injectable 40 milliGRAM(s) IV Push two times a day  tamsulosin 0.4 milliGRAM(s) Oral at bedtime      Vital Signs Last 24 Hrs  T(C): 36.5 (21 May 2019 11:17), Max: 37.3 (20 May 2019 17:00)  T(F): 97.7 (21 May 2019 11:17), Max: 99.2 (20 May 2019 17:00)  HR: 66 (21 May 2019 11:17) (66 - 79)  BP: 165/63 (21 May 2019 11:17) (160/58 - 183/55)  BP(mean): --  RR: 17 (21 May 2019 11:17) (16 - 17)  SpO2: 100% (21 May 2019 11:17) (100% - 100%)      PE:  Constitutional: frail looking  HEENT: NC/AT, EOMI, PERRLA, conjunctivae clear; ears and nose atraumatic; pharynx benign  Neck: supple; thyroid not palpable  Back: no tenderness  Respiratory: decreased breath sounds, rales   Cardiovascular: S1S2 regular, no murmurs  Abdomen: soft, not tender, not distended, positive BS; liver and spleen WNL  Genitourinary: no suprapubic tenderness  Lymphatic: no LN palpable  Musculoskeletal: no muscle tenderness, no joint swelling or tenderness  Extremities: no pedal edema  Neurological/ Psychiatric:  moving all extremities  Skin: no rashes; no palpable lesions    Labs: all available labs reviewed                                   7.2    8.53  )-----------( 240      ( 21 May 2019 07:33 )             22.0     05-21    134<L>  |  100  |  24<H>  ----------------------------<  66<L>  3.6   |  28  |  4.47<H>    Ca    8.0<L>      21 May 2019 07:33  Phos  2.6     05-20    TPro  x   /  Alb  2.8<L>  /  TBili  x   /  DBili  x   /  AST  x   /  ALT  x   /  AlkPhos  x   05-20        Culture - Blood (05.16.19 @ 01:23)    Specimen Source: .Blood Blood    Culture Results:   No growth to date.    Culture - Blood (05.16.19 @ 01:23)    Specimen Source: .Blood Blood    Culture Results:   No growth to date.    Radiology: all available radiological tests reviewed    EXAM:  CT CHEST                            PROCEDURE DATE:  05/16/2019          INTERPRETATION:  Exam Date: 5/16/2019 12:50 PM  Clinical Information: Cough  Technique:  CT of the chest was performed with axial images obtained from   the thoracic tothe inlet to the bilateral adrenal glands without IV   contrast. Maximum intensity projection images were obtained.  Comparison:  Report of CT chest abdomen pelvis 3/31/2019, CT chest   abdomen pelvis 1/9/2018    FINDINGS:     Lungs, Airways and Pleura: Central tracheobronchial tree is grossly   patent. Minimal bronchiectasis. No pneumothorax. No pleural effusions.   Scattered alveolar opacities with intralobular septal thickening might   represent mild pulmonary edema. No segmental consolidations. No discrete   pulmonary nodules.    Heart and Great Vessels: Atherosclerotic changes of the aorta and   coronary vasculature. Heart is enlarged. Left-sided pacemaker. No   pericardial effusions.    Mediastinum and Yasmin: within normal limits    Neckand Chest Wall: Subcentimeter thyroid nodules. Mild bilateral   gynecomastia.    Bones: Degenerative changes and osteopenia.    Upper Abdomen:  Gallstones. Bilateral renal atrophy.    IMPRESSION:    Findings suggesting mild pulmonary edema. No segmental consolidations. No   discrete pulmonary nodules.          Advanced directives addressed: full resuscitation

## 2019-05-21 NOTE — PROGRESS NOTE ADULT - SUBJECTIVE AND OBJECTIVE BOX
NEPHROLOGY INTERVAL HPI/OVERNIGHT EVENTS:    Date of Service: 05-21-19 @ 17:40  5/21 SY  Reports feeling better today.  No complaints of abdominal pain today.  Ate well today.    05-20-19 @ 09:12  states some medication yesterday made him dizzy  no new c/o  given bp meds this am     HPI:  61 yo Female with a PMHx of DM, HLD, HTN, CKD presents to ED with complain of generalized weakness and fever per daughter at the bedside . Patient is so weak he is having trouble ambulating. As per daughter had fever 102 F at home today. he denies HA or neck pain. No chest pain or sob. No abd pain. No n/v/d. No urinary f/u/d. No back pain. no gross motor or sensory deficits. he denies illicit drug use. he had no recent travel. He has no other acute issues symptoms or concerns.  Pt sleeping unable to get history at this time  Will review with family and the pts dialysis unit events of yesterday  Addendum:  Pt had his full dialysis yesterday at the [facility] unit and there were no issues  with dialysis and patient had no fever.    Family hx:  patient is unable to provide any detail family history this time. (16 May 2019 04:43)      PAST MEDICAL & SURGICAL HISTORY:  Pulmonary edema  Pancreatitis  AICD (automatic cardioverter/defibrillator) present  CHF (Congestive Heart Failure)  HTN (Hypertension)  High Cholesterol  Heart Attack  Dialysis Patient  Diabetes  AICD (automatic cardioverter/defibrillator) present  History of penile implant  History of Hernia Repair      MEDICATIONS  (STANDING):  carvedilol 25 milliGRAM(s) Oral every 12 hours  cefepime  Injectable. 1000 milliGRAM(s) IV Push daily  dextrose 5%. 1000 milliLiter(s) (50 mL/Hr) IV Continuous <Continuous>  dextrose 50% Injectable 12.5 Gram(s) IV Push once  dextrose 50% Injectable 25 Gram(s) IV Push once  dextrose 50% Injectable 25 Gram(s) IV Push once  epoetin indu Injectable 4000 Unit(s) IV Push <User Schedule>  furosemide    Tablet 40 milliGRAM(s) Oral <User Schedule>  furosemide    Tablet 20 milliGRAM(s) Oral once  hydrALAZINE 25 milliGRAM(s) Oral three times a day  insulin glargine Injectable (LANTUS) 10 Unit(s) SubCutaneous at bedtime  insulin lispro (HumaLOG) corrective regimen sliding scale   SubCutaneous three times a day before meals  isosorbide   mononitrate ER Tablet (IMDUR) 60 milliGRAM(s) Oral daily  levothyroxine 75 MICROGram(s) Oral daily  lisinopril 40 milliGRAM(s) Oral daily  metroNIDAZOLE  IVPB 500 milliGRAM(s) IV Intermittent every 8 hours  multivitamin 1 Tablet(s) Oral daily  pantoprazole  Injectable 40 milliGRAM(s) IV Push two times a day  tamsulosin 0.4 milliGRAM(s) Oral at bedtime    MEDICATIONS  (PRN):  acetaminophen   Tablet .. 650 milliGRAM(s) Oral every 6 hours PRN Temp greater or equal to 38C (100.4F), Mild Pain (1 - 3)  artificial  tears Solution 1 Drop(s) Both EYES four times a day PRN Dry Eyes  dextrose 40% Gel 15 Gram(s) Oral once PRN Blood Glucose LESS THAN 70 milliGRAM(s)/deciliter  docusate sodium 100 milliGRAM(s) Oral three times a day PRN Constipation  glucagon  Injectable 1 milliGRAM(s) IntraMuscular once PRN Glucose LESS THAN 70 milligrams/deciliter  senna 1 Tablet(s) Oral at bedtime PRN Constipation refractory to docusate  simethicone 80 milliGRAM(s) Chew three times a day PRN Gas    Vital Signs Last 24 Hrs  T(C): 36.5 (21 May 2019 14:35), Max: 36.5 (21 May 2019 05:15)  T(F): 97.7 (21 May 2019 14:35), Max: 97.7 (21 May 2019 05:15)  HR: 67 (21 May 2019 14:35) (66 - 67)  BP: 168/56 (21 May 2019 14:35) (160/58 - 183/55)  BP(mean): --  RR: 17 (21 May 2019 14:35) (16 - 18)  SpO2: 100% (21 May 2019 14:35) (100% - 100%)  05-20 @ 07:01  -  05-21 @ 07:00  --------------------------------------------------------  IN: 120 mL / OUT: 0 mL / NET: 120 mL    05-21 @ 07:01  -  05-21 @ 17:40  --------------------------------------------------------  IN: 261 mL / OUT: 0 mL / NET: 261 mL    PHYSICAL EXAM:  Alert and comfortable  GENERAL: No distress  CHEST/LUNG: Fair air entry  HEART: S1S2 RRR  ABDOMEN: distended  EXTREMITIES: no edema  SKIN:     LABS:                        7.2    8.53  )-----------( 240      ( 21 May 2019 07:33 )             22.0     05-21    134<L>  |  100  |  24<H>  ----------------------------<  66<L>  3.6   |  28  |  4.47<H>    Ca    8.0<L>      21 May 2019 07:33  Phos  2.6     05-20    TPro  x   /  Alb  2.8<L>  /  TBili  x   /  DBili  x   /  AST  x   /  ALT  x   /  AlkPhos  x   05-20      RADIOLOGY & ADDITIONAL TESTS:

## 2019-05-21 NOTE — PROGRESS NOTE ADULT - ASSESSMENT
61 yo Female with a PMHx of DM, HLD, HTN, CKD presents to ED with complain of generalized weakness with b/l leg pain, fever and cough.      #Acute on chronic Anemia likely secondary to the Duodenitis ?    - FOBT -positive    -1 U PRBC check Cbc after transfusion   -additional 20 mg of Lasix after transfusion    -GI consult appreciate   -NPO midnight and possible EGD in the AM  -hold heparin and ASA for now     #Sepsis 2/2 likely duodenitis   -couldnt tolerate regular diet   - CT abd/pelvis   - s/p vancomycin   - continue cefepime  and metronidazole started on 5/17 follow up with the ID to transition to PO   - blood cultures NGTD   - monitor leukocytosis -resolved   - ID consult appreciated     #Weakness likely secondary to Deconditioning and Multiple Inpatient admissions     - likely due to infection and generalized deconditioning     - PT eval appreciated: rehab facility / HI-d/w with the daughter and they prefer to go to the [facility]     #DM-2  - FS AC/HS  - lantus 10 units at night  - ISS  - carb consistent diet    #Rhabdomyolysis   - elevated CK on admission  - statin held  -continue to monitor     #HFpEF  -Stable-no in exacerbation  - ECHO from 5/8: EF of 60%  - continue carvedilol   - continue imdur      #ESRD/Hypervolemic hyponatremia  - on HD  - scheduled MWF  - continue sevelamer  - nephrology consult appreciated   - fluid restriction  - continue dialysis   - will monitor    #HTN  -Labile   - continue lisinopril   - continue hydralazine   -Lasix on Nondialysis days     #HLD  - will hold statin for now     #Constipation  - continue Miralax, colace, senna    #Hypothyroidism   - continue levothyroxine      #DVT ppx  - will hold heparin for now

## 2019-05-21 NOTE — PROGRESS NOTE ADULT - SUBJECTIVE AND OBJECTIVE BOX
GI    still with mid abd pain and some lower abd pain  +BM, pt not sure color  no vomiting  issac full liq diet  +belching  getting 1 unit of blood for anemia    ROS: otherwise negative, all reviewed    PE:  Vital Signs Last 24 Hrs  T(C): 36.5 (21 May 2019 11:17), Max: 37.3 (20 May 2019 17:00)  T(F): 97.7 (21 May 2019 11:17), Max: 99.2 (20 May 2019 17:00)  HR: 66 (21 May 2019 11:17) (66 - 79)  BP: 165/63 (21 May 2019 11:17) (160/58 - 183/55)  BP(mean): --  RR: 17 (21 May 2019 11:17) (16 - 17)  SpO2: 100% (21 May 2019 11:17) (100% - 100%)    Constitutional: frail looking  HEENT: NC/AT, EOMI, PERRLA, conjunctivae clear   Neck: supple   Respiratory: decreased breath sounds, rales   Cardiovascular: S1S2 regular, no murmurs  Abdomen: soft, mild mid/lower abd tenderness, not distended  Lymphatic: no LN palpable  Musculoskeletal: no muscle tenderness, no joint swelling or tenderness  Extremities: no pedal edema  Neurological/ Psychiatric:  moving all extremities; agitated  Skin: no rashes; no palpable lesions                                   7.2    8.53  )-----------( 240      ( 21 May 2019 07:33 )             22.0

## 2019-05-21 NOTE — PROGRESS NOTE ADULT - ASSESSMENT
61 yo male with a PMHx of DM, HLD, HTN, ESRD on HD MWF presents to ED with complain of generalized weakness and fever per daughter at the bedside. Patient is so weak he is having trouble ambulating. As per daughter had fever 102 F at home 5/16 he denies HA or neck pain. No chest pain or sob. No abd pain. No urinary f/u/d. No back pain. No gross motor or sensory deficits. He denies illicit drug use. He had no recent travel. He has no other acute issues symptoms or concerns. Here afebrile, wbc ct 15, CT chest with minimal bronchiectasis, scattered alveolar opacities ? mild pulmonary edema no consolidations or nodules.    1. febrile syndrome. nausea. vomiting. abd distension. leukocytosis  - imaging reviewed, CT abd/pelvis with duodenitis, now improving, wbc ct normalized  - low suspicion for pna based on imaging and lack of resp sxs   - on cefepime 1gm daily #6  - on flagyl [id number] for anaroebic coverage #5  - continue with abx coverage  - on dc plan for po ceftin 500mgdaily (after HD on dialysis days) and po flagyl complete 10-14 day course   - GI eval noted   - monitor temps  - tolerating abx well so far; no side effects noted  - reason for abx use and side effects reviewed with patient  - supportive care  - f/u cbc    2. other issues - care per medicine

## 2019-05-21 NOTE — PROGRESS NOTE ADULT - ASSESSMENT
61 yo Female with a PMHx of DM, HLD, HTN, CKD presents to ED with complain of generalized weakness and fever per daughter at the bedside . Patient is so weak he is having trouble ambulating. As per daughter had fever 102 F at home today. he denies HA or neck pain. No chest pain or sob. No abd pain. No n/v/d. No urinary f/u/d. No back pain. no gross motor or sensory deficits. he denies illicit drug use. he had no recent travel. He has no other acute issues symptoms or concerns.  Pt sleeping unable to get history at this time  Will review with family and the pts dialysis unit events of yesterday  Addendum:  Pt had his full dialysis yesterday at the [facility] unit and there were no issues  with dialysis and patient had no fever.  On zosyn  CXR some vascular prominence, no focal PNA reported    5/17  seen on HD   vvs  CT abd , duodenitis?  ID input noted  on cefepime and Flagyll    dr franklin covering weekend    5/20 MK   - ESRD on hd: tolerating hd, will hold bp meds pre hd         will try to target the UF with HD         fluid restrict with hyponatremia related to the excess fluid intake  - HTN; will inc the lisinopril and dec the hydralazine  - ABRAHAN; dc sevelamer during episode of duodenitis   - duodenitis  cefepimine and flagyll: monitor fluid intake    5/21 SY  --ESRD : Continue TIW HD  --Anemia with GI symptoms --GI follow up appreciated.  For EGD in am.  --HTN : continue current meds.  --Follow up HGB post tx today.

## 2019-05-22 LAB
ANION GAP SERPL CALC-SCNC: 7 MMOL/L — SIGNIFICANT CHANGE UP (ref 5–17)
BUN SERPL-MCNC: 28 MG/DL — HIGH (ref 7–23)
CALCIUM SERPL-MCNC: 8.1 MG/DL — LOW (ref 8.5–10.1)
CHLORIDE SERPL-SCNC: 96 MMOL/L — SIGNIFICANT CHANGE UP (ref 96–108)
CO2 SERPL-SCNC: 28 MMOL/L — SIGNIFICANT CHANGE UP (ref 22–31)
CREAT SERPL-MCNC: 5.42 MG/DL — HIGH (ref 0.5–1.3)
GLUCOSE BLDC GLUCOMTR-MCNC: 117 MG/DL — HIGH (ref 70–99)
GLUCOSE BLDC GLUCOMTR-MCNC: 124 MG/DL — HIGH (ref 70–99)
GLUCOSE BLDC GLUCOMTR-MCNC: 179 MG/DL — HIGH (ref 70–99)
GLUCOSE SERPL-MCNC: 135 MG/DL — HIGH (ref 70–99)
HCT VFR BLD CALC: 25.7 % — LOW (ref 39–50)
HGB BLD-MCNC: 8.8 G/DL — LOW (ref 13–17)
MCHC RBC-ENTMCNC: 31.1 PG — SIGNIFICANT CHANGE UP (ref 27–34)
MCHC RBC-ENTMCNC: 34.2 GM/DL — SIGNIFICANT CHANGE UP (ref 32–36)
MCV RBC AUTO: 90.8 FL — SIGNIFICANT CHANGE UP (ref 80–100)
PLATELET # BLD AUTO: 246 K/UL — SIGNIFICANT CHANGE UP (ref 150–400)
POTASSIUM SERPL-MCNC: 4.5 MMOL/L — SIGNIFICANT CHANGE UP (ref 3.5–5.3)
POTASSIUM SERPL-SCNC: 4.5 MMOL/L — SIGNIFICANT CHANGE UP (ref 3.5–5.3)
RBC # BLD: 2.83 M/UL — LOW (ref 4.2–5.8)
RBC # FLD: 15.4 % — HIGH (ref 10.3–14.5)
SODIUM SERPL-SCNC: 131 MMOL/L — LOW (ref 135–145)
WBC # BLD: 9.15 K/UL — SIGNIFICANT CHANGE UP (ref 3.8–10.5)
WBC # FLD AUTO: 9.15 K/UL — SIGNIFICANT CHANGE UP (ref 3.8–10.5)

## 2019-05-22 RX ADMIN — Medication 100 MILLIGRAM(S): at 22:02

## 2019-05-22 RX ADMIN — ISOSORBIDE MONONITRATE 60 MILLIGRAM(S): 60 TABLET, EXTENDED RELEASE ORAL at 15:37

## 2019-05-22 RX ADMIN — TAMSULOSIN HYDROCHLORIDE 0.4 MILLIGRAM(S): 0.4 CAPSULE ORAL at 22:01

## 2019-05-22 RX ADMIN — PANTOPRAZOLE SODIUM 40 MILLIGRAM(S): 20 TABLET, DELAYED RELEASE ORAL at 17:43

## 2019-05-22 RX ADMIN — Medication 25 MILLIGRAM(S): at 15:37

## 2019-05-22 RX ADMIN — ERYTHROPOIETIN 4000 UNIT(S): 10000 INJECTION, SOLUTION INTRAVENOUS; SUBCUTANEOUS at 13:19

## 2019-05-22 RX ADMIN — INSULIN GLARGINE 10 UNIT(S): 100 INJECTION, SOLUTION SUBCUTANEOUS at 22:02

## 2019-05-22 RX ADMIN — PANTOPRAZOLE SODIUM 40 MILLIGRAM(S): 20 TABLET, DELAYED RELEASE ORAL at 05:52

## 2019-05-22 RX ADMIN — Medication 25 MILLIGRAM(S): at 22:01

## 2019-05-22 RX ADMIN — Medication 100 MILLIGRAM(S): at 05:53

## 2019-05-22 RX ADMIN — CEFEPIME 1000 MILLIGRAM(S): 1 INJECTION, POWDER, FOR SOLUTION INTRAMUSCULAR; INTRAVENOUS at 15:37

## 2019-05-22 RX ADMIN — Medication 650 MILLIGRAM(S): at 22:02

## 2019-05-22 RX ADMIN — CARVEDILOL PHOSPHATE 25 MILLIGRAM(S): 80 CAPSULE, EXTENDED RELEASE ORAL at 17:43

## 2019-05-22 RX ADMIN — Medication 1 TABLET(S): at 15:37

## 2019-05-22 RX ADMIN — Medication 100 MILLIGRAM(S): at 15:37

## 2019-05-22 NOTE — PROGRESS NOTE ADULT - SUBJECTIVE AND OBJECTIVE BOX
SUBJECTIVE: 88yo M with PMHx of DM, HLD, HTN, CKD presents to ED with complain of generalized weakness and fever per daughter at the bedside . Patient is so weak he is having trouble ambulating. As per daughter had fever 102 F at home today. he denies HA or neck pain. No chest pain or sob. No abd pain. No n/v/d. No urinary f/u/d. No back pain. no gross motor or sensory deficits. He denies illicit drug use. he had no recent travel. He has no other acute issues symptoms or concerns. Patient discharged on 4/2  at that time was hospitalized with sepsis likely secondary to Rhino/Entero virus & Multifocal PNA.    5/22/19: Patient was scheduled for EGD but was cancelled due to elevated BP and SOB. BP improved after dialysis. Patient described suprapubic tenderness, bladder scan showed .300cc- patient straight cathed for 600cc. This afternoon patient noted he has some abdominal pain but has improved with PPI.     REVIEW OF SYSTEMS:  CONSTITUTIONAL: No weakness, fevers or chills  EYES/ENT: No visual changes;  No vertigo or throat pain   NECK: No pain or stiffness  RESPIRATORY: No cough, wheezing, hemoptysis; No shortness of breath  CARDIOVASCULAR: No chest pain or palpitations  GASTROINTESTINAL: No abdominal or epigastric pain. No nausea, vomiting, or hematemesis; No diarrhea or constipation. No melena or hematochezia.  GENITOURINARY: No dysuria, frequency or hematuria  NEUROLOGICAL: No numbness or weakness  SKIN: No itching, burning, rashes, or lesions   All other review of systems is negative unless indicated above    Vital Signs Last 24 Hrs  T(C): 36.4 (22 May 2019 17:57), Max: 37.6 (22 May 2019 15:34)  T(F): 97.6 (22 May 2019 17:57), Max: 99.6 (22 May 2019 15:34)  HR: 88 (22 May 2019 17:57) (66 - 88)  BP: 174/66 (22 May 2019 17:57) (152/66 - 200/62)  BP(mean): --  RR: 18 (22 May 2019 17:57) (16 - 19)  SpO2: 100% (22 May 2019 17:57) (99% - 100%)    I&O's Summary    21 May 2019 07:01  -  22 May 2019 07:00  --------------------------------------------------------  IN: 261 mL / OUT: 0 mL / NET: 261 mL    22 May 2019 07:01  -  22 May 2019 18:58  --------------------------------------------------------  IN: 100 mL / OUT: 600 mL / NET: -500 mL        CAPILLARY BLOOD GLUCOSE      POCT Blood Glucose.: 117 mg/dL (22 May 2019 16:45)  POCT Blood Glucose.: 124 mg/dL (22 May 2019 06:06)  POCT Blood Glucose.: 157 mg/dL (21 May 2019 21:45)    PHYSICAL EXAM:    Constitutional: Obese, tired appearing  HEENT: PERR, EOMI, Normal Hearing, MMM  Neck: Soft and supple, No LAD, No JVD  Respiratory: Breath sounds are clear bilaterally, No wheezing, rales or rhonchi  Cardiovascular: S1 and S2, regular rate and rhythm, no Murmurs, gallops or rubs  Gastrointestinal: Bowel Sounds present, soft, nontender, + distended, no guarding, no rebound  Extremities: No peripheral edema  Vascular: 2+ peripheral pulses  Neurological: A/O x 3, no focal deficits  Musculoskeletal: 5/5 strength b/l upper and lower extremities  Skin: No rashes    MEDICATIONS:  MEDICATIONS  (STANDING):  carvedilol 25 milliGRAM(s) Oral every 12 hours  cefepime  Injectable. 1000 milliGRAM(s) IV Push daily  dextrose 5%. 1000 milliLiter(s) (50 mL/Hr) IV Continuous <Continuous>  dextrose 50% Injectable 12.5 Gram(s) IV Push once  dextrose 50% Injectable 25 Gram(s) IV Push once  dextrose 50% Injectable 25 Gram(s) IV Push once  epoetin indu Injectable 4000 Unit(s) IV Push <User Schedule>  furosemide    Tablet 40 milliGRAM(s) Oral <User Schedule>  hydrALAZINE 25 milliGRAM(s) Oral three times a day  insulin glargine Injectable (LANTUS) 10 Unit(s) SubCutaneous at bedtime  insulin lispro (HumaLOG) corrective regimen sliding scale   SubCutaneous three times a day before meals  isosorbide   mononitrate ER Tablet (IMDUR) 60 milliGRAM(s) Oral daily  levothyroxine 75 MICROGram(s) Oral daily  lisinopril 40 milliGRAM(s) Oral daily  metroNIDAZOLE  IVPB 500 milliGRAM(s) IV Intermittent every 8 hours  multivitamin 1 Tablet(s) Oral daily  pantoprazole  Injectable 40 milliGRAM(s) IV Push two times a day  tamsulosin 0.4 milliGRAM(s) Oral at bedtime      LABS: All Labs Reviewed:                        8.8    9.15  )-----------( 246      ( 22 May 2019 07:45 )             25.7     05-22    131<L>  |  96  |  28<H>  ----------------------------<  135<H>  4.5   |  28  |  5.42<H>    Ca    8.1<L>      22 May 2019 07:45        CARDIAC MARKERS ( 21 May 2019 07:33 )  x     / x     / 499 U/L / x     / x

## 2019-05-22 NOTE — PROGRESS NOTE ADULT - SUBJECTIVE AND OBJECTIVE BOX
GI    still with lower midline/suprapubic abd pain  upper abd pain resolved  no vomiting  EGD planned this AM but pt hypertensive and wheezing, needs HD, EGD cancelled    ROS: otherwise negative, all reviewed    PE:  Vital Signs Last 24 Hrs  T(C): 36.8 (22 May 2019 05:04), Max: 37.1 (21 May 2019 21:46)  T(F): 98.3 (22 May 2019 05:04), Max: 98.7 (21 May 2019 21:46)  HR: 77 (22 May 2019 05:04) (66 - 77)  BP: 185/64 (22 May 2019 05:04) (165/63 - 185/64)  BP(mean): --  RR: 16 (21 May 2019 21:46) (16 - 18)  SpO2: 100% (22 May 2019 05:04) (99% - 100%)    Constitutional: frail looking  HEENT: NC/AT, EOMI, PERRLA, conjunctivae clear   Neck: supple   Respiratory: decreased breath sounds, rales   Cardiovascular: S1S2 regular, no murmurs  Abdomen: soft, mild mid/lower abd tenderness, not distended  Lymphatic: no LN palpable  Musculoskeletal: no muscle tenderness, no joint swelling or tenderness  Extremities: no pedal edema  Neurological/ Psychiatric:  moving all extremities; agitated  Skin: no rashes; no palpable lesions                                                 8.8    9.15  )-----------( 246      ( 22 May 2019 07:45 )             25.7

## 2019-05-22 NOTE — PROGRESS NOTE ADULT - ASSESSMENT
89M with abdominal pain, acute on chronic anemia, duodenitis on CT.   Likely duodenal ulcer or duodenitis.  No active GI bleeding reported (melena, hematochezia) althouth hgb dropping. Improved s/p transfusion.    Recommend:    -cont PPI BID  -continue to monitor CBC  -hold off on EGD, upper abd symptoms improving with PPI  -advance diet, monitor tolerance  -d/w pt  -d/w RN

## 2019-05-22 NOTE — PROGRESS NOTE ADULT - SUBJECTIVE AND OBJECTIVE BOX
Pt has been seen and examined with FP resident, resident supervised agree with a/p       Patient is a 89y old  Male who presents with a chief complaint of complain of weakness (22 May 2019 11:40)          PHYSICAL EXAM:  Vital Signs Last 24 Hrs  T(C): 37.6 (22 May 2019 15:34), Max: 37.6 (22 May 2019 15:34)  T(F): 99.6 (22 May 2019 15:34), Max: 99.6 (22 May 2019 15:34)  HR: 85 (22 May 2019 15:34) (66 - 85)  BP: 173/59 (22 May 2019 15:34) (152/66 - 200/62)  BP(mean): --  RR: 18 (22 May 2019 15:34) (16 - 19)  SpO2: 99% (22 May 2019 15:34) (99% - 100%)  general- comfortable   -rs-aeeb,cta  -cvs-s1s2 normal   -p/a-soft,bs+  -extremity- no asymmetrical swelling noted   -cns- non focal         A/P    #EGD today,     #ct abx     #Discussed with Dr. Lerma

## 2019-05-22 NOTE — PROGRESS NOTE ADULT - ASSESSMENT
88yo M with a PMHx of DM, HLD, HTN, CKD presents to ED with complain of generalized weakness with b/l leg pain, fever and cough.    #Acute on chronic Anemia likely secondary to the Duodenitis ?    - FOBT -positive    - s/p unit PRBc on 5/21  - continue protonix  - EGD cancelled 5/22 due to elevated BP and SOB  - hold heparin and ASA for now   - GI consult recommendations appreciated    #Sepsis 2/2 likely duodenitis   - couldn't tolerate regular diet   - CT abd/pelvis   - s/p vancomycin   - continue cefepime  and metronidazole started on 5/17 follow up with the ID to transition to PO   - blood cultures NGTD   - monitor leukocytosis -resolved   - ID consult appreciated     #Weakness likely secondary to Deconditioning and Multiple Inpatient admissions     - likely due to infection and generalized deconditioning     - PT eval appreciated: rehab facility / HI-d/w with the daughter and they prefer to go to the [facility]     #DM-2  - FS AC/HS  - lantus 10 units at night  - ISS  - carb consistent diet    #Rhabdomyolysis   - elevated CK on admission  - statin held  -continue to monitor     #HFpEF  -Stable-no in exacerbation  - ECHO from 5/8: EF of 60%  - continue carvedilol   - continue imdur      #ESRD/Hypervolemic hyponatremia  - on HD  - scheduled MWF  - continue sevelamer  - nephrology consult appreciated   - fluid restriction  - continue dialysis   - will monitor    #HTN  -Labile   - continue lisinopril   - continue hydralazine   -Lasix on Nondialysis days     #HLD  - will hold statin for now     #Constipation  - continue Miralax, colace, senna    #Hypothyroidism   - continue levothyroxine      #DVT ppx  - will hold heparin for now 88yo M with a PMHx of DM, HLD, HTN, CKD presents to ED with complain of generalized weakness with b/l leg pain, fever and cough.    #Acute on chronic Anemia likely secondary to the Duodenitis ?    - FOBT -positive    - s/p unit PRBc on 5/21  - continue protonix as symtpoms improved  - EGD cancelled 5/22 due to elevated BP and SOB  - hold heparin and ASA for now   - if symptoms improve and Hg stable in AM will consider advancing diet  - GI consult recommendations appreciated    #Sepsis 2/2 likely duodenitis   - couldn't tolerate regular diet   - s/p vancomycin   - continue cefepime  and metronidazole started on 5/17  - blood cultures NGTD   - monitor leukocytosis -resolved   - ID consult appreciated     #HTN  -Labile   - continue lisinopril, hydralazine, coreg  - Lasix on Nondialysis days   - if SBP >180 will consider Hydralazine 10mg IV PRN x1    # New onset Urinary retention  -s/p straight cath for 600cc on 5/22  - straight cath was difficult due to patients penile implant  - if patient develops suprapubic tenderness will bladder scan again  - continue home tamsulosin    #Weakness likely secondary to Deconditioning and Multiple Inpatient admissions     - likely due to infection and generalized deconditioning     - PT eval appreciated: rehab facility / HI-d/w with the daughter and they prefer to go to the [facility]     #DM-2  - BGM WNL today  - continue FS AC/HS with ISS  - continue lantus 10 units at night  - carb consistent diet    #Rhabdomyolysis   - elevated CK on admission  - statin held  -continue to monitor     #HFpEF  -Stable-no in exacerbation  - ECHO from 5/8: EF of 60%  - continue carvedilol   - continue imdur      #ESRD/Hypervolemic hyponatremia  - on HD  - scheduled MWF  - continue sevelamer  - nephrology consult appreciated   - fluid restriction  - continue dialysis   - will monitor    #HLD  - will hold statin for now     #Constipation  - continue Miralax, colace, senna    #Hypothyroidism   - continue levothyroxine    #DVT ppx  - SCD, will hold heparin for now

## 2019-05-22 NOTE — PROGRESS NOTE ADULT - SUBJECTIVE AND OBJECTIVE BOX
NEPHROLOGY INTERVAL HPI/OVERNIGHT EVENTS:  05-22-19 @ 11:40  seen at hd with   has been having midline suprapubic pain with focal pain and tenderness with no distenstion   cannot recall last bm  cannot recall las UOP       MEDICATIONS  (STANDING):  carvedilol 25 milliGRAM(s) Oral every 12 hours  cefepime  Injectable. 1000 milliGRAM(s) IV Push daily  dextrose 5%. 1000 milliLiter(s) (50 mL/Hr) IV Continuous <Continuous>  dextrose 50% Injectable 12.5 Gram(s) IV Push once  dextrose 50% Injectable 25 Gram(s) IV Push once  dextrose 50% Injectable 25 Gram(s) IV Push once  epoetin indu Injectable 4000 Unit(s) IV Push <User Schedule>  furosemide    Tablet 40 milliGRAM(s) Oral <User Schedule>  hydrALAZINE 25 milliGRAM(s) Oral three times a day  insulin glargine Injectable (LANTUS) 10 Unit(s) SubCutaneous at bedtime  insulin lispro (HumaLOG) corrective regimen sliding scale   SubCutaneous three times a day before meals  isosorbide   mononitrate ER Tablet (IMDUR) 60 milliGRAM(s) Oral daily  levothyroxine 75 MICROGram(s) Oral daily  lisinopril 40 milliGRAM(s) Oral daily  metroNIDAZOLE  IVPB 500 milliGRAM(s) IV Intermittent every 8 hours  multivitamin 1 Tablet(s) Oral daily  pantoprazole  Injectable 40 milliGRAM(s) IV Push two times a day  tamsulosin 0.4 milliGRAM(s) Oral at bedtime    MEDICATIONS  (PRN):  acetaminophen   Tablet .. 650 milliGRAM(s) Oral every 6 hours PRN Temp greater or equal to 38C (100.4F), Mild Pain (1 - 3)  artificial  tears Solution 1 Drop(s) Both EYES four times a day PRN Dry Eyes  dextrose 40% Gel 15 Gram(s) Oral once PRN Blood Glucose LESS THAN 70 milliGRAM(s)/deciliter  docusate sodium 100 milliGRAM(s) Oral three times a day PRN Constipation  glucagon  Injectable 1 milliGRAM(s) IntraMuscular once PRN Glucose LESS THAN 70 milligrams/deciliter  senna 1 Tablet(s) Oral at bedtime PRN Constipation refractory to docusate  simethicone 80 milliGRAM(s) Chew three times a day PRN Gas      Allergies    No Known Allergies    Intolerances        I&O's Detail    21 May 2019 07:01  -  22 May 2019 07:00  --------------------------------------------------------  IN:    Packed Red Blood Cells: 261 mL  Total IN: 261 mL    OUT:  Total OUT: 0 mL    Total NET: 261 mL      Patient was seen and evaluated on dialysis.   Patient is tolerating the procedure well.   Continue dialysis:   Dialyzer: revaclear 300 on 2k with uf gaol of 4kg        Vital Signs Last 24 Hrs  T(C): 36.9 (22 May 2019 11:05), Max: 37.1 (21 May 2019 21:46)  T(F): 98.4 (22 May 2019 11:05), Max: 98.7 (21 May 2019 21:46)  HR: 74 (22 May 2019 11:37) (66 - 77)  BP: 187/80 (22 May 2019 11:37) (168/56 - 187/80)  BP(mean): --  RR: 19 (22 May 2019 11:05) (16 - 19)  SpO2: 100% (22 May 2019 05:04) (99% - 100%)  Daily     Daily     PHYSICAL EXAM:  General: alert. awake,   HEENT: MMM  CV: s1s2 rrr  LUNGS: B/L basilar rales   EXT: no edema    LABS:                        8.8    9.15  )-----------( 246      ( 22 May 2019 07:45 )             25.7     05-22    131<L>  |  96  |  28<H>  ----------------------------<  135<H>  4.5   |  28  |  5.42<H>    Ca    8.1<L>      22 May 2019 07:45

## 2019-05-22 NOTE — PROGRESS NOTE ADULT - ASSESSMENT
61 yo Female with a PMHx of DM, HLD, HTN, CKD presents to ED with complain of generalized weakness and fever per daughter at the bedside . Patient is so weak he is having trouble ambulating. As per daughter had fever 102 F at home today. he denies HA or neck pain. No chest pain or sob. No abd pain. No n/v/d. No urinary f/u/d. No back pain. no gross motor or sensory deficits. he denies illicit drug use. he had no recent travel. He has no other acute issues symptoms or concerns.  Pt sleeping unable to get history at this time  Will review with family and the pts dialysis unit events of yesterday  Addendum:  Pt had his full dialysis yesterday at the [facility] unit and there were no issues  with dialysis and patient had no fever.  On zosyn  CXR some vascular prominence, no focal PNA reported    5/17  seen on HD   vvs  CT abd , duodenitis?  ID input noted  on cefepime and Flagyll    dr franklin covering weekend    5/20 MK   - ESRD on hd: tolerating hd, will hold bp meds pre hd         will try to target the UF with HD         fluid restrict with hyponatremia related to the excess fluid intake  - HTN; will inc the lisinopril and dec the hydralazine  - ABRAHAN; dc sevelamer during episode of duodenitis   - duodenitis  cefepimine and flagyll: monitor fluid intake    5/21 SY  --ESRD : Continue TIW HD  --Anemia with GI symptoms --GI follow up appreciated.  For EGD in am.  --HTN : continue current meds.  --Follow up HGB post tx today.    5/22 MK   - ESRD on HD, continue to challenge for fluid removal    unclear if transfusion without hd precipitated events    will monitor response to bp and sob with hd    for now with continue with current anti htn   -anemia with s/p transfusion yesterday, on epo  fu trend of h/h    if further tx required will do with hd  - duodenitis vs gastric ulcer: gi input noted responding to PPI    fu on abx    GI aware of mid line abd pain, CT scan finding reviewed     will order bladder scan and last bm yesterday

## 2019-05-22 NOTE — PROVIDER CONTACT NOTE (CRITICAL VALUE NOTIFICATION) - SITUATION
Received patient from [facility], pt wheezing and c/o SOB. VSS as above. pt denies CP

## 2019-05-23 LAB
ANION GAP SERPL CALC-SCNC: 8 MMOL/L — SIGNIFICANT CHANGE UP (ref 5–17)
BUN SERPL-MCNC: 17 MG/DL — SIGNIFICANT CHANGE UP (ref 7–23)
CALCIUM SERPL-MCNC: 8 MG/DL — LOW (ref 8.5–10.1)
CHLORIDE SERPL-SCNC: 96 MMOL/L — SIGNIFICANT CHANGE UP (ref 96–108)
CO2 SERPL-SCNC: 29 MMOL/L — SIGNIFICANT CHANGE UP (ref 22–31)
CREAT SERPL-MCNC: 4.2 MG/DL — HIGH (ref 0.5–1.3)
GLUCOSE BLDC GLUCOMTR-MCNC: 185 MG/DL — HIGH (ref 70–99)
GLUCOSE BLDC GLUCOMTR-MCNC: 209 MG/DL — HIGH (ref 70–99)
GLUCOSE BLDC GLUCOMTR-MCNC: 229 MG/DL — HIGH (ref 70–99)
GLUCOSE BLDC GLUCOMTR-MCNC: 90 MG/DL — SIGNIFICANT CHANGE UP (ref 70–99)
GLUCOSE SERPL-MCNC: 95 MG/DL — SIGNIFICANT CHANGE UP (ref 70–99)
HCT VFR BLD CALC: 26.2 % — LOW (ref 39–50)
HGB BLD-MCNC: 8.9 G/DL — LOW (ref 13–17)
MCHC RBC-ENTMCNC: 31.1 PG — SIGNIFICANT CHANGE UP (ref 27–34)
MCHC RBC-ENTMCNC: 34 GM/DL — SIGNIFICANT CHANGE UP (ref 32–36)
MCV RBC AUTO: 91.6 FL — SIGNIFICANT CHANGE UP (ref 80–100)
PLATELET # BLD AUTO: 279 K/UL — SIGNIFICANT CHANGE UP (ref 150–400)
POTASSIUM SERPL-MCNC: 3.8 MMOL/L — SIGNIFICANT CHANGE UP (ref 3.5–5.3)
POTASSIUM SERPL-SCNC: 3.8 MMOL/L — SIGNIFICANT CHANGE UP (ref 3.5–5.3)
RBC # BLD: 2.86 M/UL — LOW (ref 4.2–5.8)
RBC # FLD: 15.3 % — HIGH (ref 10.3–14.5)
SODIUM SERPL-SCNC: 133 MMOL/L — LOW (ref 135–145)
WBC # BLD: 9.13 K/UL — SIGNIFICANT CHANGE UP (ref 3.8–10.5)
WBC # FLD AUTO: 9.13 K/UL — SIGNIFICANT CHANGE UP (ref 3.8–10.5)

## 2019-05-23 RX ADMIN — Medication 2: at 11:23

## 2019-05-23 RX ADMIN — CARVEDILOL PHOSPHATE 25 MILLIGRAM(S): 80 CAPSULE, EXTENDED RELEASE ORAL at 17:05

## 2019-05-23 RX ADMIN — Medication 4: at 16:58

## 2019-05-23 RX ADMIN — Medication 25 MILLIGRAM(S): at 06:07

## 2019-05-23 RX ADMIN — Medication 25 MILLIGRAM(S): at 13:53

## 2019-05-23 RX ADMIN — Medication 100 MILLIGRAM(S): at 06:08

## 2019-05-23 RX ADMIN — Medication 100 MILLIGRAM(S): at 21:52

## 2019-05-23 RX ADMIN — Medication 1 TABLET(S): at 11:24

## 2019-05-23 RX ADMIN — Medication 75 MICROGRAM(S): at 06:07

## 2019-05-23 RX ADMIN — PANTOPRAZOLE SODIUM 40 MILLIGRAM(S): 20 TABLET, DELAYED RELEASE ORAL at 17:07

## 2019-05-23 RX ADMIN — CEFEPIME 1000 MILLIGRAM(S): 1 INJECTION, POWDER, FOR SOLUTION INTRAMUSCULAR; INTRAVENOUS at 11:24

## 2019-05-23 RX ADMIN — TAMSULOSIN HYDROCHLORIDE 0.4 MILLIGRAM(S): 0.4 CAPSULE ORAL at 21:52

## 2019-05-23 RX ADMIN — Medication 25 MILLIGRAM(S): at 21:52

## 2019-05-23 RX ADMIN — CARVEDILOL PHOSPHATE 25 MILLIGRAM(S): 80 CAPSULE, EXTENDED RELEASE ORAL at 06:07

## 2019-05-23 RX ADMIN — Medication 40 MILLIGRAM(S): at 06:07

## 2019-05-23 RX ADMIN — PANTOPRAZOLE SODIUM 40 MILLIGRAM(S): 20 TABLET, DELAYED RELEASE ORAL at 06:07

## 2019-05-23 RX ADMIN — Medication 100 MILLIGRAM(S): at 13:53

## 2019-05-23 RX ADMIN — ISOSORBIDE MONONITRATE 60 MILLIGRAM(S): 60 TABLET, EXTENDED RELEASE ORAL at 15:03

## 2019-05-23 RX ADMIN — LISINOPRIL 40 MILLIGRAM(S): 2.5 TABLET ORAL at 06:07

## 2019-05-23 RX ADMIN — INSULIN GLARGINE 10 UNIT(S): 100 INJECTION, SOLUTION SUBCUTANEOUS at 21:52

## 2019-05-23 NOTE — PROGRESS NOTE ADULT - SUBJECTIVE AND OBJECTIVE BOX
SUBJECTIVE: 88yo M with PMHx of DM, HLD, HTN, CKD presents to ED with complain of generalized weakness and fever per daughter at the bedside . Patient is so weak he is having trouble ambulating. As per daughter had fever 102 F at home today. he denies HA or neck pain. No chest pain or sob. No abd pain. No n/v/d. No urinary f/u/d. No back pain. no gross motor or sensory deficits. He denies illicit drug use. he had no recent travel. He has no other acute issues symptoms or concerns. Patient discharged on 4/2  at that time was hospitalized with sepsis likely secondary to Rhino/Entero virus & Multifocal PNA.    5/23/19: Overnight, patients blood pressure elevated  This morning, patient notes that he is feeling better. He notes that the suprapubic pain he felt yesterday has resolved. He denies any epigastric pain and is agreeable to advancing his diet. He denies any nausea.      REVIEW OF SYSTEMS:  CONSTITUTIONAL: No weakness, fevers or chills  EYES/ENT: No visual changes;  No vertigo or throat pain   NECK: No pain or stiffness  RESPIRATORY: No cough, wheezing, hemoptysis; No shortness of breath  CARDIOVASCULAR: No chest pain or palpitations  GASTROINTESTINAL: No abdominal or epigastric pain. No nausea, vomiting, or hematemesis; No diarrhea or constipation. No melena or hematochezia.  GENITOURINARY: No dysuria, frequency or hematuria  NEUROLOGICAL: No numbness or weakness  SKIN: No itching, burning, rashes, or lesions   All other review of systems is negative unless indicated above    Vital Signs Last 24 Hrs  T(C): 37.1 (23 May 2019 16:53), Max: 38 (22 May 2019 21:59)  T(F): 98.7 (23 May 2019 16:53), Max: 100.4 (22 May 2019 21:59)  HR: 63 (23 May 2019 16:53) (59 - 73)  BP: 153/47 (23 May 2019 16:53) (114/42 - 189/53)  BP(mean): --  RR: 16 (23 May 2019 16:53) (16 - 17)  SpO2: 97% (23 May 2019 16:53) (92% - 100%)    I&O's Summary    22 May 2019 07:01  -  23 May 2019 07:00  --------------------------------------------------------  IN: 100 mL / OUT: 600 mL / NET: -500 mL        CAPILLARY BLOOD GLUCOSE      POCT Blood Glucose.: 229 mg/dL (23 May 2019 16:35)  POCT Blood Glucose.: 185 mg/dL (23 May 2019 11:13)  POCT Blood Glucose.: 90 mg/dL (23 May 2019 08:06)  POCT Blood Glucose.: 179 mg/dL (22 May 2019 21:37)      PHYSICAL EXAM:  Constitutional: NAD, awake and alert, well-developed  HEENT: PERR, EOMI, Normal Hearing, MMM  Neck: Soft and supple, No LAD, No JVD  Respiratory: Breath sounds are clear bilaterally, No wheezing, rales or rhonchi  Cardiovascular: S1 and S2, regular rate and rhythm, no Murmurs, gallops or rubs  Gastrointestinal: Bowel Sounds present, soft, nontender, nondistended, no guarding, no rebound  Extremities: No peripheral edema  Vascular: 2+ peripheral pulses  Neurological: A/O x 3, no focal deficits  Musculoskeletal: 5/5 strength b/l upper and lower extremities  Skin: No rashes    MEDICATIONS:  MEDICATIONS  (STANDING):  carvedilol 25 milliGRAM(s) Oral every 12 hours  cefepime  Injectable. 1000 milliGRAM(s) IV Push daily  dextrose 5%. 1000 milliLiter(s) (50 mL/Hr) IV Continuous <Continuous>  dextrose 50% Injectable 12.5 Gram(s) IV Push once  dextrose 50% Injectable 25 Gram(s) IV Push once  dextrose 50% Injectable 25 Gram(s) IV Push once  epoetin indu Injectable 4000 Unit(s) IV Push <User Schedule>  furosemide    Tablet 40 milliGRAM(s) Oral <User Schedule>  hydrALAZINE 25 milliGRAM(s) Oral three times a day  insulin glargine Injectable (LANTUS) 10 Unit(s) SubCutaneous at bedtime  insulin lispro (HumaLOG) corrective regimen sliding scale   SubCutaneous three times a day before meals  isosorbide   mononitrate ER Tablet (IMDUR) 60 milliGRAM(s) Oral daily  levothyroxine 75 MICROGram(s) Oral daily  lisinopril 40 milliGRAM(s) Oral daily  metroNIDAZOLE  IVPB 500 milliGRAM(s) IV Intermittent every 8 hours  multivitamin 1 Tablet(s) Oral daily  pantoprazole  Injectable 40 milliGRAM(s) IV Push two times a day  tamsulosin 0.4 milliGRAM(s) Oral at bedtime      LABS: All Labs Reviewed:                        8.9    9.13  )-----------( 279      ( 23 May 2019 07:32 )             26.2     05-23    133<L>  |  96  |  17  ----------------------------<  95  3.8   |  29  |  4.20<H>    Ca    8.0<L>      23 May 2019 07:32            Blood Culture:     RADIOLOGY/EKG:    DVT PPX:    ADVANCED DIRECTIVE:    DISPOSITION: SUBJECTIVE: 89yo M with PMHx of DM, HLD, HTN, CKD presents to ED with complain of generalized weakness and fever per daughter at the bedside . Patient is so weak he is having trouble ambulating. As per daughter had fever 102 F at home today. he denies HA or neck pain. No chest pain or sob. No abd pain. No n/v/d. No urinary f/u/d. No back pain. no gross motor or sensory deficits. He denies illicit drug use. he had no recent travel. He has no other acute issues symptoms or concerns. Patient discharged on 4/2  at that time was hospitalized with sepsis likely secondary to Rhino/Entero virus & Multifocal PNA.    5/23/19: Overnight, patients blood pressure elevated but responded well to medication. BP better controlled during day.  This morning, patient notes that he is feeling better. He notes that the suprapubic pain he felt yesterday has resolved. He denies any epigastric pain and is agreeable to advancing his diet. He denies any nausea.      REVIEW OF SYSTEMS:  CONSTITUTIONAL: No weakness, fevers or chills  EYES/ENT: No visual changes;  No vertigo or throat pain   NECK: No pain or stiffness  RESPIRATORY: No cough, wheezing, hemoptysis; No shortness of breath  CARDIOVASCULAR: No chest pain or palpitations  GASTROINTESTINAL: No abdominal or epigastric pain. No nausea, vomiting, or hematemesis; No diarrhea or constipation. No melena or hematochezia.  GENITOURINARY: No dysuria, frequency or hematuria  NEUROLOGICAL: No numbness or weakness  SKIN: No itching, burning, rashes, or lesions   All other review of systems is negative unless indicated above    Vital Signs Last 24 Hrs  T(C): 37.1 (23 May 2019 16:53), Max: 38 (22 May 2019 21:59)  T(F): 98.7 (23 May 2019 16:53), Max: 100.4 (22 May 2019 21:59)  HR: 63 (23 May 2019 16:53) (59 - 73)  BP: 153/47 (23 May 2019 16:53) (114/42 - 189/53)  RR: 16 (23 May 2019 16:53) (16 - 17)  SpO2: 97% (23 May 2019 16:53) (92% - 100%)    I&O's Summary    22 May 2019 07:01  -  23 May 2019 07:00  --------------------------------------------------------  IN: 100 mL / OUT: 600 mL / NET: -500 mL    CAPILLARY BLOOD GLUCOSE    POCT Blood Glucose.: 229 mg/dL (23 May 2019 16:35)  POCT Blood Glucose.: 185 mg/dL (23 May 2019 11:13)  POCT Blood Glucose.: 90 mg/dL (23 May 2019 08:06)  POCT Blood Glucose.: 179 mg/dL (22 May 2019 21:37)      MEDICATIONS:  MEDICATIONS  (STANDING):  carvedilol 25 milliGRAM(s) Oral every 12 hours  cefepime  Injectable. 1000 milliGRAM(s) IV Push daily  dextrose 5%. 1000 milliLiter(s) (50 mL/Hr) IV Continuous <Continuous>  dextrose 50% Injectable 12.5 Gram(s) IV Push once  dextrose 50% Injectable 25 Gram(s) IV Push once  dextrose 50% Injectable 25 Gram(s) IV Push once  epoetin indu Injectable 4000 Unit(s) IV Push <User Schedule>  furosemide    Tablet 40 milliGRAM(s) Oral <User Schedule>  hydrALAZINE 25 milliGRAM(s) Oral three times a day  insulin glargine Injectable (LANTUS) 10 Unit(s) SubCutaneous at bedtime  insulin lispro (HumaLOG) corrective regimen sliding scale   SubCutaneous three times a day before meals  isosorbide   mononitrate ER Tablet (IMDUR) 60 milliGRAM(s) Oral daily  levothyroxine 75 MICROGram(s) Oral daily  lisinopril 40 milliGRAM(s) Oral daily  metroNIDAZOLE  IVPB 500 milliGRAM(s) IV Intermittent every 8 hours  multivitamin 1 Tablet(s) Oral daily  pantoprazole  Injectable 40 milliGRAM(s) IV Push two times a day  tamsulosin 0.4 milliGRAM(s) Oral at bedtime      LABS: All Labs Reviewed:                        8.9    9.13  )-----------( 279      ( 23 May 2019 07:32 )             26.2     05-23    133<L>  |  96  |  17  ----------------------------<  95  3.8   |  29  |  4.20<H>    Ca    8.0<L>      23 May 2019 07:32            Blood Culture:     RADIOLOGY/EKG:    DVT PPX:    ADVANCED DIRECTIVE:    DISPOSITION:

## 2019-05-23 NOTE — PROGRESS NOTE ADULT - ASSESSMENT
89M with abdominal pain, acute on chronic anemia, duodenitis on CT.   Likely duodenal ulcer or duodenitis.  No active GI bleeding reported (melena, hematochezia) although hgb dropping. Improved s/p transfusion and stable.  Suprapubic pain likely due to distended bladder-improved s/p straight cath.    Recommend:    -cont PPI BID for 6 weeks, then once daily indefinitely for presumed duodenal ulcer.  -continue to monitor CBC daily   -will hold off on EGD, upper abd symptoms improving with PPI and patient moderate risk for sedation due to medical comorbidities and age  -advance diet   -d/w pt  -no GI objection to discharge if tolerating PO

## 2019-05-23 NOTE — PROGRESS NOTE ADULT - SUBJECTIVE AND OBJECTIVE BOX
NEPHROLOGY INTERVAL HPI/OVERNIGHT EVENTS:    5/23  ambulating with PT  therapist states he is doing very well  no complaints  BP elevated last several days   ACEI increased from 5 mg to 40 mg qd on the 20th  SBP 180s earlier today fell to 114 and IMDUR was held    Addendum:  repeat   will administer the IMDUR  monitor BP       05-22-19 @ 11:40  seen at hd with   has been having midline suprapubic pain with focal pain and tenderness with no distenstion   cannot recall last bm  cannot recall las UOP       MEDICATIONS  (STANDING):  carvedilol 25 milliGRAM(s) Oral every 12 hours  cefepime  Injectable. 1000 milliGRAM(s) IV Push daily  dextrose 5%. 1000 milliLiter(s) (50 mL/Hr) IV Continuous <Continuous>  dextrose 50% Injectable 12.5 Gram(s) IV Push once  dextrose 50% Injectable 25 Gram(s) IV Push once  dextrose 50% Injectable 25 Gram(s) IV Push once  epoetin indu Injectable 4000 Unit(s) IV Push <User Schedule>  furosemide    Tablet 40 milliGRAM(s) Oral <User Schedule>  hydrALAZINE 25 milliGRAM(s) Oral three times a day  insulin glargine Injectable (LANTUS) 10 Unit(s) SubCutaneous at bedtime  insulin lispro (HumaLOG) corrective regimen sliding scale   SubCutaneous three times a day before meals  isosorbide   mononitrate ER Tablet (IMDUR) 60 milliGRAM(s) Oral daily  levothyroxine 75 MICROGram(s) Oral daily  lisinopril 40 milliGRAM(s) Oral daily  metroNIDAZOLE  IVPB 500 milliGRAM(s) IV Intermittent every 8 hours  multivitamin 1 Tablet(s) Oral daily  pantoprazole  Injectable 40 milliGRAM(s) IV Push two times a day  tamsulosin 0.4 milliGRAM(s) Oral at bedtime    MEDICATIONS  (PRN):  acetaminophen   Tablet .. 650 milliGRAM(s) Oral every 6 hours PRN Temp greater or equal to 38C (100.4F), Mild Pain (1 - 3)  artificial  tears Solution 1 Drop(s) Both EYES four times a day PRN Dry Eyes  dextrose 40% Gel 15 Gram(s) Oral once PRN Blood Glucose LESS THAN 70 milliGRAM(s)/deciliter  docusate sodium 100 milliGRAM(s) Oral three times a day PRN Constipation  glucagon  Injectable 1 milliGRAM(s) IntraMuscular once PRN Glucose LESS THAN 70 milligrams/deciliter  senna 1 Tablet(s) Oral at bedtime PRN Constipation refractory to docusate  simethicone 80 milliGRAM(s) Chew three times a day PRN Gas      Allergies    No Known Allergies    Intolerances    I&O's Detail    22 May 2019 07:01  -  23 May 2019 07:00  --------------------------------------------------------  IN:    IV PiggyBack: 100 mL  Total IN: 100 mL    OUT:    Intermittent Catheterization - Urethral: 600 mL  Total OUT: 600 mL    Total NET: -500 mL    I&O's Detail    22 May 2019 07:01  -  23 May 2019 07:00  --------------------------------------------------------  IN:    IV PiggyBack: 100 mL  Total IN: 100 mL    OUT:    Intermittent Catheterization - Urethral: 600 mL  Total OUT: 600 mL    Total NET: -500 mL            Vital Signs Last 24 Hrs  T(C): 37.1 (23 May 2019 11:11), Max: 38 (22 May 2019 21:59)  T(F): 98.8 (23 May 2019 11:11), Max: 100.4 (22 May 2019 21:59)  HR: 63 (23 May 2019 14:44) (59 - 88)  BP: 147/42 (23 May 2019 14:44) (114/42 - 189/53)  BP(mean): --  RR: 16 (23 May 2019 11:11) (16 - 18)  SpO2: 92% (23 May 2019 11:11) (92% - 100%)    PHYSICAL EXAM:  General: alert. awake,   HEENT: MMM  CV: s1s2 rrr  LUNGS: B/L basilar rales   EXT: no edema    LABS:          05-23    133<L>  |  96  |  17  ----------------------------<  95  3.8   |  29  |  4.20<H>    Ca    8.0<L>      23 May 2019 07:32                              8.9    9.13  )-----------( 279      ( 23 May 2019 07:32 )             26.2

## 2019-05-23 NOTE — PROGRESS NOTE ADULT - ASSESSMENT
61 yo Female with a PMHx of DM, HLD, HTN, CKD presents to ED with complain of generalized weakness and fever per daughter at the bedside . Patient is so weak he is having trouble ambulating. As per daughter had fever 102 F at home today. he denies HA or neck pain. No chest pain or sob. No abd pain. No n/v/d. No urinary f/u/d. No back pain. no gross motor or sensory deficits. he denies illicit drug use. he had no recent travel. He has no other acute issues symptoms or concerns.  Pt sleeping unable to get history at this time  Will review with family and the pts dialysis unit events of yesterday  Addendum:  Pt had his full dialysis yesterday at the [facility] unit and there were no issues  with dialysis and patient had no fever.  On zosyn  CXR some vascular prominence, no focal PNA reported    5/17  seen on HD   vvs  CT abd , duodenitis?  ID input noted  on cefepime and Flagyll    dr franklin covering weekend    5/20 MK   - ESRD on hd: tolerating hd, will hold bp meds pre hd         will try to target the UF with HD         fluid restrict with hyponatremia related to the excess fluid intake  - HTN; will inc the lisinopril and dec the hydralazine  - ABRAHAN; dc sevelamer during episode of duodenitis   - duodenitis  cefepimine and flagyll: monitor fluid intake    5/21 SY  --ESRD : Continue TIW HD  --Anemia with GI symptoms --GI follow up appreciated.  For EGD in am.  --HTN : continue current meds.  --Follow up HGB post tx today.    5/22 MK   - ESRD on HD, continue to challenge for fluid removal    unclear if transfusion without hd precipitated events    will monitor response to bp and sob with hd    for now with continue with current anti htn   -anemia with s/p transfusion yesterday, on epo  fu trend of h/h    if further tx required will do with hd  - duodenitis vs gastric ulcer: gi input noted responding to PPI    fu on abx    GI aware of mid line abd pain, CT scan finding reviewed     will order bladder scan and last bm yesterday    5/23  Abd discomfort improved  ambulating with PT  therapist states he is doing very well  no complaints  BP elevated last several days   ACEI increased from 5 mg to 40 mg qd on the 20th, 4th dose  SBP 180s earlier today fell to 114 and IMDUR was held    Addendum:  repeat   will administer the IMDUR  monitor BP  will not change  bp meds yet  if cont to have excessive hypotension at noon w high am BPs  may divide lisinopril to 20 mg q12 h  HD in am

## 2019-05-23 NOTE — PROGRESS NOTE ADULT - ASSESSMENT
88yo M with a PMHx of DM, HLD, HTN, CKD presents to ED with complain of generalized weakness with b/l leg pain, fever and cough.    #Acute on chronic Anemia likely secondary to the Duodenitis  -Hg now stable  -  FOBT -positive    - s/p 1 unit PRBc on 5/21  - continue protonix BID until 6/24 then daily indefinitely to treat presumed duodenal ulcer  - EGD cancelled 5/22 due to elevated BP and SOB  - hold heparin and ASA for now   - if symptoms improve and Hg stable in AM will consider advancing diet  - GI consult recommendations appreciated    #Sepsis 2/2 likely duodenitis   - sepsis resolved  - s/p vancomycin   - continue cefepime  and metronidazole started on 5/17 - will discharge on ceftin 500mg BID for 10 days total  - blood cultures NGTD   - monitor leukocytosis -resolved   - ID consult appreciated     #HTN  -Labile   - continue lisinopril, hydralazine, coreg  - Lasix on Nondialysis days   - if SBP >180 will consider Hydralazine 10mg IV PRN x1    # New onset Urinary retention - resolved  -s/p straight cath for 600cc on 5/22  - straight cath was difficult due to patients penile implant  - continue home tamsulosin    #Weakness likely secondary to Deconditioning and Multiple Inpatient admissions     - likely due to infection and generalized deconditioning     - PT eval appreciated: rehab facility / HI-d/w with the daughter and they prefer to go to the [facility]     #DM-2  - BGM WNL today  - continue FS AC/HS with ISS  - continue lantus 10 units at night  - carb consistent diet    #Rhabdomyolysis   - elevated CK on admission  - statin held  -continue to monitor     #HFpEF  -Stable-no in exacerbation  - ECHO from 5/8: EF of 60%  - continue carvedilol   - continue imdur      #ESRD/Hypervolemic hyponatremia  - on HD  - scheduled MWF  - continue sevelamer  - nephrology consult appreciated   - fluid restriction  - continue dialysis   - will monitor    #HLD  - will hold statin for now     #Constipation  - continue Miralax, colace, senna    #Hypothyroidism   - continue levothyroxine    #DVT ppx  - SCD 89yo M with a PMHx of DM, HLD, HTN, CKD presents to ED with complain of generalized weakness with b/l leg pain, fever and cough.    #Acute on chronic Anemia likely secondary to the Duodenitis  -Hg now stable  -  FOBT -positive    - s/p 1 unit PRBc on 5/21  - continue protonix BID until 6/24 then daily indefinitely to treat presumed duodenal ulcer  - EGD cancelled 5/22 due to elevated BP and SOB  - hold heparin and ASA for now   - if symptoms improve and Hg stable in AM will consider advancing diet  - GI consult recommendations appreciated    #Sepsis 2/2 likely duodenitis   - sepsis resolved  - s/p vancomycin   - continue cefepime  and metronidazole started on 5/17 - will discharge on ceftin 500mg daily and flagyl for 14 days total  - blood cultures NGTD   - monitor leukocytosis -resolved   - ID consult appreciated     #HTN  -Labile   - continue lisinopril, hydralazine, coreg  - Lasix on Nondialysis days   - if SBP >180 will consider Hydralazine 10mg IV PRN x1    # New onset Urinary retention - resolved  -s/p straight cath for 600cc on 5/22  - straight cath was difficult due to patients penile implant  - continue home tamsulosin    #Weakness likely secondary to Deconditioning and Multiple Inpatient admissions     - likely due to infection and generalized deconditioning     - PT eval appreciated: rehab facility / HI-d/w with the daughter and they prefer to go to the [facility]     #DM-2  - BGM WNL today  - continue FS AC/HS with ISS  - continue lantus 10 units at night  - carb consistent diet    #Rhabdomyolysis   - elevated CK on admission  - statin held  -continue to monitor     #HFpEF  -Stable-no in exacerbation  - ECHO from 5/8: EF of 60%  - continue carvedilol   - continue imdur      #ESRD/Hypervolemic hyponatremia  - on HD  - scheduled MWF  - continue sevelamer  - nephrology consult appreciated   - fluid restriction  - continue dialysis   - will monitor    #HLD  - will hold statin for now     #Constipation  - continue Miralax, colace, senna    #Hypothyroidism   - continue levothyroxine    #DVT ppx  - SCD

## 2019-05-24 ENCOUNTER — TRANSCRIPTION ENCOUNTER (OUTPATIENT)
Age: 84
End: 2019-05-24

## 2019-05-24 VITALS — DIASTOLIC BLOOD PRESSURE: 47 MMHG | HEART RATE: 80 BPM | SYSTOLIC BLOOD PRESSURE: 149 MMHG

## 2019-05-24 LAB
ALBUMIN SERPL ELPH-MCNC: 2.8 G/DL — LOW (ref 3.3–5)
ANION GAP SERPL CALC-SCNC: 11 MMOL/L — SIGNIFICANT CHANGE UP (ref 5–17)
BASOPHILS # BLD AUTO: 0 K/UL — SIGNIFICANT CHANGE UP (ref 0–0.2)
BASOPHILS NFR BLD AUTO: 0 % — SIGNIFICANT CHANGE UP (ref 0–2)
BUN SERPL-MCNC: 35 MG/DL — HIGH (ref 7–23)
CALCIUM SERPL-MCNC: 7.7 MG/DL — LOW (ref 8.5–10.1)
CHLORIDE SERPL-SCNC: 95 MMOL/L — LOW (ref 96–108)
CO2 SERPL-SCNC: 25 MMOL/L — SIGNIFICANT CHANGE UP (ref 22–31)
CREAT SERPL-MCNC: 5.9 MG/DL — HIGH (ref 0.5–1.3)
EOSINOPHIL # BLD AUTO: 0.6 K/UL — HIGH (ref 0–0.5)
EOSINOPHIL NFR BLD AUTO: 6 % — SIGNIFICANT CHANGE UP (ref 0–6)
GLUCOSE BLDC GLUCOMTR-MCNC: 103 MG/DL — HIGH (ref 70–99)
GLUCOSE BLDC GLUCOMTR-MCNC: 155 MG/DL — HIGH (ref 70–99)
GLUCOSE SERPL-MCNC: 150 MG/DL — HIGH (ref 70–99)
HCT VFR BLD CALC: 26.6 % — LOW (ref 39–50)
HGB BLD-MCNC: 8.9 G/DL — LOW (ref 13–17)
LYMPHOCYTES # BLD AUTO: 0.6 K/UL — LOW (ref 1–3.3)
LYMPHOCYTES # BLD AUTO: 6 % — LOW (ref 13–44)
MANUAL SMEAR VERIFICATION: SIGNIFICANT CHANGE UP
MCHC RBC-ENTMCNC: 30.6 PG — SIGNIFICANT CHANGE UP (ref 27–34)
MCHC RBC-ENTMCNC: 33.5 GM/DL — SIGNIFICANT CHANGE UP (ref 32–36)
MCV RBC AUTO: 91.4 FL — SIGNIFICANT CHANGE UP (ref 80–100)
MONOCYTES # BLD AUTO: 1.5 K/UL — HIGH (ref 0–0.9)
MONOCYTES NFR BLD AUTO: 15 % — HIGH (ref 2–14)
NEUTROPHILS # BLD AUTO: 7.31 K/UL — SIGNIFICANT CHANGE UP (ref 1.8–7.4)
NEUTROPHILS NFR BLD AUTO: 71 % — SIGNIFICANT CHANGE UP (ref 43–77)
NEUTS BAND # BLD: 2 % — SIGNIFICANT CHANGE UP (ref 0–8)
NRBC # BLD: 0 /100 — SIGNIFICANT CHANGE UP (ref 0–0)
NRBC # BLD: SIGNIFICANT CHANGE UP /100 WBCS (ref 0–0)
PHOSPHATE SERPL-MCNC: 3.7 MG/DL — SIGNIFICANT CHANGE UP (ref 2.5–4.5)
PLAT MORPH BLD: NORMAL — SIGNIFICANT CHANGE UP
PLATELET # BLD AUTO: 297 K/UL — SIGNIFICANT CHANGE UP (ref 150–400)
POTASSIUM SERPL-MCNC: 4.1 MMOL/L — SIGNIFICANT CHANGE UP (ref 3.5–5.3)
POTASSIUM SERPL-SCNC: 4.1 MMOL/L — SIGNIFICANT CHANGE UP (ref 3.5–5.3)
RBC # BLD: 2.91 M/UL — LOW (ref 4.2–5.8)
RBC # FLD: 15.5 % — HIGH (ref 10.3–14.5)
RBC BLD AUTO: SIGNIFICANT CHANGE UP
SODIUM SERPL-SCNC: 131 MMOL/L — LOW (ref 135–145)
WBC # BLD: 10.02 K/UL — SIGNIFICANT CHANGE UP (ref 3.8–10.5)
WBC # FLD AUTO: 10.02 K/UL — SIGNIFICANT CHANGE UP (ref 3.8–10.5)

## 2019-05-24 RX ORDER — PANTOPRAZOLE SODIUM 20 MG/1
1 TABLET, DELAYED RELEASE ORAL
Qty: 2 | Refills: 0
Start: 2019-05-24 | End: 2019-05-24

## 2019-05-24 RX ORDER — ISOSORBIDE MONONITRATE 60 MG/1
1 TABLET, EXTENDED RELEASE ORAL
Qty: 0 | Refills: 0 | DISCHARGE
Start: 2019-05-24

## 2019-05-24 RX ORDER — CEFUROXIME AXETIL 250 MG
1 TABLET ORAL
Qty: 10 | Refills: 0
Start: 2019-05-24 | End: 2019-05-28

## 2019-05-24 RX ORDER — INSULIN GLARGINE 100 [IU]/ML
10 INJECTION, SOLUTION SUBCUTANEOUS
Qty: 0 | Refills: 0 | DISCHARGE
Start: 2019-05-24

## 2019-05-24 RX ORDER — CEFUROXIME AXETIL 250 MG
1 TABLET ORAL
Qty: 5 | Refills: 0
Start: 2019-05-24 | End: 2019-05-28

## 2019-05-24 RX ORDER — METRONIDAZOLE 500 MG
1 TABLET ORAL
Qty: 15 | Refills: 0
Start: 2019-05-24 | End: 2019-05-28

## 2019-05-24 RX ORDER — LISINOPRIL 2.5 MG/1
1 TABLET ORAL
Qty: 0 | Refills: 0 | DISCHARGE
Start: 2019-05-24

## 2019-05-24 RX ORDER — TAMSULOSIN HYDROCHLORIDE 0.4 MG/1
1 CAPSULE ORAL
Qty: 0 | Refills: 0 | DISCHARGE
Start: 2019-05-24

## 2019-05-24 RX ORDER — HYDRALAZINE HCL 50 MG
1 TABLET ORAL
Qty: 0 | Refills: 0 | DISCHARGE

## 2019-05-24 RX ORDER — ISOSORBIDE MONONITRATE 60 MG/1
0 TABLET, EXTENDED RELEASE ORAL
Qty: 0 | Refills: 0 | DISCHARGE

## 2019-05-24 RX ORDER — ATORVASTATIN CALCIUM 80 MG/1
1 TABLET, FILM COATED ORAL
Qty: 0 | Refills: 0 | DISCHARGE

## 2019-05-24 RX ORDER — CARVEDILOL PHOSPHATE 80 MG/1
1 CAPSULE, EXTENDED RELEASE ORAL
Qty: 0 | Refills: 0 | DISCHARGE
Start: 2019-05-24

## 2019-05-24 RX ADMIN — Medication 25 MILLIGRAM(S): at 05:34

## 2019-05-24 RX ADMIN — Medication 1 TABLET(S): at 12:16

## 2019-05-24 RX ADMIN — Medication 75 MICROGRAM(S): at 05:34

## 2019-05-24 RX ADMIN — CEFEPIME 1000 MILLIGRAM(S): 1 INJECTION, POWDER, FOR SOLUTION INTRAMUSCULAR; INTRAVENOUS at 12:17

## 2019-05-24 RX ADMIN — CARVEDILOL PHOSPHATE 25 MILLIGRAM(S): 80 CAPSULE, EXTENDED RELEASE ORAL at 05:33

## 2019-05-24 RX ADMIN — ERYTHROPOIETIN 4000 UNIT(S): 10000 INJECTION, SOLUTION INTRAVENOUS; SUBCUTANEOUS at 08:02

## 2019-05-24 RX ADMIN — ISOSORBIDE MONONITRATE 60 MILLIGRAM(S): 60 TABLET, EXTENDED RELEASE ORAL at 12:16

## 2019-05-24 RX ADMIN — Medication 100 MILLIGRAM(S): at 05:34

## 2019-05-24 RX ADMIN — Medication 100 MILLIGRAM(S): at 12:32

## 2019-05-24 RX ADMIN — Medication 25 MILLIGRAM(S): at 12:17

## 2019-05-24 RX ADMIN — Medication 2: at 06:35

## 2019-05-24 RX ADMIN — PANTOPRAZOLE SODIUM 40 MILLIGRAM(S): 20 TABLET, DELAYED RELEASE ORAL at 05:33

## 2019-05-24 RX ADMIN — LISINOPRIL 40 MILLIGRAM(S): 2.5 TABLET ORAL at 05:33

## 2019-05-24 NOTE — PROGRESS NOTE ADULT - REASON FOR ADMISSION
complain of weakness

## 2019-05-24 NOTE — DISCHARGE NOTE NURSING/CASE MANAGEMENT/SOCIAL WORK - NSDCDPATPORTLINK_GEN_ALL_CORE
You can access the [facility] Patient Portal, offered by [facility], by registering with the following website: [email or web address]

## 2019-05-24 NOTE — PROGRESS NOTE ADULT - ASSESSMENT
89yo M with a PMHx of DM, HLD, HTN, CKD presents to ED with complain of generalized weakness with b/l leg pain, fever and cough.    #Acute on chronic Anemia likely secondary to the Duodenitis  -Hg now stable  -  FOBT -positive    - s/p 1 unit PRBc on 5/21  - continue protonix BID until 6/24 then daily indefinitely to treat presumed duodenal ulcer  - EGD cancelled 5/22 due to elevated BP and SOB  - hold heparin and ASA for now   - if symptoms improve and Hg stable in AM will consider advancing diet  - GI consult recommendations appreciated    #Sepsis 2/2 likely duodenitis   - sepsis resolved  - s/p vancomycin   - continue cefepime  and metronidazole started on 5/17 - will discharge on ceftin 500mg BID for 10 days total  - blood cultures NGTD   - monitor leukocytosis -resolved   - ID consult appreciated     #HTN  -Labile   - continue lisinopril, hydralazine, coreg  - Lasix on Nondialysis days   - if SBP >180 will consider Hydralazine 10mg IV PRN x1    # New onset Urinary retention - resolved  -s/p straight cath for 600cc on 5/22  - straight cath was difficult due to patients penile implant  - continue home tamsulosin    #Weakness likely secondary to Deconditioning and Multiple Inpatient admissions     - likely due to infection and generalized deconditioning     - PT eval appreciated: rehab facility / HI-d/w with the daughter and they prefer to go to the [facility]     #DM-2  - BGM WNL today  - continue FS AC/HS with ISS  - continue lantus 10 units at night  - carb consistent diet    #Rhabdomyolysis   - elevated CK on admission  - statin held  -continue to monitor     #HFpEF  -Stable-no in exacerbation  - ECHO from 5/8: EF of 60%  - continue carvedilol   - continue imdur      #ESRD/Hypervolemic hyponatremia  - on HD  - scheduled MWF  - continue sevelamer  - nephrology consult appreciated   - fluid restriction  - continue dialysis   - will monitor    #HLD  - will hold statin for now     #Constipation  - continue Miralax, colace, senna    #Hypothyroidism   - continue levothyroxine    #DVT ppx  - SCD      #Dispo: D/C to [facility] 89yo M with a PMHx of DM, HLD, HTN, CKD presents to ED with complain of generalized weakness with b/l leg pain, fever and cough.    #Acute on chronic Anemia likely secondary to the Duodenitis  - Hg stable  - s/p 1 unit PRBc on 5/21  - continue protonix BID until 7/2 (6 weeks treatment) then daily indefinitely to treat presumed duodenal ulcer  - EGD cancelled 5/22 due to elevated BP and SOB  - will restart on ASA at d/c   - GI consult recommendations appreciated    #Sepsis 2/2 likely duodenitis   - sepsis resolved  - s/p vancomycin   - continue cefepime  and metronidazole started on 5/17 - will discharge on ceftin 500mg daily and flagyl for 14 days total  - blood cultures NGTD   - monitor leukocytosis -resolved   - ID consult appreciated     #HTN  -Labile   - continue lisinopril, hydralazine, coreg - Hydralazine increased to 50mg Q8  - Lasix on Nondialysis days   - if SBP >180 will consider Hydralazine 10mg IV PRN x1    # New onset Urinary retention - resolved  - s/p straight cath for 600cc on 5/22  - straight cath was difficult due to patients penile implant  - continue home tamsulosin    #Weakness likely secondary to Deconditioning and Multiple Inpatient admissions     - likely due to infection and generalized deconditioning     - d/c to [facility]    #DM-2  - BGM WNL today  - continue FS AC/HS with ISS  - continue lantus 10 units at night  - carb consistent diet    #Rhabdomyolysis   - elevated CK on admission  - statin held  -continue to monitor     #HFpEF  -Stable-no in exacerbation  - ECHO from 5/8: EF of 60%  - continue carvedilol   - continue imdur      #ESRD/Hypervolemic hyponatremia  - on HD  - scheduled MWF  - continue sevelamer  - nephrology consult appreciated   - fluid restriction  - continue dialysis   - will monitor    #HLD  - will hold statin for now     #Constipation  - continue Miralax, colace, senna    #Hypothyroidism   - continue levothyroxine    #DVT ppx  - SCD      #Dispo: D/C to [facility]

## 2019-05-24 NOTE — CHART NOTE - NSCHARTNOTEFT_GEN_A_CORE
Attempted to sign-out patient to Dr. Amador, accepting physician at [facility]. Was unable to speak with physician directly - message left with nursing staff including callback number.

## 2019-05-24 NOTE — PROGRESS NOTE ADULT - ASSESSMENT
63 yo Female with a PMHx of DM, HLD, HTN, CKD presents to ED with complain of generalized weakness and fever per daughter at the bedside . Patient is so weak he is having trouble ambulating. As per daughter had fever 102 F at home today. he denies HA or neck pain. No chest pain or sob. No abd pain. No n/v/d. No urinary f/u/d. No back pain. no gross motor or sensory deficits. he denies illicit drug use. he had no recent travel. He has no other acute issues symptoms or concerns.  Pt sleeping unable to get history at this time  Will review with family and the pts dialysis unit events of yesterday  Addendum:  Pt had his full dialysis yesterday at the [facility] unit and there were no issues  with dialysis and patient had no fever.  On zosyn  CXR some vascular prominence, no focal PNA reported    5/17  seen on HD   vvs  CT abd , duodenitis?  ID input noted  on cefepime and Flagyll    dr franklin covering weekend    5/20 MK   - ESRD on hd: tolerating hd, will hold bp meds pre hd         will try to target the UF with HD         fluid restrict with hyponatremia related to the excess fluid intake  - HTN; will inc the lisinopril and dec the hydralazine  - ABRAHAN; dc sevelamer during episode of duodenitis   - duodenitis  cefepimine and flagyll: monitor fluid intake    5/21 SY  --ESRD : Continue TIW HD  --Anemia with GI symptoms --GI follow up appreciated.  For EGD in am.  --HTN : continue current meds.  --Follow up HGB post tx today.    5/22 MK   - ESRD on HD, continue to challenge for fluid removal    unclear if transfusion without hd precipitated events    will monitor response to bp and sob with hd    for now with continue with current anti htn   -anemia with s/p transfusion yesterday, on epo  fu trend of h/h    if further tx required will do with hd  - duodenitis vs gastric ulcer: gi input noted responding to PPI    fu on abx    GI aware of mid line abd pain, CT scan finding reviewed     will order bladder scan and last bm yesterday    5/23  Abd discomfort improved  ambulating with PT  therapist states he is doing very well  no complaints  BP elevated last several days   ACEI increased from 5 mg to 40 mg qd on the 20th, 4th dose  SBP 180s earlier today fell to 114 and IMDUR was held    Addendum:  repeat   will administer the IMDUR  monitor BP  will not change  bp meds yet  if cont to have excessive hypotension at noon w high am BPs  may divide lisinopril to 20 mg q12 h  HD in am    5/24  seen on HD   bp better controlled 150-170 range on HD   set for 3 kg fluid removal  awake alert  got lisinopril 40, hydralazine 25, and Coreg 25 this am  pt floor nurse will call me with post HD BP later  if elevated will increase hydralazine to 50 q 8

## 2019-05-24 NOTE — PROGRESS NOTE ADULT - PROVIDER SPECIALTY LIST ADULT
Family Medicine
Gastroenterology
Hospitalist
Infectious Disease
Nephrology
Family Medicine
Family Medicine
Infectious Disease
Nephrology

## 2019-05-24 NOTE — PROGRESS NOTE ADULT - SUBJECTIVE AND OBJECTIVE BOX
Pt has been seen and examined with FP resident, resident supervised agree with a/p       Patient is a 89y old  Male who presents with a chief complaint of complain of weakness (22 May 2019 11:40)          PHYSICAL EXAM:    general- comfortable   -rs-aeeb,cta  -cvs-s1s2 normal   -p/a-soft,bs+  -extremity- no asymmetrical swelling noted           A/P    #d/c today if no social issue, time spent 50 minutes     #Above discussed with pt in detail and all questions have been answered. Discussed with Dr. Lozano     #Further monitoring and management of bp in facility

## 2019-05-24 NOTE — PROGRESS NOTE ADULT - SUBJECTIVE AND OBJECTIVE BOX
Patient to be discharge to [facility] today SUBJECTIVE:     REVIEW OF SYSTEMS:  CONSTITUTIONAL: No weakness, fevers or chills  EYES/ENT: No visual changes;  No vertigo or throat pain   NECK: No pain or stiffness  RESPIRATORY: No cough, wheezing, hemoptysis; No shortness of breath  CARDIOVASCULAR: No chest pain or palpitations  GASTROINTESTINAL: No abdominal or epigastric pain. No nausea, vomiting, or hematemesis; No diarrhea or constipation. No melena or hematochezia.  GENITOURINARY: No dysuria, frequency or hematuria  NEUROLOGICAL: No numbness or weakness  SKIN: No itching, burning, rashes, or lesions   All other review of systems is negative unless indicated above    Vital Signs Last 24 Hrs  T(C): 36.6 (24 May 2019 12:07), Max: 37.1 (23 May 2019 16:53)  T(F): 97.9 (24 May 2019 12:07), Max: 98.7 (23 May 2019 16:53)  HR: 80 (24 May 2019 14:38) (63 - 86)  BP: 149/47 (24 May 2019 14:38) (140/65 - 180/57)  BP(mean): --  RR: 18 (24 May 2019 12:07) (16 - 18)  SpO2: 95% (24 May 2019 12:07) (95% - 98%)    I&O's Summary      CAPILLARY BLOOD GLUCOSE      POCT Blood Glucose.: 103 mg/dL (24 May 2019 12:22)  POCT Blood Glucose.: 155 mg/dL (24 May 2019 05:01)  POCT Blood Glucose.: 209 mg/dL (23 May 2019 20:53)      PHYSICAL EXAM:  Constitutional: NAD, awake and alert, well-developed  HEENT: PERR, EOMI, Normal Hearing, MMM  Neck: Soft and supple, No LAD, No JVD  Respiratory: Breath sounds are clear bilaterally, No wheezing, rales or rhonchi  Cardiovascular: S1 and S2, regular rate and rhythm, no Murmurs, gallops or rubs  Gastrointestinal: Bowel Sounds present, soft, nontender, nondistended, no guarding, no rebound  Extremities: No peripheral edema  Vascular: 2+ peripheral pulses  Neurological: A/O x 3, no focal deficits  Musculoskeletal: 5/5 strength b/l upper and lower extremities  Skin: No rashes    MEDICATIONS:  MEDICATIONS  (STANDING):  carvedilol 25 milliGRAM(s) Oral every 12 hours  cefepime  Injectable. 1000 milliGRAM(s) IV Push daily  dextrose 5%. 1000 milliLiter(s) (50 mL/Hr) IV Continuous <Continuous>  dextrose 50% Injectable 12.5 Gram(s) IV Push once  dextrose 50% Injectable 25 Gram(s) IV Push once  dextrose 50% Injectable 25 Gram(s) IV Push once  epoetin indu Injectable 4000 Unit(s) IV Push <User Schedule>  furosemide    Tablet 40 milliGRAM(s) Oral <User Schedule>  hydrALAZINE 25 milliGRAM(s) Oral three times a day  insulin glargine Injectable (LANTUS) 10 Unit(s) SubCutaneous at bedtime  insulin lispro (HumaLOG) corrective regimen sliding scale   SubCutaneous three times a day before meals  isosorbide   mononitrate ER Tablet (IMDUR) 60 milliGRAM(s) Oral daily  levothyroxine 75 MICROGram(s) Oral daily  lisinopril 40 milliGRAM(s) Oral daily  metroNIDAZOLE  IVPB 500 milliGRAM(s) IV Intermittent every 8 hours  multivitamin 1 Tablet(s) Oral daily  pantoprazole  Injectable 40 milliGRAM(s) IV Push two times a day  tamsulosin 0.4 milliGRAM(s) Oral at bedtime      LABS: All Labs Reviewed:                        8.9    10.02 )-----------( 297      ( 24 May 2019 07:10 )             26.6     05-24    131<L>  |  95<L>  |  35<H>  ----------------------------<  150<H>  4.1   |  25  |  5.90<H>    Ca    7.7<L>      24 May 2019 07:10  Phos  3.7     05-24    TPro  x   /  Alb  2.8<L>  /  TBili  x   /  DBili  x   /  AST  x   /  ALT  x   /  AlkPhos  x   05-24          Blood Culture:     RADIOLOGY/EKG:    DVT PPX:    ADVANCED DIRECTIVE:    DISPOSITION: SUBJECTIVE: 88yo M with PMHx of DM, HLD, HTN, CKD presents to ED with complain of generalized weakness and fever per daughter at the bedside . Patient is so weak he is having trouble ambulating. As per daughter had fever 102 F at home today. he denies HA or neck pain. No chest pain or sob. No abd pain. No n/v/d. No urinary f/u/d. No back pain. no gross motor or sensory deficits. He denies illicit drug use. he had no recent travel. He has no other acute issues symptoms or concerns. Patient discharged on 4/2  at that time was hospitalized with sepsis likely secondary to Rhino/Entero virus & Multifocal PNA.    5/24/19: Overnight, patient was noted to have elevated BP but responsive to meds.  Patient seen on dialysis. He notes that he does not have any SOB, epigastric pain or suprapubic pain. He denies constipation, nausea or difficulty with food. Patient agreeable to discharge to [facility] today.     REVIEW OF SYSTEMS:  CONSTITUTIONAL: mild weakness; no fevers or chills  EYES/ENT: No visual changes;  No vertigo or throat pain   NECK: No pain or stiffness  RESPIRATORY: No cough, wheezing, hemoptysis; No shortness of breath  CARDIOVASCULAR: No chest pain or palpitations  GASTROINTESTINAL: No abdominal or epigastric pain. No nausea, vomiting, or hematemesis; No diarrhea or constipation. No melena or hematochezia.  GENITOURINARY: No dysuria, frequency or hematuria  NEUROLOGICAL: No numbness or weakness  SKIN: No itching, burning, rashes, or lesions   All other review of systems is negative unless indicated above    Vital Signs Last 24 Hrs  T(C): 36.6 (24 May 2019 12:07), Max: 37.1 (23 May 2019 16:53)  T(F): 97.9 (24 May 2019 12:07), Max: 98.7 (23 May 2019 16:53)  HR: 80 (24 May 2019 14:38) (63 - 86)  BP: 149/47 (24 May 2019 14:38) (140/65 - 180/57)  RR: 18 (24 May 2019 12:07) (16 - 18)  SpO2: 95% (24 May 2019 12:07) (95% - 98%)    I&O's Summary      CAPILLARY BLOOD GLUCOSE      POCT Blood Glucose.: 103 mg/dL (24 May 2019 12:22)  POCT Blood Glucose.: 155 mg/dL (24 May 2019 05:01)  POCT Blood Glucose.: 209 mg/dL (23 May 2019 20:53)      PHYSICAL EXAM:    Constitutional: Obese, tired appearing  HEENT: PERR, EOMI, Normal Hearing, MMM  Neck: Soft and supple, No LAD, No JVD  Respiratory: Breath sounds are clear bilaterally, No wheezing, rales or rhonchi  Cardiovascular: S1 and S2, regular rate and rhythm, no Murmurs, gallops or rubs  Gastrointestinal: Bowel Sounds present, soft, nontender, + distended, no guarding, no rebound  Extremities: mild peripheral edema  Vascular: 2+ peripheral pulses  Neurological: A/O x person and place  Musculoskeletal: 4/5 strength b/l upper and lower extremities  Skin: No rashes    MEDICATIONS:  MEDICATIONS  (STANDING):  carvedilol 25 milliGRAM(s) Oral every 12 hours  cefepime  Injectable. 1000 milliGRAM(s) IV Push daily  dextrose 5%. 1000 milliLiter(s) (50 mL/Hr) IV Continuous <Continuous>  dextrose 50% Injectable 12.5 Gram(s) IV Push once  dextrose 50% Injectable 25 Gram(s) IV Push once  dextrose 50% Injectable 25 Gram(s) IV Push once  epoetin indu Injectable 4000 Unit(s) IV Push <User Schedule>  furosemide    Tablet 40 milliGRAM(s) Oral <User Schedule>  hydrALAZINE 25 milliGRAM(s) Oral three times a day  insulin glargine Injectable (LANTUS) 10 Unit(s) SubCutaneous at bedtime  insulin lispro (HumaLOG) corrective regimen sliding scale   SubCutaneous three times a day before meals  isosorbide   mononitrate ER Tablet (IMDUR) 60 milliGRAM(s) Oral daily  levothyroxine 75 MICROGram(s) Oral daily  lisinopril 40 milliGRAM(s) Oral daily  metroNIDAZOLE  IVPB 500 milliGRAM(s) IV Intermittent every 8 hours  multivitamin 1 Tablet(s) Oral daily  pantoprazole  Injectable 40 milliGRAM(s) IV Push two times a day  tamsulosin 0.4 milliGRAM(s) Oral at bedtime      LABS: All Labs Reviewed:                        8.9    10.02 )-----------( 297      ( 24 May 2019 07:10 )             26.6     05-24    131<L>  |  95<L>  |  35<H>  ----------------------------<  150<H>  4.1   |  25  |  5.90<H>    Ca    7.7<L>      24 May 2019 07:10  Phos  3.7     05-24    TPro  x   /  Alb  2.8<L>  /  TBili  x   /  DBili  x   /  AST  x   /  ALT  x   /  AlkPhos  x   05-24          Blood Culture:     RADIOLOGY/EKG:    DVT PPX:    ADVANCED DIRECTIVE:    DISPOSITION:

## 2019-05-24 NOTE — PROGRESS NOTE ADULT - SUBJECTIVE AND OBJECTIVE BOX
NEPHROLOGY INTERVAL HPI/OVERNIGHT EVENTS:    5/24  seen on HD   bp better controlled 150-170 range on HD   set for 3 kg fluid removal  awake alert  got lisinopril 40, hydralazine 25, and Coreg 25 this am  pt floor nurse will call me with post HD BP later  if elevated will increase hydralazine to 50 q 8    5/23  ambulating with PT  therapist states he is doing very well  no complaints  BP elevated last several days   ACEI increased from 5 mg to 40 mg qd on the 20th  SBP 180s earlier today fell to 114 and IMDUR was held    Addendum:  repeat   will administer the IMDUR  monitor BP       05-22-19 @ 11:40  seen at hd with   has been having midline suprapubic pain with focal pain and tenderness with no distension   cannot recall last bm  cannot recall las UOP       MEDICATIONS  (STANDING):  carvedilol 25 milliGRAM(s) Oral every 12 hours  cefepime  Injectable. 1000 milliGRAM(s) IV Push daily  dextrose 5%. 1000 milliLiter(s) (50 mL/Hr) IV Continuous <Continuous>  dextrose 50% Injectable 12.5 Gram(s) IV Push once  dextrose 50% Injectable 25 Gram(s) IV Push once  dextrose 50% Injectable 25 Gram(s) IV Push once  epoetin indu Injectable 4000 Unit(s) IV Push <User Schedule>  furosemide    Tablet 40 milliGRAM(s) Oral <User Schedule>  hydrALAZINE 25 milliGRAM(s) Oral three times a day  insulin glargine Injectable (LANTUS) 10 Unit(s) SubCutaneous at bedtime  insulin lispro (HumaLOG) corrective regimen sliding scale   SubCutaneous three times a day before meals  isosorbide   mononitrate ER Tablet (IMDUR) 60 milliGRAM(s) Oral daily  levothyroxine 75 MICROGram(s) Oral daily  lisinopril 40 milliGRAM(s) Oral daily  metroNIDAZOLE  IVPB 500 milliGRAM(s) IV Intermittent every 8 hours  multivitamin 1 Tablet(s) Oral daily  pantoprazole  Injectable 40 milliGRAM(s) IV Push two times a day  tamsulosin 0.4 milliGRAM(s) Oral at bedtime    MEDICATIONS  (PRN):  acetaminophen   Tablet .. 650 milliGRAM(s) Oral every 6 hours PRN Temp greater or equal to 38C (100.4F), Mild Pain (1 - 3)  artificial  tears Solution 1 Drop(s) Both EYES four times a day PRN Dry Eyes  dextrose 40% Gel 15 Gram(s) Oral once PRN Blood Glucose LESS THAN 70 milliGRAM(s)/deciliter  docusate sodium 100 milliGRAM(s) Oral three times a day PRN Constipation  glucagon  Injectable 1 milliGRAM(s) IntraMuscular once PRN Glucose LESS THAN 70 milligrams/deciliter  senna 1 Tablet(s) Oral at bedtime PRN Constipation refractory to docusate  simethicone 80 milliGRAM(s) Chew three times a day PRN Gas        Allergies    No Known Allergies    Intolerances    I&O's Detail      Vital Signs Last 24 Hrs  T(C): 36.7 (24 May 2019 11:11), Max: 37.1 (23 May 2019 16:53)  T(F): 98 (24 May 2019 11:11), Max: 98.7 (23 May 2019 16:53)  HR: 84 (24 May 2019 11:11) (63 - 86)  BP: 168/70 (24 May 2019 11:11) (140/65 - 170/72)  BP(mean): --  RR: 16 (24 May 2019 11:11) (16 - 18)  SpO2: 98% (24 May 2019 04:44) (96% - 98%)      PHYSICAL EXAM:  General: alert. awake,   HEENT: MMM  CV: s1s2 rrr  LUNGS: B/L basilar rales   EXT: no edema    LABS:    05-24    131<L>  |  95<L>  |  35<H>  ----------------------------<  150<H>  4.1   |  25  |  5.90<H>    Ca    7.7<L>      24 May 2019 07:10  Phos  3.7     05-24    TPro  x   /  Alb  2.8<L>  /  TBili  x   /  DBili  x   /  AST  x   /  ALT  x   /  AlkPhos  x   05-24                            8.9    10.02 )-----------( 297      ( 24 May 2019 07:10 )             26.6

## 2019-05-29 DIAGNOSIS — A41.9 SEPSIS, UNSPECIFIED ORGANISM: ICD-10-CM

## 2019-05-29 DIAGNOSIS — Z79.82 LONG TERM (CURRENT) USE OF ASPIRIN: ICD-10-CM

## 2019-05-29 DIAGNOSIS — E87.1 HYPO-OSMOLALITY AND HYPONATREMIA: ICD-10-CM

## 2019-05-29 DIAGNOSIS — E78.5 HYPERLIPIDEMIA, UNSPECIFIED: ICD-10-CM

## 2019-05-29 DIAGNOSIS — K59.00 CONSTIPATION, UNSPECIFIED: ICD-10-CM

## 2019-05-29 DIAGNOSIS — M62.82 RHABDOMYOLYSIS: ICD-10-CM

## 2019-05-29 DIAGNOSIS — I50.32 CHRONIC DIASTOLIC (CONGESTIVE) HEART FAILURE: ICD-10-CM

## 2019-05-29 DIAGNOSIS — E03.9 HYPOTHYROIDISM, UNSPECIFIED: ICD-10-CM

## 2019-05-29 DIAGNOSIS — N18.6 END STAGE RENAL DISEASE: ICD-10-CM

## 2019-05-29 DIAGNOSIS — E11.22 TYPE 2 DIABETES MELLITUS WITH DIABETIC CHRONIC KIDNEY DISEASE: ICD-10-CM

## 2019-05-29 DIAGNOSIS — Z99.2 DEPENDENCE ON RENAL DIALYSIS: ICD-10-CM

## 2019-05-29 DIAGNOSIS — Z95.810 PRESENCE OF AUTOMATIC (IMPLANTABLE) CARDIAC DEFIBRILLATOR: ICD-10-CM

## 2019-05-29 DIAGNOSIS — I13.2 HYPERTENSIVE HEART AND CHRONIC KIDNEY DISEASE WITH HEART FAILURE AND WITH STAGE 5 CHRONIC KIDNEY DISEASE, OR END STAGE RENAL DISEASE: ICD-10-CM

## 2019-05-29 DIAGNOSIS — J18.9 PNEUMONIA, UNSPECIFIED ORGANISM: ICD-10-CM

## 2019-05-29 DIAGNOSIS — K29.81 DUODENITIS WITH BLEEDING: ICD-10-CM

## 2019-05-29 DIAGNOSIS — K26.4 CHRONIC OR UNSPECIFIED DUODENAL ULCER WITH HEMORRHAGE: ICD-10-CM

## 2019-06-01 ENCOUNTER — OUTPATIENT (OUTPATIENT)
Dept: OUTPATIENT SERVICES | Facility: HOSPITAL | Age: 84
LOS: 1 days | End: 2019-06-01
Payer: MEDICARE

## 2019-06-01 DIAGNOSIS — Z95.810 PRESENCE OF AUTOMATIC (IMPLANTABLE) CARDIAC DEFIBRILLATOR: Chronic | ICD-10-CM

## 2019-06-01 DIAGNOSIS — Z96.89 PRESENCE OF OTHER SPECIFIED FUNCTIONAL IMPLANTS: Chronic | ICD-10-CM

## 2019-06-01 PROCEDURE — G9001: CPT

## 2019-06-13 ENCOUNTER — APPOINTMENT (OUTPATIENT)
Dept: ELECTROPHYSIOLOGY | Facility: CLINIC | Age: 84
End: 2019-06-13

## 2019-06-13 DIAGNOSIS — Z71.89 OTHER SPECIFIED COUNSELING: ICD-10-CM

## 2019-07-10 NOTE — PATIENT PROFILE ADULT. - ANESTHESIA, PREVIOUS REACTION, PROFILE
Screening Questionnaire for Adult Immunization    Are you sick today?   No   Do you have allergies to medications, food, a vaccine component or latex?   No   Have you ever had a serious reaction after receiving a vaccination?   No   Do you have a long-term health problem with heart disease, lung disease, asthma, kidney disease, metabolic disease (e.g. diabetes), anemia, or other blood disorder?   No   Do you have cancer, leukemia, HIV/AIDS, or any other immune system problem?   No   In the past 3 months, have you taken medications that affect  your immune system, such as prednisone, other steroids, or anticancer drugs; drugs for the treatment of rheumatoid arthritis, Crohn s disease, or psoriasis; or have you had radiation treatments?   No   Have you had a seizure, or a brain or other nervous system problem?   No   During the past year, have you received a transfusion of blood or blood     products, or been given immune (gamma) globulin or antiviral drug?   No   For women: Are you pregnant or is there a chance you could become        pregnant during the next month?   Yes   Have you received any vaccinations in the past 4 weeks?   No     Immunization questionnaire was positive for at least one answer.  Notified Mabel Latif.        Per orders of Mabel Latif, injection of Tdap given by Conrado Cueto. Patient instructed to remain in clinic for 15 minutes afterwards, and to report any adverse reaction to me immediately.       Screening performed by Conrado Cueto on 7/10/2019 at 1:33 PM.     none

## 2019-08-08 ENCOUNTER — EMERGENCY (EMERGENCY)
Facility: HOSPITAL | Age: 84
LOS: 0 days | Discharge: HOME | End: 2019-08-08
Attending: EMERGENCY MEDICINE
Payer: MEDICARE

## 2019-08-08 VITALS
RESPIRATION RATE: 17 BRPM | DIASTOLIC BLOOD PRESSURE: 64 MMHG | HEART RATE: 64 BPM | OXYGEN SATURATION: 97 % | SYSTOLIC BLOOD PRESSURE: 199 MMHG

## 2019-08-08 VITALS — WEIGHT: 175.05 LBS | HEIGHT: 61 IN

## 2019-08-08 DIAGNOSIS — Z95.810 PRESENCE OF AUTOMATIC (IMPLANTABLE) CARDIAC DEFIBRILLATOR: Chronic | ICD-10-CM

## 2019-08-08 DIAGNOSIS — Z95.810 PRESENCE OF AUTOMATIC (IMPLANTABLE) CARDIAC DEFIBRILLATOR: ICD-10-CM
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
## 2019-08-08 DIAGNOSIS — M54.9 DORSALGIA, UNSPECIFIED: ICD-10-CM

## 2019-08-08 DIAGNOSIS — E78.5 HYPERLIPIDEMIA, UNSPECIFIED: ICD-10-CM

## 2019-08-08 DIAGNOSIS — Z86.711 PERSONAL HISTORY OF PULMONARY EMBOLISM: ICD-10-CM

## 2019-08-08 DIAGNOSIS — I50.9 HEART FAILURE, UNSPECIFIED: ICD-10-CM

## 2019-08-08 DIAGNOSIS — I11.0 HYPERTENSIVE HEART DISEASE WITH HEART FAILURE: ICD-10-CM

## 2019-08-08 DIAGNOSIS — E11.9 TYPE 2 DIABETES MELLITUS WITHOUT COMPLICATIONS: ICD-10-CM
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
## 2019-08-08 DIAGNOSIS — R10.9 UNSPECIFIED ABDOMINAL PAIN: ICD-10-CM

## 2019-08-08 DIAGNOSIS — Z96.89 PRESENCE OF OTHER SPECIFIED FUNCTIONAL IMPLANTS: Chronic | ICD-10-CM

## 2019-08-08 DIAGNOSIS — Z79.4 LONG TERM (CURRENT) USE OF INSULIN: ICD-10-CM

## 2019-08-08 DIAGNOSIS — S30.0XXA CONTUSION OF LOWER BACK AND PELVIS, INITIAL ENCOUNTER: ICD-10-CM

## 2019-08-08 DIAGNOSIS — I25.2 OLD MYOCARDIAL INFARCTION: ICD-10-CM

## 2019-08-08 DIAGNOSIS — Z99.2 DEPENDENCE ON RENAL DIALYSIS: ICD-10-CM

## 2019-08-08 DIAGNOSIS — Z79.82 LONG TERM (CURRENT) USE OF ASPIRIN: ICD-10-CM

## 2019-08-08 PROCEDURE — 74176 CT ABD & PELVIS W/O CONTRAST: CPT

## 2019-08-08 PROCEDURE — 99285 EMERGENCY DEPT VISIT HI MDM: CPT

## 2019-08-08 PROCEDURE — 74176 CT ABD & PELVIS W/O CONTRAST: CPT | Mod: 26

## 2019-08-08 PROCEDURE — 99284 EMERGENCY DEPT VISIT MOD MDM: CPT | Mod: 25

## 2019-08-08 RX ORDER — HYDROMORPHONE HYDROCHLORIDE 2 MG/ML
0.5 INJECTION INTRAMUSCULAR; INTRAVENOUS; SUBCUTANEOUS ONCE
Refills: 0 | Status: DISCONTINUED | OUTPATIENT
Start: 2019-08-08 | End: 2019-08-08

## 2019-08-08 RX ORDER — IBUPROFEN 200 MG
600 TABLET ORAL ONCE
Refills: 0 | Status: COMPLETED | OUTPATIENT
Start: 2019-08-08 | End: 2019-08-08

## 2019-08-08 RX ORDER — LIDOCAINE 4 G/100G
1 CREAM TOPICAL ONCE
Refills: 0 | Status: COMPLETED | OUTPATIENT
Start: 2019-08-08 | End: 2019-08-08

## 2019-08-08 RX ADMIN — LIDOCAINE 1 PATCH: 4 CREAM TOPICAL at 11:24

## 2019-08-08 RX ADMIN — Medication 600 MILLIGRAM(S): at 11:23

## 2019-08-08 NOTE — ED STATDOCS - CLINICAL SUMMARY MEDICAL DECISION MAKING FREE TEXT BOX
88 y/o male s/p mechanical fall. No head injury or signs of significant sinal trauma. Due to age and ASA will do CT scan for RP bleed or occult fracture and symptomatic pain medication. 88 y/o male s/p mechanical fall. No head injury or signs of significant spinal trauma. Due to age and on ASA will do CT scan for RP bleed or occult fracture and symptomatic pain medication. 88 y/o male s/p mechanical fall. No head injury or signs of significant spinal trauma. Due to age and on ASA will do CT scan for RP bleed or occult fracture and symptomatic pain medication.      Dr. Mares came over to ST verbal reading as he can not read films in radiology and had to see images in CT.  No Fx, just DJD and no retroperitoneal bleed.  Cleared with attending.  Cherri Harris PA-C 88 y/o male s/p mechanical fall. No head injury or signs of significant spinal trauma. Due to age and on ASA will do CT scan for RP bleed or occult fracture and symptomatic pain medication.      Dr. Mares came over to ST verbal reading as he can not read films in radiology and had to see images in CT.  No Fx, just DJD and no retroperitoneal bleed.  Cleared with attending.  Cherri Mares came over to ST verbal reading as he can not read films in radiology and had to see images in CT.  No Fx, just DJD and no retroperitoneal bleed.  Cleared with attending.  Cherri Harris PA-C

## 2019-08-08 NOTE — ED ADULT NURSE NOTE - NSIMPLEMENTINTERV_GEN_ALL_ED
Implemented All Fall with Harm Risk Interventions:  [place] to call system. Call bell, personal items and telephone within reach. Instruct patient to call for assistance. Room bathroom lighting operational. Non-slip footwear when patient is off stretcher. Physically safe environment: no spills, clutter or unnecessary equipment. Stretcher in lowest position, wheels locked, appropriate side rails in place. Provide visual cue, wrist band, yellow gown, etc. Monitor gait and stability. Monitor for mental status changes and reorient to person, place, and time. Review medications for side effects contributing to fall risk. Reinforce activity limits and safety measures with patient and family. Provide visual clues: red socks.

## 2019-08-08 NOTE — ED ADULT NURSE NOTE - CHPI ED NUR SYMPTOMS NEG
no neck tenderness/no numbness/no anorexia/no bowel dysfunction/no constipation/no tingling/no fatigue/no bladder dysfunction

## 2019-08-08 NOTE — ED STATDOCS - OBJECTIVE STATEMENT
88 y/o male with a PMHx of AICD, CHF, DM, on dialysis, HLD, HTN, MI, Pancreatitis, PE, h/o hernia repair, penile implant presents to the ED s/p fall yesterday c/o back pain. Pt states yesterday when getting out of his Amulet at 10 am, he fell backwards landing on his back. no head trauma. Able to ambulate yesterday, pain worse today. Does not feel weak, no bowel or urine incontinence, still makes small amount of urine. Took Tylenol PTA with mild improvement. 88 y/o male with a PMHx of AICD, CHF, DM, on dialysis, HLD, HTN, MI, Pancreatitis, PE, h/o hernia repair, penile implant on ASA presents to the ED s/p fall yesterday c/o back pain. Pt states yesterday when getting out of his Amulet at 10 am, he fell backwards landing on his back. no head trauma. Able to ambulate yesterday, pain worse today. Does not feel weak, no bowel or urine incontinence, still makes small amount of urine. Took Tylenol PTA with mild improvement. 89 y/o male with a PMHx of AICD, CHF, DM, on dialysis, HLD, HTN, MI, Pancreatitis, PE, h/o hernia repair, penile implant on ASA presents to the ED s/p fall yesterday c/o back pain. Pt states yesterday when getting out of his Ambulette at 10 am, he fell backwards landing on his back. no head trauma. Able to ambulate yesterday, pain worse today. Does not feel weak, no bowel or urine incontinence, still makes small amount of urine. Took Tylenol PTA with mild improvement.

## 2019-08-08 NOTE — ED STATDOCS - PHYSICAL EXAMINATION
Gen: Well appearing in NAD  Head: NC/AT  Neck: trachea midline  Resp:  No distress  Ext: no deformities  Neuro:  A&O appears non focal  Skin:  Warm and dry as visualized  Psych:  Normal affect and mood  MSK: No midline TTP in t or l spine. no pain with TTP to pelvis. +Mild left sided lateral TTP most consistent with muscular.

## 2019-08-08 NOTE — ED ADULT NURSE NOTE - OBJECTIVE STATEMENT
presents to ed with right lower back pain s/p mechanical fall out of ambulette yesterday while leaving dialysis.

## 2019-08-08 NOTE — ED ADULT TRIAGE NOTE - CHIEF COMPLAINT QUOTE
pt brought by ambulette for eval of back pain and right leg pain s/p mechanical fall yesterday while getting out of the ambulette from dialysis. pt was able to ambulate s/p fall. pt on ASA 81mg

## 2019-08-08 NOTE — ED STATDOCS - PROGRESS NOTE DETAILS
89 y/o male with a PMHx of AICD, CHF, DM, on dialysis, HLD, HTN, MI, Pancreatitis, PE, h/o hernia repair, penile implant presents to the ED s/p fall yesterday c/o back pain. Pt states yesterday when getting out of his Amulet at 10 am, he fell backwards landing on his back. no head trauma. Able to ambulate yesterday, pain worse today. Does not feel weak, no bowel or urine incontinence, still makes small amount of urine. Took Tylenol PTA with mild improvement.   Cherri Harris PA-C Right flank pain will r/o retroperitoneal bleed.  Neg. head trauma.  Cherri Harris PA-C Dr. Mares came over to ST verbal reading as he can not read films in radiology and had to see images in CT.  No Fx, just DJD and no retroperitoneal bleed.  Cherri Harris PA-C Dr. Mares came over to ST verbal reading as he can not read films in radiology and had to see images in CT.  No Fx, just DJD and no retroperitoneal bleed.  Cleared with attending.  Cherri Harris PA-C

## 2019-08-08 NOTE — ED STATDOCS - PRO INTERPRETER NEED 2
How Severe Is Your Skin Lesion?: mild Has Your Skin Lesion Been Treated?: not been treated Is This A New Presentation, Or A Follow-Up?: Growth English

## 2019-08-22 ENCOUNTER — APPOINTMENT (OUTPATIENT)
Dept: ELECTROPHYSIOLOGY | Facility: CLINIC | Age: 84
End: 2019-08-22
Payer: MEDICARE

## 2019-08-22 VITALS — HEART RATE: 75 BPM | DIASTOLIC BLOOD PRESSURE: 74 MMHG | HEIGHT: 62 IN | SYSTOLIC BLOOD PRESSURE: 179 MMHG

## 2019-08-22 PROCEDURE — 93290 INTERROG DEV EVAL ICPMS IP: CPT | Mod: 26

## 2019-08-22 PROCEDURE — 93284 PRGRMG EVAL IMPLANTABLE DFB: CPT

## 2019-09-04 NOTE — PATIENT PROFILE ADULT - NSPROEDAABILITYLEARN_GEN_A_NUR
Elder Vega is a 58 year old male presenting for   Chief Complaint   Patient presents with   • Follow-up     med check      Denies Latex allergy or sensitivity.    Medication verified and med list updated  Refills needed today: Yes    Health Maintenance Due   Topic Date Due   • DTaP/Tdap/Td Vaccine (1 - Tdap) 03/17/1980   • Shingles Vaccine (1 of 2) 03/17/2011   • Hepatitis C Screening  03/17/2012   • Lung Cancer Screening  03/17/2016   • Depression Screening  05/04/2019   • Influenza Vaccine (1) 09/01/2019       Patient is due for the topics as listed above and wishes to discuss with provider.          language/communication limitations

## 2019-11-22 ENCOUNTER — TRANSCRIPTION ENCOUNTER (OUTPATIENT)
Age: 84
End: 2019-11-22

## 2019-11-25 NOTE — PROGRESS NOTE ADULT - NSHPATTENDINGPLANDISCUSS_GEN_ALL_CORE
LOV:10/30/2019  Next appt: 04/29/2020  Last refill:10/29/2018 #60 11RF   d/w pt, daughter, c/w monitor, d/c planning to [place] on monday

## 2019-11-26 ENCOUNTER — APPOINTMENT (OUTPATIENT)
Dept: ELECTROPHYSIOLOGY | Facility: CLINIC | Age: 84
End: 2019-11-26
Payer: MEDICARE

## 2019-11-26 VITALS
DIASTOLIC BLOOD PRESSURE: 56 MMHG | OXYGEN SATURATION: 97 % | HEIGHT: 62 IN | SYSTOLIC BLOOD PRESSURE: 135 MMHG | HEART RATE: 75 BPM | WEIGHT: 177 LBS | BODY MASS INDEX: 32.57 KG/M2

## 2019-11-26 PROCEDURE — 93284 PRGRMG EVAL IMPLANTABLE DFB: CPT

## 2019-11-26 PROCEDURE — 93290 INTERROG DEV EVAL ICPMS IP: CPT | Mod: 26

## 2019-12-30 NOTE — PROVIDER CONTACT NOTE (OTHER) - SITUATION
[facility]: PRESCRIBED GLASSES AND OR CONTACTS. FOLLOW-UP AS SCHEDULED. HFpEF, elevated troponin    left message with ans service

## 2020-01-01 ENCOUNTER — NON-APPOINTMENT (OUTPATIENT)
Age: 85
End: 2020-01-01

## 2020-01-01 ENCOUNTER — OUTPATIENT (OUTPATIENT)
Dept: OUTPATIENT SERVICES | Facility: HOSPITAL | Age: 85
LOS: 1 days | End: 2020-01-01
Payer: MEDICARE

## 2020-01-01 ENCOUNTER — APPOINTMENT (OUTPATIENT)
Dept: CARDIOLOGY | Facility: CLINIC | Age: 85
End: 2020-01-01
Payer: MEDICARE

## 2020-01-01 ENCOUNTER — APPOINTMENT (OUTPATIENT)
Dept: ELECTROPHYSIOLOGY | Facility: CLINIC | Age: 85
End: 2020-01-01
Payer: MEDICARE

## 2020-01-01 ENCOUNTER — APPOINTMENT (OUTPATIENT)
Dept: CARDIOLOGY | Facility: CLINIC | Age: 85
End: 2020-01-01

## 2020-01-01 ENCOUNTER — APPOINTMENT (OUTPATIENT)
Dept: ELECTROPHYSIOLOGY | Facility: CLINIC | Age: 85
End: 2020-01-01

## 2020-01-01 ENCOUNTER — EMERGENCY (EMERGENCY)
Facility: HOSPITAL | Age: 85
LOS: 0 days | Discharge: HOME | End: 2020-02-13
Attending: FAMILY MEDICINE
Payer: MEDICARE

## 2020-01-01 ENCOUNTER — EMERGENCY (EMERGENCY)
Facility: HOSPITAL | Age: 85
LOS: 0 days | Discharge: HOME | End: 2020-07-28
Attending: STUDENT IN AN ORGANIZED HEALTH CARE EDUCATION/TRAINING PROGRAM
Payer: MEDICARE

## 2020-01-01 ENCOUNTER — INPATIENT (INPATIENT)
Facility: HOSPITAL | Age: 85
LOS: 0 days | DRG: 296 | End: 2020-12-03
Attending: INTERNAL MEDICINE | Admitting: SURGERY
Payer: MEDICARE

## 2020-01-01 ENCOUNTER — EMERGENCY (EMERGENCY)
Facility: HOSPITAL | Age: 85
LOS: 0 days | Discharge: HOME | End: 2020-01-07
Attending: EMERGENCY MEDICINE
Payer: MEDICARE

## 2020-01-01 VITALS
WEIGHT: 171.08 LBS | HEART RATE: 73 BPM | BODY MASS INDEX: 31.48 KG/M2 | OXYGEN SATURATION: 96 % | SYSTOLIC BLOOD PRESSURE: 125 MMHG | DIASTOLIC BLOOD PRESSURE: 53 MMHG | HEIGHT: 62 IN

## 2020-01-01 VITALS
SYSTOLIC BLOOD PRESSURE: 146 MMHG | HEIGHT: 62 IN | OXYGEN SATURATION: 96 % | HEART RATE: 77 BPM | WEIGHT: 173 LBS | DIASTOLIC BLOOD PRESSURE: 58 MMHG | BODY MASS INDEX: 31.83 KG/M2

## 2020-01-01 VITALS — WEIGHT: 171 LBS | BODY MASS INDEX: 31.47 KG/M2 | HEIGHT: 62 IN

## 2020-01-01 VITALS
HEART RATE: 60 BPM | SYSTOLIC BLOOD PRESSURE: 152 MMHG | DIASTOLIC BLOOD PRESSURE: 57 MMHG | RESPIRATION RATE: 18 BRPM | TEMPERATURE: 98 F | OXYGEN SATURATION: 95 %

## 2020-01-01 VITALS
RESPIRATION RATE: 15 BRPM | SYSTOLIC BLOOD PRESSURE: 122 MMHG | WEIGHT: 177.91 LBS | DIASTOLIC BLOOD PRESSURE: 49 MMHG | OXYGEN SATURATION: 97 % | HEIGHT: 66 IN | TEMPERATURE: 98 F | HEART RATE: 70 BPM

## 2020-01-01 VITALS
TEMPERATURE: 99 F | HEART RATE: 71 BPM | DIASTOLIC BLOOD PRESSURE: 45 MMHG | OXYGEN SATURATION: 96 % | RESPIRATION RATE: 20 BRPM | SYSTOLIC BLOOD PRESSURE: 150 MMHG

## 2020-01-01 VITALS — SYSTOLIC BLOOD PRESSURE: 167 MMHG | DIASTOLIC BLOOD PRESSURE: 78 MMHG

## 2020-01-01 VITALS
HEART RATE: 73 BPM | SYSTOLIC BLOOD PRESSURE: 136 MMHG | HEIGHT: 62 IN | BODY MASS INDEX: 31.83 KG/M2 | OXYGEN SATURATION: 98 % | WEIGHT: 173 LBS | DIASTOLIC BLOOD PRESSURE: 57 MMHG

## 2020-01-01 VITALS — HEIGHT: 66 IN

## 2020-01-01 VITALS — WEIGHT: 164.91 LBS | HEIGHT: 62 IN

## 2020-01-01 VITALS — HEIGHT: 61 IN | WEIGHT: 175.93 LBS

## 2020-01-01 VITALS — HEART RATE: 60 BPM

## 2020-01-01 DIAGNOSIS — Z96.89 PRESENCE OF OTHER SPECIFIED FUNCTIONAL IMPLANTS: Chronic | ICD-10-CM

## 2020-01-01 DIAGNOSIS — Z99.2 DEPENDENCE ON RENAL DIALYSIS: ICD-10-CM
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
## 2020-01-01 DIAGNOSIS — I13.2 HYPERTENSIVE HEART AND CHRONIC KIDNEY DISEASE WITH HEART FAILURE AND WITH STAGE 5 CHRONIC KIDNEY DISEASE, OR END STAGE RENAL DISEASE: ICD-10-CM

## 2020-01-01 DIAGNOSIS — Z95.810 PRESENCE OF AUTOMATIC (IMPLANTABLE) CARDIAC DEFIBRILLATOR: ICD-10-CM

## 2020-01-01 DIAGNOSIS — E11.9 TYPE 2 DIABETES MELLITUS WITHOUT COMPLICATIONS: ICD-10-CM

## 2020-01-01 DIAGNOSIS — N39.0 URINARY TRACT INFECTION, SITE NOT SPECIFIED: ICD-10-CM

## 2020-01-01 DIAGNOSIS — J81.1 CHRONIC PULMONARY EDEMA: ICD-10-CM

## 2020-01-01 DIAGNOSIS — R40.2433 GLASGOW COMA SCALE SCORE 3-8, AT HOSPITAL ADMISSION: ICD-10-CM

## 2020-01-01 DIAGNOSIS — K85.90 ACUTE PANCREATITIS WITHOUT NECROSIS OR INFECTION, UNSPECIFIED: ICD-10-CM

## 2020-01-01 DIAGNOSIS — I47.2 VENTRICULAR TACHYCARDIA: ICD-10-CM

## 2020-01-01 DIAGNOSIS — R94.31 ABNORMAL ELECTROCARDIOGRAM [ECG] [EKG]: ICD-10-CM

## 2020-01-01 DIAGNOSIS — J69.0 PNEUMONITIS DUE TO INHALATION OF FOOD AND VOMIT: ICD-10-CM

## 2020-01-01 DIAGNOSIS — S22.43XA MULTIPLE FRACTURES OF RIBS, BILATERAL, INITIAL ENCOUNTER FOR CLOSED FRACTURE: ICD-10-CM
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
## 2020-01-01 DIAGNOSIS — S01.22XA LACERATION WITH FOREIGN BODY OF NOSE, INITIAL ENCOUNTER: ICD-10-CM

## 2020-01-01 DIAGNOSIS — Z79.82 LONG TERM (CURRENT) USE OF ASPIRIN: ICD-10-CM

## 2020-01-01 DIAGNOSIS — Z87.19 PERSONAL HISTORY OF OTHER DISEASES OF THE DIGESTIVE SYSTEM: ICD-10-CM

## 2020-01-01 DIAGNOSIS — I50.22 CHRONIC SYSTOLIC (CONGESTIVE) HEART FAILURE: ICD-10-CM

## 2020-01-01 DIAGNOSIS — M50.30 OTHER CERVICAL DISC DEGENERATION, UNSPECIFIED CERVICAL REGION: ICD-10-CM

## 2020-01-01 DIAGNOSIS — Z95.810 PRESENCE OF AUTOMATIC (IMPLANTABLE) CARDIAC DEFIBRILLATOR: Chronic | ICD-10-CM

## 2020-01-01 DIAGNOSIS — J18.9 PNEUMONIA, UNSPECIFIED ORGANISM: ICD-10-CM

## 2020-01-01 DIAGNOSIS — Z96.0 PRESENCE OF UROGENITAL IMPLANTS: ICD-10-CM

## 2020-01-01 DIAGNOSIS — R53.1 WEAKNESS: ICD-10-CM

## 2020-01-01 DIAGNOSIS — S01.81XA LACERATION WITHOUT FOREIGN BODY OF OTHER PART OF HEAD, INITIAL ENCOUNTER: ICD-10-CM

## 2020-01-01 DIAGNOSIS — E78.00 PURE HYPERCHOLESTEROLEMIA, UNSPECIFIED: ICD-10-CM

## 2020-01-01 DIAGNOSIS — G93.1 ANOXIC BRAIN DAMAGE, NOT ELSEWHERE CLASSIFIED: ICD-10-CM

## 2020-01-01 DIAGNOSIS — N18.6 END STAGE RENAL DISEASE: ICD-10-CM

## 2020-01-01 DIAGNOSIS — Z96.89 PRESENCE OF OTHER SPECIFIED FUNCTIONAL IMPLANTS: ICD-10-CM

## 2020-01-01 DIAGNOSIS — M54.10 RADICULOPATHY, SITE UNSPECIFIED: ICD-10-CM

## 2020-01-01 DIAGNOSIS — E11.22 TYPE 2 DIABETES MELLITUS WITH DIABETIC CHRONIC KIDNEY DISEASE: ICD-10-CM

## 2020-01-01 DIAGNOSIS — R50.9 FEVER, UNSPECIFIED: ICD-10-CM

## 2020-01-01 DIAGNOSIS — I25.2 OLD MYOCARDIAL INFARCTION: ICD-10-CM

## 2020-01-01 DIAGNOSIS — R57.0 CARDIOGENIC SHOCK: ICD-10-CM

## 2020-01-01 DIAGNOSIS — R05 COUGH: ICD-10-CM

## 2020-01-01 DIAGNOSIS — I11.0 HYPERTENSIVE HEART DISEASE WITH HEART FAILURE: ICD-10-CM

## 2020-01-01 DIAGNOSIS — I50.9 HEART FAILURE, UNSPECIFIED: ICD-10-CM

## 2020-01-01 DIAGNOSIS — M79.662 PAIN IN LEFT LOWER LEG: ICD-10-CM

## 2020-01-01 DIAGNOSIS — M47.9 SPONDYLOSIS, UNSPECIFIED: ICD-10-CM

## 2020-01-01 DIAGNOSIS — Z51.5 ENCOUNTER FOR PALLIATIVE CARE: ICD-10-CM

## 2020-01-01 DIAGNOSIS — I12.0 HYPERTENSIVE CHRONIC KIDNEY DISEASE WITH STAGE 5 CHRONIC KIDNEY DISEASE OR END STAGE RENAL DISEASE: ICD-10-CM

## 2020-01-01 DIAGNOSIS — S12.000A UNSPECIFIED DISPLACED FRACTURE OF FIRST CERVICAL VERTEBRA, INITIAL ENCOUNTER FOR CLOSED FRACTURE: ICD-10-CM

## 2020-01-01 DIAGNOSIS — R33.9 RETENTION OF URINE, UNSPECIFIED: ICD-10-CM

## 2020-01-01 DIAGNOSIS — I46.9 CARDIAC ARREST, CAUSE UNSPECIFIED: ICD-10-CM

## 2020-01-01 DIAGNOSIS — R76.8 OTHER SPECIFIED ABNORMAL IMMUNOLOGICAL FINDINGS IN SERUM: ICD-10-CM

## 2020-01-01 DIAGNOSIS — J96.00 ACUTE RESPIRATORY FAILURE, UNSPECIFIED WHETHER WITH HYPOXIA OR HYPERCAPNIA: ICD-10-CM

## 2020-01-01 DIAGNOSIS — Z79.4 LONG TERM (CURRENT) USE OF INSULIN: ICD-10-CM

## 2020-01-01 DIAGNOSIS — Z66 DO NOT RESUSCITATE: ICD-10-CM

## 2020-01-01 DIAGNOSIS — S12.190A OTHER DISPLACED FRACTURE OF SECOND CERVICAL VERTEBRA, INITIAL ENCOUNTER FOR CLOSED FRACTURE: ICD-10-CM
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
## 2020-01-01 DIAGNOSIS — Z96.9 PRESENCE OF FUNCTIONAL IMPLANT, UNSPECIFIED: ICD-10-CM

## 2020-01-01 DIAGNOSIS — R68.0 HYPOTHERMIA, NOT ASSOCIATED WITH LOW ENVIRONMENTAL TEMPERATURE: ICD-10-CM

## 2020-01-01 DIAGNOSIS — I67.89 OTHER CEREBROVASCULAR DISEASE: ICD-10-CM

## 2020-01-01 LAB
ALBUMIN SERPL ELPH-MCNC: 2.4 G/DL — LOW (ref 3.3–5)
ALBUMIN SERPL ELPH-MCNC: 3.4 G/DL — SIGNIFICANT CHANGE UP (ref 3.3–5)
ALBUMIN SERPL ELPH-MCNC: 3.4 G/DL — SIGNIFICANT CHANGE UP (ref 3.3–5)
ALP SERPL-CCNC: 158 U/L — HIGH (ref 40–120)
ALP SERPL-CCNC: 302 U/L — HIGH (ref 40–120)
ALP SERPL-CCNC: 90 U/L — SIGNIFICANT CHANGE UP (ref 40–120)
ALT FLD-CCNC: 104 U/L — HIGH (ref 12–78)
ALT FLD-CCNC: 24 U/L — SIGNIFICANT CHANGE UP (ref 12–78)
ALT FLD-CCNC: 29 U/L — SIGNIFICANT CHANGE UP (ref 12–78)
ANION GAP SERPL CALC-SCNC: 10 MMOL/L — SIGNIFICANT CHANGE UP (ref 5–17)
ANION GAP SERPL CALC-SCNC: 11 MMOL/L — SIGNIFICANT CHANGE UP (ref 5–17)
ANION GAP SERPL CALC-SCNC: 8 MMOL/L — SIGNIFICANT CHANGE UP (ref 5–17)
APPEARANCE UR: ABNORMAL
APTT BLD: 28 SEC — SIGNIFICANT CHANGE UP (ref 27.5–36.3)
APTT BLD: 39 SEC — HIGH (ref 27.5–35.5)
AST SERPL-CCNC: 13 U/L — LOW (ref 15–37)
AST SERPL-CCNC: 157 U/L — HIGH (ref 15–37)
AST SERPL-CCNC: 30 U/L — SIGNIFICANT CHANGE UP (ref 15–37)
BASE EXCESS BLDA CALC-SCNC: -0.6 MMOL/L — SIGNIFICANT CHANGE UP (ref -2–2)
BASOPHILS # BLD AUTO: 0 K/UL — SIGNIFICANT CHANGE UP (ref 0–0.2)
BASOPHILS # BLD AUTO: 0.07 K/UL — SIGNIFICANT CHANGE UP (ref 0–0.2)
BASOPHILS # BLD AUTO: 0.08 K/UL — SIGNIFICANT CHANGE UP (ref 0–0.2)
BASOPHILS # BLD AUTO: 0.19 K/UL — SIGNIFICANT CHANGE UP (ref 0–0.2)
BASOPHILS NFR BLD AUTO: 0 % — SIGNIFICANT CHANGE UP (ref 0–2)
BASOPHILS NFR BLD AUTO: 0.9 % — SIGNIFICANT CHANGE UP (ref 0–2)
BASOPHILS NFR BLD AUTO: 1 % — SIGNIFICANT CHANGE UP (ref 0–2)
BASOPHILS NFR BLD AUTO: 1.2 % — SIGNIFICANT CHANGE UP (ref 0–2)
BILIRUB SERPL-MCNC: 0.3 MG/DL — SIGNIFICANT CHANGE UP (ref 0.2–1.2)
BILIRUB SERPL-MCNC: 0.3 MG/DL — SIGNIFICANT CHANGE UP (ref 0.2–1.2)
BILIRUB SERPL-MCNC: 0.9 MG/DL — SIGNIFICANT CHANGE UP (ref 0.2–1.2)
BILIRUB UR-MCNC: NEGATIVE — SIGNIFICANT CHANGE UP
BLOOD GAS COMMENTS ARTERIAL: SIGNIFICANT CHANGE UP
BUN SERPL-MCNC: 36 MG/DL — HIGH (ref 7–23)
BUN SERPL-MCNC: 39 MG/DL — HIGH (ref 7–23)
BUN SERPL-MCNC: 49 MG/DL — HIGH (ref 7–23)
CALCIUM SERPL-MCNC: 8.2 MG/DL — LOW (ref 8.5–10.1)
CALCIUM SERPL-MCNC: 8.4 MG/DL — LOW (ref 8.5–10.1)
CALCIUM SERPL-MCNC: 9.1 MG/DL — SIGNIFICANT CHANGE UP (ref 8.5–10.1)
CHLORIDE SERPL-SCNC: 100 MMOL/L — SIGNIFICANT CHANGE UP (ref 96–108)
CHLORIDE SERPL-SCNC: 97 MMOL/L — SIGNIFICANT CHANGE UP (ref 96–108)
CHLORIDE SERPL-SCNC: 98 MMOL/L — SIGNIFICANT CHANGE UP (ref 96–108)
CO2 SERPL-SCNC: 28 MMOL/L — SIGNIFICANT CHANGE UP (ref 22–31)
COLOR SPEC: YELLOW — SIGNIFICANT CHANGE UP
CREAT SERPL-MCNC: 4.71 MG/DL — HIGH (ref 0.5–1.3)
CREAT SERPL-MCNC: 4.77 MG/DL — HIGH (ref 0.5–1.3)
CREAT SERPL-MCNC: 6.02 MG/DL — HIGH (ref 0.5–1.3)
CULTURE RESULTS: SIGNIFICANT CHANGE UP
DIFF PNL FLD: ABNORMAL
EOSINOPHIL # BLD AUTO: 0 K/UL — SIGNIFICANT CHANGE UP (ref 0–0.5)
EOSINOPHIL # BLD AUTO: 0.19 K/UL — SIGNIFICANT CHANGE UP (ref 0–0.5)
EOSINOPHIL # BLD AUTO: 0.19 K/UL — SIGNIFICANT CHANGE UP (ref 0–0.5)
EOSINOPHIL # BLD AUTO: 0.28 K/UL — SIGNIFICANT CHANGE UP (ref 0–0.5)
EOSINOPHIL NFR BLD AUTO: 0 % — SIGNIFICANT CHANGE UP (ref 0–6)
EOSINOPHIL NFR BLD AUTO: 1 % — SIGNIFICANT CHANGE UP (ref 0–6)
EOSINOPHIL NFR BLD AUTO: 2.8 % — SIGNIFICANT CHANGE UP (ref 0–6)
EOSINOPHIL NFR BLD AUTO: 3.6 % — SIGNIFICANT CHANGE UP (ref 0–6)
ETHANOL SERPL-MCNC: <10 MG/DL — SIGNIFICANT CHANGE UP (ref 0–10)
GAS PNL BLDA: SIGNIFICANT CHANGE UP
GLUCOSE SERPL-MCNC: 223 MG/DL — HIGH (ref 70–99)
GLUCOSE SERPL-MCNC: 224 MG/DL — HIGH (ref 70–99)
GLUCOSE SERPL-MCNC: 325 MG/DL — HIGH (ref 70–99)
GLUCOSE UR QL: 100 MG/DL
HCO3 BLDA-SCNC: 27 MMOL/L — SIGNIFICANT CHANGE UP (ref 21–29)
HCOV PNL SPEC NAA+PROBE: DETECTED
HCT VFR BLD CALC: 35.3 % — LOW (ref 39–50)
HCT VFR BLD CALC: 36.9 % — LOW (ref 39–50)
HCT VFR BLD CALC: 37.4 % — LOW (ref 39–50)
HCT VFR BLD CALC: 38.4 % — LOW (ref 39–50)
HCT VFR BLD CALC: 39.5 % — SIGNIFICANT CHANGE UP (ref 39–50)
HGB BLD-MCNC: 10.8 G/DL — LOW (ref 13–17)
HGB BLD-MCNC: 11.7 G/DL — LOW (ref 13–17)
HGB BLD-MCNC: 11.9 G/DL — LOW (ref 13–17)
HGB BLD-MCNC: 12.3 G/DL — LOW (ref 13–17)
HGB BLD-MCNC: 12.7 G/DL — LOW (ref 13–17)
IMM GRANULOCYTES NFR BLD AUTO: 0.3 % — SIGNIFICANT CHANGE UP (ref 0–1.5)
IMM GRANULOCYTES NFR BLD AUTO: 0.4 % — SIGNIFICANT CHANGE UP (ref 0–1.5)
INR BLD: 1.01 RATIO — SIGNIFICANT CHANGE UP (ref 0.88–1.16)
INR BLD: 1.09 RATIO — SIGNIFICANT CHANGE UP (ref 0.88–1.16)
KETONES UR-MCNC: NEGATIVE — SIGNIFICANT CHANGE UP
LACTATE SERPL-SCNC: 1.1 MMOL/L — SIGNIFICANT CHANGE UP (ref 0.7–2)
LACTATE SERPL-SCNC: 1.7 MMOL/L — SIGNIFICANT CHANGE UP (ref 0.7–2)
LACTATE SERPL-SCNC: 5.8 MMOL/L — CRITICAL HIGH (ref 0.7–2)
LEUKOCYTE ESTERASE UR-ACNC: ABNORMAL
LYMPHOCYTES # BLD AUTO: 0.23 K/UL — LOW (ref 1–3.3)
LYMPHOCYTES # BLD AUTO: 0.8 K/UL — LOW (ref 1–3.3)
LYMPHOCYTES # BLD AUTO: 0.99 K/UL — LOW (ref 1–3.3)
LYMPHOCYTES # BLD AUTO: 11.8 % — LOW (ref 13–44)
LYMPHOCYTES # BLD AUTO: 12.7 % — LOW (ref 13–44)
LYMPHOCYTES # BLD AUTO: 2 % — LOW (ref 13–44)
LYMPHOCYTES # BLD AUTO: 20 % — SIGNIFICANT CHANGE UP (ref 13–44)
LYMPHOCYTES # BLD AUTO: 3.72 K/UL — HIGH (ref 1–3.3)
MAGNESIUM SERPL-MCNC: 2.2 MG/DL — SIGNIFICANT CHANGE UP (ref 1.6–2.6)
MCHC RBC-ENTMCNC: 29.1 PG — SIGNIFICANT CHANGE UP (ref 27–34)
MCHC RBC-ENTMCNC: 29.3 PG — SIGNIFICANT CHANGE UP (ref 27–34)
MCHC RBC-ENTMCNC: 29.7 PG — SIGNIFICANT CHANGE UP (ref 27–34)
MCHC RBC-ENTMCNC: 30 PG — SIGNIFICANT CHANGE UP (ref 27–34)
MCHC RBC-ENTMCNC: 30.3 PG — SIGNIFICANT CHANGE UP (ref 27–34)
MCHC RBC-ENTMCNC: 30.6 GM/DL — LOW (ref 32–36)
MCHC RBC-ENTMCNC: 31.7 GM/DL — LOW (ref 32–36)
MCHC RBC-ENTMCNC: 31.8 GM/DL — LOW (ref 32–36)
MCHC RBC-ENTMCNC: 32 GM/DL — SIGNIFICANT CHANGE UP (ref 32–36)
MCHC RBC-ENTMCNC: 32.2 GM/DL — SIGNIFICANT CHANGE UP (ref 32–36)
MCV RBC AUTO: 91.4 FL — SIGNIFICANT CHANGE UP (ref 80–100)
MCV RBC AUTO: 91.8 FL — SIGNIFICANT CHANGE UP (ref 80–100)
MCV RBC AUTO: 92.5 FL — SIGNIFICANT CHANGE UP (ref 80–100)
MCV RBC AUTO: 95.2 FL — SIGNIFICANT CHANGE UP (ref 80–100)
MCV RBC AUTO: 98.1 FL — SIGNIFICANT CHANGE UP (ref 80–100)
MONOCYTES # BLD AUTO: 0.19 K/UL — SIGNIFICANT CHANGE UP (ref 0–0.9)
MONOCYTES # BLD AUTO: 0.93 K/UL — HIGH (ref 0–0.9)
MONOCYTES # BLD AUTO: 0.94 K/UL — HIGH (ref 0–0.9)
MONOCYTES # BLD AUTO: 1.38 K/UL — HIGH (ref 0–0.9)
MONOCYTES NFR BLD AUTO: 1 % — LOW (ref 2–14)
MONOCYTES NFR BLD AUTO: 12 % — SIGNIFICANT CHANGE UP (ref 2–14)
MONOCYTES NFR BLD AUTO: 12 % — SIGNIFICANT CHANGE UP (ref 2–14)
MONOCYTES NFR BLD AUTO: 13.8 % — SIGNIFICANT CHANGE UP (ref 2–14)
NEUTROPHILS # BLD AUTO: 13.77 K/UL — HIGH (ref 1.8–7.4)
NEUTROPHILS # BLD AUTO: 4.74 K/UL — SIGNIFICANT CHANGE UP (ref 1.8–7.4)
NEUTROPHILS # BLD AUTO: 5.5 K/UL — SIGNIFICANT CHANGE UP (ref 1.8–7.4)
NEUTROPHILS # BLD AUTO: 9.74 K/UL — HIGH (ref 1.8–7.4)
NEUTROPHILS NFR BLD AUTO: 65 % — SIGNIFICANT CHANGE UP (ref 43–77)
NEUTROPHILS NFR BLD AUTO: 70.1 % — SIGNIFICANT CHANGE UP (ref 43–77)
NEUTROPHILS NFR BLD AUTO: 70.4 % — SIGNIFICANT CHANGE UP (ref 43–77)
NEUTROPHILS NFR BLD AUTO: 84 % — HIGH (ref 43–77)
NITRITE UR-MCNC: NEGATIVE — SIGNIFICANT CHANGE UP
NRBC # BLD: SIGNIFICANT CHANGE UP /100 WBCS (ref 0–0)
NRBC # BLD: SIGNIFICANT CHANGE UP /100 WBCS (ref 0–0)
PCO2 BLDA: 66 MMHG — HIGH (ref 32–46)
PH BLDA: 7.24 — LOW (ref 7.35–7.45)
PH UR: 7 — SIGNIFICANT CHANGE UP (ref 5–8)
PHOSPHATE SERPL-MCNC: 4.5 MG/DL — SIGNIFICANT CHANGE UP (ref 2.5–4.5)
PLATELET # BLD AUTO: 213 K/UL — SIGNIFICANT CHANGE UP (ref 150–400)
PLATELET # BLD AUTO: 222 K/UL — SIGNIFICANT CHANGE UP (ref 150–400)
PLATELET # BLD AUTO: 223 K/UL — SIGNIFICANT CHANGE UP (ref 150–400)
PLATELET # BLD AUTO: 223 K/UL — SIGNIFICANT CHANGE UP (ref 150–400)
PLATELET # BLD AUTO: 238 K/UL — SIGNIFICANT CHANGE UP (ref 150–400)
PO2 BLDA: 258 MMHG — HIGH (ref 74–108)
POTASSIUM SERPL-MCNC: 4.3 MMOL/L — SIGNIFICANT CHANGE UP (ref 3.5–5.3)
POTASSIUM SERPL-MCNC: 4.7 MMOL/L — SIGNIFICANT CHANGE UP (ref 3.5–5.3)
POTASSIUM SERPL-MCNC: 4.9 MMOL/L — SIGNIFICANT CHANGE UP (ref 3.5–5.3)
POTASSIUM SERPL-SCNC: 4.3 MMOL/L — SIGNIFICANT CHANGE UP (ref 3.5–5.3)
POTASSIUM SERPL-SCNC: 4.7 MMOL/L — SIGNIFICANT CHANGE UP (ref 3.5–5.3)
POTASSIUM SERPL-SCNC: 4.9 MMOL/L — SIGNIFICANT CHANGE UP (ref 3.5–5.3)
PROT SERPL-MCNC: 7 GM/DL — SIGNIFICANT CHANGE UP (ref 6–8.3)
PROT SERPL-MCNC: 7.3 GM/DL — SIGNIFICANT CHANGE UP (ref 6–8.3)
PROT SERPL-MCNC: 8 GM/DL — SIGNIFICANT CHANGE UP (ref 6–8.3)
PROT UR-MCNC: 500 MG/DL
PROTHROM AB SERPL-ACNC: 11.2 SEC — SIGNIFICANT CHANGE UP (ref 10–12.9)
PROTHROM AB SERPL-ACNC: 12.7 SEC — SIGNIFICANT CHANGE UP (ref 10.6–13.6)
RAPID RVP RESULT: DETECTED
RBC # BLD: 3.6 M/UL — LOW (ref 4.2–5.8)
RBC # BLD: 3.93 M/UL — LOW (ref 4.2–5.8)
RBC # BLD: 4.02 M/UL — LOW (ref 4.2–5.8)
RBC # BLD: 4.2 M/UL — SIGNIFICANT CHANGE UP (ref 4.2–5.8)
RBC # BLD: 4.27 M/UL — SIGNIFICANT CHANGE UP (ref 4.2–5.8)
RBC # FLD: 12.8 % — SIGNIFICANT CHANGE UP (ref 10.3–14.5)
RBC # FLD: 12.8 % — SIGNIFICANT CHANGE UP (ref 10.3–14.5)
RBC # FLD: 14.2 % — SIGNIFICANT CHANGE UP (ref 10.3–14.5)
RBC # FLD: 14.8 % — HIGH (ref 10.3–14.5)
RBC # FLD: 16 % — HIGH (ref 10.3–14.5)
SAO2 % BLDA: 99 % — HIGH (ref 92–96)
SARS-COV-2 IGG SERPL QL IA: NEGATIVE — SIGNIFICANT CHANGE UP
SARS-COV-2 IGM SERPL IA-ACNC: <0.1 INDEX — SIGNIFICANT CHANGE UP
SARS-COV-2 RNA SPEC QL NAA+PROBE: SIGNIFICANT CHANGE UP
SARS-COV-2 RNA SPEC QL NAA+PROBE: SIGNIFICANT CHANGE UP
SODIUM SERPL-SCNC: 134 MMOL/L — LOW (ref 135–145)
SODIUM SERPL-SCNC: 136 MMOL/L — SIGNIFICANT CHANGE UP (ref 135–145)
SODIUM SERPL-SCNC: 138 MMOL/L — SIGNIFICANT CHANGE UP (ref 135–145)
SP GR SPEC: 1.01 — SIGNIFICANT CHANGE UP (ref 1.01–1.02)
SPECIMEN SOURCE: SIGNIFICANT CHANGE UP
UROBILINOGEN FLD QL: NEGATIVE MG/DL — SIGNIFICANT CHANGE UP
WBC # BLD: 11.46 K/UL — HIGH (ref 3.8–10.5)
WBC # BLD: 17.31 K/UL — HIGH (ref 3.8–10.5)
WBC # BLD: 18.61 K/UL — HIGH (ref 3.8–10.5)
WBC # BLD: 6.76 K/UL — SIGNIFICANT CHANGE UP (ref 3.8–10.5)
WBC # BLD: 7.81 K/UL — SIGNIFICANT CHANGE UP (ref 3.8–10.5)
WBC # FLD AUTO: 11.46 K/UL — HIGH (ref 3.8–10.5)
WBC # FLD AUTO: 17.31 K/UL — HIGH (ref 3.8–10.5)
WBC # FLD AUTO: 18.61 K/UL — HIGH (ref 3.8–10.5)
WBC # FLD AUTO: 6.76 K/UL — SIGNIFICANT CHANGE UP (ref 3.8–10.5)
WBC # FLD AUTO: 7.81 K/UL — SIGNIFICANT CHANGE UP (ref 3.8–10.5)

## 2020-01-01 PROCEDURE — 99215 OFFICE O/P EST HI 40 MIN: CPT

## 2020-01-01 PROCEDURE — 85610 PROTHROMBIN TIME: CPT

## 2020-01-01 PROCEDURE — 36415 COLL VENOUS BLD VENIPUNCTURE: CPT

## 2020-01-01 PROCEDURE — 93000 ELECTROCARDIOGRAM COMPLETE: CPT

## 2020-01-01 PROCEDURE — 87040 BLOOD CULTURE FOR BACTERIA: CPT | Mod: 91

## 2020-01-01 PROCEDURE — 99223 1ST HOSP IP/OBS HIGH 75: CPT

## 2020-01-01 PROCEDURE — C9113: CPT

## 2020-01-01 PROCEDURE — 86923 COMPATIBILITY TEST ELECTRIC: CPT

## 2020-01-01 PROCEDURE — 93971 EXTREMITY STUDY: CPT | Mod: 26,LT

## 2020-01-01 PROCEDURE — 93290 INTERROG DEV EVAL ICPMS IP: CPT | Mod: 26

## 2020-01-01 PROCEDURE — 93284 PRGRMG EVAL IMPLANTABLE DFB: CPT

## 2020-01-01 PROCEDURE — 70450 CT HEAD/BRAIN W/O DYE: CPT | Mod: 26

## 2020-01-01 PROCEDURE — 93306 TTE W/DOPPLER COMPLETE: CPT | Mod: 26

## 2020-01-01 PROCEDURE — 71045 X-RAY EXAM CHEST 1 VIEW: CPT | Mod: 26

## 2020-01-01 PROCEDURE — 80053 COMPREHEN METABOLIC PANEL: CPT

## 2020-01-01 PROCEDURE — 87040 BLOOD CULTURE FOR BACTERIA: CPT

## 2020-01-01 PROCEDURE — 72131 CT LUMBAR SPINE W/O DYE: CPT

## 2020-01-01 PROCEDURE — 71260 CT THORAX DX C+: CPT | Mod: 26

## 2020-01-01 PROCEDURE — 70450 CT HEAD/BRAIN W/O DYE: CPT

## 2020-01-01 PROCEDURE — 87798 DETECT AGENT NOS DNA AMP: CPT

## 2020-01-01 PROCEDURE — 84100 ASSAY OF PHOSPHORUS: CPT

## 2020-01-01 PROCEDURE — 71045 X-RAY EXAM CHEST 1 VIEW: CPT

## 2020-01-01 PROCEDURE — 74177 CT ABD & PELVIS W/CONTRAST: CPT | Mod: 26

## 2020-01-01 PROCEDURE — 99291 CRITICAL CARE FIRST HOUR: CPT

## 2020-01-01 PROCEDURE — 83605 ASSAY OF LACTIC ACID: CPT

## 2020-01-01 PROCEDURE — 93005 ELECTROCARDIOGRAM TRACING: CPT

## 2020-01-01 PROCEDURE — 73502 X-RAY EXAM HIP UNI 2-3 VIEWS: CPT | Mod: LT

## 2020-01-01 PROCEDURE — 93010 ELECTROCARDIOGRAM REPORT: CPT

## 2020-01-01 PROCEDURE — 99205 OFFICE O/P NEW HI 60 MIN: CPT

## 2020-01-01 PROCEDURE — 83735 ASSAY OF MAGNESIUM: CPT

## 2020-01-01 PROCEDURE — 85025 COMPLETE CBC W/AUTO DIFF WBC: CPT

## 2020-01-01 PROCEDURE — 99232 SBSQ HOSP IP/OBS MODERATE 35: CPT

## 2020-01-01 PROCEDURE — 93306 TTE W/DOPPLER COMPLETE: CPT

## 2020-01-01 PROCEDURE — 36600 WITHDRAWAL OF ARTERIAL BLOOD: CPT

## 2020-01-01 PROCEDURE — 85027 COMPLETE CBC AUTOMATED: CPT

## 2020-01-01 PROCEDURE — 99285 EMERGENCY DEPT VISIT HI MDM: CPT

## 2020-01-01 PROCEDURE — 99284 EMERGENCY DEPT VISIT MOD MDM: CPT | Mod: 25

## 2020-01-01 PROCEDURE — 72131 CT LUMBAR SPINE W/O DYE: CPT | Mod: 26

## 2020-01-01 PROCEDURE — 85730 THROMBOPLASTIN TIME PARTIAL: CPT

## 2020-01-01 PROCEDURE — 72125 CT NECK SPINE W/O DYE: CPT | Mod: 26

## 2020-01-01 PROCEDURE — 93010 ELECTROCARDIOGRAM REPORT: CPT | Mod: 76

## 2020-01-01 PROCEDURE — 99222 1ST HOSP IP/OBS MODERATE 55: CPT

## 2020-01-01 PROCEDURE — 87633 RESP VIRUS 12-25 TARGETS: CPT

## 2020-01-01 PROCEDURE — 87486 CHLMYD PNEUM DNA AMP PROBE: CPT

## 2020-01-01 PROCEDURE — 82803 BLOOD GASES ANY COMBINATION: CPT

## 2020-01-01 PROCEDURE — 73502 X-RAY EXAM HIP UNI 2-3 VIEWS: CPT | Mod: 26,LT

## 2020-01-01 PROCEDURE — U0003: CPT

## 2020-01-01 PROCEDURE — 87581 M.PNEUMON DNA AMP PROBE: CPT

## 2020-01-01 PROCEDURE — 86769 SARS-COV-2 COVID-19 ANTIBODY: CPT

## 2020-01-01 PROCEDURE — 93971 EXTREMITY STUDY: CPT | Mod: LT

## 2020-01-01 PROCEDURE — 99283 EMERGENCY DEPT VISIT LOW MDM: CPT | Mod: 25

## 2020-01-01 RX ORDER — LIDOCAINE 4 G/100G
1 CREAM TOPICAL ONCE
Refills: 0 | Status: COMPLETED | OUTPATIENT
Start: 2020-01-01 | End: 2020-01-01

## 2020-01-01 RX ORDER — ACETAMINOPHEN 500 MG
1000 TABLET ORAL ONCE
Refills: 0 | Status: COMPLETED | OUTPATIENT
Start: 2020-01-01 | End: 2020-01-01

## 2020-01-01 RX ORDER — ISOSORBIDE MONONITRATE 60 MG/1
60 TABLET, EXTENDED RELEASE ORAL DAILY
Refills: 0 | Status: ACTIVE | COMMUNITY

## 2020-01-01 RX ORDER — HYDRALAZINE HYDROCHLORIDE 50 MG/1
50 TABLET ORAL TWICE DAILY
Refills: 0 | Status: ACTIVE | COMMUNITY

## 2020-01-01 RX ORDER — ACETAMINOPHEN 500 MG
650 TABLET ORAL ONCE
Refills: 0 | Status: COMPLETED | OUTPATIENT
Start: 2020-01-01 | End: 2020-01-01

## 2020-01-01 RX ORDER — MORPHINE SULFATE 50 MG/1
1 CAPSULE, EXTENDED RELEASE ORAL
Qty: 100 | Refills: 0 | Status: DISCONTINUED | OUTPATIENT
Start: 2020-01-01 | End: 2020-01-01

## 2020-01-01 RX ORDER — PANTOPRAZOLE SODIUM 20 MG/1
40 TABLET, DELAYED RELEASE ORAL DAILY
Refills: 0 | Status: DISCONTINUED | OUTPATIENT
Start: 2020-01-01 | End: 2020-01-01

## 2020-01-01 RX ORDER — INSULIN GLARGINE 100 [IU]/ML
100 INJECTION, SOLUTION SUBCUTANEOUS
Refills: 0 | Status: ACTIVE | COMMUNITY

## 2020-01-01 RX ORDER — IBUPROFEN 200 MG
600 TABLET ORAL ONCE
Refills: 0 | Status: COMPLETED | OUTPATIENT
Start: 2020-01-01 | End: 2020-01-01

## 2020-01-01 RX ORDER — GABAPENTIN 100 MG/1
100 CAPSULE ORAL AT BEDTIME
Refills: 0 | Status: ACTIVE | COMMUNITY
Start: 2020-01-01

## 2020-01-01 RX ORDER — ROBINUL 0.2 MG/ML
0.2 INJECTION INTRAMUSCULAR; INTRAVENOUS ONCE
Refills: 0 | Status: COMPLETED | OUTPATIENT
Start: 2020-01-01 | End: 2020-01-01

## 2020-01-01 RX ORDER — MORPHINE SULFATE 50 MG/1
2 CAPSULE, EXTENDED RELEASE ORAL
Refills: 0 | Status: DISCONTINUED | OUTPATIENT
Start: 2020-01-01 | End: 2020-01-01

## 2020-01-01 RX ORDER — PIPERACILLIN AND TAZOBACTAM 4; .5 G/20ML; G/20ML
3.38 INJECTION, POWDER, LYOPHILIZED, FOR SOLUTION INTRAVENOUS ONCE
Refills: 0 | Status: COMPLETED | OUTPATIENT
Start: 2020-01-01 | End: 2020-01-01

## 2020-01-01 RX ORDER — LEVOTHYROXINE SODIUM 0.07 MG/1
75 TABLET ORAL DAILY
Refills: 0 | Status: ACTIVE | COMMUNITY

## 2020-01-01 RX ORDER — LOSARTAN POTASSIUM 50 MG/1
50 TABLET, FILM COATED ORAL DAILY
Refills: 0 | Status: DISCONTINUED | COMMUNITY
End: 2020-01-01

## 2020-01-01 RX ORDER — MORPHINE SULFATE 50 MG/1
2 CAPSULE, EXTENDED RELEASE ORAL ONCE
Refills: 0 | Status: DISCONTINUED | OUTPATIENT
Start: 2020-01-01 | End: 2020-01-01

## 2020-01-01 RX ORDER — CARVEDILOL 25 MG/1
25 TABLET, FILM COATED ORAL TWICE DAILY
Qty: 180 | Refills: 3 | Status: ACTIVE | COMMUNITY

## 2020-01-01 RX ORDER — PRAVASTATIN SODIUM 40 MG/1
40 TABLET ORAL DAILY
Refills: 0 | Status: ACTIVE | COMMUNITY

## 2020-01-01 RX ORDER — FUROSEMIDE 40 MG/1
40 TABLET ORAL DAILY
Qty: 90 | Refills: 3 | Status: ACTIVE | COMMUNITY
Start: 2020-01-01

## 2020-01-01 RX ORDER — TAMSULOSIN HYDROCHLORIDE 0.4 MG/1
0.4 CAPSULE ORAL
Refills: 0 | Status: ACTIVE | COMMUNITY

## 2020-01-01 RX ORDER — SEVELAMER CARBONATE 800 MG/1
800 TABLET, FILM COATED ORAL
Refills: 0 | Status: ACTIVE | COMMUNITY

## 2020-01-01 RX ORDER — LIDOCAINE 4 G/100G
1 CREAM TOPICAL
Qty: 10 | Refills: 0
Start: 2020-01-01

## 2020-01-01 RX ORDER — VANCOMYCIN HCL 1 G
1250 VIAL (EA) INTRAVENOUS ONCE
Refills: 0 | Status: COMPLETED | OUTPATIENT
Start: 2020-01-01 | End: 2020-01-01

## 2020-01-01 RX ORDER — OXYCODONE HYDROCHLORIDE 5 MG/1
5 TABLET ORAL ONCE
Refills: 0 | Status: DISCONTINUED | OUTPATIENT
Start: 2020-01-01 | End: 2020-01-01

## 2020-01-01 RX ORDER — HEPARIN SODIUM 5000 [USP'U]/ML
5000 INJECTION INTRAVENOUS; SUBCUTANEOUS EVERY 8 HOURS
Refills: 0 | Status: DISCONTINUED | OUTPATIENT
Start: 2020-01-01 | End: 2020-01-01

## 2020-01-01 RX ORDER — EPINEPHRINE 0.3 MG/.3ML
0.01 INJECTION INTRAMUSCULAR; SUBCUTANEOUS
Qty: 4 | Refills: 0 | Status: DISCONTINUED | OUTPATIENT
Start: 2020-01-01 | End: 2020-01-01

## 2020-01-01 RX ORDER — MONTELUKAST 10 MG/1
10 TABLET, FILM COATED ORAL DAILY
Refills: 0 | Status: ACTIVE | COMMUNITY
Start: 2020-01-01

## 2020-01-01 RX ADMIN — EPINEPHRINE 5.06 MICROGRAM(S)/KG/MIN: 0.3 INJECTION INTRAMUSCULAR; SUBCUTANEOUS at 19:25

## 2020-01-01 RX ADMIN — ROBINUL 0.2 MILLIGRAM(S): 0.2 INJECTION INTRAMUSCULAR; INTRAVENOUS at 16:20

## 2020-01-01 RX ADMIN — MORPHINE SULFATE 1 MG/HR: 50 CAPSULE, EXTENDED RELEASE ORAL at 16:03

## 2020-01-01 RX ADMIN — MORPHINE SULFATE 2 MILLIGRAM(S): 50 CAPSULE, EXTENDED RELEASE ORAL at 16:12

## 2020-01-01 RX ADMIN — Medication 250 MILLIGRAM(S): at 23:05

## 2020-01-01 RX ADMIN — HEPARIN SODIUM 5000 UNIT(S): 5000 INJECTION INTRAVENOUS; SUBCUTANEOUS at 00:28

## 2020-01-01 RX ADMIN — Medication 40 MILLIGRAM(S): at 00:38

## 2020-01-01 RX ADMIN — Medication 650 MILLIGRAM(S): at 02:01

## 2020-01-01 RX ADMIN — OXYCODONE HYDROCHLORIDE 5 MILLIGRAM(S): 5 TABLET ORAL at 00:38

## 2020-01-01 RX ADMIN — Medication 1000 MILLIGRAM(S): at 05:20

## 2020-01-01 RX ADMIN — PANTOPRAZOLE SODIUM 40 MILLIGRAM(S): 20 TABLET, DELAYED RELEASE ORAL at 00:28

## 2020-01-01 RX ADMIN — Medication 650 MILLIGRAM(S): at 23:23

## 2020-01-01 RX ADMIN — PIPERACILLIN AND TAZOBACTAM 200 GRAM(S): 4; .5 INJECTION, POWDER, LYOPHILIZED, FOR SOLUTION INTRAVENOUS at 22:40

## 2020-01-01 RX ADMIN — HEPARIN SODIUM 5000 UNIT(S): 5000 INJECTION INTRAVENOUS; SUBCUTANEOUS at 07:17

## 2020-01-01 RX ADMIN — OXYCODONE HYDROCHLORIDE 5 MILLIGRAM(S): 5 TABLET ORAL at 02:01

## 2020-01-01 RX ADMIN — LIDOCAINE 1 PATCH: 4 CREAM TOPICAL at 05:19

## 2020-01-01 RX ADMIN — Medication 600 MILLIGRAM(S): at 17:08

## 2020-01-07 NOTE — ED PROVIDER NOTE - NS_ ATTENDINGSCRIBEDETAILS _ED_A_ED_FT
I, Rory Smalls MD,  performed the initial face to face bedside interview with this patient regarding history of present illness, review of symptoms and relevant past medical, social and family history.  I completed an independent physical examination.    The history, relevant review of systems, past medical and surgical history, medical decision making, and physical examination was documented by the scribe in my presence and I attest to the accuracy of the documentation.

## 2020-01-07 NOTE — ED ADULT NURSE NOTE - OBJECTIVE STATEMENT
Pt alert and oriented x3. pt presents with SOB, cough, fever, chills and concerns recent dx of pneumonia. pt sent in by Dr. Perales. Pt hx of HD.

## 2020-01-07 NOTE — ED ADULT TRIAGE NOTE - CHIEF COMPLAINT QUOTE
pt presents to ED with complaints of cough, fever, and chills . pt went to PMD MD Perales and was sent for concerns of pneumonia.pt on HD MWF.

## 2020-01-07 NOTE — ED PROVIDER NOTE - PATIENT PORTAL LINK FT
You can access the FollowMyHealth Patient Portal offered by [facility] by registering at the following website: [email or web address]. By joining [employer]’s FollowMyHealth portal, you will also be able to view your health information using other applications (apps) compatible with our system.

## 2020-01-07 NOTE — ED PROVIDER NOTE - PROGRESS NOTE DETAILS
Merlene Diehl for attending Dr. Smalls: Pt on dialysis, will withhold septic bolus. Explained pt has virus and will need to fev3er control and hydrate. F/U PMD.

## 2020-01-07 NOTE — ED PROVIDER NOTE - NSFOLLOWUPINSTRUCTIONS_ED_ALL_ED_FT
Fever in Adults    WHAT YOU NEED TO KNOW:    A fever is an increase in your body temperature. Normal body temperature is 98.6°F (37°C). Fever is generally defined as greater than 100.4°F (38°C). Common causes include an infection, injury, or disease such as arthritis.    DISCHARGE INSTRUCTIONS:    Return to the emergency department if:     Your fever does not go away or gets worse even after treatment.      You have a stiff neck and a bad headache.       You are confused. You may not be able to think clearly or remember things like you normally do.       Your heart beats faster than usual even after treatment.      You have shortness of breath or chest pain when you breathe.      You urinate small amounts or not at all.       Your skin, lips, or nails turn blue.     Contact your healthcare provider if:     You have abdominal pain or you feel bloated.      You have nausea or are vomiting.      You have pain or burning when you urinate, or you have pain in your back.      You have questions or concerns about your condition or care.     Medicines: You may need any of the following:     NSAIDs, such as ibuprofen, help decrease swelling, pain, and fever. This medicine is available with or without a doctor's order. NSAIDs can cause stomach bleeding or kidney problems in certain people. If you take blood thinner medicine, always ask if NSAIDs are safe for you. Always read the medicine label and follow directions. Do not give these medicines to children under 6 months of age without direction from your child's healthcare provider.      Acetaminophen decreases pain and fever. It is available without a doctor's order. Ask how much to take and how often to take it. Follow directions. Read the labels of all other medicines you are using to see if they also contain acetaminophen, or ask your doctor or pharmacist. Acetaminophen can cause liver damage if not taken correctly. Do not use more than 4 grams (4,000 milligrams) total of acetaminophen in one day.       Antibiotics may be given if you have an infection caused by bacteria.      Take your medicine as directed. Contact your healthcare provider if you think your medicine is not helping or if you have side effects. Tell him of her if you are allergic to any medicine. Keep a list of the medicines, vitamins, and herbs you take. Include the amounts, and when and why you take them. Bring the list or the pill bottles to follow-up visits. Carry your medicine list with you in case of an emergency.    Follow up with your healthcare provider as directed: Write down your questions so you remember to ask them during your visits.    Self-care:     Drink more liquids as directed. A fever makes you sweat. This can increase your risk for dehydration. Liquids can help prevent dehydration.   Drink at least 6 to 8 eight-ounce cups of clear liquids each day. Drink water, juice, or broth. Do not drink sports drinks. They may contain caffeine.      Ask your healthcare provider if you should drink an oral rehydration solution (ORS). An ORS has the right amounts of water, salts, and sugar you need to replace body fluids.      Dress in lightweight clothes. Shivers may be a sign that your fever is rising. Do not put extra blankets or clothes on. This may cause your fever to rise even higher. Dress in light, comfortable clothing. Use a lightweight blanket or sheet when you sleep. Change your clothes, blanket, or sheets if they get wet.      Cool yourself safely. Take a bath in cool or lukewarm water. Use an ice pack wrapped in a small towel or wet a washcloth with cool water. Place the ice pack or wet washcloth on your forehead or the back of your neck.

## 2020-01-07 NOTE — ED PROVIDER NOTE - OBJECTIVE STATEMENT
89 y/o male with a PMHx of CHF, HTN, MI, DM, ESRD on dialysis, pneumonia presents to the ED c/o fever since yesterday. +cough and generalized weakness today. Has not taken meds for symptoms. Denies sick contacts. Last dialyzed yesterday. PMD- Dr. Perales.

## 2020-01-10 NOTE — H&P ADULT - BACK
detailed exam Propranolol Counseling:  I discussed with the patient the risks of propranolol including but not limited to low heart rate, low blood pressure, low blood sugar, restlessness and increased cold sensitivity. They should call the office if they experience any of these side effects.

## 2020-02-12 NOTE — ED ADULT NURSE NOTE - NSIMPLEMENTINTERV_GEN_ALL_ED
Implemented All Fall Risk Interventions:  [place] to call system. Call bell, personal items and telephone within reach. Instruct patient to call for assistance. Room bathroom lighting operational. Non-slip footwear when patient is off stretcher. Physically safe environment: no spills, clutter or unnecessary equipment. Stretcher in lowest position, wheels locked, appropriate side rails in place. Provide visual cue, wrist band, yellow gown, etc. Monitor gait and stability. Monitor for mental status changes and reorient to person, place, and time. Review medications for side effects contributing to fall risk. Reinforce activity limits and safety measures with patient and family.

## 2020-02-12 NOTE — ED ADULT NURSE NOTE - NS ED PATIENT SAFETY CONCERN
Pt had tooth removed yesterday and has more pain and swelling to right lower jaw. Last Vicodin 1900.   No

## 2020-02-12 NOTE — ED PROVIDER NOTE - CARE PROVIDER_API CALL
Edi Westbrook; PhD)  Neurosurgery  [address], [address]nd [place] [address]  Phone: [phone]  Fax: [phone]  Follow Up Time:

## 2020-02-12 NOTE — ED PROVIDER NOTE - PATIENT PORTAL LINK FT
You can access the FollowMyHealth Patient Portal offered by [facility] by registering at the following website: [email or web address]. By joining [employer]’s FollowMyHealth portal, you will also be able to view your health information using other applications (apps) compatible with our system.

## 2020-02-12 NOTE — ED STATDOCS - PROGRESS NOTE DETAILS
Merlene TOMPKINS for Dr. Downs: 89 y/o male with a PMHx of AICD, DM, CHF, HTN, HLD, MI, ESRD on dialysis presents to the ED c/o L groin pain and L thigh pain radiating down his leg x 2 weeks. Pt has been having increased weakness to LLE and fell 2 days ago. Denies back pain. Pt agreed to have his daughter at bedside translate for him. Pt with LLE weakness on exam, will send pt to main ED for further evaluation.

## 2020-02-12 NOTE — ED ADULT NURSE NOTE - OBJECTIVE STATEMENT
Pt is a 89y male, A & O x3 VSS, presents to ED with left groin pain x 1 week, pt denies trauma, + pulse, motor & sensation x 4. Pt denies chest pain shortness of breath, denies nausea vomiting or diarrhea. Pt has hx of dialysis M/W/F had dialysis today, Pt in no apparent distress, bed raisl up, will monitor.

## 2020-02-12 NOTE — ED PROVIDER NOTE - CLINICAL SUMMARY MEDICAL DECISION MAKING FREE TEXT BOX
Pt wth hx esrd ondialysis, pacer with left thigh pain for 2 weeks. No injury. Now with difficulty ambulating. Doppler, xray, ctscans, reval.

## 2020-02-12 NOTE — ED PROVIDER NOTE - OBJECTIVE STATEMENT
89 y/o male with a PMHx of pulmonary edema, pancreatitis, AICD, CHF, HTN, high cholesterol, heart attack, dialysis patient, and diabetes, presents to the ED c/o intermittent left lower extremity pain for 2 weeks. Pt states that pain begins at knee and radiates up to groin. Pain is exacerbated with movement and sitting up. Has not taken seen a practitioner or taken pain medications for sx. Patient's preferred language is Chinese. Practitioner translated. No other complaints at this time.

## 2020-02-12 NOTE — ED PROVIDER NOTE - PROGRESS NOTE DETAILS
Merlene Randolph for attending Dr. Funez: Patient not in room at this time. RADHAG:  Received signout from Dr. Anaya to ambulate patient after prednisone and oxycodone given for pain.  Patient able to stand but felt too dizzy to walk likely secondary to opiate medication.  Will re-evaluate in another 20-30 minutes and if patient still with radiculopathy and pain and unable to ambulate, will admit to medicine for ortho spine consult and PT.

## 2020-02-12 NOTE — ED PROVIDER NOTE - NSFOLLOWUPINSTRUCTIONS_ED_ALL_ED_FT
You need to take tylenol 650mg every 4-6 hours as needed for pain.   medrol dose pack and lidoderm patches at your pharmacy. See a spine specialist for your back and leg pain.    Dolor radicular  Radicular Pain  El dolor radicular es un tipo de dolor que se extiende desde la espalda o el abe a lo audrey del nervio raquídeo. Los nervios raquídeos se originan en la médula hawley y llegan a los músculos. El dolor radicular a veces se conoce lidya radiculopatía, radiculitis o pinzamiento de un nervio. Si tiene allyson tipo de dolor, también puede sentir debilidad, adormecimiento u hormigueo en la alisia del cuerpo a la que llega jesus nervio. El dolor puede ser aquiles y sentirse lidya un ardor. Según el nervio raquídeo que esté afectado, el dolor puede aparecer en los siguientes lugares:  Alisia del abe (dolor radicular cervical). También puede sentir dolor, adormecimiento, debilidad u hormigueo en los brazos.Alisia de la mitad de la columna (dolor radicular torácico). Se siente en la espalda y en el pecho. Allyson tipo es poco frecuente.Alisia inferior de la espalda (dolor radicular lumbar). Se siente un dolor lumbar. También puede sentir dolor, adormecimiento, debilidad u hormigueo en las nalgas o en las piernas. La ciática es un tipo de dolor radicular lumbar que se extiende por la parte de atrás de la pierna.El dolor radicular se produce cuando silas de los nervios espinales se irrita o se aprieta (comprime). A menudo se debe a algo que ejerce presión sobre un nervio hawley, lidya silas de los huesos de la columna vertebral (vértebras) o shirin de las almohadillas redondas que están entre las vértebras (discos intervertebrales). [place] puede deberse a lo siguiente:  Shirin lesión. El desgaste o el envejecimiento de un disco. El crecimiento de un espolón óseo que aprieta el nervio.El dolor radicular suele desaparecer cuando se siguen las indicaciones del médico sobre cómo aliviarlo en la casa.  Siga estas indicaciones en munoz casa:  Control del dolor               Si se lo indican, aplique hielo sobre la alisia afectada:  Ponga el hielo en shirin bolsa plástica.Coloque shirin toalla entre la piel y la bolsa.Coloque el hielo guille 20 minutos, 2 a 3 veces por día.Si se lo indican, aplique calor en la alisia afectada con la frecuencia que le haya indicado el médico. Use la patricia de calor que el médico le recomiende, lidya shirin compresa de calor húmedo o shirin almohadilla térmica.  Coloque shirin toalla entre la piel y la patricia de calor.Aplique calor guille 20 a 30 minutos.Retire la patricia de calor si la piel se pone de color santoyo brillante. [place] es especialmente importante si no puede sentir dolor, calor o frío. Puede correr un riesgo mayor de sufrir quemaduras.Actividad        No permanezca sentado o acostado guille largos períodos.Intente mantenerse lo más activo posible. Pregúntele al médico qué tipo de ejercicio o actividad es mejor para usted.Evite las actividades que empeoren el dolor, lidya doblarse y levantar objetos.No levante ningún objeto que pese más de 10 libras (4,5 kg) o el límite de peso que le hayan indicado, hasta que el médico le diga que puede hacerlo.Use shirin técnica adecuada al levantar objetos. La técnica adecuada para levantar objetos consiste en doblar las rodillas y levantarse.Adriana ejercicios de fuerza y flexibilidad solamente lidya se lo haya indicado el médico o el fisioterapeuta.Indicaciones generales     [place] los medicamentos de venta chen y los recetados solamente lidya se lo haya indicado el médico.Esté atento a cualquier cambio en los síntomas.Concurra a todas las visitas de seguimiento lidya se lo haya indicado el médico. [place] es importante.  El médico lo puede enviar a un fisioterapeuta para aliviar allyson dolor.Comuníquese con un médico si:  El dolor y otros síntomas empeoran.El medicamento no le calma el dolor.El dolor no mejora después de unas semanas de cuidado en el hogar.Tiene fiebre.Solicite ayuda inmediatamente si:  Tiene dolor, adormecimiento o debilidad intensos.Tiene dificultad con el control de la vejiga o los intestinos.Resumen  El dolor radicular es un tipo de dolor que se extiende desde la espalda o el abe a lo audrey del nervio raquídeo.Si tiene dolor radicular, también puede sentir debilidad, adormecimiento u hormigueo en la alisia del cuerpo a la que llega jesus nervio.El dolor puede ser aquiles o sentirse lidya ardor.El dolor radicular puede tratarse con hielo, calor, medicamentos o fisioterapia.Esta información no tiene lidya fin reemplazar el consejo del médico. Asegúrese de hacerle al médico cualquier pregunta que tenga.

## 2020-02-12 NOTE — ED PROVIDER NOTE - SHIFT CHANGE DETAILS
Pt to be reevaluated re pain and ability to ambulate after pain meds and steroids. Pt to be d/cd with ortho followup and pain meds and steroid scripts. Jaclyn MCCOY

## 2020-02-19 NOTE — ED ADULT NURSE NOTE - TEMPLATE LIST FOR HEAD TO TOE ASSESSMENT
CC:   Chief Complaint   Patient presents with   • Abdominal Pain     x last noc, worse today, with some pain in lower pelvic area with urination        Established Patient     SUBJECTIVE  HPI:Hilary Carpio is a 15 year old female who presents today with abdominal pain.  Symptoms started last night and are gradually getting worse.  She has had no nausea or vomiting.    His past medical history is significant for:  Past Medical History:   Diagnosis Date   • Exercise-induced asthma 7/30/2009       Allergies: ALLERGIES:  No Known Allergies    Current Outpatient Medications   Medication Sig Dispense Refill   • cholecalciferol (VITAMIN D3) 25 mcg (1,000 units) tablet Take 25 mcg by mouth daily.  0   • lamoTRIgine (LAMICTAL) 25 MG tablet TK 1 T PO Q 24 H  1   • escitalopram (LEXAPRO) 10 MG tablet   1   • albuterol 108 (90 Base) MCG/ACT inhaler Inhale 2 puffs into the lungs every 4 hours as needed for Shortness of Breath or Wheezing. 1 Inhaler 1     No current facility-administered medications for this visit.          Review of Systems:    All other systems reviewed are negative    OBJECTIVE  Vitals:   Visit Vitals  /64   Pulse 60   Temp 99 °F (37.2 °C)   Resp 16   Wt 59 kg (130 lb)   LMP 01/27/2020       Physical Exam:  Abdomen: Soft and nondistended.  There is tenderness with guarding to palpation in the lower abdominal region.    ASSESSMENT/PLAN  Abdominal pain of unknown cause.  Given patient's symptoms and exam findings I feel she needs further work-up evaluation the emergency room setting.  Patient replies understanding and agree.  She is stable and able go by car.  She will go to [facility] emergency department.  She left in stable condition.      Chuck Gomes MD  2/19/2020     Fall

## 2020-02-28 PROBLEM — R33.9 URINE RETENTION: Status: ACTIVE | Noted: 2020-01-01

## 2020-03-10 NOTE — ED ADULT TRIAGE NOTE - BSA (M2)
1.91 Azathioprine Counseling:  I discussed with the patient the risks of azathioprine including but not limited to myelosuppression, immunosuppression, hepatotoxicity, lymphoma, and infections.  The patient understands that monitoring is required including baseline LFTs, Creatinine, possible TPMP genotyping and weekly CBCs for the first month and then every 2 weeks thereafter.  The patient verbalized understanding of the proper use and possible adverse effects of azathioprine.  All of the patient's questions and concerns were addressed.

## 2020-03-13 NOTE — DISCHARGE NOTE ADULT - INSTRUCTIONS
Reassessment complete. Pt sedated on ventilator. RASS score -1 currently. Propofol titrated at 50 mcg. Fentanyl titrated at 50 mcg. Vent settings are 20/400/50%/5. ETT is at 8 mm at 25 LL. Pt is tolerating vent well. Respirations are unlabored and chest expansion symmetrical. Expiratory wheezing and diminished breath sounds auscultated throughout bilateral lower lobes. Right CVC IJ in place with CDI dressing. Dressing remains intact and dry. OG in place at 58 cm. Tube feed running at 10 mL/hr. BS was 102. Ribeiro catheter in place with kasey urine, secured to leg with STAT lock. Pt in bilateral wrist restraints due to safety. Bed in lowest position with bed alarm on.      Signed by Isidro Zee, nursing student Low sodium/low potassium diet

## 2020-07-21 PROBLEM — I47.2 VENTRICULAR TACHYCARDIA: Status: ACTIVE | Noted: 2017-05-30

## 2020-07-23 NOTE — ASSESSMENT
[FreeTextEntry1] : A/P:\par \par *Afib-paroxysmal-low burden: One 6 hr episode of afib noted Feb '20.  CHADS2-VASc=4 (HTN, DM, >75) (4.8% CVA risk).  HAS-BLED = 2 (4.1% bleeding) (>65, ESRD).  will discuss risk & benefits of anticoag w/ pt next visit.  Given low burden of afib, advanced age & renal disease no anticoag may be preferred.\par \par *HFrEF s/p CRT-D in '11 w/ last EF 55-60%: repeat echo to reevaluate EF.  cont.hydralazine 50 BID, imdur 60 qd, coreg 25 BID, lasix 40 PRN\par \par *VT: etiology unclear from records.  no episodes on most recent monitoring. cont. coreg\par \par *HTN: controlled\par \par *HLP: cont. statin, await lipid profile.\par \par BNP, lipid panel, comp panel, CBC & echo ordered today.\par Plan for followup in 1 month to review results.\par Records from PCP Dr. Perales requested (address [address]).\par

## 2020-07-23 NOTE — HISTORY OF PRESENT ILLNESS
[FreeTextEntry1] : 89 y/o s/p CRT-D '11 for VT, last EF 55-60%, Afib-paroxysmal-low burden, HTN, HLP, DM, ESRD, no h/o CAD, here to establish care.\par \par [place]er speaking only, history through .\par Denies cardiac symptoms: no chest pain, dyspnea, palpitations, syncope, ICD shocks, lightheadness, LE edema.\par \par Previously seen by Dr. Pereira, changing providers as this clinic is closer.\par Review of his note does not describe any history of CAD.\par \par *ECG: AS  BiV paced.\par *Echo Mar '19: EF55-60 basal inferolat & inferior mild-mod HK, moderate LVH (1.4-1.7), \par mild AS mild-mod MR, RVSP 27\par *Cath Feb '17 HH indication stable angina diffuse irregularities\par *YSV-V-Ffblsotcz-last check Jul '20- 100% , 6 hrs afib noted Feb-7-'20\par *Labs Jun '16: Cr 5 GFR 10 LFTs WNL, Hb 10.8 plat 226 LDL 39 HDL 44 Hb A1c 7.2 TSH T4  WNL

## 2020-07-23 NOTE — PHYSICAL EXAM
[General Appearance - Well Developed] : well developed [General Appearance - Well Nourished] : well nourished [Normal Appearance] : normal appearance [Well Groomed] : well groomed [General Appearance - In No Acute Distress] : no acute distress [No Deformities] : no deformities [Normal Conjunctiva] : the conjunctiva exhibited no abnormalities [Eyelids - No Xanthelasma] : the eyelids demonstrated no xanthelasmas [Normal Oral Mucosa] : normal oral mucosa [No Oral Cyanosis] : no oral cyanosis [No Oral Pallor] : no oral pallor [Normal Jugular Venous A Waves Present] : normal jugular venous A waves present [Normal Jugular Venous V Waves Present] : normal jugular venous V waves present [No Jugular Venous Rivera A Waves] : no jugular venous rivera A waves [Respiration, Rhythm And Depth] : normal respiratory rhythm and effort [Exaggerated Use Of Accessory Muscles For Inspiration] : no accessory muscle use [Auscultation Breath Sounds / Voice Sounds] : lungs were clear to auscultation bilaterally [Heart Sounds] : normal S1 and S2 [Heart Rate And Rhythm] : heart rate and rhythm were normal [Murmurs] : no murmurs present [Abdomen Tenderness] : non-tender [Abdomen Soft] : soft [Abdomen Mass (___ Cm)] : no abdominal mass palpated [Gait - Sufficient For Exercise Testing] : the gait was sufficient for exercise testing [Abnormal Walk] : normal gait [Cyanosis, Localized] : no localized cyanosis [Petechial Hemorrhages (___cm)] : no petechial hemorrhages [Nail Clubbing] : no clubbing of the fingernails [Skin Color & Pigmentation] : normal skin color and pigmentation [No Venous Stasis] : no venous stasis [] : no rash [Skin Lesions] : no skin lesions [No Xanthoma] : no  xanthoma was observed [Oriented To Time, Place, And Person] : oriented to person, place, and time [No Skin Ulcers] : no skin ulcer [Mood] : the mood was normal [No Anxiety] : not feeling anxious [Affect] : the affect was normal

## 2020-07-28 NOTE — ED PROVIDER NOTE - PATIENT PORTAL LINK FT
You can access the FollowMyHealth Patient Portal offered by [facility] by registering at the following website: [email or web address]. By joining [employer]’s FollowMyHealth portal, you will also be able to view your health information using other applications (apps) compatible with our system.

## 2020-07-28 NOTE — ED ADULT NURSE NOTE - CAS EDP DISCH TYPE
is scheduled on 6/18/2019 @ 10:30am.    Appt. With Dr. Saab is scheduled on 6/21/2019 @ 1:00pm.    Called and spoke to patient, patient expressed verbal understanding of appointment time.  
Home

## 2020-07-28 NOTE — ED ADULT TRIAGE NOTE - CHIEF COMPLAINT QUOTE
biba to ed for cough and low diastolic bp. pt sent from md office for evaluation. pt dialysis pt, scheduled for dialysis tomorrow. denies pain or discomfort 02 sat 97% on room air. Patient

## 2020-07-28 NOTE — ED PROVIDER NOTE - PROGRESS NOTE DETAILS
Himanshu DO: S/o to Dr. Myles pending work up at this time. Ken Myles: pt endorsed to me from prior physician PENDING: UA, CXR. LIKELY DISPOSITION: dc home earnest: cxr unchanged from prior, labs & cxr at baseline. vitals stable. pt still hasn't made urine & states only makes drops of urine due to ESRD, pt w/o dysuria or urinary complaints. pt wants to go home. gave update to RN daughter Beau who'll pick pt up

## 2020-07-28 NOTE — ED PROVIDER NOTE - OBJECTIVE STATEMENT
89 y/o male with pmhx of pulmonary edema, pancreatitis, ALCD, CHF, HTN, high cholesterol, heart attack, dialysis patient, DM presents to the ED c/o cough. Pt lives with his daughter who is a nurse. Went ot his pcp for routine check up. diastolic pressure was said to be low and temp of 93.4. cough for multiple months and covid swab neg. [id number]  number

## 2020-07-28 NOTE — ED ADULT NURSE NOTE - CHIEF COMPLAINT QUOTE
biba to ed for cough and low diastolic bp. pt sent from md office for evaluation. pt dialysis pt, scheduled for dialysis tomorrow. denies pain or discomfort 02 sat 97% on room air.

## 2020-07-28 NOTE — ED ADULT NURSE NOTE - OBJECTIVE STATEMENT
pt presents to ed via ems from md's office for evaluation of low diastolic and cough x 1 month. pt receives dialysis mon/weds/fri. pt vitals stable, in no distress, ekg done and cardiac monitor placed. pt 98% on room air. pt has placemaker

## 2020-07-28 NOTE — ED PROVIDER NOTE - NSFOLLOWUPINSTRUCTIONS_ED_ALL_ED_FT
FOLLOW UP WITH PMD  WITHIN 1-2DAYS, CALL TO MAKE APPOINTMENT  COME BACK TO ED IF YOUR CONDITION WORSENS OR IF YOU DEVELOP: FEVER GREATER THAN 100.4 F, fever for more than 5days straight, not being able to drink liquids, CHEST PAIN,  SHORTNESS OF BREATH OR ANY OTHER SYMPTOMS CONCERNING TO YOU  TAKE TYLENOL (ACETAMINOPHEN)  EVERY 6 HOURS AS NEEDED FOR PAIN OR FEVER      SEGUIMIENTO CON PMD DENTRO DE 1-2 DÍAS, LLAME PARA HACER CHRISTIANO LAYLA  VUELVA A Emergencia si munoz condición empeora o si desarrolla: fiebre superior a 100.4 F, fiebre por más de 5 días seguidos, no puede beber líquid,os DOLOR DE PECHO, DIFICULTA AL RESPIRAL O CUALQUIER OTRO SÍNTOMA RELACIONADO CON USTED  TOME TYLENOL (ACETAMINOPHEN)CADA 6 HORAS SEGÚN NECESIDAD DE DOLOR O FIEBRE

## 2020-07-29 NOTE — CONSULT NOTE ADULT - SUBJECTIVE AND OBJECTIVE BOX
Your Child's Health  9-10 Year-Old Visit      Will Dellar  July 29, 2020    Visit Vitals  BP 94/58 (BP Location: LUE - Left upper extremity, Patient Position: Sitting, Cuff Size: Pediatric)   Pulse 86   Temp 97.3 °F (36.3 °C) (Temporal)   Resp 20   Ht 4' 8.2\" (1.427 m)   Wt 35.6 kg   BMI 17.47 kg/m²     Weight: 78.48 lbs      YOUR CHILD'S 9 to 10 YEAR-OLD VISIT    School and Development  How your child performs in school is reflective of all aspects of their development - social skills, language skills, cognitive skills, emotional growth. Some children who have done well in the early grades may start showing learning difficulties later in elementary school as the academics become more challenging. Family stressors, depression, and bullying can affect school performance. It is important to keep in touch with teachers and to investigate if there is a significant change in school performance. If your child receives any services through an IEP, make sure that the plan is updated regularly. Making sure your child has a good night’s sleep and a healthy breakfast each morning continues to be an important part of helping them be successful at school. Children should be becoming more independent in completing homework and school-related tasks. It is still your job to know what their assignments are and provide a distraction-free setting for them to complete their work. While some afterschool activities are good for your child's self-esteem, independence, and peer relationships (and fun!), be careful not to overschedule them. They still need some unstructured downtime and family time on a regular basis.    Protecting Your Child from Violence and Conflict  Bullying, unfortunately, is often common in schools. Make sure your child knows they can talk to you whenever something bad is happening to them at school. Ask them periodically if they feel safe at school. To teach your child nonviolent ways to resolve conflict, be sure  that you are being a good role model with your own behaviors when frustrated. When they are dealing with frustrating interactions, talk about how the other person's point of view to help them develop empathy. Teach them that when they are angry they should practice deep breathing, walk away from the situation, or count to 10 before speaking or reacting in any way. Your child needs to know that outbursts and fighting are not effective ways of solving problems; those behaviors are only going to cause more problems for them in the future. StopBullying.gov and the American Academy of Pediatrics healthychildren.org website (search for “bullying”) have information that can help you deal with bullying in your child’s life.     [place] and Self-Esteem  Peers will become increasingly important to your young adolescent. Peer influence may lead your child to challenge family rules, reject certain family activities and act more independently. Some of your rules will change as your child grows older, but clear communication about rules and expectations remains very important. As your child spends more time with friends, your time together is very important to help your child feel secure, build their self-esteem, and help them become more independent. Build your child's self-esteem by telling them you are proud of them, point out their strengths, listen respectfully to them and give them lots of hugs. They should have age-appropriate chores and responsibilities with consequences (which you need to be consistent in enforcing!) if they are not completed. Non-school activities (sports, music, clubs, community activities) help them build new skills and self esteem. Keeping family ties strong even in light of increasing peer influence is critical because close family ties are known to be protective against risk-taking behaviors in adolescence. Make time every day to simply talk with their child without any phones, television, or  other electronic media distracting you. Get to know your children's friends and make sure they always have adult supervision. Studies have shown that the more unsupervised time children have, the more likely they are to use drugs. It is important to talk with your child when they have peers making poor decisions. They should know that good friends never ask friends to do things that could be dangerous or get them into trouble. Your child should know that it is always okay for them to call you and ask to come home if they are not comfortable with something that is happening at another child's home.    Protecting Against Tobacco and Substance Use  If there is a smoker in your home, exposure to secondhand smoke (even from e-cigarettes) at home or in a car remains a health risk to your child at this age (at any age!). And as your child is getting older, there is the additional risk of them experimenting with cigarettes, as well as alcohol or any other drugs which might be kept in the home. It is important to frequently tell your child that any substance use is unhealthy and dangerous and that you do not want them to smoke, drink, use drugs or hang out with any friends who are doing those things. Talk to us if you need resources to assist with quitting smoking yourself.    Personal Safety and the Internet  For optimal safety, children this age should still be supervised on the internet. As they get older, they may be permitted to do some limited browsing without your supervision, but you should always be able to access what they are searching. You also need to set a limit on how much time they can spend on electronic media each day. This is also an important time to talk about how information in the media is often unrealistic regarding body image (unrealistic body images are often portrayed), risky behaviors (sending the message that  \"everyone's doing it\"), and violence (often portrayed as without any subsequent  suffering or consequences). Tell your child that nothing is truly \"private\" on the internet; they should never text, post, or take pictures of anything that they would not want to be public knowledge.     Never giving out personal information (full name, address, phone number) is important to internet safety as well as a child's overall personal safety. Remind your child that it is never okay for an older child or adult to ask them to keep a secret. Likewise, remind them that no one should ever talk to them or touch them in a way that makes them uncomfortable.    Puberty  Many children in this age range will start experiencing sexual development. Encourage them to ask questions and answer them in a way that they can understand. If you need guidance talking to your children about sex, check out the American Academy of Pediatrics healthychildren.org website (search “talking about sex”). Talk about the body changes that will start happening and the importance of good hygiene. Girls should know that they will have a small amount of vaginal discharge which is normal for a year or so before they start their periods. Boys should know that nocturnal emissions (\"wet dreams \") are normal. Reassure your child if their peers are developing and they are not; there is a wide range of \"normal\" for starting puberty. Changes may begin for some children at age 8 or 9 years old but it may be another few years before other children start developing.     Dental Health   Your child should be brushing at least twice daily for 2 minutes at a time with toothpaste; they should also be flossing daily and seeing a dentist regularly. Let our office know if you use water from a private well; testing for fluoride content is recommended to determine if fluoride supplements are needed. Tasty things like sweet drinks [juice, soda, sweet tea, Obdulio Aid®, etc.], candy, other sweets, and sticky/chewy foods cause tooth decay and should be occasional  Patient is a 89y old  Male who presents with a chief complaint of complain of weakness (16 May 2019 16:13)    HPI:  61 yo male with a PMHx of DM, HLD, HTN, ESRD on HD MWF presents to ED with complain of generalized weakness and fever per daughter at the bedside. Patient is so weak he is having trouble ambulating. As per daughter had fever 102 F at home 5/16 he denies HA or neck pain. No chest pain or sob. No abd pain. No n/v/d. No urinary f/u/d. No back pain. no gross motor or sensory deficits. he denies illicit drug use. he had no recent travel. He has no other acute issues symptoms or concerns. Here afebrile, wbc ct 15, CT chest with minimal bronchiectasis, scattered alveolar opacities ? mild pulmonary edema no consolidations or nodules.    pt seen and examined   reports nausea/vomiting     PMH: as above  PSH: as above  Meds: per reconciliation sheet, noted below  MEDICATIONS  (STANDING):  aspirin  chewable 81 milliGRAM(s) Oral daily  carvedilol 25 milliGRAM(s) Oral every 12 hours  cefepime  Injectable. 1000 milliGRAM(s) IV Push daily  dextrose 5%. 1000 milliLiter(s) (50 mL/Hr) IV Continuous <Continuous>  dextrose 50% Injectable 12.5 Gram(s) IV Push once  dextrose 50% Injectable 25 Gram(s) IV Push once  dextrose 50% Injectable 25 Gram(s) IV Push once  heparin  Injectable 5000 Unit(s) SubCutaneous every 8 hours  hydrALAZINE 100 milliGRAM(s) Oral two times a day  insulin glargine Injectable (LANTUS) 10 Unit(s) SubCutaneous at bedtime  insulin lispro (HumaLOG) corrective regimen sliding scale   SubCutaneous three times a day before meals  isosorbide   mononitrate ER Tablet (IMDUR) 60 milliGRAM(s) Oral daily  levothyroxine 75 MICROGram(s) Oral daily  lisinopril 5 milliGRAM(s) Oral daily  multivitamin 1 Tablet(s) Oral daily  sevelamer carbonate 1600 milliGRAM(s) Oral three times a day  tamsulosin 0.4 milliGRAM(s) Oral at bedtime      Allergies    No Known Allergies    Intolerances      Social: no smoking, no alcohol, no illegal drugs; no recent travel, no exposure to TB  FAMILY HISTORY:     no history of premature cardiovascular disease in first degree relatives    ROS: no HA, no dizziness, no sore throat, no blurry vision, no CP, no palpitations, no abdominal pain, no diarrhea,  no dysuria, no leg pain, no claudication, no rash, no joint aches, no rectal pain or bleeding, no night sweats  All other systems reviewed and are negative    Vital Signs Last 24 Hrs  T(C): 37.1 (17 May 2019 11:17), Max: 37.3 (16 May 2019 21:55)  T(F): 98.8 (17 May 2019 11:17), Max: 99.1 (16 May 2019 21:55)  HR: 77 (17 May 2019 11:17) (77 - 86)  BP: 131/41 (17 May 2019 11:17) (121/43 - 155/47)  BP(mean): --  RR: 18 (17 May 2019 11:17) (18 - 18)  SpO2: 100% (17 May 2019 11:17) (99% - 100%)  Daily         PE:  Constitutional: frail looking  HEENT: NC/AT, EOMI, PERRLA, conjunctivae clear; ears and nose atraumatic; pharynx benign  Neck: supple; thyroid not palpable  Back: no tenderness  Respiratory: decreased breath sounds, rales   Cardiovascular: S1S2 regular, no murmurs  Abdomen: soft, not tender, not distended, positive BS; liver and spleen WNL  Genitourinary: no suprapubic tenderness  Lymphatic: no LN palpable  Musculoskeletal: no muscle tenderness, no joint swelling or tenderness  Extremities: no pedal edema  Neurological/ Psychiatric:  moving all extremities  Skin: no rashes; no palpable lesions    Labs: all available labs reviewed                        8.5    13.94 )-----------( 188      ( 17 May 2019 07:49 )             25.3     05-17    132<L>  |  91<L>  |  55<H>  ----------------------------<  174<H>  4.0   |  31  |  6.33<H>    Ca    8.3<L>      17 May 2019 07:49  Phos  3.4     05-17  Mg     2.5     05-17    TPro  6.8  /  Alb  2.9<L>  /  TBili  0.4  /  DBili  x   /  AST  36  /  ALT  44  /  AlkPhos  148<H>  05-16     LIVER FUNCTIONS - ( 16 May 2019 01:23 )  Alb: 2.9 g/dL / Pro: 6.8 gm/dL / ALK PHOS: 148 U/L / ALT: 44 U/L / AST: 36 U/L / GGT: x           Culture - Blood (05.16.19 @ 01:23)    Specimen Source: .Blood Blood    Culture Results:   No growth to date.    Culture - Blood (05.16.19 @ 01:23)    Specimen Source: .Blood Blood    Culture Results:   No growth to date.          Radiology: all available radiological tests reviewed  < from: CT Chest No Cont (05.16.19 @ 12:50) >    EXAM:  CT CHEST                            PROCEDURE DATE:  05/16/2019          INTERPRETATION:  Exam Date: 5/16/2019 12:50 PM  Clinical Information: Cough  Technique:  CT of the chest was performed with axial images obtained from   the thoracic tothe inlet to the bilateral adrenal glands without IV   contrast. Maximum intensity projection images were obtained.  Comparison:  Report of CT chest abdomen pelvis 3/31/2019, CT chest   abdomen pelvis 1/9/2018    FINDINGS:     Lungs, Airways and Pleura: Central tracheobronchial tree is grossly   patent. Minimal bronchiectasis. No pneumothorax. No pleural effusions.   Scattered alveolar opacities with intralobular septal thickening might   represent mild pulmonary edema. No segmental consolidations. No discrete   pulmonary nodules.    Heart and Great Vessels: Atherosclerotic changes of the aorta and   coronary vasculature. Heart is enlarged. Left-sided pacemaker. No   pericardial effusions.    Mediastinum and Yasmin: within normal limits    Neckand Chest Wall: Subcentimeter thyroid nodules. Mild bilateral   gynecomastia.    Bones: Degenerative changes and osteopenia.    Upper Abdomen:  Gallstones. Bilateral renal atrophy.    IMPRESSION:    Findings suggesting mild pulmonary edema. No segmental consolidations. No   discrete pulmonary nodules.          Advanced directives addressed: full resuscitation treats only, not a regular part of a child’s diet (and they should be followed by tooth brushing!). If your child is active in sports, they should wear a mouth guard.    Nutrition, Exercise and Screen Time  Children can become overweight or obese at any age. Healthy eating choices and regular physical activity are the best ways to keep a child at a healthy weight; dieting and supplements can be harmful at this age. Do your best to keep healthy choices in the home and encourage your child to make healthy eating decisions. Keep things like fresh fruits and vegetables, string cheese, whole grain crackers, yogurt, nuts, and hard-boiled eggs around for snacks. High calorie, high fat and sugary foods should be an occasional treat only. Your child should always have something for breakfast; it is a fact that children who eat breakfast perform better in school. Talk to your children about recognizing when they are full and the importance of avoiding overeating on a regular basis. As often as possible, mealtimes should be a family affair. Keep the TV off, keep phones away from the table, and encourage conversation between everyone in the family.    Good bone health starts at a young age. Your child needs 3 to 4 servings of a good source of calcium daily (low-fat or skim milk, yogurt, cheese, calcium-fortified orange juice or other foods). Vitamin D is needed along with calcium to optimize bone health; 600 IU daily is recommended. Most people cannot meet their vitamin D needs with their usual diet, so a multivitamin with iron supplement is a good way to get it. (A pure vitamin D supplement with 400 or 600 IU is also okay.)    The American Academy of Pediatrics recommends no more than 8 ounces of 100% fruit juice daily. Sports drinks may be appropriate after an hour or so of really vigorous exercise, but as a routine drink, they have lots of sugar and more calories than your child needs. Your child should drink plenty of water  with routine activities. The high caffeine levels in energy drinks are potentially dangerous; children should not be allowed to drink these.    At least 60 minutes of physical activity every day is recommended for all children. Sports teams can be a great choice, but not all children want to play team sports. Options include dancing, swimming, gymnastics, riding a bike, skipping rope, and just plain old running around with other kids. Find activities that will get the whole family moving. When your child is active, make sure they are using any recommended safety equipment (helmets, safety goggles, mouth guards). Avoiding too much sitting (which is usually due to screen time!) is just as important as being active. Check out the American Academy of Pediatrics healthychildren.org website (search “media use”) for recommendations on setting reasonable rules for media (computers, phones, pads, TV) use in your home. Also, keeping phones and electronics out of the bedroom at night is a great step to making sure your child gets a healthy, restful sleep of night.    Car Safety  Children should ride in a booster seat until they are over 80 pounds and 4 feet 9 inches tall (which is how big they need to be before they can be safely secured by a seat belt). High-backed booster seats should be used if there are low seat backs or no head rests in your car; backless boosters can be used if your car has high seat backs and head rests. They are safest in the back seat until they are 13 years old.    Water Safety  Your child should be taught how to swim if they have not already had lessons. However, even if they are a good swimmer, they should never swim on their own without adult supervision. They should be taught not to dive into water until an adult has checked to make sure the water is deep enough to be safe for diving. Your child should always wear a [employer] approved lifejacket when on a boat or any watercraft. You should  make sure that swimming pools that your child has access to (in your yard, apartment complex or neighborhood) are fenced with a locked, self-closing, self-latching gate.    Your child should wear sunscreen with an SPF of 15 or greater whenever they are outside. It should be reapplied every two hours and after being in the water. Encourage your child to wear other sun protective gear (hat, sunglasses, sun protection clothing) and avoid time in the sun between 11 and 3 PM when possible.    Guns and Firearms  If you keep a firearm in your home, be sure to store it unloaded and locked up with ammunition locked separately. Find out if there are firearms in any of the homes your child might visit and make sure those are safely stored before allowing your child to visit. Your child should know that if they ever see a gun, they should not touch it, they should leave the area, and they should tell an adult.    MEDICATION FOR FEVER OR PAIN:   Acetaminophen liquid (e.g., Tylenol or Tempra) may be given every four hours as needed for pain or fever.  Acetaminophen liquid is less concentrated than the infant dropper bottle type. Be sure to check which product CONCENTRATION you are using.      CHILDREN’S Tylenol/Acetaminophen  (160 MG/5 mL)    Child’s Weight:  Dose:  36 - 47 pounds:    240 mg (7.5 mL (1 1/2 Teaspoons))  48 - 59 pounds:    320 mg (10.0 mL (2 Teaspoons))  60 - 71 pounds:    400 mg (12.5 mL (2 1/2 Teaspoons))  72 - 95 pounds:    480 mg (15.0 mL (3 Teaspoons))    CHILDREN’S Tylenol/Acetaminophen MELTAWAYS ( 80 MG tablets)    Child’s Weight:  Dose:  36 - 47 pounds:    240 mg (3 meltaway tablets)  48 - 59 pounds:    320 mg (4 meltaway tablets)  60 - 71 pounds:    400 mg (5 meltaway tablets)  72 - 95 pounds:    480 mg (6 meltaway tablets)    Juwan (Jr) Tylenol/Acetaminophen MELTAWAYS (160 MG tablets)    Child’s Weight:  Dose:  36 - 47 pounds:    240 mg (1 1/2 meltaway tablets)  48 - 59 pounds:    320 mg (2 meltaway  tablets)  60 - 71 pounds:    400 mg (2 1/2 meltaway tablets)  72 - 95 pounds:    480 mg (3 meltaway tablets)    CHILDREN'S Ibuprofen (e.g., Advil or Motrin) may be given every six hours as needed for pain or fever.    Child’s Weight:  Dose:  48 - 59 pounds:    200 mg (2 Teaspoons of liquid)  60 - 71 pounds:    250 mg (2 1/2Teaspoons of liquid)  72 - 95 pounds:    300 mg (3 Teaspoons of liquid)    NEXT VISIT:  IN 1 Year      Thank you for entrusting your care to [facility].    Also, check out “Children’s Health” on the [facility] Blog for updates on timely topics regarding children’s health!Patient Education     Coronavirus (COVID-19): How to Talk to Your Child  Your kids are hearing about coronavirus (COVID-19). You want to make sure they get reliable information -- and you want them to hear it from you. Here's how to talk about it.  Find Out What Your Child Already Knows  Ask questions geared to your child's age level. For older kids, you might ask, \"Are people in school talking about coronavirus? What are they saying?\" For younger children, you could say, \"Have you heard grownups talking about a new sickness that's going around?\" This gives you a chance to learn how much kids know -- and to find out if they're hearing the wrong information.  Follow your child's lead. Some kids may want to spend time talking. But if your kids don't seem interested or don't ask a lot of questions, that's OK.  Offer Comfort -- and Honesty  Focus on helping your child feel safe, but be truthful. Don't offer more detail than your child is interested in. For example, if kids ask about school closings, address their questions. But if the topic doesn't come up, there's no need to raise it unless it happens.  If your child asks about something and you don't know the answer, say so. Use the question as a chance to find out together. Check the Centers for Disease Control and Prevention (CDC) website for up-to-date, reliable  information about coronavirus (COVID-19). That way, you have the facts and kids don't see headlines about deaths and other scary information.  Speak calmly and reassuringly. Explain that most people who get sick feel like they have a cold or the flu. Kids  on it when parents worry. So when you talk about coronavirus and the news, use a calm voice and try not to seem upset.  Give kids space to share their fears. It's natural for kids to worry, \"Could I be next? Could that happen to me?\" Let your child know that kids don't seem to get as sick as adults. Let them know they can always come to you for answers or to talk about what scares them.  Know when they need guidance. Be aware of how your kids get news and information, especially older kids who go online. Point them to age-appropriate content so they don't end up finding news shows or outlets that scare them or have incorrect information.  Help Kids Feel in Control  Give your child specific things they can do to feel in control. Teach kids that getting lots of sleep and washing their hands well and often can help them stay strong and well. Explain that regular hand washing also helps stop viruses from spreading to others. Be a good role model and let your kids see you washing your hands often!  Talk about all the things that are happening to keep people safe and healthy. Young kids might be reassured to know that hospitals and doctors are prepared to treat people who get sick. Older kids might be comforted to know that scientists are working to develop a vaccine. These talks also prepare kids for changes in their normal routine if schools or childcare centers close in the future.  Put news stories in context. If they ask, explain that death from the virus is still rare, despite what they might hear. Watch the news with your kids so you can filter what they hear.  Kids and teens often worry more about family and friends than themselves. For example, if kids  hear that older people are more likely to be seriously ill, they might worry about their grandparents. Letting them call or Skype with older relatives can help them feel reassured about loved ones.  Let your kids know that it's normal to feel stressed out at times. Everyone does. Recognizing these feelings and knowing that stressful times pass and life gets back to normal can help children build resilience.  Keep the Conversation Going  Keep checking in with your child. Use talking about coronavirus as a way to help kids learn about their bodies, like how the immune system fights off disease.  Talk about current events with your kids often. It's important to help them think through stories they hear about. Ask questions: What do you think about these events? How do you think these things happen? Such questions also encourage conversation about non-news topics.  Date reviewed: March 2020    © 2020 [employer]®. Used and adapted under license by your health care provider. This information is for general use only. For specific medical advice or questions, consult your health care professional.

## 2020-10-22 PROBLEM — I50.22 CHRONIC SYSTOLIC CONGESTIVE HEART FAILURE: Status: ACTIVE | Noted: 2017-05-30

## 2020-10-22 PROBLEM — Z95.810 CARDIAC RESYNCHRONIZATION THERAPY DEFIBRILLATOR (CRT-D) IN PLACE: Status: ACTIVE | Noted: 2017-05-30

## 2020-10-26 NOTE — PHYSICAL EXAM
[General Appearance - Well Developed] : well developed [Normal Appearance] : normal appearance [Well Groomed] : well groomed [General Appearance - Well Nourished] : well nourished [No Deformities] : no deformities [General Appearance - In No Acute Distress] : no acute distress [Normal Conjunctiva] : the conjunctiva exhibited no abnormalities [Eyelids - No Xanthelasma] : the eyelids demonstrated no xanthelasmas [Normal Oral Mucosa] : normal oral mucosa [No Oral Pallor] : no oral pallor [No Oral Cyanosis] : no oral cyanosis [Normal Jugular Venous A Waves Present] : normal jugular venous A waves present [Normal Jugular Venous V Waves Present] : normal jugular venous V waves present [No Jugular Venous Rivera A Waves] : no jugular venous rivera A waves [Heart Rate And Rhythm] : heart rate and rhythm were normal [Heart Sounds] : normal S1 and S2 [Murmurs] : no murmurs present [Respiration, Rhythm And Depth] : normal respiratory rhythm and effort [Exaggerated Use Of Accessory Muscles For Inspiration] : no accessory muscle use [Auscultation Breath Sounds / Voice Sounds] : lungs were clear to auscultation bilaterally [Abdomen Soft] : soft [Abdomen Tenderness] : non-tender [Abdomen Mass (___ Cm)] : no abdominal mass palpated [Abnormal Walk] : normal gait [Gait - Sufficient For Exercise Testing] : the gait was sufficient for exercise testing [Nail Clubbing] : no clubbing of the fingernails [Cyanosis, Localized] : no localized cyanosis [Petechial Hemorrhages (___cm)] : no petechial hemorrhages [Skin Color & Pigmentation] : normal skin color and pigmentation [] : no rash [No Venous Stasis] : no venous stasis [Skin Lesions] : no skin lesions [No Skin Ulcers] : no skin ulcer [No Xanthoma] : no  xanthoma was observed [Oriented To Time, Place, And Person] : oriented to person, place, and time [Affect] : the affect was normal [Mood] : the mood was normal [No Anxiety] : not feeling anxious

## 2020-11-19 NOTE — HISTORY OF PRESENT ILLNESS
[FreeTextEntry1] : LESTER WHITLOCK (LESTER)29-Dec-1929(89y)M\par \par [place]uran speaking only\par \par 89 y/o s/p CRT-D '11, VT, last EF 55-60%, Afib-paroxysmal-low burden, ESRD, \par HTN, HLP, DM, \par no h/o CAD, here for FU.\par \par Last visit labs ordered, 6 hr episode of\par afib noted Feb '20.  Echo ordered.\par \par Since then no labs completed.\par Echo reviewed, normal EF mild AS.\par Discussed risks/benefits anticoag.,\par pt agrees to hold off on anticoag for now.  Discussed remote monitoring & q3 month device checks for surveillance afib.\par \par No new cardiac symptoms\par \par \par *ECG: AS  BiV paced.\par *Echo Oct '20: EF 60-65%, mild diast. dysfx, mild AS, mild AR,\par mild-mod MR, mild LAE\par *Echo Mar '19: EF55-60 basal inferolat & inferior mild-mod HK, moderate LVH (1.4-1.7), \par mild AS mild-mod MR, RVSP 27\par *Cath Feb '17 HH indication stable angina diffuse irregularities\par *RNR-T-Imjbwxtbm-last check Jul '20- 100% , 6 hrs afib noted Feb-7-'20\par *Labs Jun '16: Cr 5 GFR 10 LFTs WNL, Hb 10.8 plat 226 LDL 39 HDL 44 Hb A1c 7.2 TSH T4 WNL \par

## 2020-11-19 NOTE — ASSESSMENT
[FreeTextEntry1] : A/P:\par \par *Afib-paroxysmal-low burden: One 6 hr episode of afib noted Feb '20. CHADS2-VASc=4 (HTN, DM, >75) (4.8% CVA risk). HAS-BLED = 2 (4.1% bleeding) (>65, ESRD). Jaison cont. to monitor for increased burden of afib, hold off on anticoag for now.  home monitoring arranged, q3 month device checks until home monitoring in place.\par \par *HFrEF s/p CRT-D in '11 w/ Oct '20 echo EF 55-60%:  cont.hydralazine 50 BID, imdur 60 qd, coreg 25 BID, lasix 40 PRN\par consider changing hydral/imdur to  ACEI/ARB/entresto next visit after discussion w/ nephro.\par \par *VT: etiology unclear from records. no episodes on most recent monitoring. cont. coreg\par \par *HTN: controlled\par \par *HLP: cont. statin, await lipid profile.\par \par Return 1 month to make sure monitoring is in place & labs have been completed.\par \par ===============================\par \par Addendum Nov 19th\par pt missed apt today.  Called family member answered.\par Advised to schedule apt next week.\par Task sent to office staff to call them to schedule.

## 2020-12-02 NOTE — H&P ADULT - HISTORY OF PRESENT ILLNESS
89 Y old male unwitnessed fall four steps, was found unconscious lost vitals at the scene , ACLS protocol for 25 min before arrival 6 Epinephrine pushes, CPR for 10 min in ER, with 1 epi, bicarb , calcium, ROSC was placed on epi infusion. Possible fluid in RUQ on FAST 2 units of blood were given as Pt was not stable for CT. GCS 3 T, cervical collar in  place. Pt was intubated in ER, only visible injuries abrasions on forehead, nose and B/L knees.  Abdomen  soft. Pelvis is stable.  D/ W family Pt is now SDNR. pt was seen at 1900 : 89 Y old male unwitnessed fall four steps, was found unconscious lost vitals at the scene , ACLS protocol for 25 min before arrival 6 Epinephrine pushes, CPR for 10 min in ER, with 1 epi, bicarb , calcium, ROSC was placed on epi infusion. Possible fluid in RUQ on FAST 2 units of blood were given as Pt was not stable for CT. GCS 3 T, cervical collar in  place. Pt was intubated in ER, only visible injuries abrasions on forehead, nose and B/L knees.  Abdomen  soft. Pelvis is stable.  D/ W family Pt is now SDNR.

## 2020-12-02 NOTE — ED PROVIDER NOTE - INTERNATIONAL TRAVEL
Try the gabapentin for a week and if anxiety is stabilized and you are still often nauseous, call about cutting back on Lamictal   Unable to Assess

## 2020-12-02 NOTE — ED PROVIDER NOTE - OBJECTIVE STATEMENT
89 y/o male with a PMHx of ESRD on dialysis, pulmonary edema, pancreatitis, AICD placement, CHF, HTN, HLD, MI, AICD in place, presents to the ED BIBEMS from home for cardiac arrest 25 minutes PTA. As per EMS, pt with unwitnessed fall backwards down 4-5 wooden stairs with head injury. EMS states pt granddaughter heard pt yell then heard pt fall down stairs and found pt with blood on face with laceration to forehead and nasal bridge. Granddaughter reports pt without a pulse when found pt, started CPR on pt, and called 911. EMS states arrival 5 minutes after 911 call, upon EMS arrival pt with wide-complex PEA , received 6 doses of epi, obtained ROSC 2-3 times with last ROSC 5 minutes PTA. Pt with IO access in RLE and sena airway in place upon ED arrival. EMS notes 25 minutes down time. Pt received full dialysis treatment today. Complete history unobtainable secondary to pt unresponsiveness, partial Hx obtained via EMS. 89 y/o male with a PMHx of ESRD on dialysis, pulmonary edema, pancreatitis, AICD placement, CHF, HTN, HLD, MI, AICD in place, presents to the ED BIBEMS from home for cardiac arrest 25 minutes PTA. As per EMS, pt with unwitnessed fall backwards down 4-5 wooden stairs with head injury. EMS states pt granddaughter heard pt yell then heard pt fall down stairs and found pt with blood on face with laceration to forehead and nasal bridge. Granddaughter reports pt without a pulse when found pt, started CPR on pt, and called 911. EMS states arrival 5 minutes after 911 call, upon EMS arrival pt with wide-complex PEA, received 6 doses of epi, obtained ROSC 2-3 times with last ROSC 5 minutes PTA. Pt with IO access in RLE and sena airway in place upon ED arrival. EMS notes 25 minutes down time. Not shocked in the field. Unknown if pt arrested prior to or after fall. Pt received full dialysis treatment today. Complete history unobtainable secondary to pt unresponsiveness, partial Hx obtained via EMS.

## 2020-12-02 NOTE — ED PROVIDER NOTE - CLINICAL SUMMARY MEDICAL DECISION MAKING FREE TEXT BOX
Patient with trauma and cardiac arrest, unsure if trauma came first vs arrest first.  Patient with ROSC on arrival, intubated with Ilan airway which was exchanged.  Started on epinephrine drip.  FAST ? fluid in Morrisons pouch, and patient had lower BP transiently in the 80's, emergent release blood given with concern for bleeding.  CT C spine shows C1 and C 2 fractures, spine consulted.  Discussed with ICU for admission.  Discussed with family, no withdrawal of care currently, but patient is now DNR.  Plan admit to ICU under trauma service. Patient with trauma and cardiac arrest, unsure if trauma came first vs arrest first.  Patient with ROSC on arrival, intubated with Ilan airway which was exchanged.  EKG with paced rhythm, which does not meet sgarbosa criteria.  Started on epinephrine drip.  FAST ? fluid in Morrisons pouch, and patient had lower BP transiently in the 80's, emergent release blood given with concern for bleeding.  CT C spine shows C1 and C 2 fractures, spine consulted.  Discussed with ICU for admission.  Discussed with family, no withdrawal of care currently, but patient is now DNR.  Plan admit to ICU under trauma service. Patient with trauma and cardiac arrest, unsure if trauma came first vs arrest first.  Patient with ROSC on arrival, intubated with Ilan airway which was exchanged.  EKG with paced rhythm, which does not meet sgarbosa criteria.  Started on epinephrine drip.  FAST ? fluid in Morrisons pouch, and patient had lower BP transiently in the 80's, emergent release blood given with concern for bleeding.  CT C spine shows C1 and C 2 fractures, spine consulted, also has bilateral rib fractures.  Bibasilar opacities vs atelectasis, WBC 18, question 2/2 stress response/trauma, however can't exclude infection at this time.  Discussed with ICU for admission.  Discussed with family, no withdrawal of care currently, but patient is now DNR.  Plan admit to ICU under trauma service. Patient with trauma and cardiac arrest, unsure if trauma came first vs arrest first.  Patient with ROSC on arrival, intubated with Ilan airway which was exchanged.  EKG with paced rhythm, which does not meet sgarbosa criteria.  Started on epinephrine drip.  FAST ? fluid in Morrisons pouch, and patient had lower BP transiently in the 80's, emergent release blood given with concern for bleeding.  CT C spine shows C1 and C 2 fractures, spine consulted, also has bilateral rib fractures.  Bibasilar opacities vs atelectasis, WBC 18, question 2/2 stress response/trauma, as according to family had no preceeding illness.  Discussed with ICU for admission.  Discussed with family, no withdrawal of care currently, but patient is now DNR.  Plan admit to ICU under trauma service. Patient with trauma and cardiac arrest, unsure if trauma came first vs arrest first.  Patient with ROSC on arrival, intubated with Ilan airway which was exchanged.  EKG with paced rhythm, which does not meet sgarbosa criteria.  Started on epinephrine drip.  FAST ? fluid in Morrisons pouch, and patient had lower BP transiently in the 80's, emergent release blood given with concern for bleeding.  CT C spine shows C1 and C 2 fractures, spine consulted, also has bilateral rib fractures.  Bibasilar opacities vs atelectasis, WBC 18, question 2/2 stress response/trauma, lactic acid elevated likely 2/2 cardiac arrest, however does have evidence of UTI, will cover with antibiotics, however will not give additional fluids at this time. Discussed with ICU for admission.  Discussed with family, no withdrawal of care currently, but patient is now DNR.  Plan admit to ICU under trauma service.

## 2020-12-02 NOTE — ED ADULT TRIAGE NOTE - CHIEF COMPLAINT QUOTE
pt bibems from home, unwitnessed fall, fell down 4 steps, heard by granddaughter, fell head first, PEA noted by ems upon arrival to field.  CPR started by grand-daughter, pt received 5 epi, no shock given PTA.  Called code trauma upon arrival to ED at 1849.

## 2020-12-02 NOTE — CONSULT NOTE ADULT - SUBJECTIVE AND OBJECTIVE BOX
Patient is a 89y old  Male who presents with a chief complaint of s/p fall Cardiac arrest (02 Dec 2020 21:32)    HPI:  89 Y old male presented tonight after unwitnessed fall. According to susan at bedside, heard loud noise and found patient face down. Attempted to turn patient and called 911. No pulses were felt at the time and compressions started immediately Patint received 25 minutes of ACLS prior to arrival to ED and additional 10 minutes with ROSC obtained.  Placed on Epinephrine gtt at the time. At bedside patient is unresponsive in C-collar. CT C-spine demonstrated cervical fracture of C1 and C2    Allergies: No Known Allergies    PAST MEDICAL & SURGICAL HISTORY:  Pulmonary edema    Pancreatitis    AICD (automatic cardioverter/defibrillator) present    CHF (Congestive Heart Failure)    HTN (Hypertension)    High Cholesterol    Heart Attack    Dialysis Patient    Diabetes    AICD (automatic cardioverter/defibrillator) present    History of penile implant    History of Hernia Repair      FAMILY HISTORY:    SOCIAL HISTORY:    Home Medications:    Review of Systems:  Unable to obtain ROS    T(F): 96.6 (20 @ 20:37), Max: 96.6 (20 @ 20:37)  HR: 67 (20 @ 21:00) (67 - 103)  BP: 121/57 (20 @ 21:00) (84/47 - 210/97)  RR: 12 (20 @ 21:00) (12 - 24)  SpO2: 100% (20 @ 21:00)  Wt(kg): --  Mode: AC/ CMV (Assist Control/ Continuous Mandatory Ventilation), RR (machine): 14, TV (machine): 400, FiO2: 100, PEEP: 5  CAPILLARY BLOOD GLUCOSE      POCT Blood Glucose.: 287 mg/dL (02 Dec 2020 18:54)    I&O's Summary      Physical Exam:     Gen: critically ill appearing, intubated, C-collar  Neuro: unresponsive  CVS: +S1S2 tachycardic  Resp: CTA  Abd: soft, NT, ND  Ext: warm, dry, no edema    Meds:  piperacillin/tazobactam IVPB. 3.375 Gram(s) IV Intermittent once  vancomycin  IVPB 1250 milliGRAM(s) IV Intermittent once    EPINEPHrine    Infusion 0.015 MICROgram(s)/kG/Min IV Continuous <Continuous>                 heparin   Injectable 5000 Unit(s) SubCutaneous every 8 hours     pantoprazole  Injectable 40 milliGRAM(s) IV Push daily                                          10.8   18.61 )-----------( 223      ( 02 Dec 2020 18:53 )             35.3     Bands 9.0        136  |  99  |  24<H>  ----------------------------<  345<H>  4.8   |  27  |  3.97<H>    Ca    9.6      02 Dec 2020 20:31    TPro  6.7  /  Alb  2.4<L>  /  TBili  0.4  /  DBili  x   /  AST  166<H>  /  ALT  120<H>  /  AlkPhos  298<H>      Lactate 5.8            @ 20:31      CARDIAC MARKERS ( 02 Dec 2020 20:31 )  0.046 ng/mL / x     / 387 U/L / x     / x          PT/INR - ( 02 Dec 2020 18:53 )   PT: 12.7 sec;   INR: 1.09 ratio         PTT - ( 02 Dec 2020 18:53 )  PTT:39.0 sec  Urinalysis Basic - ( 02 Dec 2020 20:07 )    Color: Yellow / Appearance: very cloudy / S.010 / pH: x  Gluc: x / Ketone: Negative  / Bili: Negative / Urobili: Negative mg/dL   Blood: x / Protein: 500 mg/dL / Nitrite: Negative   Leuk Esterase: Moderate / RBC: 6-10 /HPF / WBC >50   Sq Epi: x / Non Sq Epi: Few / Bacteria: Moderate            ABG - ( 02 Dec 2020 21:49 )  pH, Arterial: 7.24  pH, Blood: x     /  pCO2: 66    /  pO2: 258   / HCO3: 27    / Base Excess: -.6   /  SaO2: 99                Radiology:   EXAM:  CT ABDOMEN AND PELVIS IC                          EXAM:  CT CHEST IC                            PROCEDURE DATE:  2020          INTERPRETATION:  CLINICAL INFORMATION: Trauma and cardiac arrest. Dialysis patient    COMPARISON: CT chest 2019 and CT abdomen pelvis 2019    PROCEDURE:  CT of the Chest, Abdomen and Pelvis was performed with intravenous contrast.  Imaging was performed through the chest in the arterial phase followed by imaging of the abdomen and pelvis in the portal venous phase.  Intravenous contrast: 90 ml Omnipaque 350. 10 ml discarded.  Oral contrast: None.  Sagittal and coronal reformats were performed.    FINDINGS:  CHEST:  LUNGS AND LARGE AIRWAYS: Patent central airways. There are patchy posterior densities in the lower lobes suggestive of atelectasis versus infiltrates.  ET tube is seen with the tip just above the sirena.  PLEURA: No pleural effusion. No pneumothorax  VESSELS: There is atherosclerotic calcification of aorta and coronary arteries.  HEART: Heart size is normal. No pericardial effusion. Left-sided pacemaker/AICD is seen  MEDIASTINUM AND GERRI: No lymphadenopathy.  CHEST WALL AND LOWER NECK: Within normal limits.    ABDOMEN AND PELVIS:  LIVER: Within normal limits.  BILE DUCTS: Normal caliber.  GALLBLADDER: Cholelithiasis.  SPLEEN: Within normal limits.  PANCREAS: Within normal limits.  ADRENALS: Within normal limits.  KIDNEYS/URETERS: Within normal limits.    BLADDER: Thick-walled but nondistended.  REPRODUCTIVE ORGANS: Penileimplant is seen. Prostate gland is unremarkable    BOWEL: No bowel obstruction. Appendix is normal.. NG to extends into the distal stomach  PERITONEUM: No ascites.  VESSELS: Atherosclerotic changes.  RETROPERITONEUM/LYMPH NODES: No lymphadenopathy.  ABDOMINAL WALL: Within normal limits.  BONES: There are degenerative changes of the spine. There are displaced right fourth fifth sixth and seventh anterior rib fractures. There are left anterior sixth and 7 anterior mildly displaced rib fractures andnondisplaced left anterior fourth rib fracture    IMPRESSION:  Multiple bilateral rib fractures right greater than left  Bilateral lower lobe atelectasis versus infiltrate  Cholelithiasis  ET tube and NG tube  No evidence of acute abnormality in the abdomen or pelvis            MILLA MONET MD; Attending Radiologist  This document has been electronically signed. Dec  2 2020  8:24PM      Assessment/Plan:  89 Y old male pmhx ESRD on HD, AICD, CHF, HTN, presented w/ out of hospital cardiac arrest with prolonged ROSC    -CTH w/ no acute findings. C1-C2 fracture noted on CT neck  -Patient unresponsive at bedside off sedation w/ poor neuro exam  -Intubated for airway proteciton. Maintain full mechanical ventilator. Check post ABG   -s/p prolonged cardiac arrest w/ cardiogenic shock. Started on Epinephrine gtt   -Broad spectrum abx started    Long discussion with daughter at bedside regarding fathers condition. She is aware that given his age and prolonged cardiac arrest he has a poor prognosis of meaningful recovery. She discussed with her sister who is on her way and would like to see her father. She does not want escalation of care and has made patient DNR. Does not want to add pressors or any further aggressive measures at this time. Would like to keep epinephrine gtt and patient intubated at this time until her sister is able to come then will likely palliatively wean.    Admit to ICU. Discussed w/ e-ICU attending and trauma surgeon.     CODE STATUS: DNR/DNi  GOC discussion: Y  Critical care time spent (mins): 40 minutes of critical care time spent reviewing chart, ordering tests/labs, discussing with interdisciplinary team, and goals of care discussion. Not including time spent performing procedures

## 2020-12-02 NOTE — ED PROVIDER NOTE - PHYSICAL EXAMINATION
Pt unconscious, no purposeful movement  C-collar in place  Pupils 3 mm bilaterally, slugglish  Ilan airway in placed on arrival with mechanical breath sounds  Pulse present  Abrasions to forehead, nose, right knee   No active external bleeding  Slightly diminished rectal tone

## 2020-12-02 NOTE — H&P ADULT - NSHPLABSRESULTS_GEN_ALL_CORE
Labs:                          10.8   18.61 )-----------( 223      ( 02 Dec 2020 18:53 )             35.3               PT/INR - ( 02 Dec 2020 18:53 )   PT: 12.7 sec;   INR: 1.09 ratio         PTT - ( 02 Dec 2020 18:53 )  PTT:39.0 sec    < from: CT Abdomen and Pelvis w/ IV Cont (12.02.20 @ 19:52) >    IMPRESSION:  Multiple bilateral rib fractures right greater than left  Bilateral lower lobe atelectasis versus infiltrate  Cholelithiasis  ET tube and NG tube  No evidence of acute abnormality in the abdomen or pelvis        < end of copied text >    < from: CT Cervical Spine No Cont (12.02.20 @ 19:51) >    IMPRESSION:  1. Oblique nondisplaced fracture of the odontoid process of C2.  2. Fractures of the posterior arch of C1 bilaterally more extensive on the right.  3. Extensive degenerative changes in the mid and lower cervical region.    Follow-up MR imaging of the cervical spine is recommended.  < from: CT Head No Cont (12.02.20 @ 19:51) >    IMPRESSION:  1)  chronic ischemic changes with multifocal atrophy. No acute abnormality suggested..  2)  no intracerebral hemorrhage, contusion, or acute extracerebral collections are identified.      < end of copied text >
Knowledge and Beliefs

## 2020-12-02 NOTE — PROGRESS NOTE ADULT - SUBJECTIVE AND OBJECTIVE BOX
HPI:     89 Y old male unwitnessed fall four steps, was found unconscious lost vitals at the scene , ACLS protocol for 25 min before arrival 6 Epinephrine pushes, CPR for 10 min in ER, with 1 epi, bicarb , calcium, ROSC was placed on epi infusion. Patient was intubated, cervical collar in  place.   after return of ROSC patient is on epinephrine drip, ct shows odontoid fracture, multiple rib fracture    PMH    HTN    HLD    ESRD     MEDICATIONS  (STANDING):  EPINEPHrine    Infusion 0.015 MICROgram(s)/kG/Min (5.06 mL/Hr) IV Continuous <Continuous>  heparin   Injectable 5000 Unit(s) SubCutaneous every 8 hours  pantoprazole  Injectable 40 milliGRAM(s) IV Push daily  piperacillin/tazobactam IVPB. 3.375 Gram(s) IV Intermittent once  vancomycin  IVPB 1000 milliGRAM(s) IV Intermittent once    MEDICATIONS  (PRN):    Height (cm): 167.6 ( @ 18:54)  Weight (kg): 90 ( @ 19:14)  BMI (kg/m2): 32 ( @ 19:14)    ICU Vital Signs Last 24 Hrs  T(C): 35.9 (02 Dec 2020 20:37), Max: 35.9 (02 Dec 2020 20:37)  T(F): 96.6 (02 Dec 2020 20:37), Max: 96.6 (02 Dec 2020 20:37)  HR: 67 (02 Dec 2020 21:00) (67 - 103)  BP: 121/57 (02 Dec 2020 21:00) (84/47 - 210/97)  BP(mean): 76 (02 Dec 2020 21:00) (61 - 127)  ABP: --  ABP(mean): --  RR: 12 (02 Dec 2020 21:00) (12 - 24)  SpO2: 100% (02 Dec 2020 21:00) (97% - 100%)      Mode: AC/ CMV (Assist Control/ Continuous Mandatory Ventilation)  RR (machine): 14  TV (machine): 400  FiO2: 100  PEEP: 5  ITime: 1  PIP: 28      I&O's Summary                          10.8   18.61 )-----------( 223      ( 02 Dec 2020 18:53 )             35.3     12-02    136  |  99  |  24<H>  ----------------------------<  345<H>  4.8   |  27  |  3.97<H>    Ca    9.6      02 Dec 2020 20:31    TPro  6.7  /  Alb  2.4<L>  /  TBili  0.4  /  DBili  x   /  AST  166<H>  /  ALT  120<H>  /  AlkPhos  298<H>  12-02      CARDIAC MARKERS ( 02 Dec 2020 20:31 )  x     / x     / 387 U/L / x     / x        Urinalysis Basic - ( 02 Dec 2020 20:07 )    Color: Yellow / Appearance: very cloudy / S.010 / pH: x  Gluc: x / Ketone: Negative  / Bili: Negative / Urobili: Negative mg/dL   Blood: x / Protein: 500 mg/dL / Nitrite: Negative   Leuk Esterase: Moderate / RBC: 6-10 /HPF / WBC >50   Sq Epi: x / Non Sq Epi: Few / Bacteria: Moderate                                                                 Advanced Directives: DNR    This was a Telehealth visit using A/V capability  Provider was located at [address] [place] [address]

## 2020-12-02 NOTE — ED ADULT NURSE REASSESSMENT NOTE - NS ED NURSE REASSESS COMMENT FT1
Received report from EVELYN Connor.  Patient is CODE TRAUMA, + pulse on arrival, PEA @ 1858, ROSC achieved at 1900.  Patient intubated at 1906, size 18, 23 @ lip.  Epinephrine infusion started @ 1920.  Patient currently hypotensive @ 84/47, 2 units PRBC pending.  Patient to go to CT scan, will continue plan of care.

## 2020-12-02 NOTE — H&P ADULT - ASSESSMENT
89 y old mlae with multiple medical problems, fall from 4 steps, cardiac arrest , down time 25 min, C1. C2 fracture, B/L rib fractures    Mechanical ventilation  Pressors as needed   Neuro checks Q  1  Cervical collar: all the time   Incentive spirometry  Vitals, IS/OS Q 4  Diet:   ( ) as tolerated (X ) NPO, NGT  DVT/GI prophylaxis  Activity:   Bed rest   Hold A/C  Spine consult: May need MRI   ICU consult, follow up cardiac enzymes  Nephrology consult   D/W family, Pt os critically ill, with poor prognosis, continue  with support now but DNR.

## 2020-12-02 NOTE — H&P ADULT - NSHPREVIEWOFSYSTEMS_GEN_ALL_CORE
ROS:.  [] A ten-point review of systems was otherwise negative except as noted.  Systemic:	[ ] Fever	[ ] Chills	[ ] Night sweats    [ ] Fatigue	[ ] Other  [] Cardiovascular:  [] Pulmonary:  [] Renal/Urologic:  [] Gastrointestinal: abdominal pain, vomiting  [] Metabolic:  [] Neurologic:  [] Hematologic:  [] ENT:  [] Ophthalmologic:  [] Musculoskeletal:    [ X] Due to altered mental status/intubation, subjective information were not able to be obtained from the patient. History was obtained, to the extent possible, from review of the chart and collateral sources of information.

## 2020-12-02 NOTE — ED PROVIDER NOTE - CARE PROVIDER_API CALL
Amy Tilley  SURGERY  [address] [facility], [address] [place] [address]  Phone: [phone]  Fax: [phone]  Follow Up Time:

## 2020-12-02 NOTE — ED PROVIDER NOTE - CARE PLAN
Principal Discharge DX:	Cardiac arrest  Secondary Diagnosis:	Fracture of cervical vertebra   Principal Discharge DX:	Cardiac arrest  Secondary Diagnosis:	Fracture of cervical vertebra  Secondary Diagnosis:	Rib fractures

## 2020-12-02 NOTE — ED PROVIDER NOTE - PROGRESS NOTE DETAILS
Merlene Diehl for attending Dr. Knight: Discussed with family, pt is DNR, MOLST form filled out. Pt currently with good BP, HR, and pulse intact, not on sedation, not making any purposeful movements. Pt with C1 ad C2 fx, will discuss with spine, discussed with ICU PA, will admit to ICU likely under trauma service. Patient with C1 and C2 fractures, discussed with Neurosurgery PA.  Will evaluate. Nura Knight D.O. ICU PA saw patient, discussed with family, may move to comfort care, will reeval. Nura Knight D.O.

## 2020-12-02 NOTE — H&P ADULT - NSHPPHYSICALEXAM_GEN_ALL_CORE
vVital Signs Last 24 Hrs  T(C): 35.9 (02 Dec 2020 20:37), Max: 35.9 (02 Dec 2020 20:37)  T(F): 96.6 (02 Dec 2020 20:37), Max: 96.6 (02 Dec 2020 20:37)  HR: 77 (02 Dec 2020 20:10) (76 - 103)  BP: 112/55 (02 Dec 2020 20:10) (84/47 - 210/97)  BP(mean): 73 (02 Dec 2020 20:10) (61 - 127)  RR: 16 (02 Dec 2020 20:10) (16 - 24)  SpO2: 100% (02 Dec 2020 20:10) (97% - 100%)    PHYSICAL EXAM:  Constitutional: NAD, GCS: 3/15    Eyes:  mid dilated equal, sluggish pupil  ENMT:  small forehead, nasal abrasion  Neck:  WNL, non tender  Back: Non tender  Respiratory: CTABL, coarse breath sounds.   Cardiovascular:  S1+S2+0  Gastrointestinal: Soft, ND , NT  Genitourinary:  WNL  Extremities: NV intact, B/L knee abrasion. Rt tibial IO.   Vascular:  Intact  Neurological: No focal neurological deficit,  CN, motor and sensory system grossly intact.  Skin: WNL  Musculoskeletal: WNL  Psychiatric: Grossly WNL vVital Signs Last 24 Hrs  T(C): 35.9 (02 Dec 2020 20:37), Max: 35.9 (02 Dec 2020 20:37)  T(F): 96.6 (02 Dec 2020 20:37), Max: 96.6 (02 Dec 2020 20:37)  HR: 77 (02 Dec 2020 20:10) (76 - 103)  BP: 112/55 (02 Dec 2020 20:10) (84/47 - 210/97)  BP(mean): 73 (02 Dec 2020 20:10) (61 - 127)  RR: 16 (02 Dec 2020 20:10) (16 - 24)  SpO2: 100% (02 Dec 2020 20:10) (97% - 100%)    PHYSICAL EXAM:  Constitutional: NAD, GCS: 3/15    Eyes:  mid dilated equal, sluggish pupil  ENMT:  small forehead, nasal abrasion  Neck:  WNL, non tender  Back: Non tender  Respiratory: CTABL, coarse breath sounds.   Cardiovascular:  S1+S2+0  Gastrointestinal: Soft, ND , NT, decreased rectal tone.   Genitourinary:  WNL  Extremities: NV intact, B/L knee abrasion. Rt tibial IO.   Vascular:  Intact  Neurological: No focal neurological deficit,  CN, motor and sensory system grossly intact.  Skin: WNL  Musculoskeletal: WNL  Psychiatric: Grossly WNL

## 2020-12-02 NOTE — CONSULT NOTE ADULT - SUBJECTIVE AND OBJECTIVE BOX
Neurosurgery:    89 y/o male with a PMHx of ESRD on dialysis, pulmonary edema, pancreatitis, AICD placement, CHF, HTN, HLD, MI, AICD in place, presents to the ED BIBEMS from home for cardiac arrest 25 minutes PTA. As per EMS, pt with unwitnessed fall backwards down 4-5 wooden stairs with head injury. EMS states pt granddaughter heard pt yell then heard pt fall down stairs and found pt with blood on face with laceration to forehead and nasal bridge. Granddaughter reports pt without a pulse when found pt, started CPR on pt, and called 911. EMS states arrival 5 minutes after 911 call, upon EMS arrival pt with wide-complex PEA, received 6 doses of epi, obtained ROSC 2-3 times with last ROSC 5 minutes PTA. Pt with IO access in RLE and sena airway in place upon ED arrival. EMS notes 25 minutes down time. Not shocked in the field. Unknown if pt arrested prior to or after fall. Pt received full dialysis treatment today. Complete history unobtainable secondary to pt unresponsiveness, partial Hx obtained via EMS.    Trauma scan led to discover of C1,C2 fracture - Spine Neurosurgery consulted.    PAST MEDICAL & SURGICAL HISTORY:  Pulmonary edema  Pancreatitis  AICD (automatic cardioverter/defibrillator) present  CHF (Congestive Heart Failure)  HTN (Hypertension)  High Cholesterol  Heart Attack  Dialysis Patient  Diabetes  AICD (automatic cardioverter/defibrillator) present  History of penile implant  History of Hernia Repair    Review of Symptoms: PACO    Vital Signs Last 24 Hrs  T(C): --  T(F): --  HR: 77 (02 Dec 2020 20:10) (76 - 103)  BP: 112/55 (02 Dec 2020 20:10) (84/47 - 210/97)  BP(mean): 73 (02 Dec 2020 20:10) (61 - 127)  RR: 16 (02 Dec 2020 20:10) (16 - 24)  SpO2: 100% (02 Dec 2020 20:10) (97% - 100%)    Labs:                        10.8   18.61 )-----------( 223      ( 02 Dec 2020 18:53 )             35.3   PT/INR - ( 02 Dec 2020 18:53 )   PT: 12.7 sec;   INR: 1.09 ratio    PTT - ( 02 Dec 2020 18:53 )  PTT:39.0 sec    Radiology report:  - CT head:  IMPRESSION:  1)  chronic ischemic changes with multifocal atrophy. No acute abnormality suggested..  2)  no intracerebral hemorrhage, contusion, or acute extracerebral collections are identified.    - CT cspine: IMPRESSION:  1. Oblique nondisplaced fracture of the odontoid process of C2.  2. Fractures of the posterior arch of C1 bilaterally more extensive on the right.  3. Extensive degenerative changes in the mid and lower cervical region.      Physical Exam:  Intubated not sedated  HEENT: facial abrasion scalp from fall.  Neck: Supple in collar  Respiratory: Breath sounds are clear bilaterally  Cardiovascular: S1 and S2, regular rhythm  Gastrointestinal: Soft, NT/ND  Extremities:  no edema  Vascular: No carotid Bruit      Neurological Exam:  HF: Intubated, sedated.  CN: PERRL 3mm  Motor: no motor at this time.  Coord:  No FNFA, dysmetria, JB intact   Gait/Balance: Cannot test    A/P:  89 y/o male with a PMHx of ESRD on dialysis, pulmonary edema, pancreatitis, AICD placement, CHF, HTN, HLD, MI, AICD in place.  Pt s/p fall secondary to suspected cardiac insult with subsequent C1,C2 fracture.    - collar at all times  - MRI cspine when possible   - Spine precautions, caution with intubation / re-intubation  - DVT ppx  - No urgent emergent neurosurgical intervention at this time  - pt extremely critical and will maintain collar for next 48 hours with further evaluation.  - d/w Attending Dr. Westbrook

## 2020-12-02 NOTE — INITIAL ORGAN DONATION REFERRAL - NSORGANDONATIONCLINICALTRIGGER_GEN_ALL_CORE
[place] Coma Scale is less than or equal to 5/Family discussion withdrawal of life-sustaining therapies is anticipated

## 2020-12-03 NOTE — CHART NOTE - NSCHARTNOTEFT_GEN_A_CORE
Met with Beau at bedside--after family sees the pt--will move forward with extubation and stopping pressors.  Start MSO4

## 2020-12-03 NOTE — CHART NOTE - NSCHARTNOTEFT_GEN_A_CORE
Notified by Nurse at approximately 16:27 that patient had :    Patient immediately assessed at the bedside. Noted to have absent breath and heart sounds, no peripheral pulses, absent corneal reflex and pupillary responses, non-responsive and non-verbal to painful stimuli.    Patient family notified.    Primary team notified.

## 2020-12-03 NOTE — PROGRESS NOTE ADULT - ASSESSMENT
A/P:  89 y/o male with a PMHx of ESRD on dialysis, pulmonary edema, pancreatitis, AICD placement, CHF, HTN, HLD, MI, AICD in place.  Pt s/p fall secondary to suspected cardiac insult with subsequent C1,C2 fracture.    - collar at all times, spine precautions   - DVT ppx  - No urgent emergent neurosurgical intervention at this time  - Family has made patient DNR, possible withdrawal of care  - d/w Attending Dr. Westbrook    Will sign off for now, please re-consult if there is a change in patients prognosis 
Patient with cardiac arrest, than fall, not entirely clear with cardiac arrest than fall  or vice versa  now on epinephrine drip  cervical spine fracture  rib fracture aspiration pneumonia    patient on epinephrine drip,   now dnr with no escalation of pressures  likely anoxic encephalopathy given duration of cardiac arrest    d/w PA
IMP:    89 y/o male with ESRD on RRT admitted with out of hospital cardiac arrest with prolong down time.  C1 and C 2 C spine Fx  Acute resp failure/arrest  Anoxic brain injury    Spoke with daughter Beau--[phone].  They are coming in today to WD life support.  No further escalation of care/pressors.    Vent for now  Will start MSO4 gtt

## 2020-12-03 NOTE — PROGRESS NOTE ADULT - SUBJECTIVE AND OBJECTIVE BOX
Pt admitted under trauma surgery service     Hospital # 1  ICU # 1  Vent # 1    CC:  Out of hospital Cardiac arrest    HPI:    89 y/o male with ESRD on RRT found down at bottom of stair case after falling.  No pulse.  ACLS initiated and prolong CPR before ROSC.  Vented.  No responsive.  CT C1 and C2 Fx    12/3:  Case d/w GERARD Nails.  Pt seen and examined in ICU--vented--not responsive--breathing over the set RR on Vent.  Epi gtt.  Very poor outlook    PMH:  As above.     PSH:  As above.     FH: Non Contributory other than those listed in HPI    Social History:  Unobtainable due to clinical condition     MEDICATIONS  (STANDING):  EPINEPHrine    Infusion 0.015 MICROgram(s)/kG/Min (5.06 mL/Hr) IV Continuous <Continuous>  heparin   Injectable 5000 Unit(s) SubCutaneous every 8 hours  pantoprazole  Injectable 40 milliGRAM(s) IV Push daily    MEDICATIONS  (PRN):      Allergies: NKDA    ROS:  SEE BELOW    Height (cm): 167.6 ( @ 18:54)  Weight (kg): 77.2 ( @ 23:00)  BMI (kg/m2): 27.5 ( @ 23:00)    ICU Vital Signs Last 24 Hrs  T(C): 37.6 (03 Dec 2020 06:00), Max: 37.6 (03 Dec 2020 06:00)  T(F): 99.6 (03 Dec 2020 06:00), Max: 99.6 (03 Dec 2020 06:00)  HR: 66 (03 Dec 2020 09:00) (60 - 103)  BP: 117/46 (03 Dec 2020 09:00) (78/37 - 210/97)  BP(mean): 62 (03 Dec 2020 09:00) (46 - 127)  ABP: --  ABP(mean): --  RR: 22 (03 Dec 2020 09:00) (12 - 24)  SpO2: 98% (03 Dec 2020 09:00) (97% - 100%)      Mode: AC/ CMV (Assist Control/ Continuous Mandatory Ventilation)  RR (machine): 20  TV (machine): 400  FiO2: 40  PEEP: 5  ITime: 1  MAP: 11  PIP: 24      I&O's Summary    02 Dec 2020 07:01  -  03 Dec 2020 07:00  --------------------------------------------------------  IN: 130 mL / OUT: 325 mL / NET: -195 mL        Physical Exam:  SEE BELOW                          12.3   17.31 )-----------( 238      ( 03 Dec 2020 07:10 )             38.4       12-    136  |  97  |  36<H>  ----------------------------<  325<H>  4.3   |  28  |  4.71<H>    Ca    9.1      03 Dec 2020 07:10  Phos  4.5     12  Mg     2.2     12    TPro  7.3  /  Alb  2.4<L>  /  TBili  0.9  /  DBili  x   /  AST  157<H>  /  ALT  104<H>  /  AlkPhos  302<H>  1203      CARDIAC MARKERS ( 02 Dec 2020 20:31 )  0.046 ng/mL / x     / 387 U/L / x     / x            ABG - ( 02 Dec 2020 21:49 )  pH, Arterial: 7.24  pH, Blood: x     /  pCO2: 66    /  pO2: 258   / HCO3: 27    / Base Excess: -.6   /  SaO2: 99                  Urinalysis Basic - ( 02 Dec 2020 20:07 )    Color: Yellow / Appearance: very cloudy / S.010 / pH: x  Gluc: x / Ketone: Negative  / Bili: Negative / Urobili: Negative mg/dL   Blood: x / Protein: 500 mg/dL / Nitrite: Negative   Leuk Esterase: Moderate / RBC: 6-10 /HPF / WBC >50   Sq Epi: x / Non Sq Epi: Few / Bacteria: Moderate        DVT Prophylaxis:                                                            Contraindication:     Advanced Directives:    Discussed with:    Visit Information:  Time spent excluding procedure:  45 cc mins    ** Time is exclusive of billed procedures and/or teaching and/or routine family updates.

## 2020-12-03 NOTE — PROGRESS NOTE ADULT - SUBJECTIVE AND OBJECTIVE BOX
HPI:  89 y/o male with a PMHx of ESRD on dialysis, pulmonary edema, pancreatitis, AICD placement, CHF, HTN, HLD, MI, AICD in place, presents to the ED BIBEMS from home for cardiac arrest 25 minutes PTA. As per EMS, pt with unwitnessed fall backwards down 4-5 wooden stairs with head injury. EMS states pt granddaughter heard pt yell then heard pt fall down stairs and found pt with blood on face with laceration to forehead and nasal bridge. Granddaughter reports pt without a pulse when found pt, started CPR on pt, and called 911. EMS states arrival 5 minutes after 911 call, upon EMS arrival pt with wide-complex PEA, received 6 doses of epi, obtained ROSC 2-3 times with last ROSC 5 minutes PTA. Pt with IO access in RLE and sena airway in place upon ED arrival. EMS notes 25 minutes down time. Not shocked in the field. Unknown if pt arrested prior to or after fall. Pt received full dialysis treatment today. Complete history unobtainable secondary to pt unresponsiveness, partial Hx obtained via EMS.    Trauma scan led to discover of C1,C2 fracture - Spine Neurosurgery consulted.    12/3- No events overnight, family has made the patient DNR, no change in exam, pt on Epinephrine drip     Vital Signs Last 24 Hrs  T(C): 37.6 (03 Dec 2020 06:00), Max: 37.6 (03 Dec 2020 06:00)  T(F): 99.6 (03 Dec 2020 06:00), Max: 99.6 (03 Dec 2020 06:00)  HR: 75 (03 Dec 2020 13:00) (60 - 103)  BP: 103/47 (03 Dec 2020 13:00) (78/37 - 210/97)  BP(mean): 61 (03 Dec 2020 13:00) (46 - 127)  RR: 24 (03 Dec 2020 13:00) (12 - 24)  SpO2: 97% (03 Dec 2020 13:00) (97% - 100%)    MEDICATIONS  (STANDING):  EPINEPHrine    Infusion 0.015 MICROgram(s)/kG/Min (5.06 mL/Hr) IV Continuous    PHYSICAL EXAM:  Intubated not sedated  HEENT: facial abrasion scalp from fall  Neck: Supple in collar  Respiratory: Breath sounds are clear bilaterally  Cardiovascular: S1 and S2, regular rhythm  Gastrointestinal: Soft, NT/ND  Extremities:  RLE IO in place - not being used   Vascular: No carotid Bruit    Neurological Exam:  HF: Intubated, no sedation   CN: PERRL 3mm no reaction   Motor: no motor at this time  Coord:  No FNFA, dysmetria, JB intact   Gait/Balance: Cannot test    LABS:                         12.3   17.31 )-----------( 238      ( 03 Dec 2020 07:10 )             38.4     12-03    136  |  97  |  36<H>  ----------------------------<  325<H>  4.3   |  28  |  4.71<H>    Ca    9.1      03 Dec 2020 07:10  Phos  4.5     12-03  Mg     2.2     12-03    TPro  7.3  /  Alb  2.4<L>  /  TBili  0.9  /  DBili  x   /  AST  157<H>  /  ALT  104<H>  /  AlkPhos  302<H>  12-03    LIVER FUNCTIONS - ( 03 Dec 2020 07:10 )  Alb: 2.4 g/dL / Pro: 7.3 gm/dL / ALK PHOS: 302 U/L / ALT: 104 U/L / AST: 157 U/L / GGT: x

## 2021-01-16 NOTE — ED ADULT TRIAGE NOTE - SPO2 (%)
INTENSIVE CARE UNIT  Resident Physician Progress Note    Patient - Mack Kate  Date of Admission -  1/13/2021  4:15 PM  Date of Evaluation -  1/16/2021  Room and Bed Number -  0101/0101-01   Hospital Day - 3      SUBJECTIVE:     OVERNIGHT EVENTS:        No acute events overnight. This morning patient blood pressure went up to 190s with heart rate around 60. She was given a dose of hydralazine 5 mg IV leading to improvement of her blood pressure. Patient mentation is improving, she is more alert today. Able to tell her name and where she is. She continues to have left-sided body weakness due to right MCA stroke. Denies chest pain, shortness of breath, fever, vomiting or abdominal pain. Afebrile, SBP 140s, HR 90s. Labs reviewed: Sodium improving    Blood cultures 1/13 growing Enterococcus faecium    Urine culture grew ecoli although 10-50,000 only. TODAY: Rhonda Lugo. Travon Cantu admitted 1/13/2020 is a 70-year-old female who presents after being recently discharged on December 30/2020 after a 2  week stay in which she presented with a right MCA ischemic infarct as well as a urinary tract infection, patient was sent to a skilled nursing facility at that time, presented to outside ER with altered mental status and hyperglycemia. Patient did have a sinus CT scan to have acute intracranial hemorrhage with hyperdense findings on the posterior aspect of the department of parietal and temporal lobe as well as the right caudate nucleus with concerns for new blood products and with redemonstration of extensive right MCA territory infarct, there critical care was consulted. Nephrology was consulted with concerns for hypernatremia, started patient on half-normal saline with a sodium yesterday morning at 154 down from 158 the previous day. 154 again today     Patient's vitals morning respiratory 16, pulse 87, blood pressure 162/71, saturating 100% on 2 L nasal cannula. Morning bp systolic above 335, given lopressor early, tylenol for pain     PLAN: Continue rocephin for e.coli, repeat CT head tomorrow, started on lovenox today, restart aspirin if ct head stable     AWAKE & FOLLOWING COMMANDS:  [] No   [x] Yes    SECRETIONS Amount:  [] Small [] Moderate  [] Large  [x] None  Color:     [] White [] Colored  [] Bloody    SEDATION:  RAAS Score:  [] Propofol gtt  [] Versed gtt  [] Ativan gtt   [x] No Sedation    PARALYZED:  [x] No    [] Yes    VASOPRESSORS:  [x] No    [] Yes  [] Levophed [] Dopamine [] Vasopressin  [] Dobutamine [] Phenylephrine [] Epinephrine      OBJECTIVE:     VITAL SIGNS:  BP (!) 107/50   Pulse 58   Temp 98.6 °F (37 °C) (Oral)   Resp 14   Ht 5' 4\" (1.626 m)   Wt 226 lb 6.6 oz (102.7 kg)   LMP  (LMP Unknown)   SpO2 97%   BMI 38.86 kg/m²   Tmax over 24 hours:  Temp (24hrs), Av.1 °F (36.7 °C), Min:97 °F (36.1 °C), Max:98.6 °F (37 °C)      Patient Vitals for the past 8 hrs:   BP Temp Temp src Pulse Resp SpO2   21 0530    58 14 97 %   21 0500 (!) 107/50   69 20 100 %   21 0400 (!) 130/58 98.6 °F (37 °C) Oral 75 16 100 %   21 0300 125/64   77 18 100 %   21 0200 128/62   72 18 100 %   21 0100 138/66   77 17 100 %   21 0000 132/64 98.6 °F (37 °C) Oral 74 21 100 %         Intake/Output Summary (Last 24 hours) at 2021 0726  Last data filed at 2021 0400  Gross per 24 hour   Intake 4954 ml   Output 1100 ml   Net 3854 ml     Date 21 0000 - 21 2359   Shift 0440-7622 6239-3635 2617-0083 24 Hour Total   INTAKE   I.V.(mL/kg) 995(9.7)   995(9.7)   NG/GT(mL/kg) 474(4.6)   474(4.6)   Shift Total(mL/kg) 6390(51.9)   7745(53.4)   OUTPUT   Urine(mL/kg/hr) 330   330   Shift Total(mL/kg) 330(3.2)   330(3.2)   Weight (kg) 102.7 102.7 102.7 102.7     Wt Readings from Last 3 Encounters:   01/15/21 226 lb 6.6 oz (102.7 kg)   21 220 lb (99.8 kg)   20 231 lb (104.8 kg)   acetaminophen, 650 mg, Q6H PRN      glucose, 15 g, PRN      dextrose, 12.5 g, PRN      glucagon (rDNA), 1 mg, PRN      dextrose, 100 mL/hr, PRN        SUPPORT DEVICES: [] Ventilator [] BIPAP  [x] Nasal Cannula [] Room Air        Lab Results   Component Value Date    AJF8MNC 36 01/15/2021    FIO2 28.0 01/15/2021         DATA:  Complete Blood Count:   Recent Labs     01/14/21  0511 01/15/21  0541 01/16/21  0520   WBC 8.5 7.5 6.7   RBC 3.90* 3.75* 3.30*   HGB 10.8* 10.4* 9.2*   HCT 38.1 36.2* 31.4*   MCV 97.7 96.5 95.2   MCH 27.7 27.7 27.9   MCHC 28.3* 28.7 29.3   RDW 13.8 13.4 13.2    139 116*   MPV 12.4 12.1 12.3        Last 3 Blood Glucose:   Recent Labs     01/13/21  0919 01/13/21  1325 01/14/21  0511 01/15/21  0541 01/16/21  0520   GLUCOSE 372* 234* 196* 266* 313*        PT/INR:    Lab Results   Component Value Date    PROTIME 10.3 12/15/2020    INR 1.0 12/15/2020     PTT:    Lab Results   Component Value Date    APTT 31.1 12/16/2020       Comprehensive Metabolic Profile:   Recent Labs     01/14/21  0511 01/14/21  0511 01/15/21  0541 01/15/21  0541 01/15/21  1950 01/16/21  0059 01/16/21  0520   *   < > 154*   < > 152* 148* 147*   K 3.5*   < > 3.8   < > 4.0 3.6* 3.7   *   < > 115*   < > 114* 112* 112*   CO2 36*   < > 32*   < > 30 30 31   BUN 57*  --  35*  --   --   --  21   CREATININE 0.54  --  0.43*  --   --   --  0.38*   GLUCOSE 196*  --  266*  --   --   --  313*   CALCIUM 9.2  --  9.0  --   --   --  8.6   PROT 6.0*  --  5.7*  --   --   --  5.1*   LABALBU 2.6*  --  2.5*  --   --   --  2.3*   BILITOT 0.45  --  0.40  --   --   --  0.32   ALKPHOS 72  --  74  --   --   --  65   AST 27  --  29  --   --   --  25   ALT 15  --  18  --   --   --  18    < > = values in this interval not displayed.       Magnesium:   Lab Results   Component Value Date    MG 1.7 01/16/2021    MG 2.0 01/15/2021    MG 2.2 01/14/2021     Phosphorus:   Lab Results   Component Value Date    PHOS 2.3 01/16/2021 PHOS 2.6 01/15/2021    PHOS 3.5 01/14/2021     Ionized Calcium:   Lab Results   Component Value Date    CAION 1.23 12/22/2020    CAION 1.22 12/21/2020    CAION 1.21 12/20/2020        Urinalysis:   Lab Results   Component Value Date    NITRU NEGATIVE 01/13/2021    COLORU YELLOW 01/13/2021    PHUR 5.5 01/13/2021    WBCUA TOO NUMEROUS TO COUNT 01/13/2021    RBCUA 2 TO 5 01/13/2021    MUCUS NOT REPORTED 01/13/2021    TRICHOMONAS NOT REPORTED 01/13/2021    YEAST NOT REPORTED 01/13/2021    BACTERIA MANY 01/13/2021    SPECGRAV 1.020 01/13/2021    LEUKOCYTESUR LARGE 01/13/2021    UROBILINOGEN Normal 01/13/2021    BILIRUBINUR NEGATIVE 01/13/2021    BILIRUBINUR NEGATIVE 02/24/2012    GLUCOSEU TRACE 01/13/2021    GLUCOSEU NEGATIVE 02/24/2012    KETUA NEGATIVE 01/13/2021    AMORPHOUS NOT REPORTED 01/13/2021       HgBA1c:    Lab Results   Component Value Date    LABA1C 6.8 12/16/2020     TSH:    Lab Results   Component Value Date    TSH 1.38 12/19/2018     Lactic Acid:   Lab Results   Component Value Date    LACTA NOT REPORTED 01/16/2021    LACTA NOT REPORTED 01/15/2021    LACTA NOT REPORTED 01/14/2021      Troponin: No results for input(s): TROPONINI in the last 72 hours.     Microbiology:  Urine Culture:  No components found for: LAILA      Other Labs:  CBC:   Lab Results   Component Value Date    WBC 6.7 01/16/2021    RBC 3.30 01/16/2021    RBC 4.21 02/24/2012    HGB 9.2 01/16/2021    HCT 31.4 01/16/2021    MCV 95.2 01/16/2021    MCH 27.9 01/16/2021    MCHC 29.3 01/16/2021    RDW 13.2 01/16/2021     01/16/2021     02/24/2012    MPV 12.3 01/16/2021     BMP:    Lab Results   Component Value Date     01/16/2021    K 3.7 01/16/2021     01/16/2021    CO2 31 01/16/2021    BUN 21 01/16/2021    LABALBU 2.3 01/16/2021    LABALBU 4.2 02/24/2012    CREATININE 0.38 01/16/2021    CALCIUM 8.6 01/16/2021    GFRAA >60 01/16/2021    LABGLOM >60 01/16/2021    GLUCOSE 313 01/16/2021    GLUCOSE 105 02/24/2012 ASSESSMENT:     Patient Active Problem List    Diagnosis Date Noted    Bacteremia     Acute cystitis without hematuria     Hypernatremia     Cerebrovascular accident (CVA) due to thrombosis of right middle cerebral artery ([address]     Oropharyngeal dysphagia     Dehydration 01/13/2021    Ischemic cerebral stroke due to intracranial large artery atherosclerosis (HCC)     Acute cerebrovascular accident (CVA) ([address]     Right middle cerebral artery stroke ([address]     Non-traumatic rhabdomyolysis     Cerebrovascular accident (CVA) due to embolic occlusion of right middle cerebral artery ([address] 12/15/2020    Influenza B 02/24/2018    Reactive airway disease with acute exacerbation 02/24/2018    Long term (current) use of antithrombotics/antiplatelets 14/95/9167    Medication monitoring encounter 10/09/2017    Postlaminectomy syndrome     Postlaminectomy syndrome     Primary osteoarthritis of right knee     Anxiety 10/25/2016    Depression (emotion) 10/25/2016    Postlaminectomy syndrome, lumbar 10/25/2016    S/P coronary artery stent placement - RCA 10/12/16 - Dr. General Johnson 10/12/2016    Type 2 diabetes mellitus with complication, without long-term current use of insulin ([address] 06/16/2016    Chronic prescription opiate use 02/10/2016    Chronic pain     Coronary artery disease involving native coronary artery without angina pectoris     Hypertensive urgency     Acute coronary syndrome (HCC)     Congestive heart failure ([address]     Lymphadenopathy     Chronic knee pain 02/24/2015    Chronic use of opiate drugs therapeutic purposes 11/25/2014    Lumbar spondylosis 08/27/2014    Cervical spondylosis 08/27/2014    Urge incontinence of urine 07/31/2014    Knee pain, bilateral 06/25/2014    S/P TKR (total knee replacement) 06/25/2014    Morbid obesity ([address] 04/03/2014    YUDITH (obstructive sleep apnea) 04/02/2014    Chest pain 03/31/2014    Spinal stenosis in cervical region 02/06/2013  Edema 08/07/2012    Knee osteoarthritis 06/06/2012    DM (diabetes mellitus) ([facility] 02/21/2012    HTN (hypertension) 02/21/2012    Smoker 02/21/2012    Asthma 02/21/2012          PLAN:     Metabolic encephalopathy secondary to hypernatremia and recent large right MCA stroke  -CT head 1/13 redemonstration right MCA territory infarct, new hyperdense  hyperdensities along the posterior aspect of previous infarct parietal lobes at this time temporal lobe as well as aspect of the caudate nucleus  -Mentation and hyponatremia improving  -Continue 0.45 saline at 100 mL/h  -Free water bolus 250 mL every 6 hours  -Follow with further nephrology recommendations  -Repeat CT head today - if CT scan is stable then can resume aspirin, and 7 to 10 days after resuming aspirin can restart Plavix    Enterococcus faecalis bacteremia from unclear source  -Vancomycin for 10 days  -Continue to assess right gluteal decubitus wound  -Wound care consult  -May need to rule out GI source of bacteremia  -ID following    Hypertension  -Resume [id number] mg, Cozaar 25 mg. Continue metoprolol at low-dose 25 mg twice daily due to bradycardia  -Will readjust medications according to blood pressure    Type 2 diabetes mellitus  -Last A1c 6.8  -Blood glucose uncontrolled after starting tube feeding  -We will start on Lantus 15 units daily  -Continue cover with sliding scale insulin    History of coronary artery disease with PCI in the past  -Continue statin, BB  -Holding aspirin for now due to concern of intracranial bleed although questionable. CT head today - if stable then will restart aspirin      DVT prophylaxis: Lovenox  Diet: PEG tube feeding      Alan Reynolds MD  Internal Medicine Resident, PGY-1  [facility];  [place], [place]  7/48/8817,2:83 AM      Attending Physician Statement I have discussed the care of Suzy Wilkins, including pertinent history and exam findings with the resident. I have reviewed the key elements of all parts of the encounter with the resident. I have seen and examined the patient with the resident. I agree with the assessment and plan and status of the problem list as documented. I have seen the patient during rounds today, have reviewed the labs images noted and last imaging studies seen. She is slightly more arousable speech is the same without much change. She was hypertensive earlier today was bradycardic currently her heart rate is in 60s to 70 and blood pressure is 346 systolic. She is on Norvasc 10 mg losartan was resumed also will resume her clonidine which she take at home and she is on Lopressor 25 twice daily. She was seen by infectious disease for enterococcal bacteremia she is continued on vancomycin. Neurology has seen her and they wanted to repeat the CT head today and if CT head is stable they want to resume aspirin. Her sodium is 147 improved she is on tube feeding and she was started on free water yesterday she still getting 100 mL of half saline per nephrology. Lactic acid is normalized we will discontinue lactic acid. Lantus 15 units as her blood sugar are above 200 and adjust Lantus and continue sliding scale. Follow-up with neurology. We will transfer to stepdown bed with medicine team and critical care team will sign off once she is on the floor. Discussed with nursing staff, treatment and plan discussed. Discussed with respiratory therapist.    Total critical care time caring for this patient with life threatening, unstable organ failure, including direct patient contact, management of life support systems, review of data including imaging and labs, discussions with other team members and physicians at least 27  Min so far today, excluding procedures. Please note that this chart was generated using voice recognition Dragon dictation software. Although every effort was made to ensure the accuracy of this automated transcription, some errors in transcription may have occurred.      Zay Spears MD  1/16/2021 11:15 AM 96

## 2021-01-28 ENCOUNTER — APPOINTMENT (OUTPATIENT)
Dept: ELECTROPHYSIOLOGY | Facility: CLINIC | Age: 86
End: 2021-01-28

## 2021-03-22 NOTE — DIETITIAN INITIAL EVALUATION ADULT. - PROBLEM SELECTOR PROBLEM 4
Pt has not heard from anyone.  Schedule searched and first available appt with Dr. Singh is 4/26.  This appt was already r/s from 2/11.    [phone]   ESRD (end stage renal disease) on dialysis

## 2021-05-17 NOTE — ED ADULT NURSE NOTE - NS ED NURSE LEVEL OF CONSCIOUSNESS SPEECH
We reviewed the pathophysiology of posterior vitreous detachment and the occasional association with retinal tear and retinal detachment. Speaking Coherently

## 2021-10-19 NOTE — PATIENT PROFILE ADULT. - FUNCTIONAL SCREEN CURRENT LEVEL: EATING, MLM
Per coding- we do not have a flat rate for DOTs. He could be seen on same day.     Reached out to director/rev cycle to inquire on a flat rate option.    (0) independent

## 2021-11-10 NOTE — ED STATDOCS - PSH
AICD (automatic cardioverter/defibrillator) present    History of Hernia Repair    History of penile implant Auto Fax: yes Letter Template: [phone]

## 2022-01-01 NOTE — ED PROVIDER NOTE - CROS ED NEURO ALL NEG
Right ear hearing screen completed date: 2022  Right ear screen method: EOAE (evoked otoacoustic emission)  Right ear screen result: Passed  Right ear screen comment: N/A    Left ear hearing screen completed date: 2022  Left ear screen method: EOAE (evoked otoacoustic emission)  Left ear screen result: Passed  Left ear screen comments: N/A   negative...

## 2022-02-02 NOTE — PATIENT PROFILE ADULT. - NUTRITION PROFILE
Discussed refractive status w/ pt today. MR done by James, new GLRx given. Monitor yearly or PRN.
no indicators present

## 2022-03-25 NOTE — ED ADULT NURSE NOTE - TEMPLATE
LOW SALT FOR BLOOD PRESSURE CONTROL. LOW CARBOHYDRATE FOR BLOOD SUGAR AND WEIGHT CONTROL. LOW FAT DIET FOR CHOLESTEROL CONTROL. TAKE LIPITOR 40 MG. NIGHTLY   FOR CHOLESTEROL CONTROL. APPLY NIZORAL CREAM TO NAIL ON LEFT HAND 2 TIMES A DAY. APPLY NICODERM PATCH TO SKIN IN AM. REMOVE PATCH BEFORE RETIRING TO BED. NO SMOKING WITH PATCH ON. Laz Skkenneth REGULAR WALKING ADVISED. ADVISED WEIGHT REDUCTION. SEE DR. Blair Zarate FOR HAND NUMBNESS. NEXT APPOINTMENT IN 1 MONTH. Fall

## 2022-11-02 NOTE — INPATIENT CERTIFICATION FOR MEDICARE PATIENTS - NS ICMP TWO DAYS INPATIENT
Yes Patient presents to ED complaining of abdominal pain. Patient has history of IBS and is followed by a GI.

## 2023-03-01 NOTE — ED ADULT NURSE NOTE - CHPI ED NUR SYMPTOMS POS
PAIN Humira Counseling:  I discussed with the patient the risks of adalimumab including but not limited to myelosuppression, immunosuppression, autoimmune hepatitis, demyelinating diseases, lymphoma, and serious infections.  The patient understands that monitoring is required including a PPD at baseline and must alert us or the primary physician if symptoms of infection or other concerning signs are noted.

## 2023-07-11 NOTE — DISCHARGE NOTE ADULT - FUNCTIONAL SCREEN CURRENT LEVEL: AMBULATION, MLM
-- DO NOT REPLY / DO NOT REPLY ALL --  -- Message is from Engagement Center Operations (ECO) --    Order Request  Procedure: Body Scan     Message / reason: Patient has an appointment with Dr. Leger on 7/25. Oncology department stated that she needs a body scan before that appointment on 7/25. Patient states she will also need a referral along with the order.     Insurance type: HMO   Payor: [employer] MEDICARE / Plan: [employer] HMO / Product Type: O MISC    Preferred Delivery Method   Input in Epic     Caller Information       Type Contact Phone/Fax    07/11/2023 11:20 AM CDT Phone (Incoming) Sarah Quiñones (Self) [phone] (M)          Alternative phone number: [phone]     Can a detailed message be left? Yes    Message Turnaround:     [place]:    Please give this turnaround time to the caller:   \"This message will be sent to [state Provider's name]. The clinical team will fulfill your request as soon as they review your message.\"   (1) assistive equipment

## 2023-07-17 NOTE — ED ADULT TRIAGE NOTE - CCCP TRG CHIEF CMPLNT
-no longer needs weekly labs  -please reschedule her appts to below:  -romidepsin infusion on 7/21, 8/4, 8/11, 8/18  -cbc, cmp prior to infuion on 7/21, 8/11  -Please schedule PET scan in [place] on either 8/14 or 8/15  -virtual MD appt at 4:20 on 8/17    
fever/flu-like symptoms

## 2023-08-26 NOTE — ED ADULT TRIAGE NOTE - MODE OF ARRIVAL
Independent [facility] Visit. Department of Emergency Medicine   ED  Encounter Note  Admit Date/RoomTime: 2023  7:08 PM  ED Room: Internal Waiting/IntWait    NAME: Devi Farfan  : 1954  MRN: [id number]     Chief Complaint:  Back Pain and Leg Pain (LT SIDED HELPING SOMEONE IN CHAIR FELT PULL LT BACK DOWN LT LEG/ X 1 HR PTA)    History of Present Illness       Devi Farfan is a 71 y.o. old male with a prior history of no prior back problems, presents to the emergency department by private vehicle, for traumatic acute, aching, dull, and stiffness left sided middle and lower lumbar spine pain with radiation down the left lower extremity, for 1 hour(s) prior to arrival.  There has been a new injury as it relates to today's presenting complaint when he was lifting one of his friends and twisted while bending at the waist.   Since onset the symptoms have been remaining constant and is moderate-to-severe. He has associated signs & symptoms of nothing additional.   He denies any new weakness, tingling or paresthesias, recent back injection, recent spinal surgery, recent spinal/chiropractic manipulation, fever, abdominal pain, bladder incontinence, bowel incontinence, bladder retention, bladder urgency, bowel urgency, saddle paresthesias , or sacral numbness. The pain is aggraveated by any movement and standing and relieved by nothing in particular. He is not currently enrolled in an pain management program or managed by PCP or back specialist.      ROS   Pertinent positives and negatives are stated within HPI, all other systems reviewed and are negative. Past Medical History:  has a past medical history of Arthritis, Asthma, CAD (coronary artery disease), Cataract left eye, Esophagus, foreign body, GERD (gastroesophageal reflux disease), Heart disease, Hyperlipidemia, Hypertension, Psoriasis, and Sleep apnea.     Surgical History:  has a past surgical history that includes Cholecystectomy; Knee arthroscopy Walk in

## 2023-11-14 NOTE — PATIENT PROFILE ADULT - NSPROHMCARDIOSYMPCOND_GEN_A_NUR
What Type Of Note Output Would You Prefer (Optional)?: Standard Output Hpi Title: Evaluation of Skin Lesions heart failure

## 2024-07-02 NOTE — PHYSICAL THERAPY INITIAL EVALUATION ADULT - ASSISTIVE DEVICE FOR TRANSFER: GAIT, REHAB EVAL
Patient noted to suddenly have HR in the 160s-170s on the telemetry monitor. Stat EKG ordered. B/p stable. Pt asymptomatic. Sustaining 170s, Arnulfo NP for cardiac sx notified. Pt will need transferred to ICU for Amiodarone bolus and drip. Pt is aware.   rolling walker

## 2024-11-13 NOTE — ED PROVIDER NOTE - PROGRESS NOTE ADDITIONAL1
Take medication as prescribed  Increase fluid intake   Tylenol for fever and or pain  Follow up with health care provider if needed  
Additional Progress Note...

## 2025-02-03 NOTE — ED STATDOCS - CARE PROVIDER_API CALL
oJsiane nurse at [facility] notified patient needs to see a nutritionist per Dr. Lopez. Josiane nurse states patient does see the nutritionist at the facility    Jorge A Mercer)  Orthopaedic Surgery  [address] [place] [address]  Phone: [phone]  Fax: [phone]  Follow Up Time:

## 2025-05-24 NOTE — DISCHARGE NOTE ADULT - PHYSICIAN SECTION COMPLETE
73M w/ PMH of gastric cancer (s/p feeding J-tube), portal vein thrombosis (on Eliquis) AAA (s/p EVAR in 2023), T2DM, hypothyroidism, HTN, anemia, BPH, type 2 endoleak repair, s/p embolization of internal iliac artery branch (10/24), cirrhosis, who presents to the ED with worsening bilateral leg swelling and gen weakness, admitted for further evaluation. Yes